# Patient Record
Sex: FEMALE | Race: OTHER | HISPANIC OR LATINO | Employment: FULL TIME | URBAN - METROPOLITAN AREA
[De-identification: names, ages, dates, MRNs, and addresses within clinical notes are randomized per-mention and may not be internally consistent; named-entity substitution may affect disease eponyms.]

---

## 2017-02-08 ENCOUNTER — HOSPITAL ENCOUNTER (EMERGENCY)
Facility: HOSPITAL | Age: 37
Discharge: HOME | End: 2017-02-08
Attending: EMERGENCY MEDICINE | Admitting: EMERGENCY MEDICINE
Payer: MEDICARE

## 2017-02-08 VITALS
DIASTOLIC BLOOD PRESSURE: 66 MMHG | WEIGHT: 250 LBS | TEMPERATURE: 97.8 F | RESPIRATION RATE: 18 BRPM | OXYGEN SATURATION: 96 % | SYSTOLIC BLOOD PRESSURE: 124 MMHG | HEART RATE: 98 BPM

## 2017-02-08 DIAGNOSIS — R51.9 HEADACHE: Primary | ICD-10-CM

## 2017-02-08 LAB
BACTERIA UR QL AUTO: ABNORMAL /HPF
BILIRUB UR QL STRIP: NEGATIVE
CLARITY UR: ABNORMAL
COLOR UR: YELLOW
GLUCOSE UR STRIP-MCNC: NEGATIVE MG/DL
HCG UR QL: NEGATIVE
HGB UR QL STRIP.AUTO: ABNORMAL
KETONES UR STRIP-MCNC: NEGATIVE MG/DL
LEUKOCYTE ESTERASE UR QL STRIP: ABNORMAL
MUCOUS THREADS UR QL AUTO: ABNORMAL
NITRITE UR QL STRIP: NEGATIVE
NON-SQ EPI CELLS URNS QL MICRO: ABNORMAL /HPF
PH UR STRIP.AUTO: 6 [PH] (ref 4.5–8)
PROT UR STRIP-MCNC: NEGATIVE MG/DL
RBC #/AREA URNS AUTO: ABNORMAL /HPF
SP GR UR STRIP.AUTO: 1.02 (ref 1–1.03)
UROBILINOGEN UR QL STRIP.AUTO: 0.2 E.U./DL
WBC #/AREA URNS AUTO: ABNORMAL /HPF

## 2017-02-08 PROCEDURE — 96361 HYDRATE IV INFUSION ADD-ON: CPT

## 2017-02-08 PROCEDURE — 87086 URINE CULTURE/COLONY COUNT: CPT

## 2017-02-08 PROCEDURE — 96374 THER/PROPH/DIAG INJ IV PUSH: CPT

## 2017-02-08 PROCEDURE — 81001 URINALYSIS AUTO W/SCOPE: CPT

## 2017-02-08 PROCEDURE — 96375 TX/PRO/DX INJ NEW DRUG ADDON: CPT

## 2017-02-08 PROCEDURE — 81025 URINE PREGNANCY TEST: CPT | Performed by: EMERGENCY MEDICINE

## 2017-02-08 PROCEDURE — 81002 URINALYSIS NONAUTO W/O SCOPE: CPT | Performed by: EMERGENCY MEDICINE

## 2017-02-08 PROCEDURE — 99283 EMERGENCY DEPT VISIT LOW MDM: CPT

## 2017-02-08 RX ORDER — KETOROLAC TROMETHAMINE 30 MG/ML
30 INJECTION, SOLUTION INTRAMUSCULAR; INTRAVENOUS ONCE
Status: COMPLETED | OUTPATIENT
Start: 2017-02-08 | End: 2017-02-08

## 2017-02-08 RX ORDER — NAPROXEN 500 MG/1
500 TABLET ORAL 2 TIMES DAILY WITH MEALS
Qty: 10 TABLET | Refills: 0 | Status: SHIPPED | OUTPATIENT
Start: 2017-02-08 | End: 2017-08-31 | Stop reason: ALTCHOICE

## 2017-02-08 RX ORDER — ONDANSETRON 4 MG/1
4 TABLET, ORALLY DISINTEGRATING ORAL EVERY 8 HOURS PRN
Qty: 20 TABLET | Refills: 0 | Status: SHIPPED | OUTPATIENT
Start: 2017-02-08 | End: 2017-08-31 | Stop reason: ALTCHOICE

## 2017-02-08 RX ORDER — METOCLOPRAMIDE HYDROCHLORIDE 5 MG/ML
10 INJECTION INTRAMUSCULAR; INTRAVENOUS ONCE
Status: COMPLETED | OUTPATIENT
Start: 2017-02-08 | End: 2017-02-08

## 2017-02-08 RX ADMIN — METOCLOPRAMIDE 10 MG: 5 INJECTION, SOLUTION INTRAMUSCULAR; INTRAVENOUS at 17:18

## 2017-02-08 RX ADMIN — SODIUM CHLORIDE 1000 ML: 0.9 INJECTION, SOLUTION INTRAVENOUS at 17:17

## 2017-02-08 RX ADMIN — KETOROLAC TROMETHAMINE 30 MG: 30 INJECTION, SOLUTION INTRAMUSCULAR at 17:19

## 2017-02-09 LAB — BACTERIA UR CULT: NORMAL

## 2017-08-31 ENCOUNTER — HOSPITAL ENCOUNTER (EMERGENCY)
Facility: HOSPITAL | Age: 37
Discharge: HOME | End: 2017-08-31
Attending: EMERGENCY MEDICINE | Admitting: EMERGENCY MEDICINE
Payer: MEDICARE

## 2017-08-31 VITALS
SYSTOLIC BLOOD PRESSURE: 144 MMHG | RESPIRATION RATE: 18 BRPM | TEMPERATURE: 98.4 F | DIASTOLIC BLOOD PRESSURE: 88 MMHG | WEIGHT: 293 LBS | HEART RATE: 98 BPM | OXYGEN SATURATION: 100 %

## 2017-08-31 DIAGNOSIS — N76.0 BACTERIAL VAGINOSIS: Primary | ICD-10-CM

## 2017-08-31 DIAGNOSIS — R10.9 ABDOMINAL CRAMPING: ICD-10-CM

## 2017-08-31 DIAGNOSIS — R10.2 VAGINAL PAIN: ICD-10-CM

## 2017-08-31 DIAGNOSIS — B96.89 BACTERIAL VAGINOSIS: Primary | ICD-10-CM

## 2017-08-31 LAB
BACTERIA UR QL AUTO: ABNORMAL /HPF
BILIRUB UR QL STRIP: ABNORMAL
CLARITY UR: CLEAR
COLOR UR: ABNORMAL
GLUCOSE UR STRIP-MCNC: NEGATIVE MG/DL
HGB UR QL STRIP.AUTO: ABNORMAL
KETONES UR STRIP-MCNC: NEGATIVE MG/DL
LEUKOCYTE ESTERASE UR QL STRIP: ABNORMAL
MUCOUS THREADS UR QL AUTO: ABNORMAL
NITRITE UR QL STRIP: NEGATIVE
NON-SQ EPI CELLS URNS QL MICRO: ABNORMAL /HPF
PH UR STRIP.AUTO: 5.5 [PH] (ref 4.5–8)
PROT UR STRIP-MCNC: ABNORMAL MG/DL
RBC #/AREA URNS AUTO: ABNORMAL /HPF
SP GR UR STRIP.AUTO: >=1.03 (ref 1–1.03)
UROBILINOGEN UR QL STRIP.AUTO: 0.2 E.U./DL
WBC #/AREA URNS AUTO: ABNORMAL /HPF

## 2017-08-31 PROCEDURE — 81001 URINALYSIS AUTO W/SCOPE: CPT

## 2017-08-31 PROCEDURE — 81002 URINALYSIS NONAUTO W/O SCOPE: CPT | Performed by: EMERGENCY MEDICINE

## 2017-08-31 PROCEDURE — 99284 EMERGENCY DEPT VISIT MOD MDM: CPT

## 2017-08-31 PROCEDURE — 81025 URINE PREGNANCY TEST: CPT | Performed by: EMERGENCY MEDICINE

## 2017-08-31 PROCEDURE — 87591 N.GONORRHOEAE DNA AMP PROB: CPT | Performed by: EMERGENCY MEDICINE

## 2017-08-31 PROCEDURE — 87491 CHLMYD TRACH DNA AMP PROBE: CPT | Performed by: EMERGENCY MEDICINE

## 2017-08-31 RX ORDER — LEVETIRACETAM 1000 MG/1
1000 TABLET ORAL 2 TIMES DAILY
COMMUNITY
End: 2018-10-10 | Stop reason: SDUPTHER

## 2017-08-31 RX ORDER — METRONIDAZOLE 500 MG/1
500 TABLET ORAL EVERY 12 HOURS SCHEDULED
Qty: 14 TABLET | Refills: 0 | Status: SHIPPED | OUTPATIENT
Start: 2017-08-31 | End: 2017-09-07

## 2017-08-31 RX ORDER — NAPROXEN 250 MG/1
500 TABLET ORAL ONCE
Status: COMPLETED | OUTPATIENT
Start: 2017-08-31 | End: 2017-08-31

## 2017-08-31 RX ORDER — NAPROXEN 500 MG/1
500 TABLET ORAL 2 TIMES DAILY PRN
Qty: 14 TABLET | Refills: 0 | Status: SHIPPED | OUTPATIENT
Start: 2017-08-31 | End: 2018-06-27 | Stop reason: ALTCHOICE

## 2017-08-31 RX ADMIN — NAPROXEN 500 MG: 250 TABLET ORAL at 17:12

## 2017-09-01 LAB
CHLAMYDIA DNA CVX QL NAA+PROBE: NORMAL
N GONORRHOEA DNA GENITAL QL NAA+PROBE: NORMAL

## 2018-03-26 ENCOUNTER — APPOINTMENT (EMERGENCY)
Dept: RADIOLOGY | Facility: HOSPITAL | Age: 38
End: 2018-03-26
Payer: MEDICARE

## 2018-03-26 ENCOUNTER — HOSPITAL ENCOUNTER (EMERGENCY)
Facility: HOSPITAL | Age: 38
Discharge: HOME | End: 2018-03-26
Attending: EMERGENCY MEDICINE | Admitting: EMERGENCY MEDICINE
Payer: MEDICARE

## 2018-03-26 VITALS
RESPIRATION RATE: 26 BRPM | OXYGEN SATURATION: 100 % | TEMPERATURE: 98.3 F | DIASTOLIC BLOOD PRESSURE: 87 MMHG | WEIGHT: 145.6 LBS | HEART RATE: 112 BPM | SYSTOLIC BLOOD PRESSURE: 172 MMHG

## 2018-03-26 DIAGNOSIS — R05.9 COUGH: ICD-10-CM

## 2018-03-26 DIAGNOSIS — J45.901 ASTHMA EXACERBATION: Primary | ICD-10-CM

## 2018-03-26 PROCEDURE — 71046 X-RAY EXAM CHEST 2 VIEWS: CPT

## 2018-03-26 PROCEDURE — 99285 EMERGENCY DEPT VISIT HI MDM: CPT

## 2018-03-26 PROCEDURE — 94640 AIRWAY INHALATION TREATMENT: CPT

## 2018-03-26 RX ORDER — BENZONATATE 100 MG/1
100 CAPSULE ORAL 3 TIMES DAILY PRN
Qty: 9 CAPSULE | Refills: 0 | Status: SHIPPED | OUTPATIENT
Start: 2018-03-26 | End: 2018-03-29

## 2018-03-26 RX ORDER — ALBUTEROL SULFATE 90 UG/1
2 AEROSOL, METERED RESPIRATORY (INHALATION) EVERY 6 HOURS PRN
COMMUNITY
End: 2018-07-30

## 2018-03-26 RX ORDER — ONDANSETRON 4 MG/1
4 TABLET, ORALLY DISINTEGRATING ORAL EVERY 6 HOURS PRN
Qty: 12 TABLET | Refills: 0 | Status: SHIPPED | OUTPATIENT
Start: 2018-03-26 | End: 2018-06-27 | Stop reason: ALTCHOICE

## 2018-03-26 RX ORDER — ALBUTEROL SULFATE 2.5 MG/3ML
5 SOLUTION RESPIRATORY (INHALATION) ONCE
Status: COMPLETED | OUTPATIENT
Start: 2018-03-26 | End: 2018-03-26

## 2018-03-26 RX ORDER — PREDNISONE 20 MG/1
20 TABLET ORAL DAILY
Qty: 10 TABLET | Refills: 0 | Status: SHIPPED | OUTPATIENT
Start: 2018-03-26 | End: 2018-03-26

## 2018-03-26 RX ORDER — ALBUTEROL SULFATE 2.5 MG/3ML
SOLUTION RESPIRATORY (INHALATION)
Status: DISCONTINUED
Start: 2018-03-26 | End: 2018-03-26

## 2018-03-26 RX ORDER — ALBUTEROL SULFATE 2.5 MG/3ML
SOLUTION RESPIRATORY (INHALATION)
Status: COMPLETED
Start: 2018-03-26 | End: 2018-03-26

## 2018-03-26 RX ORDER — ONDANSETRON 4 MG/1
4 TABLET, ORALLY DISINTEGRATING ORAL ONCE
Status: COMPLETED | OUTPATIENT
Start: 2018-03-26 | End: 2018-03-26

## 2018-03-26 RX ORDER — BENZONATATE 100 MG/1
100 CAPSULE ORAL ONCE
Status: COMPLETED | OUTPATIENT
Start: 2018-03-26 | End: 2018-03-26

## 2018-03-26 RX ORDER — PREDNISONE 20 MG/1
40 TABLET ORAL DAILY
Qty: 10 TABLET | Refills: 0 | Status: SHIPPED | OUTPATIENT
Start: 2018-03-26 | End: 2018-03-31

## 2018-03-26 RX ADMIN — BENZONATATE 100 MG: 100 CAPSULE ORAL at 20:01

## 2018-03-26 RX ADMIN — ONDANSETRON 4 MG: 4 TABLET, ORALLY DISINTEGRATING ORAL at 19:58

## 2018-03-26 RX ADMIN — IPRATROPIUM BROMIDE 0.5 MG: 0.5 SOLUTION RESPIRATORY (INHALATION) at 18:43

## 2018-03-26 RX ADMIN — ALBUTEROL SULFATE 5 MG: 2.5 SOLUTION RESPIRATORY (INHALATION) at 18:43

## 2018-03-26 RX ADMIN — Medication 0.5 MG: at 18:43

## 2018-03-26 RX ADMIN — PREDNISONE 50 MG: 20 TABLET ORAL at 18:56

## 2018-03-26 RX ADMIN — ALBUTEROL SULFATE 5 MG: 2.5 SOLUTION RESPIRATORY (INHALATION) at 20:01

## 2018-03-26 RX ADMIN — IPRATROPIUM BROMIDE 0.5 MG: 0.5 SOLUTION RESPIRATORY (INHALATION) at 20:01

## 2018-03-26 NOTE — ED PROVIDER NOTES
History  Chief Complaint   Patient presents with    Shortness of Breath     cough w/SOB x 2 days   "spitting up"  rib pain  HPI     26-year-old female with past medical history of intermittent asthma never ever intubated presents with chief complaint of shortness of breath  Started 2 days ago URI type symptoms  Patient began to wheeze 2 days ago patient has taking albuterol around the clock without relief  Patient states the cough started off as dry but has become more productive clear frothy sputum  Patient states this is like her typical asthma exacerbation  Patient was started on albuterol by nursing, patient states this is helping her greatly  Patient is conversationally dyspneic  Patient had an audible wheeze reported by nursing on presentation to triage  Patient denies history of DVT or PE  Patient does not take are hormone  No recent long trips  No smoking or smoking exposure  Dog at home  Typical trigger is cleaning solution at home  Patient denies fever chills rigors headache lightheadedness dizziness neck pain neck stiffness chest pain palpitations hemoptysis abdominal pain nausea vomiting diarrhea constipation urinary symptoms motor weakness  Prior to Admission Medications   Prescriptions Last Dose Informant Patient Reported? Taking?    Citalopram Hydrobromide (CELEXA PO)   Yes No   Sig: Take 1 tablet by mouth   DOXEPIN HCL PO   Yes No   Sig: Take 1 tablet by mouth   albuterol (PROVENTIL HFA,VENTOLIN HFA) 90 mcg/act inhaler  Self Yes Yes   Sig: Inhale 2 puffs every 6 (six) hours as needed for wheezing   levETIRAcetam (KEPPRA) 1000 MG tablet   Yes No   Sig: Take 1,000 mg by mouth 2 (two) times a day   naproxen (NAPROSYN) 500 mg tablet   No No   Sig: Take 1 tablet by mouth 2 (two) times a day as needed for mild pain or moderate pain      Facility-Administered Medications: None       Past Medical History:   Diagnosis Date    Angina at rest [facility])     Anxiety     Asthma     Depression  Migraine     PTSD (post-traumatic stress disorder)     Rheumatoid arthritis (HCC)     Seizures (HCC)        Past Surgical History:   Procedure Laterality Date    APPENDECTOMY         No family history on file  I have reviewed and agree with the history as documented  Social History   Substance Use Topics    Smoking status: Former Smoker    Smokeless tobacco: Not on file    Alcohol use No        Review of Systems   Constitutional: Negative for activity change, appetite change, chills, diaphoresis, fatigue, fever and unexpected weight change  HENT: Negative for congestion, ear discharge, ear pain, facial swelling, hearing loss, nosebleeds, postnasal drip, rhinorrhea, sinus pressure, sneezing, sore throat and tinnitus  Eyes: Negative for photophobia, pain, redness, itching and visual disturbance  Respiratory: Positive for chest tightness, shortness of breath and wheezing  Negative for cough and stridor  Cardiovascular: Negative for chest pain, palpitations and leg swelling  Gastrointestinal: Negative for abdominal distention, abdominal pain, anal bleeding, blood in stool, constipation, diarrhea, nausea and vomiting  Endocrine: Negative for polydipsia, polyphagia and polyuria  Genitourinary: Negative for decreased urine volume, difficulty urinating, dysuria, enuresis, flank pain, frequency, hematuria, menstrual problem, urgency, vaginal bleeding, vaginal discharge and vaginal pain  Musculoskeletal: Negative for arthralgias, back pain, gait problem, joint swelling, myalgias, neck pain and neck stiffness  Skin: Negative for rash and wound  Allergic/Immunologic: Negative for environmental allergies, food allergies and immunocompromised state  Neurological: Negative for dizziness, tremors, seizures, syncope, facial asymmetry, speech difficulty, weakness, light-headedness, numbness and headaches  Hematological: Negative for adenopathy     Psychiatric/Behavioral: Negative for agitation, behavioral problems, confusion, dysphoric mood, hallucinations, self-injury, sleep disturbance and suicidal ideas  The patient is not nervous/anxious and is not hyperactive  Physical Exam  ED Triage Vitals   Temperature Pulse Respirations Blood Pressure SpO2   03/26/18 2022 03/26/18 1843 03/26/18 1843 03/26/18 1843 03/26/18 1843   98 3 °F (36 8 °C) (!) 112 (!) 32 (!) 182/91 97 %      Temp Source Heart Rate Source Patient Position - Orthostatic VS BP Location FiO2 (%)   03/26/18 2022 03/26/18 2022 03/26/18 2022 03/26/18 2022 --   Oral Monitor Lying Right arm       Pain Score       03/26/18 1843       5           Orthostatic Vital Signs  Vitals:    03/26/18 1843 03/26/18 2022   BP: (!) 182/91 (!) 172/87   Pulse: (!) 112 (!) 112   Patient Position - Orthostatic VS:  Lying       Physical Exam   Constitutional: She is oriented to person, place, and time  She appears well-developed and well-nourished  No distress  She is not intubated  HENT:   Head: Normocephalic and atraumatic  Right Ear: External ear normal    Left Ear: External ear normal    Nose: Nose normal    Mouth/Throat: Oropharynx is clear and moist  No oropharyngeal exudate  Eyes: Conjunctivae and EOM are normal  Pupils are equal, round, and reactive to light  Right eye exhibits no discharge  Left eye exhibits no discharge  No scleral icterus  Neck: Normal range of motion  Neck supple  No JVD present  No tracheal deviation present  No thyromegaly present  Cardiovascular: Normal rate, regular rhythm and intact distal pulses  No murmur heard  Pulmonary/Chest: Effort normal  No accessory muscle usage or stridor  Tachypnea noted  No apnea and no bradypnea  She is not intubated  No respiratory distress  She has no decreased breath sounds  She has wheezes (mild expiratory)  She has no rhonchi  She has no rales  Abdominal: Soft  Bowel sounds are normal  She exhibits no distension and no mass  There is no tenderness   There is no rebound and no guarding  No hernia  Musculoskeletal: Normal range of motion  She exhibits no edema, tenderness or deformity  Lymphadenopathy:     She has no cervical adenopathy  Neurological: She is alert and oriented to person, place, and time  She displays normal reflexes  No cranial nerve deficit  She exhibits normal muscle tone  Skin: Skin is warm and dry  No rash noted  She is not diaphoretic  No erythema  Psychiatric: She has a normal mood and affect  Her behavior is normal  Judgment and thought content normal    Nursing note and vitals reviewed  ED Medications  Medications   albuterol inhalation solution 5 mg (5 mg Nebulization Given 3/26/18 1843)   ipratropium (ATROVENT) 0 02 % inhalation solution 0 5 mg (0 5 mg Nebulization Given 3/26/18 1843)   predniSONE tablet 50 mg (50 mg Oral Given 3/26/18 1856)   albuterol inhalation solution 5 mg (5 mg Nebulization Given 3/26/18 2001)   ipratropium (ATROVENT) 0 02 % inhalation solution 0 5 mg (0 5 mg Nebulization Given 3/26/18 2001)   benzonatate (TESSALON PERLES) capsule 100 mg (100 mg Oral Given 3/26/18 2001)   ondansetron (ZOFRAN-ODT) dispersible tablet 4 mg (4 mg Oral Given 3/26/18 1958)       Diagnostic Studies  Results Reviewed     None                 XR chest 2 views   ED Interpretation by Venice Garza DO (03/26 2003)   No effusions or consolidations bilaterally      Final Result by Katheryn Verma MD (03/27 3523)      Normal examination  Workstation performed: [id number]               Procedures  Procedures      Phone Consults  ED Phone Contact    ED Course  ED Course as of Mar 27 2032   Mon Mar 26, 2018   2037 Patient feels better    2038 No wheezing                                 MDM  Number of Diagnoses or Management Options  Asthma exacerbation:   Cough:   Diagnosis management comments: 23-year-old female presenting with asthma exacerbation  Patient was water bleed wheezing as per nursing    On exam vital signs show tachycardia with elevation blood pressure  Patient is in no acute respiratory distress, wheezing mild expiratory with breathing treatment process  Differential diagnosis includes asthma exacerbation pneumonia pulmonary embolism  Patient has never had a pulmonary embolism in the past, patient is not hypoxic  Pulmonary embolism low risk  Patient coughing up sputum, chest x-ray within normal limits doubt pneumonia  Patient admits that nausea vomiting, no signs of aspiration pneumonitis on chest x-ray  Impression asthma exacerbation with cough  Breathing treatments given reassessment multiple times, wheezing remitted, patient felt better  Tessalon Perles for home for cough  Educated patient on the danger of Josr Deal in children  Patient understands at 1 pill can kill a child  Patient will secure Tessalon Perles  Patient's sepsis wrist   Zofran for home for nausea with vomiting  Steroid taper for home  ED return precautions discussed  Patient will follow up with [address] [place] [address] Time    Disposition  Final diagnoses:   Asthma exacerbation   Cough     Time reflects when diagnosis was documented in both MDM as applicable and the Disposition within this note     Time User Action Codes Description Comment    3/26/2018  8:39 PM Justice Roque Add [J45 [id number]] Asthma exacerbation     3/26/2018  8:40 PM Oscar Hawkins Add [R05] Cough       ED Disposition     ED Disposition Condition Comment    Discharge  Carol Pilling discharge to home/self care  Condition at discharge: Good    Return precautions were discussed with patient  Patient understands when to return to  Emergency department  Patient agrees to discharge plan and follow up care             Follow-up Information     Follow up With Specialties Details Why [facility] Call in 2 days for reaccessment [address] [place] [address]  [id number] [phone] Discharge Medication List as of 3/26/2018  8:43 PM      START taking these medications    Details   benzonatate (TESSALON PERLES) 100 mg capsule Take 1 capsule (100 mg total) by mouth 3 (three) times a day as needed for cough for up to 3 days, Starting Mon 3/26/2018, Until Thu 3/29/2018, Print      ondansetron (ZOFRAN-ODT) 4 mg disintegrating tablet Take 1 tablet (4 mg total) by mouth every 6 (six) hours as needed for nausea or vomiting for up to 3 days, Starting Mon 3/26/2018, Until Thu 3/29/2018, Print      predniSONE 20 mg tablet Take 1 tablet (20 mg total) by mouth daily for 10 days, Starting Mon 3/26/2018, Until Thu 4/5/2018, Print         CONTINUE these medications which have NOT CHANGED    Details   albuterol (PROVENTIL HFA,VENTOLIN HFA) 90 mcg/act inhaler Inhale 2 puffs every 6 (six) hours as needed for wheezing, Historical Med      Citalopram Hydrobromide (CELEXA PO) Take 1 tablet by mouth, Historical Med      DOXEPIN HCL PO Take 1 tablet by mouth, Historical Med      levETIRAcetam (KEPPRA) 1000 MG tablet Take 1,000 mg by mouth 2 (two) times a day, Historical Med      naproxen (NAPROSYN) 500 mg tablet Take 1 tablet by mouth 2 (two) times a day as needed for mild pain or moderate pain, Starting Thu 8/31/2017, Print           No discharge procedures on file  ED Provider  Attending physically available and evaluated Lizabeth Madera I managed the patient along with the ED Attending      Electronically Signed by         Dhruv Mcgill DO  03/27/18 2033

## 2018-03-26 NOTE — ED ATTENDING ATTESTATION
Josh Werner DO, saw and evaluated the patient  I have discussed the patient with the resident/non-physician practitioner and agree with the resident's/non-physician practitioner's findings, Plan of Care, and MDM as documented in the resident's/non-physician practitioner's note, except where noted  All available labs and Radiology studies were reviewed  At this point I agree with the current assessment done in the Emergency Department  I have conducted an independent evaluation of this patient a history and physical is as follows:    Several days of SOB and asthma with associated productive cough  Patient mild expiratory wheeze without increased work of breathing on exam   The patient has hacking cough without significant productive sputum during my exam   The heart is regular with positive S1-S2    The patient's history and examination are consistent with acute asthma exacerbation with associated cough    The plan is for discharge with cough medication, and steroids        Critical Care Time  CritCare Time    Procedures

## 2018-03-27 NOTE — ED NOTES
Pt states feeling improved after second treatment   Dr Froy Shepherd at bedside for madelynal     Noemí Cornejo, RN  03/26/18 2035

## 2018-03-27 NOTE — DISCHARGE INSTRUCTIONS
Asthma, Ambulatory Care   GENERAL INFORMATION:   Asthma  is a lung disease that makes breathing difficult  Chronic inflammation and reactions to triggers narrow the airways in your lungs  Asthma can become life-threatening if it is not managed  Common symptoms include the following:   · Coughing     · Wheezing     · Shortness of breath     · Chest tightness  Seek immediate care for the following symptoms:   · Severe shortness of breath    · Blue or gray lips or nails    · Skin around your neck and ribs pulls in with each breath    · Shortness of breath, even after you take your short-term medicine as directed     · Peak flow numbers in the red zone of your asthma action plan  Treatment for asthma  will depend on how severe it is  Medicine may decrease inflammation, open airways, and make it easier to breathe  Medicines may be inhaled, taken as a pill, or injected  Short-term medicines relieve your symptoms quickly  Long-term medicines are used to prevent future attacks  You may also need medicine to help control your allergies  Manage and prevent future asthma attacks:   · Follow your asthma action pan  This is a written plan that you and your healthcare provider create  It explains which medicine you need and when to change doses if necessary  It also explains how you can monitor symptoms and use a peak flow meter  The meter measures how well your lungs are working  · Manage other health conditions , such as allergies, acid reflux, and sleep apnea  · Identify and avoid triggers  These may include pets, dust mites, mold, and cockroaches  · Do not smoke and avoid others who smoke  If you smoke, it is never too late to quit  Ask your healthcare provider if you need help quitting  · Ask about a flu vaccine  The flu can make your asthma worse  You may need a yearly flu shot  Follow up with your healthcare provider as directed:   You will need to return to make sure your medicine is working and your symptoms are controlled  You may be referred to an asthma or allergy specialist  Yenny Valente may be asked to keep a record of your peak flow values and bring it with you to your appointments  Write down your questions so you remember to ask them during your visits  CARE AGREEMENT:   You have the right to help plan your care  Learn about your health condition and how it may be treated  Discuss treatment options with your caregivers to decide what care you want to receive  You always have the right to refuse treatment  The above information is an  only  It is not intended as medical advice for individual conditions or treatments  Talk to your doctor, nurse or pharmacist before following any medical regimen to see if it is safe and effective for you  © 2014 [address] is for End User's use only and may not be sold, redistributed or otherwise used for commercial purposes  All illustrations and images included in CareNotes® are the copyrighted property of A D [employer] M [employer]  or [address]

## 2018-06-22 LAB
ABSOL LYMPHOCYTES (HISTORICAL): 1.7 K/UL (ref 0.5–4)
ALBUMIN SERPL BCP-MCNC: 3.9 G/DL (ref 3–5.2)
ALP SERPL-CCNC: 73 U/L (ref 43–122)
ALT SERPL W P-5'-P-CCNC: 23 U/L (ref 9–52)
ANION GAP SERPL CALCULATED.3IONS-SCNC: 10 MMOL/L (ref 5–14)
AST SERPL W P-5'-P-CCNC: 18 U/L (ref 14–36)
BASOPHILS # BLD AUTO: 0 % (ref 0–1)
BASOPHILS # BLD AUTO: 0 K/UL (ref 0–0.1)
BILIRUB SERPL-MCNC: 0.3 MG/DL
BUN SERPL-MCNC: 10 MG/DL (ref 5–25)
CALCIUM SERPL-MCNC: 9.2 MG/DL (ref 8.4–10.2)
CHLORIDE SERPL-SCNC: 107 MEQ/L (ref 97–108)
CO2 SERPL-SCNC: 25 MMOL/L (ref 22–30)
CREATINE, SERUM (HISTORICAL): 0.65 MG/DL (ref 0.6–1.2)
DEPRECATED RDW RBC AUTO: 13.8 %
EGFR (HISTORICAL): >60 ML/MIN/1.73 M2
EOSINOPHIL # BLD AUTO: 0.3 K/UL (ref 0–0.4)
EOSINOPHIL NFR BLD AUTO: 3 % (ref 0–6)
GLUCOSE SERPL-MCNC: 98 MG/DL (ref 70–99)
HCT VFR BLD AUTO: 33.2 % (ref 36–46)
HGB BLD-MCNC: 11 G/DL (ref 12–16)
LYMPHOCYTES NFR BLD AUTO: 20 % (ref 25–45)
MCH RBC QN AUTO: 27 PG (ref 26–34)
MCHC RBC AUTO-ENTMCNC: 33.1 % (ref 31–36)
MCV RBC AUTO: 82 FL (ref 80–100)
MONOCYTES # BLD AUTO: 0.5 K/UL (ref 0.2–0.9)
MONOCYTES NFR BLD AUTO: 6 % (ref 1–10)
NEUTROPHILS ABS COUNT (HISTORICAL): 6 K/UL (ref 1.8–7.8)
NEUTS SEG NFR BLD AUTO: 71 % (ref 45–65)
PLATELET # BLD AUTO: 369 K/MCL (ref 150–450)
POTASSIUM SERPL-SCNC: 4 MEQ/L (ref 3.6–5)
RBC # BLD AUTO: 4.06 M/MCL (ref 4–5.2)
SODIUM SERPL-SCNC: 141 MEQ/L (ref 137–147)
TOTAL PROTEIN (HISTORICAL): 7.7 G/DL (ref 5.9–8.4)
WBC # BLD AUTO: 8.5 K/MCL (ref 4.5–11)

## 2018-06-27 ENCOUNTER — OFFICE VISIT (OUTPATIENT)
Dept: SURGERY | Facility: CLINIC | Age: 38
End: 2018-06-27

## 2018-06-27 VITALS
WEIGHT: 293 LBS | SYSTOLIC BLOOD PRESSURE: 140 MMHG | RESPIRATION RATE: 14 BRPM | HEART RATE: 82 BPM | DIASTOLIC BLOOD PRESSURE: 92 MMHG

## 2018-06-27 DIAGNOSIS — K80.12 CALCULUS OF GALLBLADDER WITH ACUTE ON CHRONIC CHOLECYSTITIS WITHOUT OBSTRUCTION: Primary | ICD-10-CM

## 2018-06-27 PROCEDURE — 99024 POSTOP FOLLOW-UP VISIT: CPT | Performed by: SPECIALIST

## 2018-06-27 RX ORDER — FOLIC ACID 1 MG/1
TABLET ORAL EVERY 24 HOURS
COMMUNITY
Start: 2016-01-27

## 2018-06-27 RX ORDER — MELOXICAM 7.5 MG/1
7.5 TABLET ORAL 2 TIMES DAILY PRN
COMMUNITY
End: 2018-08-27 | Stop reason: SDUPTHER

## 2018-06-27 RX ORDER — PYRIDOXINE HCL (VITAMIN B6) 100 MG
TABLET ORAL
COMMUNITY
Start: 2015-10-05

## 2018-06-27 RX ORDER — FUROSEMIDE 20 MG/1
TABLET ORAL EVERY 24 HOURS
COMMUNITY
Start: 2018-05-09 | End: 2019-08-02 | Stop reason: SDUPTHER

## 2018-06-27 RX ORDER — MULTIVIT-MIN/IRON FUM/FOLIC AC 7.5 MG-4
1 TABLET ORAL DAILY
COMMUNITY
End: 2019-08-02 | Stop reason: SDUPTHER

## 2018-06-27 RX ORDER — ALBUTEROL SULFATE 90 UG/1
AEROSOL, METERED RESPIRATORY (INHALATION)
COMMUNITY
Start: 2018-04-23 | End: 2018-11-29 | Stop reason: SDUPTHER

## 2018-06-27 RX ORDER — ACETAMINOPHEN 160 MG
TABLET,DISINTEGRATING ORAL
COMMUNITY
Start: 2015-10-05

## 2018-06-27 RX ORDER — LORATADINE 10 MG/1
TABLET ORAL EVERY 24 HOURS
COMMUNITY
Start: 2018-05-09 | End: 2019-08-02 | Stop reason: SDUPTHER

## 2018-06-27 RX ORDER — BENZONATATE 100 MG/1
CAPSULE ORAL 3 TIMES DAILY
COMMUNITY
Start: 2018-05-09 | End: 2018-07-29

## 2018-06-27 RX ORDER — NITROGLYCERIN 0.4 MG/1
TABLET SUBLINGUAL
COMMUNITY
Start: 2015-07-28 | End: 2019-03-28 | Stop reason: SDUPTHER

## 2018-06-27 RX ORDER — FAMOTIDINE 20 MG/1
TABLET, FILM COATED ORAL EVERY 12 HOURS
COMMUNITY
Start: 2015-10-05 | End: 2019-08-02 | Stop reason: SDUPTHER

## 2018-06-27 RX ORDER — ASPIRIN 81 MG/1
TABLET, CHEWABLE ORAL EVERY 24 HOURS
COMMUNITY
Start: 2015-07-28 | End: 2018-09-14 | Stop reason: SINTOL

## 2018-06-27 NOTE — LETTER
June 27, 2018     Patient: Jamar Irwin   YOB: 1980   Date of Visit: 6/27/2018       To Whom it May Concern:    Jamar Irwin is under my professional care  She was seen in my office on 6/27/2018  Leticia Whitten was her transportation for today's visit  Thank you for excusing him       If you have any questions or concerns, please don't hesitate to call           Sincerely,          Gillian He MD        CC: Faizaalf Dc

## 2018-06-27 NOTE — PROGRESS NOTES
Assessment/Plan:   Nikki Barillas is a 44 y  o female who comes in today for postoperative check   Status post laparoscopic cholecystectomy  She was recently seen by her family physician and started on Lasix  She is due to follow up 07/30/2018 at the [facility] as IgA L  From our standpoint surgically she stable  To still the return here p r n  Is encouraged to follow up with her family physician at the center  Postoperative restrictions reviewed  All questions answered  ______________________________________________________  HPI:  Nikki Barillas is a 44 y  o female who comes in today for postoperative check after recent  Laparoscopic cholecystectomy for acute calculous cholecystitis  She was admitted through the emergency room at [facility] and ultimately underwent laparoscopic cholecystectomy after having medical clearance  Currently   Currently she has no real complaints other her umbilicus hurts a little bit  She is otherwise tolerating her diet as per GI function  ROS:  General ROS: negative for - chills, fatigue, fever or night sweats, weight loss  Respiratory ROS: no cough, shortness of breath, or wheezing  Cardiovascular ROS: no chest pain or dyspnea on exertion  Genito-Urinary ROS: no dysuria, trouble voiding, or hematuria  Musculoskeletal ROS: negative for - gait disturbance, joint pain or muscle pain  Neurological ROS: no TIA or stroke symptoms  GI ROS: see HPI  Skin ROS: no new rashes or lesions   Lymphatic ROS: no new adenopathy noted by pt  GYN ROS: see HPI, no new GYN history or bleeding noted  Psy ROS: no new mental or behavioral disturbances         There is no problem list on file for this patient  Allergies:  Benadryl [diphenhydramine]; Codeine; Iodine; Latex; Lovenox [enoxaparin]; Oxycontin [oxycodone]; Provera [medroxyprogesterone];  Shellfish-derived products; Vaccinium angustifolium; and Vicodin [hydrocodone-acetaminophen]      Current Outpatient Prescriptions:     albuterol (PROVENTIL HFA,VENTOLIN HFA) 90 mcg/act inhaler, Inhale 2 puffs every 6 (six) hours as needed for wheezing, Disp: , Rfl:     aspirin 81 mg chewable tablet, every 24 hours, Disp: , Rfl:     benzonatate (TESSALON PERLES) 100 mg capsule, 3 (three) times a day, Disp: , Rfl:     Calcium Carb-Cholecalciferol (CALCIUM 600+D3) 600-200 MG-UNIT TABS, take 1 tablet twice a day, Disp: , Rfl:     Cholecalciferol (VITAMIN D3) 2000 units capsule, take 1 tablet po daily  , Disp: , Rfl:     Citalopram Hydrobromide (CELEXA PO), Take 1 tablet by mouth, Disp: , Rfl:     DOXEPIN HCL PO, Take 1 tablet by mouth, Disp: , Rfl:     famotidine (PEPCID) 20 mg tablet, Every 12 hours, Disp: , Rfl:     folic acid (FOLVITE) 1 mg tablet, every 24 hours, Disp: , Rfl:     furosemide (LASIX) 20 mg tablet, every 24 hours, Disp: , Rfl:     levETIRAcetam (KEPPRA) 1000 MG tablet, Take 1,000 mg by mouth 2 (two) times a day, Disp: , Rfl:     loratadine (CLARITIN) 10 mg tablet, every 24 hours, Disp: , Rfl:     meloxicam (MOBIC) 7 5 mg tablet, Take 7 5 mg by mouth 2 (two) times a day as needed, Disp: , Rfl:     Multiple Vitamins-Minerals (MULTIVITAMIN WITH MINERALS) tablet, Take 1 tablet by mouth daily, Disp: , Rfl:     nitroglycerin (NITROSTAT) 0 4 mg SL tablet, place 1 tablet (0 4MG)  by sublingual route at the 1st sign of attack; may repeat every 5 min until relief; as needed, Disp: , Rfl:     albuterol (VENTOLIN HFA) 90 mcg/act inhaler, inhale 2 puff by inhalation route  every 4 - 6 hours as needed, Disp: , Rfl:     Past Medical History:   Diagnosis Date    [facility] rest [facility] [address] )     Anxiety     Asthma     Depression     Hypertension     Migraine     PTSD (post-traumatic stress disorder)     Rheumatoid arthritis ([facility] [address] )     Seizures ([facility] [address] )        Past Surgical History:   Procedure Laterality Date    APPENDECTOMY       SECTION      CHOLECYSTECTOMY         Family History   Problem Relation Age of Onset    Diabetes Maternal Grandmother     Hypertension Maternal Grandmother         reports that she has quit smoking  She has never used smokeless tobacco  She reports that she does not drink alcohol or use drugs  PHYSICAL EXAM  General: normal, cooperative, no distress  Abdominal: soft, nondistended or nontender  Incision: clean, dry, and intact and healing well    Some portions of this record may have been generated with voice recognition software  There may be translation, syntax,  or grammatical errors  Occasional wrong word or "sound-a-like" substitutions may have occurred due to the inherent limitations of the voice recognition software  Read the chart carefully and recognize, using context, where substitutions may have occurred  If you have any questions, please contact the dictating provider for clarification or correction, as needed  This encounter has been coded by a non-certified coder

## 2018-07-29 PROBLEM — R56.9 SEIZURE (HCC): Status: ACTIVE | Noted: 2018-07-29

## 2018-07-29 PROBLEM — F32.A DEPRESSION: Status: ACTIVE | Noted: 2018-07-29

## 2018-07-30 ENCOUNTER — OFFICE VISIT (OUTPATIENT)
Dept: FAMILY MEDICINE CLINIC | Facility: CLINIC | Age: 38
End: 2018-07-30
Payer: COMMERCIAL

## 2018-07-30 ENCOUNTER — APPOINTMENT (OUTPATIENT)
Dept: LAB | Facility: HOSPITAL | Age: 38
End: 2018-07-30
Payer: COMMERCIAL

## 2018-07-30 ENCOUNTER — TRANSCRIBE ORDERS (OUTPATIENT)
Dept: ADMINISTRATIVE | Facility: HOSPITAL | Age: 38
End: 2018-07-30

## 2018-07-30 VITALS
HEART RATE: 129 BPM | WEIGHT: 293 LBS | DIASTOLIC BLOOD PRESSURE: 94 MMHG | SYSTOLIC BLOOD PRESSURE: 132 MMHG | OXYGEN SATURATION: 99 % | HEIGHT: 62 IN | RESPIRATION RATE: 18 BRPM | BODY MASS INDEX: 53.92 KG/M2 | TEMPERATURE: 97.2 F

## 2018-07-30 DIAGNOSIS — R32 URINARY INCONTINENCE, UNSPECIFIED TYPE: ICD-10-CM

## 2018-07-30 DIAGNOSIS — M06.9 RHEUMATOID ARTHRITIS, INVOLVING UNSPECIFIED SITE, UNSPECIFIED RHEUMATOID FACTOR PRESENCE: ICD-10-CM

## 2018-07-30 DIAGNOSIS — R26.2 AMBULATORY DYSFUNCTION: ICD-10-CM

## 2018-07-30 DIAGNOSIS — E66.9 OBESITY, UNSPECIFIED CLASSIFICATION, UNSPECIFIED OBESITY TYPE, UNSPECIFIED WHETHER SERIOUS COMORBIDITY PRESENT: ICD-10-CM

## 2018-07-30 DIAGNOSIS — E66.9 OBESITY, UNSPECIFIED CLASSIFICATION, UNSPECIFIED OBESITY TYPE, UNSPECIFIED WHETHER SERIOUS COMORBIDITY PRESENT: Primary | ICD-10-CM

## 2018-07-30 DIAGNOSIS — R31.9 HEMATURIA, UNSPECIFIED TYPE: ICD-10-CM

## 2018-07-30 PROBLEM — D64.9 ANEMIA: Status: ACTIVE | Noted: 2018-07-30

## 2018-07-30 PROBLEM — J45.20 MILD INTERMITTENT ASTHMA: Status: ACTIVE | Noted: 2018-07-30

## 2018-07-30 PROBLEM — N39.3 STRESS INCONTINENCE: Status: ACTIVE | Noted: 2018-07-30

## 2018-07-30 PROBLEM — N13.30 HYDRONEPHROSIS: Status: ACTIVE | Noted: 2018-07-30

## 2018-07-30 LAB
CHOLEST SERPL-MCNC: 153 MG/DL
EST. AVERAGE GLUCOSE BLD GHB EST-MCNC: 131 MG/DL
HBA1C MFR BLD: 6.2 % (ref 4.2–6.3)
HDLC SERPL-MCNC: 49 MG/DL (ref 40–59)
LDLC SERPL CALC-MCNC: 72 MG/DL
NONHDLC SERPL-MCNC: 104 MG/DL
SL AMB  POCT GLUCOSE, UA: ABNORMAL
SL AMB LEUKOCYTE ESTERASE,UA: ABNORMAL
SL AMB POCT BILIRUBIN,UA: ABNORMAL
SL AMB POCT BLOOD,UA: 3
SL AMB POCT CLARITY,UA: CLEAR
SL AMB POCT COLOR,UA: ABNORMAL
SL AMB POCT KETONES,UA: ABNORMAL
SL AMB POCT NITRITE,UA: ABNORMAL
SL AMB POCT PH,UA: 5
SL AMB POCT SPECIFIC GRAVITY,UA: 1020
SL AMB POCT URINE PROTEIN: 1
SL AMB POCT UROBILINOGEN: ABNORMAL
TRIGL SERPL-MCNC: 159 MG/DL
TSH SERPL DL<=0.05 MIU/L-ACNC: 2.04 UIU/ML (ref 0.47–4.68)

## 2018-07-30 PROCEDURE — 86038 ANTINUCLEAR ANTIBODIES: CPT

## 2018-07-30 PROCEDURE — 84443 ASSAY THYROID STIM HORMONE: CPT

## 2018-07-30 PROCEDURE — 99213 OFFICE O/P EST LOW 20 MIN: CPT | Performed by: INTERNAL MEDICINE

## 2018-07-30 PROCEDURE — 86431 RHEUMATOID FACTOR QUANT: CPT

## 2018-07-30 PROCEDURE — 80061 LIPID PANEL: CPT

## 2018-07-30 PROCEDURE — 81002 URINALYSIS NONAUTO W/O SCOPE: CPT | Performed by: INTERNAL MEDICINE

## 2018-07-30 PROCEDURE — 83036 HEMOGLOBIN GLYCOSYLATED A1C: CPT

## 2018-07-30 PROCEDURE — 36415 COLL VENOUS BLD VENIPUNCTURE: CPT

## 2018-07-30 PROCEDURE — 86430 RHEUMATOID FACTOR TEST QUAL: CPT

## 2018-07-30 NOTE — ASSESSMENT & PLAN NOTE
Counseled patient on the importance of working to achieve weight reduction goal   Discussed benefits of weight loss including prevention or control of comorbidities  Discussed role that balanced diet and daily activity play in weight reduction  Set up small attainable weight loss goals  Involve family, friends, and co-workers for support  Will obtain blood work for hypothyroidism especially with bilateral lower extremity swelling  I informed patient that she is a good candidate for gastric bypass however she is not willing to have it    She will think about it

## 2018-07-30 NOTE — PROGRESS NOTES
Assessment/Plan:    Seizure ([address] )  Generalized out seizures  She is on Keppra 1000 mg b i d  Last seizure was few months ago    Mild intermittent asthma  Symptoms are currently controlled at this time  Avoid exposure to tobacco smoke, polluted air and other known asthma triggers  Monitor albuterol use to report back at follow up  Patient aware that increased albuterol use indicates poor control and may need further medication adjustment  Continue albuterol as needed    Essential hypertension  Currently blood pressure is controlled at this time  Reviewed BP target goal with patient  Continue to maintain healthy balanced diet with focus on low salt intake  Limit alcohol intake  She is only on Lasix 20 mg daily  Which she states it was given for her for hypertension and edema    Rheumatoid myopathy with rheumatoid arthritis [facility])  She told me she used to be on methotrexate in 2016 and she was [facility] however she was never seen by a rheumatologist   She is requesting to go back to methotrexate  Will refer to Rheumatology  I will also obtain rheumatoid factor and CAROLYN as patient does not seem to have that done in the past    Obesity, unspecified  Counseled patient on the importance of working to achieve weight reduction goal   Discussed benefits of weight loss including prevention or control of comorbidities  Discussed role that balanced diet and daily activity play in weight reduction  Set up small attainable weight loss goals  Involve family, friends, and co-workers for support  Will obtain blood work for hypothyroidism especially with bilateral lower extremity swelling  I informed patient that she is a good candidate for gastric bypass however she is not willing to have it  She will think about it    Depression  To follow up with [facility] however has not seen them for a while  Denies any homicidal suicidal ideations  She is not on any medications right now    I encouraged her to call them and schedule an appointment with them    Anemia  Most likely secondary to her chronic disease rheumatoid arthritis  Continue supplements    Ambulatory dysfunction  She is requesting a walker as she is unable to get up at night to go to the bathroom due to her arthritis and morbid obesity  She is at risk for fall  Will send script for a walker    Stress incontinence  Encouraged her to continue doing Kegel exercises  She is requesting script for pads  Hydronephrosis  In the past over 10 years ago  However she does not know if she had kidney stones  She reports hematuria   Due to history of back pain we will check for nephrolithiasis  Will obtain urine dipstick in the office and send for CT abdomen as well       Diagnoses and all orders for this visit:    Obesity, unspecified classification, unspecified obesity type, unspecified whether serious comorbidity present  -     TSH, 3rd generation with T4 reflex; Future  -     Lipid panel; Future  -     HEMOGLOBIN A1C W/ EAG ESTIMATION; Future    Hematuria, unspecified type  -     POCT urine dip    Rheumatoid arthritis, involving unspecified site, unspecified rheumatoid factor presence ([facility] [address] )  -     Ambulatory referral to Rheumatology; Future  -     RF Screen w/ Reflex to Titer; Future  -     CAROLYN Screen w/ Reflex to Titer/Pattern; Future    Ambulatory dysfunction  -     Walker    Urinary incontinence, unspecified type  -     Incontinence Supply Disposable (ULTIMA INCONTINENCE PAD) MISC; by Does not apply route as needed (incontinence)          Subjective:      Patient ID: Pastora Villaseñor is a 44 y o  female  HPI  Patient is here to establish care  She was seen last in our office in 2016 for a regular visit and came in recently for multiple sick visits  She does not need refills for her medications  But she is requesting to restart methotrexate  She is unable to tell me why she is on aspirin    States that when she was pregnant she had a clot in her kidney and they placed a stent  The stent was removed afterwards and she was placed on aspirin for that  However she also reports that she had hydronephrosis at this time  She does not think that she had kidney stones  She was diagnosed with rheumatoid arthritis in group home  And was placed on some medication which she had side effects of and then was changed to methotrexate  She is requesting a walker due to her ambulatory dysfunction  She is also complaining that her legs are very swollen and that the water pill is not helping them she was not checked for hypothyroidism  I explained to her that this may not be water retention but fat deposition  She denies any shortness of breath  But she is complaining of soft stools that she is  having about 5 times a day since she had her cholecystectomy recently  I explained to her that this could be due to her diet after she had cholecystectomy  She will call Dr logan and inform him about that as well  Complaining of diffuse joint pains  Her last Pap smear was long time ago and she will return for a gyn visit in 3 months with me    The following portions of the patient's history were reviewed and updated as appropriate: allergies, current medications, past family history, past medical history, past social history, past surgical history and problem list     Review of Systems   Constitutional: Negative for chills, fatigue and fever  HENT: Negative for congestion, sinus pressure and sore throat  Eyes: Negative for photophobia, pain and visual disturbance  Respiratory: Negative for cough, chest tightness and shortness of breath  Cardiovascular: Negative for chest pain and leg swelling  Gastrointestinal: Positive for diarrhea  Negative for abdominal pain  Genitourinary: Negative for dysuria, frequency and urgency  Musculoskeletal: Positive for arthralgias and myalgias  Skin: Negative for rash     Neurological: Negative for dizziness, seizures, syncope and headaches  Hematological: Negative for adenopathy  Objective:      /94 (Patient Position: Sitting, Cuff Size: Large) Comment (BP Location): wrist  Pulse (!) 129   Temp (!) 97 2 °F (36 2 °C) (Temporal)   Resp 18   Ht 5' 1 5" (1 562 m)   Wt 136 kg (300 lb)   SpO2 99%   BMI 55 77 kg/m²          Physical Exam   Constitutional: She is oriented to person, place, and time  She appears well-developed and well-nourished  HENT:   Head: Normocephalic and atraumatic  Mouth/Throat: No oropharyngeal exudate  Eyes: Conjunctivae and EOM are normal  Pupils are equal, round, and reactive to light  Right eye exhibits no discharge  Left eye exhibits no discharge  Neck: Normal range of motion  Neck supple  No JVD present  No thyromegaly present  Cardiovascular: Normal rate, regular rhythm and normal heart sounds  No murmur heard  Pulmonary/Chest: Effort normal and breath sounds normal  No respiratory distress  She has no wheezes  She has no rales  Abdominal: Bowel sounds are normal  She exhibits no distension  There is no tenderness  Musculoskeletal: Normal range of motion  She exhibits no edema or deformity  Neurological: She is alert and oriented to person, place, and time  Skin: Skin is warm and dry

## 2018-07-30 NOTE — ASSESSMENT & PLAN NOTE
She told me she used to be on methotrexate in 2016 and [place] however she was never seen by a rheumatologist   She is requesting to go back to methotrexate  Will refer to Rheumatology    I will also obtain rheumatoid factor and CAROLYN as patient does not seem to have that done in the past

## 2018-07-30 NOTE — ASSESSMENT & PLAN NOTE
She is requesting a walker as she is unable to get up at night to go to the bathroom due to her arthritis and morbid obesity  She is at risk for fall    Will send script for a walker

## 2018-07-30 NOTE — ASSESSMENT & PLAN NOTE
Symptoms are currently controlled at this time  Avoid exposure to tobacco smoke, polluted air and other known asthma triggers  Monitor albuterol use to report back at follow up  Patient aware that increased albuterol use indicates poor control and may need further medication adjustment    Continue albuterol as needed

## 2018-07-30 NOTE — ASSESSMENT & PLAN NOTE
To follow up with [facility] however has not seen them for a while  Denies any homicidal suicidal ideations  She is not on any medications right now    I encouraged her to call them and schedule an appointment with them

## 2018-07-30 NOTE — ASSESSMENT & PLAN NOTE
In the past over 10 years ago  However she does not know if she had kidney stones  She reports hematuria   Due to history of back pain we will check for nephrolithiasis    Will obtain urine dipstick in the office and send for CT abdomen as well

## 2018-07-30 NOTE — ASSESSMENT & PLAN NOTE
Currently blood pressure is controlled at this time  Reviewed BP target goal with patient  Continue to maintain healthy balanced diet with focus on low salt intake  Limit alcohol intake  She is only on Lasix 20 mg daily    Which she states it was given for her for hypertension and edema

## 2018-07-31 LAB
CRYOGLOB RF SER-ACNC: ABNORMAL [IU]/ML
RHEUMATOID FACT SER QL LA: POSITIVE

## 2018-08-01 LAB — RYE IGE QN: NEGATIVE

## 2018-08-06 DIAGNOSIS — Z11.59 ENCOUNTER FOR HEPATITIS C VIRUS SCREENING TEST FOR HIGH RISK PATIENT: ICD-10-CM

## 2018-08-06 DIAGNOSIS — Z91.89 ENCOUNTER FOR HEPATITIS C VIRUS SCREENING TEST FOR HIGH RISK PATIENT: ICD-10-CM

## 2018-08-06 DIAGNOSIS — Z11.4 SCREENING FOR HIV (HUMAN IMMUNODEFICIENCY VIRUS): ICD-10-CM

## 2018-08-06 DIAGNOSIS — Z11.1 SCREENING FOR TUBERCULOSIS: ICD-10-CM

## 2018-08-06 DIAGNOSIS — M06.9 RHEUMATOID ARTHRITIS, INVOLVING UNSPECIFIED SITE, UNSPECIFIED RHEUMATOID FACTOR PRESENCE: Primary | ICD-10-CM

## 2018-08-07 ENCOUNTER — APPOINTMENT (OUTPATIENT)
Dept: LAB | Facility: HOSPITAL | Age: 38
End: 2018-08-07
Payer: COMMERCIAL

## 2018-08-07 ENCOUNTER — HOSPITAL ENCOUNTER (OUTPATIENT)
Dept: CT IMAGING | Facility: HOSPITAL | Age: 38
Discharge: HOME | End: 2018-08-07
Payer: COMMERCIAL

## 2018-08-07 DIAGNOSIS — Z11.4 SCREENING FOR HIV (HUMAN IMMUNODEFICIENCY VIRUS): ICD-10-CM

## 2018-08-07 DIAGNOSIS — R31.9 HEMATURIA, UNSPECIFIED TYPE: ICD-10-CM

## 2018-08-07 DIAGNOSIS — M06.9 RHEUMATOID ARTHRITIS, INVOLVING UNSPECIFIED SITE, UNSPECIFIED RHEUMATOID FACTOR PRESENCE: ICD-10-CM

## 2018-08-07 DIAGNOSIS — Z11.1 SCREENING FOR TUBERCULOSIS: ICD-10-CM

## 2018-08-07 DIAGNOSIS — Z11.59 ENCOUNTER FOR HEPATITIS C VIRUS SCREENING TEST FOR HIGH RISK PATIENT: ICD-10-CM

## 2018-08-07 DIAGNOSIS — Z91.89 ENCOUNTER FOR HEPATITIS C VIRUS SCREENING TEST FOR HIGH RISK PATIENT: ICD-10-CM

## 2018-08-07 LAB
ALBUMIN SERPL BCP-MCNC: 4.2 G/DL (ref 3–5.2)
ALP SERPL-CCNC: 80 U/L (ref 43–122)
ALT SERPL W P-5'-P-CCNC: 37 U/L (ref 9–52)
ANION GAP SERPL CALCULATED.3IONS-SCNC: 11 MMOL/L (ref 5–14)
AST SERPL W P-5'-P-CCNC: 25 U/L (ref 14–36)
BILIRUB SERPL-MCNC: 0.2 MG/DL
BUN SERPL-MCNC: 16 MG/DL (ref 5–25)
CALCIUM SERPL-MCNC: 9.5 MG/DL (ref 8.4–10.2)
CHLORIDE SERPL-SCNC: 105 MMOL/L (ref 97–108)
CO2 SERPL-SCNC: 25 MMOL/L (ref 22–30)
CREAT SERPL-MCNC: 0.72 MG/DL (ref 0.6–1.2)
ERYTHROCYTE [SEDIMENTATION RATE] IN BLOOD: 83 MM/HOUR (ref 1–20)
GFR SERPL CREATININE-BSD FRML MDRD: 107 ML/MIN/1.73SQ M
GLUCOSE P FAST SERPL-MCNC: 105 MG/DL (ref 70–99)
POTASSIUM SERPL-SCNC: 3.5 MMOL/L (ref 3.6–5)
PROT SERPL-MCNC: 8.3 G/DL (ref 5.9–8.4)
SODIUM SERPL-SCNC: 141 MMOL/L (ref 137–147)

## 2018-08-07 PROCEDURE — 87389 HIV-1 AG W/HIV-1&-2 AB AG IA: CPT

## 2018-08-07 PROCEDURE — 80053 COMPREHEN METABOLIC PANEL: CPT

## 2018-08-07 PROCEDURE — 36415 COLL VENOUS BLD VENIPUNCTURE: CPT

## 2018-08-07 PROCEDURE — 74176 CT ABD & PELVIS W/O CONTRAST: CPT

## 2018-08-07 PROCEDURE — 85652 RBC SED RATE AUTOMATED: CPT

## 2018-08-07 PROCEDURE — 86200 CCP ANTIBODY: CPT

## 2018-08-07 PROCEDURE — 80074 ACUTE HEPATITIS PANEL: CPT

## 2018-08-07 PROCEDURE — 86480 TB TEST CELL IMMUN MEASURE: CPT

## 2018-08-08 LAB
HAV IGM SER QL: NORMAL
HBV CORE IGM SER QL: NORMAL
HBV SURFACE AG SER QL: NORMAL
HCV AB SER QL: NORMAL
HIV 1+2 AB+HIV1 P24 AG SERPL QL IA: NORMAL

## 2018-08-09 LAB
ANNOTATION COMMENT IMP: NORMAL
CCP IGA+IGG SERPL IA-ACNC: >250 UNITS (ref 0–19)
GAMMA INTERFERON BACKGROUND BLD IA-ACNC: 0.05 IU/ML
M TB IFN-G BLD-IMP: NEGATIVE
M TB IFN-G CD4+ BCKGRND COR BLD-ACNC: 0.01 IU/ML
M TB IFN-G CD4+ T-CELLS BLD-ACNC: 0.06 IU/ML
MITOGEN IGNF BLD-ACNC: 6.89 IU/ML
QUANTIFERON-TB GOLD IN TUBE: NORMAL
SERVICE CMNT-IMP: NORMAL

## 2018-08-10 ENCOUNTER — TELEPHONE (OUTPATIENT)
Dept: FAMILY MEDICINE CLINIC | Facility: CLINIC | Age: 38
End: 2018-08-10

## 2018-08-10 DIAGNOSIS — M05.9 RHEUMATOID ARTHRITIS WITH POSITIVE RHEUMATOID FACTOR, INVOLVING UNSPECIFIED SITE (HCC): Primary | ICD-10-CM

## 2018-08-10 RX ORDER — METHOTREXATE 2.5 MG/1
2.5 TABLET ORAL WEEKLY
Qty: 4 TABLET | Refills: 0 | Status: SHIPPED | OUTPATIENT
Start: 2018-08-10 | End: 2018-09-05 | Stop reason: SDUPTHER

## 2018-08-10 RX ORDER — PREDNISONE 1 MG/1
TABLET ORAL
Qty: 60 TABLET | Refills: 0 | Status: SHIPPED | OUTPATIENT
Start: 2018-08-10 | End: 2018-09-05 | Stop reason: SDUPTHER

## 2018-08-10 NOTE — TELEPHONE ENCOUNTER
I reviewed labs  Will start patient on prednisone taper and methotraxate 2 5 mg weekly   I called her however no answer so I left a voice message  with details about dosing  And that she should definitely  get CMP in 1 month     Meds send to pharmacy

## 2018-08-27 DIAGNOSIS — M54.9 BACK PAIN, UNSPECIFIED BACK LOCATION, UNSPECIFIED BACK PAIN LATERALITY, UNSPECIFIED CHRONICITY: Primary | ICD-10-CM

## 2018-08-27 RX ORDER — MELOXICAM 7.5 MG/1
7.5 TABLET ORAL 2 TIMES DAILY PRN
Qty: 30 TABLET | Refills: 0 | Status: SHIPPED | OUTPATIENT
Start: 2018-08-27 | End: 2018-09-05 | Stop reason: SDUPTHER

## 2018-09-04 ENCOUNTER — TELEPHONE (OUTPATIENT)
Dept: FAMILY MEDICINE CLINIC | Facility: CLINIC | Age: 38
End: 2018-09-04

## 2018-09-04 DIAGNOSIS — M54.9 BACK PAIN, UNSPECIFIED BACK LOCATION, UNSPECIFIED BACK PAIN LATERALITY, UNSPECIFIED CHRONICITY: ICD-10-CM

## 2018-09-04 DIAGNOSIS — M05.9 RHEUMATOID ARTHRITIS WITH POSITIVE RHEUMATOID FACTOR, INVOLVING UNSPECIFIED SITE (HCC): ICD-10-CM

## 2018-09-04 RX ORDER — MELOXICAM 7.5 MG/1
7.5 TABLET ORAL 2 TIMES DAILY PRN
Qty: 30 TABLET | Refills: 0 | Status: CANCELLED | OUTPATIENT
Start: 2018-09-04

## 2018-09-04 RX ORDER — METHOTREXATE 2.5 MG/1
2.5 TABLET ORAL WEEKLY
Qty: 4 TABLET | Refills: 0 | Status: CANCELLED | OUTPATIENT
Start: 2018-09-04

## 2018-09-04 RX ORDER — PREDNISONE 1 MG/1
TABLET ORAL
Qty: 60 TABLET | Refills: 0 | Status: SHIPPED | OUTPATIENT
Start: 2018-09-04 | End: 2018-09-05

## 2018-09-04 NOTE — TELEPHONE ENCOUNTER
I called patient and explained to her that I am not refilling her methotraxate unless she gets the CMP done  She said she will do it tomorrow  Based on the results and her liver function I will decide if we can continue with methotrexate  I refilled her prednisone  However am not refilling her meloxicam as I just refilled it on the 27th of August   Patient states that she have not received anything    Encouraged her to call pharmacy and check with that

## 2018-09-04 NOTE — TELEPHONE ENCOUNTER
Pt is scheduled to see dr duque for same day appt tomorrow  Can you send her refills or pt is questioning if dr duque can do it tomorrow?     Pt also would like for you to contact her regarding a script for walker and poids pads

## 2018-09-05 ENCOUNTER — APPOINTMENT (OUTPATIENT)
Dept: LAB | Facility: HOSPITAL | Age: 38
End: 2018-09-05
Payer: COMMERCIAL

## 2018-09-05 ENCOUNTER — OFFICE VISIT (OUTPATIENT)
Dept: FAMILY MEDICINE CLINIC | Facility: CLINIC | Age: 38
End: 2018-09-05
Payer: COMMERCIAL

## 2018-09-05 ENCOUNTER — TELEPHONE (OUTPATIENT)
Dept: FAMILY MEDICINE CLINIC | Facility: CLINIC | Age: 38
End: 2018-09-05

## 2018-09-05 VITALS
OXYGEN SATURATION: 97 % | TEMPERATURE: 97.1 F | BODY MASS INDEX: 55.39 KG/M2 | WEIGHT: 293 LBS | SYSTOLIC BLOOD PRESSURE: 128 MMHG | RESPIRATION RATE: 18 BRPM | HEART RATE: 83 BPM | DIASTOLIC BLOOD PRESSURE: 80 MMHG

## 2018-09-05 DIAGNOSIS — M05.40 RHEUMATOID MYOPATHY WITH RHEUMATOID ARTHRITIS (HCC): Primary | ICD-10-CM

## 2018-09-05 DIAGNOSIS — M05.9 RHEUMATOID ARTHRITIS WITH POSITIVE RHEUMATOID FACTOR, INVOLVING UNSPECIFIED SITE (HCC): ICD-10-CM

## 2018-09-05 DIAGNOSIS — M54.9 BACK PAIN, UNSPECIFIED BACK LOCATION, UNSPECIFIED BACK PAIN LATERALITY, UNSPECIFIED CHRONICITY: ICD-10-CM

## 2018-09-05 DIAGNOSIS — R32 URINARY INCONTINENCE, UNSPECIFIED TYPE: Primary | ICD-10-CM

## 2018-09-05 LAB
ALBUMIN SERPL BCP-MCNC: 4 G/DL (ref 3–5.2)
ALP SERPL-CCNC: 68 U/L (ref 43–122)
ALT SERPL W P-5'-P-CCNC: 31 U/L (ref 9–52)
ANION GAP SERPL CALCULATED.3IONS-SCNC: 10 MMOL/L (ref 5–14)
AST SERPL W P-5'-P-CCNC: 20 U/L (ref 14–36)
BACTERIA UR QL AUTO: ABNORMAL /HPF
BILIRUB SERPL-MCNC: 0.6 MG/DL
BILIRUB UR QL STRIP: ABNORMAL
BUN SERPL-MCNC: 14 MG/DL (ref 5–25)
CALCIUM SERPL-MCNC: 9.1 MG/DL (ref 8.4–10.2)
CHLORIDE SERPL-SCNC: 104 MMOL/L (ref 97–108)
CLARITY UR: ABNORMAL
CO2 SERPL-SCNC: 27 MMOL/L (ref 22–30)
COLOR UR: ABNORMAL
CREAT SERPL-MCNC: 0.66 MG/DL (ref 0.6–1.2)
GFR SERPL CREATININE-BSD FRML MDRD: 112 ML/MIN/1.73SQ M
GLUCOSE P FAST SERPL-MCNC: 103 MG/DL (ref 70–99)
GLUCOSE UR STRIP-MCNC: NEGATIVE MG/DL
HGB UR QL STRIP.AUTO: ABNORMAL
KETONES UR STRIP-MCNC: ABNORMAL MG/DL
LEUKOCYTE ESTERASE UR QL STRIP: ABNORMAL
NITRITE UR QL STRIP: NEGATIVE
NON-SQ EPI CELLS URNS QL MICRO: ABNORMAL /HPF
PH UR STRIP.AUTO: 5.5 [PH] (ref 4.5–8)
POTASSIUM SERPL-SCNC: 3.8 MMOL/L (ref 3.6–5)
PROT SERPL-MCNC: 8 G/DL (ref 5.9–8.4)
PROT UR STRIP-MCNC: ABNORMAL MG/DL
RBC #/AREA URNS AUTO: ABNORMAL /HPF
SL AMB  POCT GLUCOSE, UA: NORMAL
SL AMB LEUKOCYTE ESTERASE,UA: ABNORMAL
SL AMB POCT BILIRUBIN,UA: NEGATIVE
SL AMB POCT BLOOD,UA: 250
SL AMB POCT CLARITY,UA: ABNORMAL
SL AMB POCT COLOR,UA: ABNORMAL
SL AMB POCT KETONES,UA: NEGATIVE
SL AMB POCT NITRITE,UA: NEGATIVE
SL AMB POCT PH,UA: 5
SL AMB POCT SPECIFIC GRAVITY,UA: 1.02
SL AMB POCT URINE PROTEIN: ABNORMAL
SL AMB POCT UROBILINOGEN: NORMAL
SODIUM SERPL-SCNC: 141 MMOL/L (ref 137–147)
SP GR UR STRIP.AUTO: 1.02 (ref 1–1.03)
UROBILINOGEN UR QL STRIP.AUTO: 0.2 E.U./DL
WBC #/AREA URNS AUTO: ABNORMAL /HPF

## 2018-09-05 PROCEDURE — 81002 URINALYSIS NONAUTO W/O SCOPE: CPT | Performed by: FAMILY MEDICINE

## 2018-09-05 PROCEDURE — 3725F SCREEN DEPRESSION PERFORMED: CPT | Performed by: FAMILY MEDICINE

## 2018-09-05 PROCEDURE — 87086 URINE CULTURE/COLONY COUNT: CPT | Performed by: FAMILY MEDICINE

## 2018-09-05 PROCEDURE — 36415 COLL VENOUS BLD VENIPUNCTURE: CPT

## 2018-09-05 PROCEDURE — 81001 URINALYSIS AUTO W/SCOPE: CPT | Performed by: FAMILY MEDICINE

## 2018-09-05 PROCEDURE — 80053 COMPREHEN METABOLIC PANEL: CPT

## 2018-09-05 PROCEDURE — 99213 OFFICE O/P EST LOW 20 MIN: CPT | Performed by: FAMILY MEDICINE

## 2018-09-05 RX ORDER — METHOTREXATE 2.5 MG/1
5 TABLET ORAL WEEKLY
Qty: 8 TABLET | Refills: 0 | Status: SHIPPED | OUTPATIENT
Start: 2018-09-05 | End: 2019-09-30

## 2018-09-05 RX ORDER — METHOTREXATE 2.5 MG/1
5 TABLET ORAL WEEKLY
Qty: 14 TABLET | Refills: 0 | Status: SHIPPED | OUTPATIENT
Start: 2018-09-05 | End: 2018-09-05 | Stop reason: SDUPTHER

## 2018-09-05 RX ORDER — MELOXICAM 7.5 MG/1
7.5 TABLET ORAL DAILY
Qty: 30 TABLET | Refills: 0 | Status: SHIPPED | OUTPATIENT
Start: 2018-09-05 | End: 2019-08-02

## 2018-09-05 RX ORDER — DIAPER,BRIEF,ADULT, DISPOSABLE
EACH MISCELLANEOUS 4 TIMES DAILY PRN
Qty: 90 EACH | Refills: 0 | Status: SHIPPED | OUTPATIENT
Start: 2018-09-05 | End: 2020-10-15 | Stop reason: SDUPTHER

## 2018-09-05 NOTE — ASSESSMENT & PLAN NOTE
She does not report any improvement, will advance methotrexate to 5mg weekly until she sees her PCP on 10/25 and access if she needs further titration  Counseled regarding GI side effects as well as possible worsening of hematuria  She verbalized understanding

## 2018-09-05 NOTE — PROGRESS NOTES
Assessment/Plan:    Rheumatoid myopathy with rheumatoid arthritis ([facility] )  She does not report any improvement, will advance methotrexate to 5mg weekly until she sees her PCP on 10/25 and access if she needs further titration  Counseled regarding GI side effects as well as possible worsening of hematuria  She verbalized understanding  Diagnoses and all orders for this visit:    Rheumatoid myopathy with rheumatoid arthritis ([facility] )    Rheumatoid arthritis with positive rheumatoid factor, involving unspecified site ([facility])  -     POCT urine dip  -     UA w Reflex to Microscopic w Reflex to Culture  -     Discontinue: methotrexate 2 5 MG tablet; Take 2 tablets (5 mg total) by mouth once a week for 7 doses          Subjective:      Patient ID: Chaim Nicholson is a 43 y o  female  Blood in Urine   This is a recurrent problem  The current episode started more than 1 month ago  The problem is unchanged  She describes the hematuria as gross (clots and smear on toilet paper) hematuria  Clotting in stream: has not noticed at what portion of stream  Her pain is at a severity of 0/10  She is experiencing no pain  She describes her urine color as tea colored (dark tea colored)  Irritative symptoms do not include frequency or urgency  Obstructive symptoms do not include dribbling, incomplete emptying or straining  Pertinent negatives include no abdominal pain, dysuria, fever, flank pain, hesitancy, inability to urinate or nausea  (Recently started on methotrexate, can be an adverse effect but dose is only 2 5mg weekly  )       The following portions of the patient's history were reviewed and updated as appropriate: allergies, current medications, past family history, past medical history, past social history, past surgical history and problem list     Review of Systems   Constitutional: Negative for activity change, appetite change, fatigue and fever  HENT: Negative for drooling and sore throat      Eyes: Negative for pain and visual disturbance  Respiratory: Negative for cough, chest tightness and shortness of breath  Cardiovascular: Negative for chest pain and palpitations  Gastrointestinal: Negative for abdominal pain, constipation, diarrhea and nausea  Genitourinary: Positive for hematuria  Negative for dysuria, flank pain, frequency, hesitancy, incomplete emptying and urgency  Musculoskeletal: Negative for back pain and myalgias  Skin: Negative for color change  Neurological: Negative for numbness and headaches  Psychiatric/Behavioral: Negative for hallucinations and suicidal ideas  Objective:      /80 (BP Location: Left arm, Patient Position: Sitting, Cuff Size: Adult)   Pulse 83   Temp (!) 97 1 °F (36 2 °C) (Temporal)   Resp 18   Wt 135 kg (298 lb)   SpO2 97%   BMI 55 39 kg/m²          Physical Exam   Constitutional: She is oriented to person, place, and time  She appears well-developed and well-nourished  HENT:   Head: Normocephalic and atraumatic  Right Ear: External ear normal    Left Ear: External ear normal    Eyes: Pupils are equal, round, and reactive to light  Neck: Normal range of motion  Neck supple  Cardiovascular: Normal rate, regular rhythm, normal heart sounds and intact distal pulses  Pulmonary/Chest: Effort normal and breath sounds normal  No respiratory distress  Abdominal: Soft  Bowel sounds are normal  She exhibits no distension  There is no tenderness  Musculoskeletal: Normal range of motion  She exhibits no edema  Lymphadenopathy:     She has no cervical adenopathy  Neurological: She is alert and oriented to person, place, and time  Skin: Skin is warm and dry  Psychiatric: She has a normal mood and affect

## 2018-09-06 NOTE — PROGRESS NOTES
Still having gross hematuria, awaiting culture  Doubt d/t methotrexate as she has been on such a low dose but you may want to discuss with you patient when you see her and refer to urology

## 2018-09-07 DIAGNOSIS — N02.9 IDIOPATHIC HEMATURIA WITH GLOMERULAR MORPHOLOGIC CHANGES: Primary | ICD-10-CM

## 2018-09-07 LAB — BACTERIA UR CULT: NORMAL

## 2018-09-08 ENCOUNTER — TELEPHONE (OUTPATIENT)
Dept: FAMILY MEDICINE CLINIC | Facility: CLINIC | Age: 38
End: 2018-09-08

## 2018-09-08 DIAGNOSIS — N39.0 URINARY TRACT INFECTION WITH HEMATURIA, SITE UNSPECIFIED: Primary | ICD-10-CM

## 2018-09-08 DIAGNOSIS — R31.9 URINARY TRACT INFECTION WITH HEMATURIA, SITE UNSPECIFIED: Primary | ICD-10-CM

## 2018-09-08 DIAGNOSIS — R80.9 PROTEINURIA, UNSPECIFIED TYPE: Primary | ICD-10-CM

## 2018-09-08 NOTE — TELEPHONE ENCOUNTER
Dr Goodman,  My patient whom I started on Methotrexate came in as a sick visit with gross hematuria  The dose at that time was only 2 5 mg weekly, I didn't find hematuria as a side effect anywhere  So the initial thought was a UTI  Her urine cx was contaminants only  But when I looked at the UA she has +2 Proteinuria  So in light of her RA, I was wondering if she could have Lupus and Lupus nephrtitis  I ordered the blood work and I am waiting on results  I will also place urology consult for the hematuria  I also called her nad recommended stopping meloxicam and aspirin  If you have any other thoughts, I appreciate your input  Thanks!

## 2018-09-10 ENCOUNTER — APPOINTMENT (OUTPATIENT)
Dept: LAB | Facility: HOSPITAL | Age: 38
End: 2018-09-10
Payer: COMMERCIAL

## 2018-09-10 DIAGNOSIS — R80.9 PROTEINURIA, UNSPECIFIED TYPE: ICD-10-CM

## 2018-09-10 DIAGNOSIS — M05.9 RHEUMATOID ARTHRITIS WITH POSITIVE RHEUMATOID FACTOR, INVOLVING UNSPECIFIED SITE (HCC): ICD-10-CM

## 2018-09-10 DIAGNOSIS — N02.9 IDIOPATHIC HEMATURIA WITH GLOMERULAR MORPHOLOGIC CHANGES: ICD-10-CM

## 2018-09-10 LAB
ALBUMIN SERPL BCP-MCNC: 4.2 G/DL (ref 3–5.2)
ALP SERPL-CCNC: 67 U/L (ref 43–122)
ALT SERPL W P-5'-P-CCNC: 38 U/L (ref 9–52)
ANION GAP SERPL CALCULATED.3IONS-SCNC: 13 MMOL/L (ref 5–14)
AST SERPL W P-5'-P-CCNC: 33 U/L (ref 14–36)
BASOPHILS # BLD AUTO: 0 THOUSANDS/ΜL (ref 0–0.1)
BASOPHILS NFR BLD AUTO: 1 % (ref 0–1)
BILIRUB SERPL-MCNC: 0.3 MG/DL
BUN SERPL-MCNC: 16 MG/DL (ref 5–25)
C3 SERPL-MCNC: 154 MG/DL (ref 90–180)
C4 SERPL-MCNC: 32 MG/DL (ref 10–40)
CALCIUM SERPL-MCNC: 9.7 MG/DL (ref 8.4–10.2)
CHLORIDE SERPL-SCNC: 102 MMOL/L (ref 97–108)
CO2 SERPL-SCNC: 28 MMOL/L (ref 22–30)
CREAT SERPL-MCNC: 0.68 MG/DL (ref 0.6–1.2)
CREAT UR-MCNC: 411 MG/DL
EOSINOPHIL # BLD AUTO: 0.1 THOUSAND/ΜL (ref 0–0.4)
EOSINOPHIL NFR BLD AUTO: 1 % (ref 0–6)
ERYTHROCYTE [DISTWIDTH] IN BLOOD BY AUTOMATED COUNT: 16.1 %
GFR SERPL CREATININE-BSD FRML MDRD: 111 ML/MIN/1.73SQ M
GLUCOSE P FAST SERPL-MCNC: 99 MG/DL (ref 70–99)
HCT VFR BLD AUTO: 37.5 % (ref 36–46)
HGB BLD-MCNC: 12.3 G/DL (ref 12–16)
LYMPHOCYTES # BLD AUTO: 2.1 THOUSANDS/ΜL (ref 0.5–4)
LYMPHOCYTES NFR BLD AUTO: 32 % (ref 20–50)
MCH RBC QN AUTO: 27.1 PG (ref 26–34)
MCHC RBC AUTO-ENTMCNC: 32.7 G/DL (ref 31–36)
MCV RBC AUTO: 83 FL (ref 80–100)
MICROALBUMIN UR-MCNC: 678 MG/L (ref 0–20)
MICROALBUMIN/CREAT 24H UR: 165 MG/G CREATININE (ref 0–30)
MONOCYTES # BLD AUTO: 0.4 THOUSAND/ΜL (ref 0.2–0.9)
MONOCYTES NFR BLD AUTO: 6 % (ref 1–10)
NEUTROPHILS # BLD AUTO: 3.9 THOUSANDS/ΜL (ref 1.8–7.8)
NEUTS SEG NFR BLD AUTO: 60 % (ref 45–65)
PLATELET # BLD AUTO: 350 THOUSANDS/UL (ref 150–450)
PMV BLD AUTO: 8 FL (ref 8.9–12.7)
POTASSIUM SERPL-SCNC: 3.2 MMOL/L (ref 3.6–5)
PROT SERPL-MCNC: 8.4 G/DL (ref 5.9–8.4)
RBC # BLD AUTO: 4.52 MILLION/UL (ref 4–5.2)
SODIUM SERPL-SCNC: 143 MMOL/L (ref 137–147)
WBC # BLD AUTO: 6.5 THOUSAND/UL (ref 4.5–11)

## 2018-09-10 PROCEDURE — 85025 COMPLETE CBC W/AUTO DIFF WBC: CPT

## 2018-09-10 PROCEDURE — 36415 COLL VENOUS BLD VENIPUNCTURE: CPT

## 2018-09-10 PROCEDURE — 86147 CARDIOLIPIN ANTIBODY EA IG: CPT

## 2018-09-10 PROCEDURE — 86225 DNA ANTIBODY NATIVE: CPT

## 2018-09-10 PROCEDURE — 82570 ASSAY OF URINE CREATININE: CPT | Performed by: FAMILY MEDICINE

## 2018-09-10 PROCEDURE — 82043 UR ALBUMIN QUANTITATIVE: CPT | Performed by: FAMILY MEDICINE

## 2018-09-10 PROCEDURE — 86160 COMPLEMENT ANTIGEN: CPT

## 2018-09-10 PROCEDURE — 80053 COMPREHEN METABOLIC PANEL: CPT

## 2018-09-11 DIAGNOSIS — R31.9 HEMATURIA, UNSPECIFIED TYPE: Primary | ICD-10-CM

## 2018-09-11 LAB
CARDIOLIPIN IGA SER IA-ACNC: <9 APL U/ML (ref 0–11)
CARDIOLIPIN IGG SER IA-ACNC: <9 GPL U/ML (ref 0–14)
CARDIOLIPIN IGM SER IA-ACNC: 10 MPL U/ML (ref 0–12)
DSDNA AB SER-ACNC: 1 IU/ML (ref 0–9)

## 2018-09-12 ENCOUNTER — TELEPHONE (OUTPATIENT)
Dept: FAMILY MEDICINE CLINIC | Facility: CLINIC | Age: 38
End: 2018-09-12

## 2018-09-12 NOTE — TELEPHONE ENCOUNTER
Karol,  I placed a rheumatology referral for this patient a good while ago but do not see an appointment yet  Can you check on this please?  Thanks

## 2018-09-13 RX ORDER — NITROFURANTOIN MACROCRYSTALS 100 MG/1
100 CAPSULE ORAL 2 TIMES DAILY
Qty: 10 CAPSULE | Refills: 0 | Status: SHIPPED | OUTPATIENT
Start: 2018-09-13 | End: 2018-09-18

## 2018-09-13 NOTE — TELEPHONE ENCOUNTER
Called patient  Will hold MTX  Although urine cx only positive for mixed bacteria, will attempt to treat UTI and evaluate response  She also has appt with Urology tomorrow   She verbalized understanding

## 2018-09-13 NOTE — TELEPHONE ENCOUNTER
I agree with your plan to hold the MTX for now  Regarding the protein in her urine - remember that RBC's, especially that quantity, will test positive for protein due to cell membrane breakdown  I would suggest empirically treating the "mixed colonies" to see if it resolves her hematuria    I agree with urology referral

## 2018-09-14 ENCOUNTER — OFFICE VISIT (OUTPATIENT)
Dept: UROLOGY | Facility: MEDICAL CENTER | Age: 38
End: 2018-09-14
Payer: COMMERCIAL

## 2018-09-14 VITALS
HEIGHT: 62 IN | BODY MASS INDEX: 53.92 KG/M2 | SYSTOLIC BLOOD PRESSURE: 140 MMHG | DIASTOLIC BLOOD PRESSURE: 90 MMHG | WEIGHT: 293 LBS

## 2018-09-14 DIAGNOSIS — R31.0 HEMATURIA, GROSS: Primary | ICD-10-CM

## 2018-09-14 DIAGNOSIS — R31.9 HEMATURIA, UNSPECIFIED TYPE: ICD-10-CM

## 2018-09-14 DIAGNOSIS — R31.29 MICROSCOPIC HEMATURIA: ICD-10-CM

## 2018-09-14 LAB
SL AMB  POCT GLUCOSE, UA: ABNORMAL
SL AMB LEUKOCYTE ESTERASE,UA: ABNORMAL
SL AMB POCT BILIRUBIN,UA: ABNORMAL
SL AMB POCT BLOOD,UA: ABNORMAL
SL AMB POCT CLARITY,UA: CLEAR
SL AMB POCT COLOR,UA: YELLOW
SL AMB POCT KETONES,UA: ABNORMAL
SL AMB POCT NITRITE,UA: ABNORMAL
SL AMB POCT PH,UA: 5.5
SL AMB POCT SPECIFIC GRAVITY,UA: 1.02
SL AMB POCT URINE PROTEIN: ABNORMAL
SL AMB POCT UROBILINOGEN: 0.2

## 2018-09-14 PROCEDURE — 81003 URINALYSIS AUTO W/O SCOPE: CPT | Performed by: UROLOGY

## 2018-09-14 PROCEDURE — 88112 CYTOPATH CELL ENHANCE TECH: CPT | Performed by: PATHOLOGY

## 2018-09-14 PROCEDURE — 99244 OFF/OP CNSLTJ NEW/EST MOD 40: CPT | Performed by: UROLOGY

## 2018-09-14 NOTE — PROGRESS NOTES
Assessment/Plan:      Diagnoses and all orders for this visit:    Hematuria, gross    Microscopic hematuria  -     Cytology, urine; Future  -     Cystoscopy; Future    Hematuria, unspecified type  -     Ambulatory referral to Urology  -     POCT urine dip auto non-scope          Subjective:  No complaints     Patient ID: Ute Tate is a 43 y o  female  HPI  Patient states that she has been having episodic, almost daily, gross hematuria for about a month  She denies any dysuria, flank or abdominal pains, or history of nephrolithiasis or pyelonephritis  He has not had any urinary tract infections in quite some time, but does report that she had a similar issue about 10 years ago in [place], where hydronephrosis of her right kidney was noted  At the time a stent was placed and her hematuria resolved, with the stent subsequently being removed  She states that she has not had any issues of her kidneys but she still has had recurrent and chronic hematuria, otherwise  Review of Systems   Constitutional: Negative  HENT: Negative  Eyes: Negative  Respiratory: Negative  Cardiovascular: Negative  Gastrointestinal: Negative  Endocrine: Negative  Genitourinary: Positive for hematuria  Negative for difficulty urinating, dysuria, flank pain and urgency  Musculoskeletal: Positive for arthralgias, joint swelling and myalgias  Skin: Negative  Allergic/Immunologic: Negative  Neurological: Positive for headaches  Hematological: Negative  Psychiatric/Behavioral: Negative  Objective:     Physical Exam   Constitutional: She is oriented to person, place, and time  She appears well-developed and well-nourished  No distress  HENT:   Head: Normocephalic and atraumatic  Nose: Nose normal    Mouth/Throat: Oropharynx is clear and moist    Eyes: Conjunctivae and EOM are normal  Pupils are equal, round, and reactive to light  No scleral icterus  Neck: Normal range of motion   Neck supple  Pulmonary/Chest: Effort normal  No respiratory distress  Abdominal: Soft  Bowel sounds are normal  She exhibits no distension and no mass  There is no tenderness  Musculoskeletal: Normal range of motion  She exhibits no edema or tenderness  Neurological: She is alert and oriented to person, place, and time  No cranial nerve deficit  Skin: Skin is warm and dry  No pallor  Psychiatric: She has a normal mood and affect  Her behavior is normal  Judgment and thought content normal    Nursing note and vitals reviewed  CT scan of the abdomen pelvis from 08/07/2018: Impression     1  There is no acute intra-abdominal or intrapelvic inflammatory change   There is no bowel obstruction or hydronephrosis   2  No renal calculi are seen  3  Left adnexal 4 1 cm water density cyst    According to current guidelines (J Am Carolyn Radiol 2013;10:671-681) in this premenopausal woman, this is benign and requires no followup  If this lesion is symptomatic, consider further evaluation with pelvic    ultrasound

## 2018-10-10 DIAGNOSIS — R56.9 SEIZURE (HCC): Primary | ICD-10-CM

## 2018-10-11 RX ORDER — LEVETIRACETAM 1000 MG/1
1000 TABLET ORAL 2 TIMES DAILY
Qty: 60 TABLET | Refills: 3 | Status: SHIPPED | OUTPATIENT
Start: 2018-10-11 | End: 2019-08-02 | Stop reason: SDUPTHER

## 2018-10-18 ENCOUNTER — PROCEDURE VISIT (OUTPATIENT)
Dept: UROLOGY | Facility: MEDICAL CENTER | Age: 38
End: 2018-10-18
Payer: COMMERCIAL

## 2018-10-18 VITALS
WEIGHT: 293 LBS | DIASTOLIC BLOOD PRESSURE: 80 MMHG | BODY MASS INDEX: 53.92 KG/M2 | HEIGHT: 62 IN | SYSTOLIC BLOOD PRESSURE: 130 MMHG

## 2018-10-18 DIAGNOSIS — R31.0 HEMATURIA, GROSS: Primary | ICD-10-CM

## 2018-10-18 LAB
SL AMB  POCT GLUCOSE, UA: ABNORMAL
SL AMB LEUKOCYTE ESTERASE,UA: ABNORMAL
SL AMB POCT BILIRUBIN,UA: ABNORMAL
SL AMB POCT BLOOD,UA: ABNORMAL
SL AMB POCT CLARITY,UA: ABNORMAL
SL AMB POCT COLOR,UA: ABNORMAL
SL AMB POCT KETONES,UA: ABNORMAL
SL AMB POCT NITRITE,UA: ABNORMAL
SL AMB POCT PH,UA: 5.5
SL AMB POCT SPECIFIC GRAVITY,UA: 1.02
SL AMB POCT URINE PROTEIN: 300
SL AMB POCT UROBILINOGEN: 0.2

## 2018-10-18 PROCEDURE — 52000 CYSTOURETHROSCOPY: CPT | Performed by: UROLOGY

## 2018-10-18 PROCEDURE — 81003 URINALYSIS AUTO W/O SCOPE: CPT | Performed by: UROLOGY

## 2018-10-18 NOTE — PROGRESS NOTES
Assessment/Plan:      Diagnoses and all orders for this visit:    Hematuria, gross  -     POCT urine dip auto non-scope        Plan:  No notable findings for this hematuria workup, will monitor  Subjective: Follow-up for hematuria workup     Patient ID: Lizabeth Madera is a 43 y o  female  HPI  Patient states that she had been having episodic, almost daily, gross hematuria for about a month, but no dysuria, flank or abdominal pains, or history of nephrolithiasis or pyelonephritis  She does report that she had a similar issue about 10 years ago in [place], where hydronephrosis of her right kidney was noted  At the time a stent was placed and her hematuria resolved, with the stent subsequently being removed  She states that she has not had any issues of her kidneys but she still has had recurrent and chronic hematuria, otherwise  She is here for cystoscopy today      Review of Systems   Constitutional: Negative  HENT: Negative  Eyes: Negative  Respiratory: Negative  Cardiovascular: Negative  Gastrointestinal: Negative  Endocrine: Negative  Genitourinary: Negative  Musculoskeletal: Negative  Skin: Negative  Allergic/Immunologic: Negative  Neurological: Negative  Hematological: Negative  Psychiatric/Behavioral: Negative  Objective:     Physical Exam   Constitutional: She is oriented to person, place, and time  She appears well-developed and well-nourished  No distress  HENT:   Head: Normocephalic and atraumatic  Nose: Nose normal    Mouth/Throat: Oropharynx is clear and moist    Eyes: Pupils are equal, round, and reactive to light  Conjunctivae and EOM are normal  No scleral icterus  Neck: Normal range of motion  Neck supple  Pulmonary/Chest: Effort normal  No respiratory distress  Abdominal: Soft  Bowel sounds are normal  She exhibits no distension and no mass  There is no tenderness     Genitourinary: Vagina normal    Musculoskeletal: Normal range of motion  She exhibits no edema or tenderness  Lymphadenopathy:     She has no cervical adenopathy  Neurological: She is alert and oriented to person, place, and time  No cranial nerve deficit  Skin: Skin is warm and dry  No rash noted  No erythema  No pallor  Psychiatric: She has a normal mood and affect  Her behavior is normal  Judgment and thought content normal    Nursing note and vitals reviewed  09/14/2018 1140                                      [facility]                                                             Pathologist:           Mohini Jiang MD                                                           Specimen:    Urine, Voided                                                                              Final Diagnosis   A  Urine, Voided, :  Negative for high grade urothelial carcinoma ([address]) - see comment  Benign urothelial cells  Benign squamous cells    Neutrophils and lymphocytes            Refer to cystoscopy procedure note

## 2018-10-18 NOTE — LETTER
October 18, 2018     Justice Carver MD  [address]  [place]  [address]  [address]    Patient: Nataly Horner   YOB: 1980   Date of Visit: 10/18/2018       Dear Dr Hugh Lozano: Thank you for referring Nataly Horner to me for evaluation  Below are my notes for this consultation  If you have questions, please do not hesitate to call me  I look forward to following your patient along with you  Sincerely,        Gonzalez Jarquin MD        CC: No Recipients  Gonzalez Jarquin MD  10/18/2018 10:36 AM  Sign at close encounter  Cystoscopy  Date/Time: 10/18/2018 10:35 AM  Performed by: Mere Cespedesr by: Sriram Rocha     Procedure details: cystoscopy    Patient tolerance: Patient tolerated the procedure well with no immediate complications        Patient was placed in the dorsal lithotomy position, prepped and draped in the introitus in the usual manner  A lubricated 16 Zimbabwean flexible cystoscope was inserted per urethra, where there was no evidence of any obstruction, lesions, or stricture seen  Bladder was entered and pan cystoscoped, there was 1/4 trabeculation, but no tumors, stones, or diverticuli were visualized  The bilateral ureteral orifices were in their normal position orientation, with clear efflux of urine  Cystoscope was removed, she tolerated procedure well  Gonzalez Jarquin MD  10/18/2018 10:36 AM  Sign at close encounter  Assessment/Plan:      Diagnoses and all orders for this visit:    Hematuria, gross  -     POCT urine dip auto non-scope        Plan:  No notable findings for this hematuria workup, will monitor  Subjective: Follow-up for hematuria workup     Patient ID: Nataly Horner is a 43 y o  female  HPI  Patient states that she had been having episodic, almost daily, gross hematuria for about a month, but no dysuria, flank or abdominal pains, or history of nephrolithiasis or pyelonephritis   She does report that she had a similar issue about 10 years ago in [place], where hydronephrosis of her right kidney was noted  At the time a stent was placed and her hematuria resolved, with the stent subsequently being removed  She states that she has not had any issues of her kidneys but she still has had recurrent and chronic hematuria, otherwise  She is here for cystoscopy today      Review of Systems   Constitutional: Negative  HENT: Negative  Eyes: Negative  Respiratory: Negative  Cardiovascular: Negative  Gastrointestinal: Negative  Endocrine: Negative  Genitourinary: Negative  Musculoskeletal: Negative  Skin: Negative  Allergic/Immunologic: Negative  Neurological: Negative  Hematological: Negative  Psychiatric/Behavioral: Negative  Objective:     Physical Exam   Constitutional: She is oriented to person, place, and time  She appears well-developed and well-nourished  No distress  HENT:   Head: Normocephalic and atraumatic  Nose: Nose normal    Mouth/Throat: Oropharynx is clear and moist    Eyes: Pupils are equal, round, and reactive to light  Conjunctivae and EOM are normal  No scleral icterus  Neck: Normal range of motion  Neck supple  Pulmonary/Chest: Effort normal  No respiratory distress  Abdominal: Soft  Bowel sounds are normal  She exhibits no distension and no mass  There is no tenderness  Genitourinary: Vagina normal    Musculoskeletal: Normal range of motion  She exhibits no edema or tenderness  Lymphadenopathy:     She has no cervical adenopathy  Neurological: She is alert and oriented to person, place, and time  No cranial nerve deficit  Skin: Skin is warm and dry  No rash noted  No erythema  No pallor  Psychiatric: She has a normal mood and affect  Her behavior is normal  Judgment and thought content normal    Nursing note and vitals reviewed        09/14/2018 1140                                      [facility] [place]                                                             Pathologist:           Juan Antonio Chase MD                                                           Specimen:    Urine, Voided                                                                              Final Diagnosis   A  Urine, Voided, :  Negative for high grade urothelial carcinoma ([address]) - see comment  Benign urothelial cells  Benign squamous cells    Neutrophils and lymphocytes            Refer to cystoscopy procedure note

## 2018-10-18 NOTE — PROGRESS NOTES
Cystoscopy  Date/Time: 10/18/2018 10:35 AM  Performed by: Rose Mary Knight by: Elizabeth Rodríguez     Procedure details: cystoscopy    Patient tolerance: Patient tolerated the procedure well with no immediate complications        Patient was placed in the dorsal lithotomy position, prepped and draped in the introitus in the usual manner  A lubricated 16 Macedonian flexible cystoscope was inserted per urethra, where there was no evidence of any obstruction, lesions, or stricture seen  Bladder was entered and pan cystoscoped, there was 1/4 trabeculation, but no tumors, stones, or diverticuli were visualized  The bilateral ureteral orifices were in their normal position orientation, with clear efflux of urine  Cystoscope was removed, she tolerated procedure well

## 2018-10-25 ENCOUNTER — APPOINTMENT (OUTPATIENT)
Dept: LAB | Facility: HOSPITAL | Age: 38
End: 2018-10-25
Payer: COMMERCIAL

## 2018-10-25 ENCOUNTER — ANNUAL EXAM (OUTPATIENT)
Dept: FAMILY MEDICINE CLINIC | Facility: CLINIC | Age: 38
End: 2018-10-25
Payer: COMMERCIAL

## 2018-10-25 VITALS
SYSTOLIC BLOOD PRESSURE: 124 MMHG | TEMPERATURE: 97.3 F | WEIGHT: 286 LBS | RESPIRATION RATE: 18 BRPM | DIASTOLIC BLOOD PRESSURE: 88 MMHG | BODY MASS INDEX: 52.63 KG/M2 | HEIGHT: 62 IN

## 2018-10-25 DIAGNOSIS — T14.8XXA BRUISING: ICD-10-CM

## 2018-10-25 DIAGNOSIS — N91.2 AMENORRHEA: ICD-10-CM

## 2018-10-25 DIAGNOSIS — Z72.51 HIGH RISK SEXUAL BEHAVIOR, UNSPECIFIED TYPE: ICD-10-CM

## 2018-10-25 DIAGNOSIS — N92.6 IRREGULAR PERIODS: ICD-10-CM

## 2018-10-25 DIAGNOSIS — Z01.419 ENCOUNTER FOR GYNECOLOGICAL EXAMINATION: ICD-10-CM

## 2018-10-25 DIAGNOSIS — Z72.51 HIGH RISK SEXUAL BEHAVIOR, UNSPECIFIED TYPE: Primary | ICD-10-CM

## 2018-10-25 PROBLEM — R23.8 EASY BRUISING: Status: ACTIVE | Noted: 2018-10-25

## 2018-10-25 PROBLEM — R23.3 EASY BRUISING: Status: ACTIVE | Noted: 2018-10-25

## 2018-10-25 LAB
BASOPHILS # BLD AUTO: 0.1 THOUSANDS/ΜL (ref 0–0.1)
BASOPHILS NFR BLD AUTO: 1 % (ref 0–1)
EOSINOPHIL # BLD AUTO: 0.1 THOUSAND/ΜL (ref 0–0.4)
EOSINOPHIL NFR BLD AUTO: 1 % (ref 0–6)
ERYTHROCYTE [DISTWIDTH] IN BLOOD BY AUTOMATED COUNT: 15.5 %
FSH SERPL-ACNC: 3 MIU/ML
HCT VFR BLD AUTO: 36.6 % (ref 36–46)
HGB BLD-MCNC: 12 G/DL (ref 12–16)
INR PPP: 1.08 (ref 0.89–1.1)
LH SERPL-ACNC: 5.6 MIU/ML
LYMPHOCYTES # BLD AUTO: 2 THOUSANDS/ΜL (ref 0.5–4)
LYMPHOCYTES NFR BLD AUTO: 22 % (ref 20–50)
MCH RBC QN AUTO: 27.9 PG (ref 26–34)
MCHC RBC AUTO-ENTMCNC: 32.9 G/DL (ref 31–36)
MCV RBC AUTO: 85 FL (ref 80–100)
MONOCYTES # BLD AUTO: 0.6 THOUSAND/ΜL (ref 0.2–0.9)
MONOCYTES NFR BLD AUTO: 7 % (ref 1–10)
NEUTROPHILS # BLD AUTO: 6.4 THOUSANDS/ΜL (ref 1.8–7.8)
NEUTS SEG NFR BLD AUTO: 70 % (ref 45–65)
PLATELET # BLD AUTO: 336 THOUSANDS/UL (ref 150–450)
PMV BLD AUTO: 8.8 FL (ref 8.9–12.7)
PROLACTIN SERPL-MCNC: 7.2 NG/ML
PROTHROMBIN TIME: 11.4 SECONDS (ref 9.5–11.6)
RBC # BLD AUTO: 4.31 MILLION/UL (ref 4–5.2)
SL AMB POCT URINE HCG: NORMAL
TSH SERPL DL<=0.05 MIU/L-ACNC: 1.45 UIU/ML (ref 0.47–4.68)
WBC # BLD AUTO: 9.2 THOUSAND/UL (ref 4.5–11)

## 2018-10-25 PROCEDURE — 85025 COMPLETE CBC W/AUTO DIFF WBC: CPT

## 2018-10-25 PROCEDURE — 87624 HPV HI-RISK TYP POOLED RSLT: CPT | Performed by: FAMILY MEDICINE

## 2018-10-25 PROCEDURE — G0145 SCR C/V CYTO,THINLAYER,RESCR: HCPCS | Performed by: FAMILY MEDICINE

## 2018-10-25 PROCEDURE — 83001 ASSAY OF GONADOTROPIN (FSH): CPT

## 2018-10-25 PROCEDURE — 36415 COLL VENOUS BLD VENIPUNCTURE: CPT

## 2018-10-25 PROCEDURE — 81025 URINE PREGNANCY TEST: CPT | Performed by: FAMILY MEDICINE

## 2018-10-25 PROCEDURE — 99395 PREV VISIT EST AGE 18-39: CPT | Performed by: FAMILY MEDICINE

## 2018-10-25 PROCEDURE — 87491 CHLMYD TRACH DNA AMP PROBE: CPT | Performed by: FAMILY MEDICINE

## 2018-10-25 PROCEDURE — 80074 ACUTE HEPATITIS PANEL: CPT

## 2018-10-25 PROCEDURE — 84146 ASSAY OF PROLACTIN: CPT

## 2018-10-25 PROCEDURE — 83002 ASSAY OF GONADOTROPIN (LH): CPT

## 2018-10-25 PROCEDURE — 87389 HIV-1 AG W/HIV-1&-2 AB AG IA: CPT

## 2018-10-25 PROCEDURE — 85610 PROTHROMBIN TIME: CPT

## 2018-10-25 PROCEDURE — 87591 N.GONORRHOEAE DNA AMP PROB: CPT | Performed by: FAMILY MEDICINE

## 2018-10-25 PROCEDURE — 86592 SYPHILIS TEST NON-TREP QUAL: CPT

## 2018-10-25 PROCEDURE — 84443 ASSAY THYROID STIM HORMONE: CPT

## 2018-10-25 NOTE — ASSESSMENT & PLAN NOTE
Last period was in July 2018  It could be secondary to autoimmune disease  Patient states that her period is very irregular  Sometimes only lasting 2-3 days which is unusual for her  Will check urine pregnancy testing in the office  Will check prolactin TSH and FSH and LH    Based on results and based on Pap smear will also consider referral for gyn for further evaluation

## 2018-10-25 NOTE — PROGRESS NOTES
Chino Will 1980 female MRN: [id number]    Annual GYN/PAP Visit      ASSESSMENT & PLAN: Chino Will is a 43 y o  No obstetric history on file  with normal gynecologic exam     1   Routine well woman exam done today  2  Pap and HPV:  The patient's last pap was many years ago  Pap and cotesting was done today  STI testing was done today  Current ASCCP Guidelines reviewed  3   The following were reviewed in today's visit: breast self exam, STD testing, HIV risk factors and prevention, adequate intake of calcium and vitamin D, exercise and healthy diet  CC:  Annual Gynecologic Examination    HPI: Chino Will is a 43 y o  No obstetric history on file  who presents for annual gynecologic examination  She has the following concerns:  Irregular periods, STD risk  States that her  is having an affair and she wants to be tested for all sexually transmitted diseases  Although she denies any vaginal discharge or gyn symptoms          Past Medical/Surgical History:     Past Medical History:   Diagnosis Date    Angina at rest [facility])     Anxiety     Asthma     Depression     Hypertension     Kidney problem     Migraine     PTSD (post-traumatic stress disorder)     Rheumatoid arthritis ([facility] )     Seizures ([facility] )        Past Surgical History:   Procedure Laterality Date    APPENDECTOMY      CARDIAC CATHETERIZATION       SECTION      CHOLECYSTECTOMY      KIDNEY SURGERY         Past OB/Gyn History:  OB History     No data available           Patient's last menstrual period was 2018 (within days)  Pt has menstrual issues  Irregular periods, last was in 2018   History of sexually transmitted infection: No   History of abnormal pap smears: No      Patient is currently sexually active  heterosexual   The current method of family planning is none      Family History   Problem Relation Age of Onset    Diabetes Maternal Grandmother     Hypertension Maternal Grandmother        Social History:  Social History     Social History    Marital status: Single     Spouse name: N/A    Number of children: N/A    Years of education: N/A     Occupational History    Not on file  Social History Main Topics    Smoking status: Former Smoker    Smokeless tobacco: Former User    Alcohol use No    Drug use: No    Sexual activity: Not on file     Other Topics Concern    Not on file     Social History Narrative    No narrative on file       Presently lives with    Patient is   Patient is currently unemployed   DV Screening: The patient does feel safe at home  Allergies   Allergen Reactions    Codeine     Iodine     Latex     Lovenox [Enoxaparin]     Shellfish-Derived Products Hives    Vaccinium Angustifolium Hives    Vicodin [Hydrocodone-Acetaminophen]     Benadryl [Diphenhydramine] Rash    Oxycontin [Oxycodone] Palpitations    Provera [Medroxyprogesterone] Palpitations         Current Outpatient Prescriptions:     albuterol (VENTOLIN HFA) 90 mcg/act inhaler, inhale 2 puff by inhalation route  every 4 - 6 hours as needed, Disp: , Rfl:     Calcium Carb-Cholecalciferol (CALCIUM 600+D3) 600-200 MG-UNIT TABS, take 1 tablet twice a day, Disp: , Rfl:     Cholecalciferol (VITAMIN D3) 2000 units capsule, take 1 tablet po daily  , Disp: , Rfl:     famotidine (PEPCID) 20 mg tablet, Every 12 hours, Disp: , Rfl:     folic acid (FOLVITE) 1 mg tablet, every 24 hours, Disp: , Rfl:     furosemide (LASIX) 20 mg tablet, every 24 hours, Disp: , Rfl:     Incontinence Supply Disposable (POISE PAD) PADS, by Does not apply route 4 (four) times a day as needed (incontinence), Disp: 90 each, Rfl: 0    levETIRAcetam (KEPPRA) 1000 MG tablet, Take 1 tablet (1,000 mg total) by mouth 2 (two) times a day, Disp: 60 tablet, Rfl: 3    loratadine (CLARITIN) 10 mg tablet, every 24 hours, Disp: , Rfl:     meloxicam (MOBIC) 7 5 mg tablet, Take 1 tablet (7 5 mg total) by mouth daily, Disp: 30 tablet, Rfl: 0    methotrexate 2 5 MG tablet, Take 2 tablets (5 mg total) by mouth once a week for 4 doses, Disp: 8 tablet, Rfl: 0    Multiple Vitamins-Minerals (MULTIVITAMIN WITH MINERALS) tablet, Take 1 tablet by mouth daily, Disp: , Rfl:     nitroglycerin (NITROSTAT) 0 4 mg SL tablet, place 1 tablet (0 4MG)  by sublingual route at the 1st sign of attack; may repeat every 5 min until relief; as needed, Disp: , Rfl:     Review of Systems  Constitutional: feels well, no tiredness, no recent unintended weight gain or loss  ENT: no nosebleeds or hoarseness  Cardiovascular: no chest pain, no palpitations, no lower extremity edema  Respiratory: no complaints of shortness of shortness of breath  Breasts:no complaints of breast pain, breast lump, or nipple discharge  Gastrointestinal: no complaints of abdominal pain, constipation, nausea, vomiting, or diarrhea or bloody stools  Genitourinary : no complaints of dysuria, incontinence, pelvic pain, no dysmenorrhea, vaginal discharge or abnormal vaginal bleeding and as noted in HPI  Musculoskeletal: positive for arthralgia,joint swelling or stiffness  Integumentary: no complaints of skin rash or lesion, itching or dry skin, but complain of easy bruising   Neurological: no complaints of headache, no dizziness or fainting      Objective          /88 (BP Location: Right arm, Patient Position: Sitting, Cuff Size: Large)   Temp (!) 97 3 °F (36 3 °C) (Temporal)   Resp 18   Ht 5' 2" (1 575 m)   Wt 130 kg (286 lb)   LMP 07/25/2018 (Within Days)   BMI 52 31 kg/m²     General:   appears stated age, cooperative, alert   Neck: normal, supple, no masses   Heart: regular rate and rhythm, no murmur   Lungs: clear to auscultation bilaterally   Breasts: normal appearance, no masses or tenderness   Abdomen: soft, non-tender, without masses or organomegaly   Vulva: normal   Vagina: normal vagina   Urethra: normal   Cervix: Normal, no discharge  Uterus: normal   Adnexa: normal adnexa   Lymphatic palpation of lymph nodes in neck, axilla, groin and/or other locations: no lymphadenopathy or masses noted   Skin normal skin turgor and no rashes     Psychiatric orientation to person, place, and time: normal  mood and affect: normal       Cinthya Carlson MD  PGY-3, Family Medicine  10/25/18

## 2018-10-26 LAB
HAV IGM SER QL: NORMAL
HBV CORE IGM SER QL: NORMAL
HBV SURFACE AG SER QL: NORMAL
HCV AB SER QL: NORMAL
HIV 1+2 AB+HIV1 P24 AG SERPL QL IA: NORMAL
RPR SER QL: NORMAL

## 2018-10-27 LAB
CHLAMYDIA DNA CVX QL NAA+PROBE: NORMAL
N GONORRHOEA DNA GENITAL QL NAA+PROBE: NORMAL

## 2018-10-30 LAB
HPV HR 12 DNA CVX QL NAA+PROBE: NEGATIVE
HPV16 DNA CVX QL NAA+PROBE: NEGATIVE
HPV18 DNA CVX QL NAA+PROBE: NEGATIVE

## 2018-11-02 LAB
LAB AP GYN PRIMARY INTERPRETATION: NORMAL
Lab: NORMAL

## 2018-11-07 ENCOUNTER — TELEPHONE (OUTPATIENT)
Dept: UROLOGY | Facility: MEDICAL CENTER | Age: 38
End: 2018-11-07

## 2018-11-07 NOTE — TELEPHONE ENCOUNTER
Received signed [facility] request for records from 2018  Records printed and mailed in provided envelope  Pre Payment is not permitted

## 2018-11-29 DIAGNOSIS — J45.909 UNCOMPLICATED ASTHMA, UNSPECIFIED ASTHMA SEVERITY, UNSPECIFIED WHETHER PERSISTENT: Primary | ICD-10-CM

## 2018-11-29 RX ORDER — ALBUTEROL SULFATE 90 UG/1
2 AEROSOL, METERED RESPIRATORY (INHALATION) EVERY 4 HOURS PRN
Qty: 18 G | Refills: 1 | Status: SHIPPED | OUTPATIENT
Start: 2018-11-29 | End: 2019-08-02 | Stop reason: SDUPTHER

## 2019-03-28 ENCOUNTER — OFFICE VISIT (OUTPATIENT)
Dept: FAMILY MEDICINE CLINIC | Facility: CLINIC | Age: 39
End: 2019-03-28

## 2019-03-28 VITALS
TEMPERATURE: 98.6 F | BODY MASS INDEX: 51.16 KG/M2 | HEIGHT: 62 IN | SYSTOLIC BLOOD PRESSURE: 134 MMHG | RESPIRATION RATE: 18 BRPM | OXYGEN SATURATION: 99 % | WEIGHT: 278 LBS | DIASTOLIC BLOOD PRESSURE: 82 MMHG | HEART RATE: 104 BPM

## 2019-03-28 DIAGNOSIS — M05.40 RHEUMATOID MYOPATHY WITH RHEUMATOID ARTHRITIS (HCC): ICD-10-CM

## 2019-03-28 DIAGNOSIS — I20.8 ANGINA AT REST (HCC): Primary | ICD-10-CM

## 2019-03-28 DIAGNOSIS — R07.9 CHEST PAIN, UNSPECIFIED TYPE: ICD-10-CM

## 2019-03-28 DIAGNOSIS — F41.9 ANXIETY: ICD-10-CM

## 2019-03-28 PROCEDURE — 99214 OFFICE O/P EST MOD 30 MIN: CPT | Performed by: PHYSICIAN ASSISTANT

## 2019-03-28 PROCEDURE — 93000 ELECTROCARDIOGRAM COMPLETE: CPT | Performed by: PHYSICIAN ASSISTANT

## 2019-03-28 RX ORDER — DOXEPIN HYDROCHLORIDE 25 MG/1
25 CAPSULE ORAL
Qty: 30 CAPSULE | Refills: 1 | Status: SHIPPED | OUTPATIENT
Start: 2019-03-28 | End: 2019-08-02 | Stop reason: SDUPTHER

## 2019-03-28 RX ORDER — NITROGLYCERIN 0.4 MG/1
0.4 TABLET SUBLINGUAL
Qty: 30 TABLET | Refills: 0 | Status: SHIPPED | OUTPATIENT
Start: 2019-03-28 | End: 2019-11-05 | Stop reason: SDUPTHER

## 2019-03-28 RX ORDER — CITALOPRAM 20 MG/1
20 TABLET ORAL DAILY
Qty: 30 TABLET | Refills: 1 | Status: SHIPPED | OUTPATIENT
Start: 2019-03-28 | End: 2019-08-02 | Stop reason: SDUPTHER

## 2019-03-28 NOTE — ASSESSMENT & PLAN NOTE
Started back on citalopram and doxepin   In the past she was on 150 of doxepin but I recommend she return back in 1 month to titrate if needed

## 2019-03-28 NOTE — PATIENT INSTRUCTIONS
Angina   AMBULATORY CARE:   Angina  is pain, pressure, or tightness that is usually felt in your chest  It is caused by decreased blood flow and oxygen to your heart  Angina is usually caused by atherosclerosis (hardening of the arteries)  Chest pain may come on when you are stressed or do physical activities, such as walking or exercising  If left untreated, angina may get worse, increase your risk for a heart attack, or become life-threatening  Common symptoms include the following:   · Pressure, tightness, or pain in your neck, jaw, shoulder, or back    · Pain or numbness in either arm    · Discomfort that feels like heartburn    · Shortness of breath, sweating, nausea, or lightheadedness  Call 911 if:   · You have any of the following signs of a heart attack:      ¨ Squeezing, pressure, or pain in your chest that lasts longer than 5 minutes or returns    ¨ Discomfort or pain in your back, neck, jaw, stomach, or arm     ¨ Trouble breathing    ¨ Nausea or vomiting    ¨ Lightheadedness or a sudden cold sweat, especially with chest pain or trouble breathing    · You have chest pain that does not go away after you take medicine as directed  · You lose feeling in your face, arms, or legs, or you suddenly feel weak  Seek care immediately if:   · Your angina is happening more frequently, lasting longer, or causing worse pain  Contact your healthcare provider if:   · You are dizzy or nauseated after you take your medicine  · You have shortness of breath at rest     · You have new or worse swelling in your feet or ankles  · You have questions or concerns about your condition or care  Treatment for angina  may include any of the following  Take your medicine as directed  Call your healthcare provider if you think your medicine is not helping or if you have side effects  Tell him if you are allergic to any medicine  Keep a list of the medicines, vitamins, and herbs you take   Include the amounts, and when and why you take them  Bring the list or the pill bottles to follow-up visits  Carry your medicine list with you in case of an emergency  · Antiplatelets , such as aspirin, help prevent blood clots  Take your antiplatelet medicine exactly as directed  These medicines make it more likely for you to bleed or bruise  If you are told to take aspirin, do not take acetaminophen or ibuprofen instead  · Blood thinners  keep clots from forming in your blood  Clots may cause heart attacks, strokes, or death  This medicine makes it more likely for you to bleed or bruise  · Other medicines  may be given to open the arteries to your heart, slow your heartbeat, or decrease your blood pressure or cholesterol  · Do not take certain medicines without asking your healthcare provider first   These include NSAIDs, herbal or vitamin supplements, or hormones (estrogen or progestin)  · Angioplasty and stenting  help open the coronary arteries and allow blood to flow to the heart  Ask for more information about these procedures  · Coronary artery bypass graft (CABG) , or open heart surgery, can improve blood flow to the heart  This will help decrease your chest pain and prevent a heart attack  Manage angina:   · Do not smoke  Nicotine and other chemicals in cigarettes and cigars can cause heart and lung damage  Ask your healthcare provider for information if you currently smoke and need help to quit  E-cigarettes or smokeless tobacco still contain nicotine  Talk to your healthcare provider before you use these products  · Maintain a healthy weight  When you weigh more than is healthy for you, your heart must work harder  You are at higher risk for serious health problems if you are overweight  Ask your healthcare provider how much you should weigh  Ask him to help you create a weight loss plan if you are overweight  · Ask about activity  Your healthcare provider will tell you which activities to limit or avoid  Ask about the best exercise plan for you  · Eat heart-healthy foods  Include fresh fruits and vegetables in your meal plan  Choose low-fat foods, such as skim or 1% fat milk, low-fat cheese and yogurt, fish, chicken (without skin), and lean meats  Eat two 4-ounce servings of fish high in omega-3 fats each week, such as salmon, fresh tuna, and herring  Do not eat foods that are high in sodium, such as canned foods, potato chips, salty snacks, and cold cuts  Put less table salt on your food  · Avoid activities that cause angina  Pay attention to your symptoms and find out what seems to make your angina worse  · Ask if you should have a flu vaccine  The flu vaccine will decrease your risk for an infection  An infection can put more stress on your heart and worsen your angina  Follow up with your healthcare provider as directed:  Write down your questions so you remember to ask them during your visits  © 2017 [facility] Information is for End User's use only and may not be sold, redistributed or otherwise used for commercial purposes  All illustrations and images included in CareNotes® are the copyrighted property of A D A M [employer] Inc  or [employer] Jordan  The above information is an  only  It is not intended as medical advice for individual conditions or treatments  Talk to your doctor, nurse or pharmacist before following any medical regimen to see if it is safe and effective for you

## 2019-03-28 NOTE — ASSESSMENT & PLAN NOTE
EKG was normal today but I recommend getting a stress test   Chest pain is likely more from anxiety and she was started on medication    Nitro was refilled in case of need however

## 2019-03-28 NOTE — PROGRESS NOTES
Assessment/Plan:    Rheumatoid myopathy with rheumatoid arthritis [facility])  New referral for rheumatology placed today    Angina at rest [facility])  EKG was normal today but I recommend getting a stress test   Chest pain is likely more from anxiety and she was started on medication  Nitro was refilled in case of need however    Anxiety  Started back on citalopram and doxepin  In the past she was on 150 of doxepin but I recommend she return back in 1 month to titrate if needed         Problem List Items Addressed This Visit        Cardiovascular and Mediastinum    Angina at rest [facility]) - Primary     EKG was normal today but I recommend getting a stress test   Chest pain is likely more from anxiety and she was started on medication  Nitro was refilled in case of need however         Relevant Medications    nitroglycerin (NITROSTAT) 0 4 mg SL tablet    Other Relevant Orders    Stress test only, exercise       Musculoskeletal and Integument    Rheumatoid myopathy with rheumatoid arthritis [facility])     New referral for rheumatology placed today         Relevant Orders    Ambulatory referral to Rheumatology       Other    Anxiety     Started back on citalopram and doxepin  In the past she was on 150 of doxepin but I recommend she return back in 1 month to titrate if needed         Relevant Medications    citalopram (CeleXA) 20 mg tablet    doxepin (SINEquan) 25 mg capsule      Other Visit Diagnoses     Chest pain, unspecified type        Relevant Orders    Stress test only, exercise    POCT ECG (Completed)            Subjective:      Patient ID: Natalia Suarez is a 43 y o  female  HPI 42 y/o F with RA, hx of bipolar d/o and angina here for chest pain  She started to get chest pain 4 days ago  Pain is stabbing and she was gettign SOB and it was radiating into her right shoulder  She took 1 dose of nitroglycerin and it didn't go away    She tried to take deep breaths and also took tums because she thought it could be acid reflux  Pain has not resolved and is there now but mild  She has been under a lot of stress  Pain increases with activity and stress  No change with eating   She was seeing [facility] in the past and was on doxepin and celexa  IN the past she had SI and had been hearing voices and doxepin was increased to 150 to make her sleep and rid of the voices  She is not hearing any of those now but is anxious because of her son moving in with her and having to move  She also got denied social security but was told her RA is severe  The following portions of the patient's history were reviewed and updated as appropriate:   She  has a past medical history of Angina at rest [facility]), Anxiety, Asthma, Depression, Hypertension, Kidney problem, Migraine, PTSD (post-traumatic stress disorder), Rheumatoid arthritis ([facility] [address] ), and Seizures ([facility] )  She   Patient Active Problem List    Diagnosis Date Noted    Anxiety 2019    Angina at rest [facility]) 2019    Encounter for gynecological examination 10/25/2018    Easy bruising 10/25/2018    Irregular periods 10/25/2018    Anemia 2018    Ambulatory dysfunction 2018    Mild intermittent asthma 2018    Stress incontinence 2018    Hydronephrosis 2018    Depression 2018    Seizure ([facility] [address] ) 2018    Rheumatoid myopathy with rheumatoid arthritis ([facility] [address] ) 2016    Obesity, unspecified 2012    Essential hypertension 2012     She  has a past surgical history that includes Cholecystectomy; Appendectomy ();  section (); Cardiac catheterization (); and Kidney surgery ()  Her family history includes Diabetes in her maternal grandmother; Hypertension in her maternal grandmother  She  reports that she has quit smoking  She has quit using smokeless tobacco  She reports that she does not drink alcohol or use drugs    Current Outpatient Medications   Medication Sig Dispense Refill    albuterol (VENTOLIN HFA) 90 mcg/act inhaler Inhale 2 puffs every 4 (four) hours as needed for wheezing or shortness of breath 18 g 1    Calcium Carb-Cholecalciferol (CALCIUM 600+D3) 600-200 MG-UNIT TABS take 1 tablet twice a day      Cholecalciferol (VITAMIN D3) 2000 units capsule take 1 tablet po daily   citalopram (CeleXA) 20 mg tablet Take 1 tablet (20 mg total) by mouth daily 30 tablet 1    doxepin (SINEquan) 25 mg capsule Take 1 capsule (25 mg total) by mouth daily at bedtime 30 capsule 1    famotidine (PEPCID) 20 mg tablet Every 12 hours      folic acid (FOLVITE) 1 mg tablet every 24 hours      furosemide (LASIX) 20 mg tablet every 24 hours      Incontinence Supply Disposable (POISE PAD) PADS by Does not apply route 4 (four) times a day as needed (incontinence) 90 each 0    levETIRAcetam (KEPPRA) 1000 MG tablet Take 1 tablet (1,000 mg total) by mouth 2 (two) times a day 60 tablet 3    loratadine (CLARITIN) 10 mg tablet every 24 hours      meloxicam (MOBIC) 7 5 mg tablet Take 1 tablet (7 5 mg total) by mouth daily 30 tablet 0    methotrexate 2 5 MG tablet Take 2 tablets (5 mg total) by mouth once a week for 4 doses 8 tablet 0    Multiple Vitamins-Minerals (MULTIVITAMIN WITH MINERALS) tablet Take 1 tablet by mouth daily      nitroglycerin (NITROSTAT) 0 4 mg SL tablet Place 1 tablet (0 4 mg total) under the tongue every 5 (five) minutes as needed for chest pain Call 911 if using 3 doses 30 tablet 0     No current facility-administered medications for this visit  Current Outpatient Medications on File Prior to Visit   Medication Sig    albuterol (VENTOLIN HFA) 90 mcg/act inhaler Inhale 2 puffs every 4 (four) hours as needed for wheezing or shortness of breath    Calcium Carb-Cholecalciferol (CALCIUM 600+D3) 600-200 MG-UNIT TABS take 1 tablet twice a day    Cholecalciferol (VITAMIN D3) 2000 units capsule take 1 tablet po daily      famotidine (PEPCID) 20 mg tablet Every 12 hours    folic acid (FOLVITE) 1 mg tablet every 24 hours    furosemide (LASIX) 20 mg tablet every 24 hours    Incontinence Supply Disposable (POISE PAD) PADS by Does not apply route 4 (four) times a day as needed (incontinence)    levETIRAcetam (KEPPRA) 1000 MG tablet Take 1 tablet (1,000 mg total) by mouth 2 (two) times a day    loratadine (CLARITIN) 10 mg tablet every 24 hours    meloxicam (MOBIC) 7 5 mg tablet Take 1 tablet (7 5 mg total) by mouth daily    methotrexate 2 5 MG tablet Take 2 tablets (5 mg total) by mouth once a week for 4 doses    Multiple Vitamins-Minerals (MULTIVITAMIN WITH MINERALS) tablet Take 1 tablet by mouth daily    [DISCONTINUED] nitroglycerin (NITROSTAT) 0 4 mg SL tablet place 1 tablet (0 4MG)  by sublingual route at the 1st sign of attack; may repeat every 5 min until relief; as needed     No current facility-administered medications on file prior to visit  She is allergic to codeine; iodine; latex; lovenox [enoxaparin]; shellfish-derived products; vaccinium angustifolium; vicodin [hydrocodone-acetaminophen]; benadryl [diphenhydramine]; oxycontin [oxycodone]; and provera [medroxyprogesterone]       Review of Systems      Objective:      /82 (BP Location: Left arm, Patient Position: Sitting, Cuff Size: Thigh)   Pulse 104   Temp 98 6 °F (37 °C) (Temporal)   Resp 18   Ht 5' 2" (1 575 m)   Wt 126 kg (278 lb)   SpO2 99%   Breastfeeding? No   BMI 50 85 kg/m²          Physical Exam   Constitutional: She is oriented to person, place, and time  She appears well-developed and well-nourished  No distress  HENT:   Head: Normocephalic and atraumatic  Right Ear: External ear normal    Left Ear: External ear normal    Eyes: Conjunctivae are normal    Neck: Normal range of motion  Neck supple  No thyromegaly present  Cardiovascular: Normal rate, regular rhythm and normal heart sounds  No murmur heard  Pulmonary/Chest: Effort normal and breath sounds normal  No respiratory distress  She has no wheezes  Lymphadenopathy:     She has no cervical adenopathy  Neurological: She is alert and oriented to person, place, and time  Psychiatric: She has a normal mood and affect  Her behavior is normal    Nursing note and vitals reviewed          EKG: NSR

## 2019-04-03 ENCOUNTER — HOSPITAL ENCOUNTER (OUTPATIENT)
Dept: NON INVASIVE DIAGNOSTICS | Facility: HOSPITAL | Age: 39
Discharge: HOME | End: 2019-04-03
Payer: COMMERCIAL

## 2019-04-03 DIAGNOSIS — I20.8 ANGINA AT REST (HCC): ICD-10-CM

## 2019-04-03 DIAGNOSIS — R07.9 CHEST PAIN, UNSPECIFIED TYPE: ICD-10-CM

## 2019-04-03 LAB
CHEST PAIN STATEMENT: NORMAL
MAX DIASTOLIC BP: 84 MMHG
MAX HEART RATE: 155 BPM
MAX PREDICTED HEART RATE: 181 BPM
MAX. SYSTOLIC BP: 177 MMHG
PROTOCOL NAME: NORMAL
TARGET HR FORMULA: NORMAL
TEST INDICATION: NORMAL
TIME IN EXERCISE PHASE: NORMAL

## 2019-04-03 PROCEDURE — 93018 CV STRESS TEST I&R ONLY: CPT | Performed by: INTERNAL MEDICINE

## 2019-04-03 PROCEDURE — 93016 CV STRESS TEST SUPVJ ONLY: CPT | Performed by: INTERNAL MEDICINE

## 2019-04-03 PROCEDURE — 93017 CV STRESS TEST TRACING ONLY: CPT

## 2019-05-02 ENCOUNTER — PATIENT OUTREACH (OUTPATIENT)
Dept: FAMILY MEDICINE CLINIC | Facility: CLINIC | Age: 39
End: 2019-05-02

## 2019-05-02 ENCOUNTER — APPOINTMENT (OUTPATIENT)
Dept: LAB | Facility: CLINIC | Age: 39
End: 2019-05-02
Payer: COMMERCIAL

## 2019-05-02 ENCOUNTER — OFFICE VISIT (OUTPATIENT)
Dept: FAMILY MEDICINE CLINIC | Facility: CLINIC | Age: 39
End: 2019-05-02

## 2019-05-02 ENCOUNTER — TELEPHONE (OUTPATIENT)
Dept: FAMILY MEDICINE CLINIC | Facility: CLINIC | Age: 39
End: 2019-05-02

## 2019-05-02 VITALS
SYSTOLIC BLOOD PRESSURE: 128 MMHG | WEIGHT: 292 LBS | OXYGEN SATURATION: 99 % | DIASTOLIC BLOOD PRESSURE: 82 MMHG | HEART RATE: 82 BPM | TEMPERATURE: 97.4 F | BODY MASS INDEX: 53.41 KG/M2 | RESPIRATION RATE: 18 BRPM

## 2019-05-02 DIAGNOSIS — F41.9 ANXIETY: ICD-10-CM

## 2019-05-02 DIAGNOSIS — M05.9 RHEUMATOID ARTHRITIS WITH POSITIVE RHEUMATOID FACTOR, INVOLVING UNSPECIFIED SITE (HCC): ICD-10-CM

## 2019-05-02 DIAGNOSIS — H10.32 ACUTE CONJUNCTIVITIS OF LEFT EYE, UNSPECIFIED ACUTE CONJUNCTIVITIS TYPE: ICD-10-CM

## 2019-05-02 DIAGNOSIS — R80.9 PROTEINURIA, UNSPECIFIED TYPE: Primary | ICD-10-CM

## 2019-05-02 DIAGNOSIS — E66.9 OBESITY, UNSPECIFIED CLASSIFICATION, UNSPECIFIED OBESITY TYPE, UNSPECIFIED WHETHER SERIOUS COMORBIDITY PRESENT: ICD-10-CM

## 2019-05-02 DIAGNOSIS — K59.00 CONSTIPATION, UNSPECIFIED CONSTIPATION TYPE: ICD-10-CM

## 2019-05-02 LAB
ALBUMIN SERPL BCP-MCNC: 3.5 G/DL (ref 3.5–5)
ALP SERPL-CCNC: 78 U/L (ref 46–116)
ALT SERPL W P-5'-P-CCNC: 14 U/L (ref 12–78)
ANION GAP SERPL CALCULATED.3IONS-SCNC: 7 MMOL/L (ref 4–13)
AST SERPL W P-5'-P-CCNC: 14 U/L (ref 5–45)
BASOPHILS # BLD AUTO: 0.02 THOUSANDS/ΜL (ref 0–0.1)
BASOPHILS NFR BLD AUTO: 0 % (ref 0–1)
BILIRUB SERPL-MCNC: 0.37 MG/DL (ref 0.2–1)
BUN SERPL-MCNC: 10 MG/DL (ref 5–25)
CALCIUM SERPL-MCNC: 8.7 MG/DL (ref 8.3–10.1)
CHLORIDE SERPL-SCNC: 107 MMOL/L (ref 100–108)
CHOLEST SERPL-MCNC: 140 MG/DL (ref 50–200)
CO2 SERPL-SCNC: 26 MMOL/L (ref 21–32)
CREAT SERPL-MCNC: 0.78 MG/DL (ref 0.6–1.3)
CREAT UR-MCNC: 197 MG/DL
EOSINOPHIL # BLD AUTO: 0.1 THOUSAND/ΜL (ref 0–0.61)
EOSINOPHIL NFR BLD AUTO: 2 % (ref 0–6)
ERYTHROCYTE [DISTWIDTH] IN BLOOD BY AUTOMATED COUNT: 13.4 % (ref 11.6–15.1)
GFR SERPL CREATININE-BSD FRML MDRD: 96 ML/MIN/1.73SQ M
GLUCOSE P FAST SERPL-MCNC: 86 MG/DL (ref 65–99)
HCT VFR BLD AUTO: 34.6 % (ref 34.8–46.1)
HDLC SERPL-MCNC: 57 MG/DL (ref 40–60)
HGB BLD-MCNC: 10.5 G/DL (ref 11.5–15.4)
IMM GRANULOCYTES # BLD AUTO: 0.01 THOUSAND/UL (ref 0–0.2)
IMM GRANULOCYTES NFR BLD AUTO: 0 % (ref 0–2)
LDLC SERPL CALC-MCNC: 64 MG/DL (ref 0–100)
LYMPHOCYTES # BLD AUTO: 1.59 THOUSANDS/ΜL (ref 0.6–4.47)
LYMPHOCYTES NFR BLD AUTO: 25 % (ref 14–44)
MCH RBC QN AUTO: 26.3 PG (ref 26.8–34.3)
MCHC RBC AUTO-ENTMCNC: 30.3 G/DL (ref 31.4–37.4)
MCV RBC AUTO: 87 FL (ref 82–98)
MICROALBUMIN UR-MCNC: 23.9 MG/L (ref 0–20)
MICROALBUMIN/CREAT 24H UR: 12 MG/G CREATININE (ref 0–30)
MONOCYTES # BLD AUTO: 0.42 THOUSAND/ΜL (ref 0.17–1.22)
MONOCYTES NFR BLD AUTO: 7 % (ref 4–12)
NEUTROPHILS # BLD AUTO: 4.29 THOUSANDS/ΜL (ref 1.85–7.62)
NEUTS SEG NFR BLD AUTO: 66 % (ref 43–75)
NONHDLC SERPL-MCNC: 83 MG/DL
NRBC BLD AUTO-RTO: 0 /100 WBCS
PLATELET # BLD AUTO: 357 THOUSANDS/UL (ref 149–390)
PMV BLD AUTO: 10.1 FL (ref 8.9–12.7)
POTASSIUM SERPL-SCNC: 3.8 MMOL/L (ref 3.5–5.3)
PROT SERPL-MCNC: 7.9 G/DL (ref 6.4–8.2)
RBC # BLD AUTO: 4 MILLION/UL (ref 3.81–5.12)
SODIUM SERPL-SCNC: 140 MMOL/L (ref 136–145)
TRIGL SERPL-MCNC: 95 MG/DL
TSH SERPL DL<=0.05 MIU/L-ACNC: 1.54 UIU/ML (ref 0.36–3.74)
WBC # BLD AUTO: 6.43 THOUSAND/UL (ref 4.31–10.16)

## 2019-05-02 PROCEDURE — 80053 COMPREHEN METABOLIC PANEL: CPT

## 2019-05-02 PROCEDURE — 36415 COLL VENOUS BLD VENIPUNCTURE: CPT

## 2019-05-02 PROCEDURE — 99214 OFFICE O/P EST MOD 30 MIN: CPT | Performed by: INTERNAL MEDICINE

## 2019-05-02 PROCEDURE — 82570 ASSAY OF URINE CREATININE: CPT | Performed by: FAMILY MEDICINE

## 2019-05-02 PROCEDURE — 80061 LIPID PANEL: CPT

## 2019-05-02 PROCEDURE — 85025 COMPLETE CBC W/AUTO DIFF WBC: CPT

## 2019-05-02 PROCEDURE — 84443 ASSAY THYROID STIM HORMONE: CPT

## 2019-05-02 PROCEDURE — 82043 UR ALBUMIN QUANTITATIVE: CPT | Performed by: FAMILY MEDICINE

## 2019-05-02 RX ORDER — POLYMYXIN B SULFATE AND TRIMETHOPRIM 1; 10000 MG/ML; [USP'U]/ML
1 SOLUTION OPHTHALMIC EVERY 4 HOURS
Qty: 10 ML | Refills: 0 | Status: SHIPPED | OUTPATIENT
Start: 2019-05-02 | End: 2020-04-14

## 2019-05-02 RX ORDER — POLYETHYLENE GLYCOL 3350 17 G/17G
17 POWDER, FOR SOLUTION ORAL DAILY
Qty: 14 EACH | Refills: 0 | Status: SHIPPED | OUTPATIENT
Start: 2019-05-02 | End: 2019-08-02

## 2019-05-06 ENCOUNTER — PATIENT OUTREACH (OUTPATIENT)
Dept: FAMILY MEDICINE CLINIC | Facility: CLINIC | Age: 39
End: 2019-05-06

## 2019-05-06 ENCOUNTER — APPOINTMENT (OUTPATIENT)
Dept: LAB | Facility: CLINIC | Age: 39
End: 2019-05-06
Payer: COMMERCIAL

## 2019-05-06 DIAGNOSIS — R80.9 PROTEINURIA, UNSPECIFIED TYPE: ICD-10-CM

## 2019-05-06 PROCEDURE — 84156 ASSAY OF PROTEIN URINE: CPT

## 2019-05-07 LAB
PROT 24H UR-MCNC: 144 MG/24 HRS (ref 40–150)
SPECIMEN VOL UR: 1600 ML

## 2019-05-09 ENCOUNTER — PATIENT OUTREACH (OUTPATIENT)
Dept: FAMILY MEDICINE CLINIC | Facility: CLINIC | Age: 39
End: 2019-05-09

## 2019-05-20 ENCOUNTER — PATIENT OUTREACH (OUTPATIENT)
Dept: FAMILY MEDICINE CLINIC | Facility: CLINIC | Age: 39
End: 2019-05-20

## 2019-06-07 ENCOUNTER — PATIENT OUTREACH (OUTPATIENT)
Dept: FAMILY MEDICINE CLINIC | Facility: CLINIC | Age: 39
End: 2019-06-07

## 2019-07-11 ENCOUNTER — OFFICE VISIT (OUTPATIENT)
Dept: OTOLARYNGOLOGY | Facility: CLINIC | Age: 39
End: 2019-07-11
Payer: COMMERCIAL

## 2019-07-11 VITALS
HEART RATE: 79 BPM | DIASTOLIC BLOOD PRESSURE: 66 MMHG | BODY MASS INDEX: 53.39 KG/M2 | SYSTOLIC BLOOD PRESSURE: 98 MMHG | HEIGHT: 62 IN | WEIGHT: 290.1 LBS

## 2019-07-11 DIAGNOSIS — E66.01 MORBID OBESITY WITH BMI OF 50.0-59.9, ADULT (HCC): Primary | ICD-10-CM

## 2019-07-11 DIAGNOSIS — T16.1XXA FB EAR, RIGHT, INITIAL ENCOUNTER: ICD-10-CM

## 2019-07-11 PROCEDURE — 99203 OFFICE O/P NEW LOW 30 MIN: CPT | Performed by: OTOLARYNGOLOGY

## 2019-07-11 PROCEDURE — 69200 CLEAR OUTER EAR CANAL: CPT | Performed by: OTOLARYNGOLOGY

## 2019-07-11 NOTE — PROGRESS NOTES
Otolaryngology Head and Neck Surgery History and Physical    Chief complaint    Chief Complaint   Patient presents with    Regular check up        History of the Present Illness    Misael Waters is a 43 y o  who reports that when she has upper respiratory infection she does have coughing spells and dysphonia that lasts longer than expected  Currently no symptoms  She denies any significant reflux  She reports that her breathing is usually normal but does have significant congestion with upper respiratory infections that she reports as having 4-6 per year  Review of Systems   Constitutional: Negative  HENT: Negative  Eyes: Negative  Respiratory: Positive for shortness of breath  Cardiovascular: Negative  Gastrointestinal: Negative  Endocrine: Negative  Genitourinary: Negative  Musculoskeletal: Positive for back pain and neck pain  Skin: Negative  Allergic/Immunologic: Negative  Neurological: Positive for headaches  Hematological: Negative  Psychiatric/Behavioral: Negative          Past Medical History:   Diagnosis Date    Angina at rest [facility])     Anxiety     Asthma     Depression     Hypertension     Kidney problem     Migraine     PTSD (post-traumatic stress disorder)     Rhe[facility] ([facility] [address] )     Seizures (HCC)        Past Surgical History:   Procedure Laterality Date    APPENDECTOMY      CARDIAC CATHETERIZATION  2006     SECTION  2008    CHOLECYSTECTOMY      KIDNEY SURGERY  2007       Social History     Socioeconomic History    Marital status: /Civil Union     Spouse name: Not on file    Number of children: Not on file    Years of education: Not on file    Highest education level: Not on file   Occupational History    Not on file   Social Needs    Financial resource strain: Hard    Food insecurity:     Worry: Never true     Inability: Never true   Get Together needs:     Medical: Yes     Non-medical: Yes Tobacco Use    Smoking status: Former Smoker    Smokeless tobacco: Former User   Substance and Sexual Activity    Alcohol use: No    Drug use: No    Sexual activity: Not on file   Lifestyle    Physical activity:     Days per week: Not on file     Minutes per session: Not on file    Stress: Not on file   Relationships    Social connections:     Talks on phone: Not on file     Gets together: Not on file     Attends Ten[employer] service: Not on file     Active member of club or organization: Not on file     Attends meetings of clubs or organizations: Not on file     Relationship status: Not on file    Intimate partner violence:     Fear of current or ex partner: Not on file     Emotionally abused: Not on file     Physically abused: Not on file     Forced sexual activity: Not on file   Other Topics Concern    Not on file   Social History Narrative    Not on file       Family History   Problem Relation Age of Onset    Diabetes Maternal Grandmother     Hypertension Maternal Grandmother            BP 98/66 (BP Location: Right arm, Patient Position: Sitting, Cuff Size: Standard)   Pulse 79   Ht 5' 2" (1 575 m)   Wt 132 kg (290 lb 1 6 oz)   BMI 53 06 kg/m²       Current Outpatient Medications:     albuterol (VENTOLIN HFA) 90 mcg/act inhaler, Inhale 2 puffs every 4 (four) hours as needed for wheezing or shortness of breath, Disp: 18 g, Rfl: 1    Calcium Carb-Cholecalciferol (CALCIUM 600+D3) 600-200 MG-UNIT TABS, take 1 tablet twice a day, Disp: , Rfl:     Cholecalciferol (VITAMIN D3) 2000 units capsule, take 1 tablet po daily  , Disp: , Rfl:     citalopram (CeleXA) 20 mg tablet, Take 1 tablet (20 mg total) by mouth daily, Disp: 30 tablet, Rfl: 1    doxepin (SINEquan) 25 mg capsule, Take 1 capsule (25 mg total) by mouth daily at bedtime, Disp: 30 capsule, Rfl: 1    famotidine (PEPCID) 20 mg tablet, Every 12 hours, Disp: , Rfl:     folic acid (FOLVITE) 1 mg tablet, every 24 hours, Disp: , Rfl:    furosemide (LASIX) 20 mg tablet, every 24 hours, Disp: , Rfl:     Incontinence Supply Disposable (POISE PAD) PADS, by Does not apply route 4 (four) times a day as needed (incontinence), Disp: 90 each, Rfl: 0    levETIRAcetam (KEPPRA) 1000 MG tablet, Take 1 tablet (1,000 mg total) by mouth 2 (two) times a day, Disp: 60 tablet, Rfl: 3    loratadine (CLARITIN) 10 mg tablet, every 24 hours, Disp: , Rfl:     meloxicam (MOBIC) 7 5 mg tablet, Take 1 tablet (7 5 mg total) by mouth daily, Disp: 30 tablet, Rfl: 0    Multiple Vitamins-Minerals (MULTIVITAMIN WITH MINERALS) tablet, Take 1 tablet by mouth daily, Disp: , Rfl:     nitroglycerin (NITROSTAT) 0 4 mg SL tablet, Place 1 tablet (0 4 mg total) under the tongue every 5 (five) minutes as needed for chest pain Call 911 if using 3 doses, Disp: 30 tablet, Rfl: 0    polyethylene glycol (MIRALAX) 17 g packet, Take 17 g by mouth daily, Disp: 14 each, Rfl: 0    polymyxin b-trimethoprim (POLYTRIM) ophthalmic solution, Administer 1 drop into the left eye every 4 (four) hours, Disp: 10 mL, Rfl: 0    methotrexate 2 5 MG tablet, Take 2 tablets (5 mg total) by mouth once a week for 4 doses, Disp: 8 tablet, Rfl: 0     Physical Exam    Physical Exam   Constitutional: Oriented to person, place, and time  Well-developed, obese  No apparent distress, non-toxic appearance  Cooperative, able to hear and answer questions without difficulty  Voice: Normal voice quality  Head: Normocephalic, atraumatic  No scars, masses or lesions  Face: Symmetric, no edema, no sinus tenderness  Eyes: Vision grossly intact, extra-ocular movement intact  Right Ear: External ear normal   Auditory canal with wax (FB?) in ear  Removed with suction and alligator forceps, appears to be dead insect  Tympanic membrane well-appearing, without retraction or scarring  No fluid present  No post-auricular erythema or tenderness  Left Ear: External ear normal   Auditory canal clear    Tympanic membrane well-appearing, without retraction or scarring  No fluid present  No post-auricular erythema or tenderness  Nose: Septum mild right deviation  Mucosa moist, turbinates well appearing  No crusting, polyps or discharge evident  Oral cavity: Dentition intact  Mucosa moist, lips normal   Tongue mobile, floor of mouth normal   Hard palate unremarkable  No masses or lesions  Oropharynx: Uvula is midline, sot palate normal   Unremarkable oropharyngeal inlet  Tonsils 1+ unremarkable  Posterior pharyngeal wall clear  No masses or lesions  Salivary glands:  Parotid glands and submandibular glands symmetric, no enlargement or tenderness  Neck: Normal laryngeal elevation with swallow  Trachea midline  No masses or lesions  No palpable adenopathy  Thyroid: normal in size, unremarkable without tenderness or palpable nodules  Pulmonary/Chest: Normal effort and rate  No respiratory distress  Musculoskeletal: Normal range of motion  Neurological: Cranial nerves 2-12 intact  Skin: Skin is warm and dry  Psychiatric: Normal mood and affect        1  Morbid obesity with BMI of 50 0-59 9, adult ([facility] [address] )     2  FB ear, right, initial encounter         FB removed   F/u prn

## 2019-07-16 ENCOUNTER — HOSPITAL ENCOUNTER (EMERGENCY)
Facility: HOSPITAL | Age: 39
Discharge: HOME | End: 2019-07-16
Attending: EMERGENCY MEDICINE | Admitting: EMERGENCY MEDICINE
Payer: COMMERCIAL

## 2019-07-16 VITALS
RESPIRATION RATE: 18 BRPM | TEMPERATURE: 99.5 F | HEART RATE: 80 BPM | DIASTOLIC BLOOD PRESSURE: 87 MMHG | OXYGEN SATURATION: 100 % | WEIGHT: 289 LBS | SYSTOLIC BLOOD PRESSURE: 134 MMHG | BODY MASS INDEX: 52.86 KG/M2

## 2019-07-16 DIAGNOSIS — R19.7 NAUSEA VOMITING AND DIARRHEA: Primary | ICD-10-CM

## 2019-07-16 DIAGNOSIS — R10.9 ABDOMINAL PAIN: ICD-10-CM

## 2019-07-16 DIAGNOSIS — R11.2 NAUSEA VOMITING AND DIARRHEA: Primary | ICD-10-CM

## 2019-07-16 LAB
ALBUMIN SERPL BCP-MCNC: 4.4 G/DL (ref 3–5.2)
ALP SERPL-CCNC: 69 U/L (ref 43–122)
ALT SERPL W P-5'-P-CCNC: 16 U/L (ref 9–52)
ANION GAP SERPL CALCULATED.3IONS-SCNC: 10 MMOL/L (ref 5–14)
AST SERPL W P-5'-P-CCNC: 19 U/L (ref 14–36)
BACTERIA UR QL AUTO: ABNORMAL /HPF
BASOPHILS # BLD AUTO: 0.1 THOUSANDS/ΜL (ref 0–0.1)
BASOPHILS NFR BLD AUTO: 1 % (ref 0–1)
BILIRUB SERPL-MCNC: 0.1 MG/DL
BILIRUB UR QL STRIP: NEGATIVE
BUN SERPL-MCNC: 17 MG/DL (ref 5–25)
CALCIUM SERPL-MCNC: 9.2 MG/DL (ref 8.4–10.2)
CHLORIDE SERPL-SCNC: 106 MMOL/L (ref 97–108)
CLARITY UR: ABNORMAL
CO2 SERPL-SCNC: 25 MMOL/L (ref 22–30)
COLOR UR: YELLOW
CREAT SERPL-MCNC: 0.63 MG/DL (ref 0.6–1.2)
EOSINOPHIL # BLD AUTO: 0.1 THOUSAND/ΜL (ref 0–0.4)
EOSINOPHIL NFR BLD AUTO: 1 % (ref 0–6)
ERYTHROCYTE [DISTWIDTH] IN BLOOD BY AUTOMATED COUNT: 15.9 %
EXT PREG TEST URINE: NEGATIVE
EXT. CONTROL ED NAV: NORMAL
GFR SERPL CREATININE-BSD FRML MDRD: 113 ML/MIN/1.73SQ M
GLUCOSE SERPL-MCNC: 98 MG/DL (ref 70–99)
GLUCOSE UR STRIP-MCNC: NEGATIVE MG/DL
HCT VFR BLD AUTO: 34.3 % (ref 36–46)
HGB BLD-MCNC: 11.4 G/DL (ref 12–16)
HGB UR QL STRIP.AUTO: 250
KETONES UR STRIP-MCNC: NEGATIVE MG/DL
LEUKOCYTE ESTERASE UR QL STRIP: 100
LIPASE SERPL-CCNC: 80 U/L (ref 23–300)
LYMPHOCYTES # BLD AUTO: 2.3 THOUSANDS/ΜL (ref 0.5–4)
LYMPHOCYTES NFR BLD AUTO: 28 % (ref 25–45)
MCH RBC QN AUTO: 26.8 PG (ref 26–34)
MCHC RBC AUTO-ENTMCNC: 33.1 G/DL (ref 31–36)
MCV RBC AUTO: 81 FL (ref 80–100)
MONOCYTES # BLD AUTO: 0.6 THOUSAND/ΜL (ref 0.2–0.9)
MONOCYTES NFR BLD AUTO: 7 % (ref 1–10)
NEUTROPHILS # BLD AUTO: 5.2 THOUSANDS/ΜL (ref 1.8–7.8)
NEUTS SEG NFR BLD AUTO: 63 % (ref 45–65)
NITRITE UR QL STRIP: NEGATIVE
NON-SQ EPI CELLS URNS QL MICRO: ABNORMAL /HPF
PH UR STRIP.AUTO: 5 [PH]
PLATELET # BLD AUTO: 350 THOUSANDS/UL (ref 150–450)
PMV BLD AUTO: 7.8 FL (ref 8.9–12.7)
POTASSIUM SERPL-SCNC: 3.9 MMOL/L (ref 3.6–5)
PROT SERPL-MCNC: 8.3 G/DL (ref 5.9–8.4)
PROT UR STRIP-MCNC: NEGATIVE MG/DL
RBC # BLD AUTO: 4.25 MILLION/UL (ref 4–5.2)
RBC #/AREA URNS AUTO: ABNORMAL /HPF
SODIUM SERPL-SCNC: 141 MMOL/L (ref 137–147)
SP GR UR STRIP.AUTO: 1.02 (ref 1–1.04)
UROBILINOGEN UA: NEGATIVE MG/DL
WBC # BLD AUTO: 8.3 THOUSAND/UL (ref 4.5–11)
WBC #/AREA URNS AUTO: ABNORMAL /HPF

## 2019-07-16 PROCEDURE — 80053 COMPREHEN METABOLIC PANEL: CPT | Performed by: EMERGENCY MEDICINE

## 2019-07-16 PROCEDURE — 81001 URINALYSIS AUTO W/SCOPE: CPT | Performed by: EMERGENCY MEDICINE

## 2019-07-16 PROCEDURE — 85025 COMPLETE CBC W/AUTO DIFF WBC: CPT | Performed by: EMERGENCY MEDICINE

## 2019-07-16 PROCEDURE — 96374 THER/PROPH/DIAG INJ IV PUSH: CPT

## 2019-07-16 PROCEDURE — 99283 EMERGENCY DEPT VISIT LOW MDM: CPT

## 2019-07-16 PROCEDURE — 81025 URINE PREGNANCY TEST: CPT | Performed by: EMERGENCY MEDICINE

## 2019-07-16 PROCEDURE — 36415 COLL VENOUS BLD VENIPUNCTURE: CPT | Performed by: EMERGENCY MEDICINE

## 2019-07-16 PROCEDURE — 83690 ASSAY OF LIPASE: CPT | Performed by: EMERGENCY MEDICINE

## 2019-07-16 PROCEDURE — 96361 HYDRATE IV INFUSION ADD-ON: CPT

## 2019-07-16 PROCEDURE — 99284 EMERGENCY DEPT VISIT MOD MDM: CPT | Performed by: EMERGENCY MEDICINE

## 2019-07-16 PROCEDURE — 96375 TX/PRO/DX INJ NEW DRUG ADDON: CPT

## 2019-07-16 RX ORDER — ONDANSETRON 4 MG/1
4 TABLET, FILM COATED ORAL EVERY 6 HOURS
Qty: 12 TABLET | Refills: 0 | Status: SHIPPED | OUTPATIENT
Start: 2019-07-16 | End: 2019-08-02

## 2019-07-16 RX ORDER — ONDANSETRON 2 MG/ML
4 INJECTION INTRAMUSCULAR; INTRAVENOUS ONCE
Status: COMPLETED | OUTPATIENT
Start: 2019-07-16 | End: 2019-07-16

## 2019-07-16 RX ADMIN — ONDANSETRON 4 MG: 2 INJECTION, SOLUTION INTRAMUSCULAR; INTRAVENOUS at 21:54

## 2019-07-16 RX ADMIN — FAMOTIDINE 20 MG: 10 INJECTION, SOLUTION INTRAVENOUS at 21:54

## 2019-07-16 RX ADMIN — SODIUM CHLORIDE 1000 ML: 0.9 INJECTION, SOLUTION INTRAVENOUS at 21:51

## 2019-07-17 NOTE — ED PROVIDER NOTES
History  Chief Complaint   Patient presents with    Vomiting     vomiting with diarrhea since this morning     Four hundred [de-identified] 4year-old female presenting for nausea vomiting diarrhea that started today  States he has had 3 episodes of nonbloody nonbilious emesis, 3 episodes of nonbloody diarrhea  Denies any fevers or chills or recent travel outside [place]  She states she began having abdominal pain after having multiple episodes of vomiting  Denies any dysuria or frequency  Denies any sick contacts  History provided by:  Patient   used: No    Vomiting   Severity:  Moderate  Timing:  Constant  Number of daily episodes:  3  Quality:  Stomach contents  Progression:  Unchanged  Chronicity:  New  Recent urination:  Normal  Relieved by:  Nothing  Worsened by:  Nothing  Ineffective treatments:  None tried  Associated symptoms: abdominal pain and diarrhea    Associated symptoms: no chills, no cough, no fever and no sore throat        Prior to Admission Medications   Prescriptions Last Dose Informant Patient Reported? Taking? Calcium Carb-Cholecalciferol (CALCIUM 600+D3) 600-200 MG-UNIT TABS  Self Yes No   Sig: take 1 tablet twice a day   Cholecalciferol (VITAMIN D3) 2000 units capsule  Self Yes No   Sig: take 1 tablet po daily     Incontinence Supply Disposable (POISE PAD) PADS  Self No No   Sig: by Does not apply route 4 (four) times a day as needed (incontinence)   Multiple Vitamins-Minerals (MULTIVITAMIN WITH MINERALS) tablet  Self Yes No   Sig: Take 1 tablet by mouth daily   albuterol (VENTOLIN HFA) 90 mcg/act inhaler   No No   Sig: Inhale 2 puffs every 4 (four) hours as needed for wheezing or shortness of breath   citalopram (CeleXA) 20 mg tablet   No No   Sig: Take 1 tablet (20 mg total) by mouth daily   doxepin (SINEquan) 25 mg capsule   No No   Sig: Take 1 capsule (25 mg total) by mouth daily at bedtime   famotidine (PEPCID) 20 mg tablet  Self Yes No   Sig: Every 12 hours folic acid (FOLVITE) 1 mg tablet  Self Yes No   Sig: every 24 hours   furosemide (LASIX) 20 mg tablet  Self Yes No   Sig: every 24 hours   levETIRAcetam (KEPPRA) 1000 MG tablet   No No   Sig: Take 1 tablet (1,000 mg total) by mouth 2 (two) times a day   loratadine (CLARITIN) 10 mg tablet  Self Yes No   Sig: every 24 hours   meloxicam (MOBIC) 7 5 mg tablet  Self No No   Sig: Take 1 tablet (7 5 mg total) by mouth daily   methotrexate 2 5 MG tablet  Self No No   Sig: Take 2 tablets (5 mg total) by mouth once a week for 4 doses   nitroglycerin (NITROSTAT) 0 4 mg SL tablet   No No   Sig: Place 1 tablet (0 4 mg total) under the tongue every 5 (five) minutes as needed for chest pain Call 911 if using 3 doses   polyethylene glycol (MIRALAX) 17 g packet   No No   Sig: Take 17 g by mouth daily   polymyxin b-trimethoprim (POLYTRIM) ophthalmic solution   No No   Sig: Administer 1 drop into the left eye every 4 (four) hours      Facility-Administered Medications: None       Past Medical History:   Diagnosis Date    [facility]     Anxiety     Asthma     Depression     Hypertension     Kidney problem     Migraine     PTSD (post-traumatic stress disorder)     [facility] arthritis ([facility] [address] [facility]     Seizures ([facility] [address] )        Past Surgical History:   Procedure Laterality Date    APPENDECTOMY      CARDIAC CATHETERIZATION  2006     SECTION  2008    CHOLECYSTECTOMY      KIDNEY SURGERY  2007       Family History   Problem Relation Age of Onset    Diabetes Maternal Grandmother     Hypertension Maternal Grandmother      I have reviewed and agree with the history as documented  Social History     Tobacco Use    Smoking status: Former Smoker    Smokeless tobacco: Former User   Substance Use Topics    Alcohol use: No    Drug use: No        Review of Systems   Constitutional: Negative  Negative for chills and fever  HENT: Negative  Negative for rhinorrhea, sore throat, trouble swallowing and voice change  Eyes: Negative  Negative for pain and visual disturbance  Respiratory: Negative  Negative for cough, shortness of breath and wheezing  Cardiovascular: Negative  Negative for chest pain and palpitations  Gastrointestinal: Positive for abdominal pain, diarrhea, nausea and vomiting  Genitourinary: Negative  Negative for dysuria and frequency  Musculoskeletal: Negative  Negative for neck pain and neck stiffness  Skin: Negative  Negative for rash  Neurological: Negative  Negative for dizziness, speech difficulty, weakness, light-headedness and numbness  Physical Exam  Physical Exam   Constitutional: She is oriented to person, place, and time  She appears well-developed and well-nourished  No distress  HENT:   Head: Normocephalic and atraumatic  Mouth/Throat: Oropharynx is clear and moist    Eyes: Pupils are equal, round, and reactive to light  Conjunctivae and EOM are normal    Neck: Normal range of motion  Neck supple  No tracheal deviation present  Cardiovascular: Normal rate, regular rhythm and intact distal pulses  Pulmonary/Chest: Effort normal and breath sounds normal  No respiratory distress  She has no wheezes  She has no rales  Abdominal: Soft  Bowel sounds are normal  She exhibits no distension  There is no tenderness  There is no rebound and no guarding  Musculoskeletal: Normal range of motion  She exhibits no tenderness or deformity  Neurological: She is alert and oriented to person, place, and time  Skin: Skin is warm and dry  Capillary refill takes less than 2 seconds  No rash noted  Psychiatric: She has a normal mood and affect  Her behavior is normal    Nursing note and vitals reviewed        Vital Signs  ED Triage Vitals   Temperature Pulse Respirations Blood Pressure SpO2   07/16/19 2046 07/16/19 2046 07/16/19 2046 07/16/19 2051 07/16/19 2046   99 5 °F (37 5 °C) 89 18 (!) 173/78 100 %      Temp Source Heart Rate Source Patient Position - Orthostatic VS BP Location FiO2 (%)   07/16/19 2046 07/16/19 2046 07/16/19 2046 07/16/19 2046 --   Tympanic Monitor Sitting Left arm       Pain Score       --                  Vitals:    07/16/19 2046 07/16/19 2051 07/16/19 2256   BP:  (!) 173/78 134/87   Pulse: 89  80   Patient Position - Orthostatic VS: Sitting Sitting Lying         Visual Acuity      ED Medications  Medications   ondansetron (ZOFRAN) injection 4 mg (4 mg Intravenous Given 7/16/19 2154)   famotidine (PEPCID) injection 20 mg (20 mg Intravenous Given 7/16/19 2154)   sodium chloride 0 9 % bolus 1,000 mL (0 mL Intravenous Stopped 7/16/19 2256)       Diagnostic Studies  Results Reviewed     Procedure Component Value Units Date/Time    CBC and differential [[id number]]  (Abnormal) Collected:  07/16/19 2147    Lab Status:  Final result Specimen:  Blood from Arm, Right Updated:  07/16/19 2208     WBC 8 30 Thousand/uL      RBC 4 25 Million/uL      Hemoglobin 11 4 g/dL      Hematocrit 34 3 %      MCV 81 fL      MCH 26 8 pg      MCHC 33 1 g/dL      RDW 15 9 %      MPV 7 8 fL      Platelets 826 Thousands/uL      Neutrophils Relative 63 %      Lymphocytes Relative 28 %      Monocytes Relative 7 %      Eosinophils Relative 1 %      Basophils Relative 1 %      Neutrophils Absolute 5 20 Thousands/µL      Lymphocytes Absolute 2 30 Thousands/µL      Monocytes Absolute 0 60 Thousand/µL      Eosinophils Absolute 0 10 Thousand/µL      Basophils Absolute 0 10 Thousands/µL     Lipase [[id number]]  (Normal) Collected:  07/16/19 2147    Lab Status:  Final result Specimen:  Blood from Arm, Right Updated:  07/16/19 2207     Lipase 80 u/L     CMP [[id number]]  (Normal) Collected:  07/16/19 2147    Lab Status:  Final result Specimen:  Blood from Arm, Right Updated:  07/16/19 2207     Sodium 141 mmol/L      Potassium 3 9 mmol/L      Chloride 106 mmol/L      CO2 25 mmol/L      ANION GAP 10 mmol/L      BUN 17 mg/dL      Creatinine 0 63 mg/dL      Glucose 98 mg/dL      Calcium 9 2 mg/dL      AST 19 U/L      ALT 16 U/L      Alkaline Phosphatase 69 U/L      Total Protein 8 3 g/dL      Albumin 4 4 g/dL      Total Bilirubin 0 10 mg/dL      eGFR 113 ml/min/1 73sq m     Narrative:       [place] guidelines for Chronic Kidney Disease (CKD):     Stage 1 with normal or high GFR (GFR > 90 mL/min/1 73 square meters)    Stage 2 Mild CKD (GFR = 60-89 mL/min/1 73 square meters)    Stage 3A Moderate CKD (GFR = 45-59 mL/min/1 73 square meters)    Stage 3B Moderate CKD (GFR = 30-44 mL/min/1 73 square meters)    Stage 4 Severe CKD (GFR = 15-29 mL/min/1 73 square meters)    Stage 5 End Stage CKD (GFR <15 mL/min/1 73 square meters)  Note: GFR calculation is accurate only with a steady state creatinine    Urine Microscopic [[id number]]  (Abnormal) Collected:  07/16/19 2115    Lab Status:  Final result Specimen:  Urine, Clean Catch Updated:  07/16/19 2137     RBC, UA 4-10 /hpf      WBC, UA 4-10 /hpf      Epithelial Cells Moderate /hpf      Bacteria, UA Moderate /hpf     UA w Reflex to Microscopic [[id number]]  (Abnormal) Collected:  07/16/19 2115    Lab Status:  Final result Specimen:  Urine, Clean Catch Updated:  07/16/19 2129     Color, UA Yellow     Clarity, UA Slightly Cloudy     Specific [place], UA 1 020     pH, UA 5 0     Leukocytes,  0     Nitrite, UA Negative     Protein, UA Negative mg/dl      Glucose, UA Negative mg/dl      Ketones, UA Negative mg/dl      Bilirubin, UA Negative     Blood,  0     UROBILINOGEN UA Negative mg/dL     POCT pregnancy, urine [[id number]]  (Normal) Resulted:  07/16/19 2115    Lab Status:  Final result Specimen:  Urine Updated:  07/16/19 2115     EXT PREG TEST UR (Ref: Negative) negative     Control valid                 No orders to display              Procedures  Procedures       ED Course                               MDM  Number of Diagnoses or Management Options  Abdominal pain:   Nausea vomiting and diarrhea:   Diagnosis management comments: Patient is a 43-year-old female who presented for nausea vomiting diarrhea x1 day  Has a benign abdominal examination  Vitals are okay  Her lab work is within normal limits  After receiving IV fluids, Zofran and Pepcid she felt significantly better  Her repeat abdominal examination to be soft and without focal tenderness  She is requesting to go home  Will be discharged with a short prescription of Zofran  Strict return precautions discussed, size of follow-up with PCP and if needed to establish herself with a gastroenterologist        Amount and/or Complexity of Data Reviewed  Clinical lab tests: ordered and reviewed  Tests in the medicine section of CPT®: ordered and reviewed    Patient Progress  Patient progress: improved      Disposition  Final diagnoses:   Nausea vomiting and diarrhea   Abdominal pain     Time reflects when diagnosis was documented in both MDM as applicable and the Disposition within this note     Time User Action Codes Description Comment    7/16/2019 10:38 PM Osluz Cochran Add [R11 2,  R19 7] Nausea vomiting and diarrhea     7/16/2019 10:38 PM Osluz Cochran Add [R10 9] Abdominal pain       ED Disposition     ED Disposition Condition Date/Time Comment    Discharge Stable Tue Jul 16, 2019 10:37 PM Sonja Rose discharge to home/self care              Follow-up Information     Follow up With Specialties Details Why Contact Info Additional [id number] In 1 week  [address], [address] [facility] [address]  [address], [address], [address], [address]          Discharge Medication List as of 7/16/2019 10:40 PM      START taking these medications    Details   ondansetron (ZOFRAN) 4 mg tablet Take 1 tablet (4 mg total) by mouth every 6 (six) hours, Starting Tue 7/16/2019, Print         CONTINUE these medications which have NOT CHANGED    Details albuterol (VENTOLIN HFA) 90 mcg/act inhaler Inhale 2 puffs every 4 (four) hours as needed for wheezing or shortness of breath, Starting Thu 11/29/2018, Normal      Calcium Carb-Cholecalciferol (CALCIUM 600+D3) 600-200 MG-UNIT TABS take 1 tablet twice a day, Historical Med      Cholecalciferol (VITAMIN D3) 2000 units capsule take 1 tablet po daily  , Historical Med      citalopram (CeleXA) 20 mg tablet Take 1 tablet (20 mg total) by mouth daily, Starting Thu 3/28/2019, Normal      doxepin (SINEquan) 25 mg capsule Take 1 capsule (25 mg total) by mouth daily at bedtime, Starting Thu 3/28/2019, Normal      famotidine (PEPCID) 20 mg tablet Every 12 hours, Starting Mon 10/5/2015, Historical Med      folic acid (FOLVITE) 1 mg tablet every 24 hours, Starting Wed 1/27/2016, Historical Med      furosemide (LASIX) 20 mg tablet every 24 hours, Starting Wed 5/9/2018, Historical Med      Incontinence Supply Disposable (POISE PAD) PADS by Does not apply route 4 (four) times a day as needed (incontinence), Starting Wed 9/5/2018, Normal      levETIRAcetam (KEPPRA) 1000 MG tablet Take 1 tablet (1,000 mg total) by mouth 2 (two) times a day, Starting Thu 10/11/2018, Normal      loratadine (CLARITIN) 10 mg tablet every 24 hours, Starting Wed 5/9/2018, Historical Med      meloxicam (MOBIC) 7 5 mg tablet Take 1 tablet (7 5 mg total) by mouth daily, Starting Wed 9/5/2018, Normal      methotrexate 2 5 MG tablet Take 2 tablets (5 mg total) by mouth once a week for 4 doses, Starting Wed 9/5/2018, Until Thu 9/27/2018, Normal      Multiple Vitamins-Minerals (MULTIVITAMIN WITH MINERALS) tablet Take 1 tablet by mouth daily, Historical Med      nitroglycerin (NITROSTAT) 0 4 mg SL tablet Place 1 tablet (0 4 mg total) under the tongue every 5 (five) minutes as needed for chest pain Call 911 if using 3 doses, Starting Thu 3/28/2019, Normal      polyethylene glycol (MIRALAX) 17 g packet Take 17 g by mouth daily, Starting Thu 5/2/2019, Normal polymyxin b-trimethoprim (POLYTRIM) ophthalmic solution Administer 1 drop into the left eye every 4 (four) hours, Starting Thu 5/2/2019, Normal           No discharge procedures on file      ED Provider  Electronically Signed by           Adalid Edouard DO  07/17/19 2746

## 2019-08-02 ENCOUNTER — OFFICE VISIT (OUTPATIENT)
Dept: FAMILY MEDICINE CLINIC | Facility: CLINIC | Age: 39
End: 2019-08-02

## 2019-08-02 VITALS
TEMPERATURE: 97.6 F | OXYGEN SATURATION: 99 % | RESPIRATION RATE: 18 BRPM | DIASTOLIC BLOOD PRESSURE: 78 MMHG | SYSTOLIC BLOOD PRESSURE: 120 MMHG | HEIGHT: 62 IN | HEART RATE: 89 BPM | BODY MASS INDEX: 52.06 KG/M2 | WEIGHT: 282.9 LBS

## 2019-08-02 DIAGNOSIS — K59.00 CONSTIPATION, UNSPECIFIED CONSTIPATION TYPE: ICD-10-CM

## 2019-08-02 DIAGNOSIS — R20.2 NUMBNESS AND TINGLING IN BOTH HANDS: Primary | ICD-10-CM

## 2019-08-02 DIAGNOSIS — J45.20 MILD INTERMITTENT ASTHMA WITHOUT COMPLICATION: ICD-10-CM

## 2019-08-02 DIAGNOSIS — F41.9 ANXIETY: ICD-10-CM

## 2019-08-02 DIAGNOSIS — M05.40 RHEUMATOID MYOPATHY WITH RHEUMATOID ARTHRITIS (HCC): ICD-10-CM

## 2019-08-02 DIAGNOSIS — J30.9 ALLERGIC RHINITIS, UNSPECIFIED SEASONALITY, UNSPECIFIED TRIGGER: ICD-10-CM

## 2019-08-02 DIAGNOSIS — R20.0 NUMBNESS AND TINGLING IN BOTH HANDS: Primary | ICD-10-CM

## 2019-08-02 DIAGNOSIS — R56.9 SEIZURE (HCC): ICD-10-CM

## 2019-08-02 DIAGNOSIS — K21.9 GASTROESOPHAGEAL REFLUX DISEASE WITHOUT ESOPHAGITIS: ICD-10-CM

## 2019-08-02 DIAGNOSIS — J45.909 UNCOMPLICATED ASTHMA, UNSPECIFIED ASTHMA SEVERITY, UNSPECIFIED WHETHER PERSISTENT: ICD-10-CM

## 2019-08-02 DIAGNOSIS — I10 ESSENTIAL HYPERTENSION: ICD-10-CM

## 2019-08-02 PROCEDURE — 3074F SYST BP LT 130 MM HG: CPT | Performed by: PHYSICIAN ASSISTANT

## 2019-08-02 PROCEDURE — 3078F DIAST BP <80 MM HG: CPT | Performed by: PHYSICIAN ASSISTANT

## 2019-08-02 PROCEDURE — 99214 OFFICE O/P EST MOD 30 MIN: CPT | Performed by: PHYSICIAN ASSISTANT

## 2019-08-02 RX ORDER — CITALOPRAM 20 MG/1
20 TABLET ORAL DAILY
Qty: 30 TABLET | Refills: 3 | Status: SHIPPED | OUTPATIENT
Start: 2019-08-02 | End: 2020-04-14 | Stop reason: SDUPTHER

## 2019-08-02 RX ORDER — FAMOTIDINE 20 MG/1
20 TABLET, FILM COATED ORAL DAILY
Qty: 30 TABLET | Refills: 5 | Status: SHIPPED | OUTPATIENT
Start: 2019-08-02 | End: 2019-11-05 | Stop reason: SDUPTHER

## 2019-08-02 RX ORDER — GABAPENTIN 300 MG/1
300 CAPSULE ORAL 3 TIMES DAILY
Qty: 90 CAPSULE | Refills: 2 | Status: SHIPPED | OUTPATIENT
Start: 2019-08-02 | End: 2019-09-26

## 2019-08-02 RX ORDER — DICLOFENAC SODIUM 75 MG/1
75 TABLET, DELAYED RELEASE ORAL 2 TIMES DAILY
Qty: 60 TABLET | Refills: 1 | Status: SHIPPED | OUTPATIENT
Start: 2019-08-02 | End: 2019-11-05 | Stop reason: SDUPTHER

## 2019-08-02 RX ORDER — LEFLUNOMIDE 10 MG/1
10 TABLET ORAL DAILY
COMMUNITY
End: 2020-10-15

## 2019-08-02 RX ORDER — POLYETHYLENE GLYCOL 3350 17 G/17G
17 POWDER, FOR SOLUTION ORAL DAILY
Qty: 578 G | Refills: 2 | Status: SHIPPED | OUTPATIENT
Start: 2019-08-02 | End: 2019-11-05 | Stop reason: SDUPTHER

## 2019-08-02 RX ORDER — FUROSEMIDE 20 MG/1
20 TABLET ORAL DAILY
Qty: 30 TABLET | Refills: 5 | Status: SHIPPED | OUTPATIENT
Start: 2019-08-02 | End: 2020-04-14

## 2019-08-02 RX ORDER — ALBUTEROL SULFATE 90 UG/1
2 AEROSOL, METERED RESPIRATORY (INHALATION) EVERY 4 HOURS PRN
Qty: 18 G | Refills: 1 | Status: SHIPPED | OUTPATIENT
Start: 2019-08-02 | End: 2019-11-05 | Stop reason: SDUPTHER

## 2019-08-02 RX ORDER — DOXEPIN HYDROCHLORIDE 25 MG/1
25 CAPSULE ORAL
Qty: 30 CAPSULE | Refills: 3 | Status: SHIPPED | OUTPATIENT
Start: 2019-08-02 | End: 2020-04-14 | Stop reason: SDUPTHER

## 2019-08-02 RX ORDER — LEVETIRACETAM 1000 MG/1
1000 TABLET ORAL 2 TIMES DAILY
Qty: 60 TABLET | Refills: 3 | Status: SHIPPED | OUTPATIENT
Start: 2019-08-02 | End: 2020-04-14 | Stop reason: SDUPTHER

## 2019-08-02 RX ORDER — MULTIVIT-MIN/IRON FUM/FOLIC AC 7.5 MG-4
1 TABLET ORAL DAILY
Qty: 30 TABLET | Refills: 11 | Status: SHIPPED | OUTPATIENT
Start: 2019-08-02

## 2019-08-02 RX ORDER — LORATADINE 10 MG/1
10 TABLET ORAL DAILY
Qty: 30 TABLET | Refills: 5 | OUTPATIENT
Start: 2019-08-02 | End: 2019-11-05

## 2019-08-02 NOTE — ASSESSMENT & PLAN NOTE
Placed on diclofenac  Recommend she continue following up with rheumatology  Discussed that this can increase her risk of GI bleed and we discussed symptoms of GI bleed  She will call the office with any abdominal pain or bleeding symptoms

## 2019-08-02 NOTE — PATIENT INSTRUCTIONS
Recommend starting the gabapentin at night 3 days and then increase to twice a day  After 1 week start 3 times a day

## 2019-08-02 NOTE — PROGRESS NOTES
Assessment/Plan:    Mild intermittent asthma  Continue with as needed and albuterol    Rheumatoid myopathy with rheumatoid arthritis ([facility] )  Placed on diclofenac  Recommend she continue following up with rheumatology  Discussed that this can increase her risk of GI bleed and we discussed symptoms of GI bleed  She will call the office with any abdominal pain or bleeding symptoms  Seizure ([place] [address] )  Current Keppra  Referred to Neurology  Problem List Items Addressed This Visit        Respiratory    Mild intermittent asthma     Continue with as needed and albuterol         Relevant Medications    albuterol (VENTOLIN HFA) 90 mcg/act inhaler       Cardiovascular and Mediastinum    Essential hypertension    Relevant Medications    furosemide (LASIX) 20 mg tablet       Musculoskeletal and Integument    Rheumatoid myopathy with rheumatoid arthritis ([place] [address] )     Placed on diclofenac  Recommend she continue following up with rheumatology  Discussed that this can increase her risk of GI bleed and we discussed symptoms of GI bleed  She will call the office with any abdominal pain or bleeding symptoms  Other    Seizure ([place] [address] )     Current Keppra  Referred to Neurology           Relevant Medications    levETIRAcetam (KEPPRA) 1000 MG tablet    Multiple Vitamins-Minerals (MULTIVITAMIN WITH MINERALS) tablet    Other Relevant Orders    Ambulatory referral to Neurology    Anxiety    Relevant Medications    citalopram (CeleXA) 20 mg tablet    doxepin (SINEquan) 25 mg capsule    polyethylene glycol (GLYCOLAX) powder    Constipation      Other Visit Diagnoses     Numbness and tingling in both hands    -  Primary    Relevant Medications    gabapentin (NEURONTIN) 300 mg capsule    diclofenac (VOLTAREN) 75 mg EC tablet    Elastic Bandages & Supports (CARPAL TUNNEL WRIST DELUXE) MISC    Other Relevant Orders    EMG 2 Limb Upper Extremity    Uncomplicated asthma, unspecified asthma severity, unspecified whether persistent Relevant Medications    albuterol (VENTOLIN HFA) 90 mcg/act inhaler    Gastroesophageal reflux disease without esophagitis        Relevant Medications    famotidine (PEPCID) 20 mg tablet    Allergic rhinitis, unspecified seasonality, unspecified trigger        Relevant Medications    loratadine (CLARITIN) 10 mg tablet            Subjective:      Patient ID: Nikki Barillas is a 43 y o  female  HPI 67-year-old female with history of rheumatoid arthritis, seizures, angina and depression PTSD here for follow-up  She does see Rheumatology regularly and is currently recommended to take arava  She was on methotrexate once a week in the past but it didnot help and she was restarted on it now  She has been on this for about 1 month  She is still in pain daily  She will be seeing Dermatology back in another month  She has been gettign a lot of numbness in her hands  It is is worse at night   It is alll her fingers and she has problems bending them  Sometimes the numbness radiates up her arms  She denies any neck problems but does get bilateral shoulder pain  She does work as a  and the numbness and pain in her fingers interferes the job  She is on keppra for seizures and last one was in May  She had seizures for many years  She has not currently seeing a neurologist   She has been on [address] which has been controlling his seizures  She believes she had an EEG which she does not know when she had it done  The seizures are described as generalized  The following portions of the patient's history were reviewed and updated as appropriate:   She  has a past medical history of Angina at rest [facility]), Anxiety, Depression, Kidney problem, Migraine, and PTSD (post-traumatic stress disorder)    She   Patient Active Problem List    Diagnosis Date Noted    Constipation 05/02/2019    Proteinuria 05/02/2019    Acute bacterial conjunctivitis of left eye 05/02/2019    Anxiety 03/28/2019    Angina at rest [facility]) 2019    Encounter for gynecological examination 10/25/2018    Easy bruising 10/25/2018    Irregular periods 10/25/2018    Anemia 2018    Ambulatory dysfunction 2018    Mild intermittent asthma 2018    Stress incontinence 2018    Hydronephrosis 2018    Depression 2018    Seizure ([facility] ) 2018    Rheumatoid myopathy with rheumatoid arthritis ([facility] ) 2016    Obesity, unspecified 2012    Essential hypertension 2012     She  has a past surgical history that includes Cholecystectomy; Appendectomy ();  section (); Cardiac catheterization (); and Kidney surgery ()  Her family history includes Diabetes in her maternal grandmother; Hypertension in her maternal grandmother  She  reports that she has quit smoking  She has quit using smokeless tobacco  She reports that she does not drink alcohol or use drugs  Current Outpatient Medications   Medication Sig Dispense Refill    albuterol (VENTOLIN HFA) 90 mcg/act inhaler Inhale 2 puffs every 4 (four) hours as needed for wheezing or shortness of breath 18 g 1    Calcium Carb-Cholecalciferol (CALCIUM 600+D3) 600-200 MG-UNIT TABS take 1 tablet twice a day      Cholecalciferol (VITAMIN D3) 2000 units capsule take 1 tablet po daily        citalopram (CeleXA) 20 mg tablet Take 1 tablet (20 mg total) by mouth daily 30 tablet 3    doxepin (SINEquan) 25 mg capsule Take 1 capsule (25 mg total) by mouth daily at bedtime 30 capsule 3    famotidine (PEPCID) 20 mg tablet Take 1 tablet (20 mg total) by mouth daily 30 tablet 5    folic acid (FOLVITE) 1 mg tablet every 24 hours      furosemide (LASIX) 20 mg tablet Take 1 tablet (20 mg total) by mouth daily 30 tablet 5    Incontinence Supply Disposable (POISE PAD) PADS by Does not apply route 4 (four) times a day as needed (incontinence) 90 each 0    leflunomide (ARAVA) 10 MG tablet Take 10 mg by mouth daily      levETIRAcetam (KEPPRA) 1000 MG tablet Take 1 tablet (1,000 mg total) by mouth 2 (two) times a day 60 tablet 3    loratadine (CLARITIN) 10 mg tablet Take 1 tablet (10 mg total) by mouth daily 30 tablet 5    Multiple Vitamins-Minerals (MULTIVITAMIN WITH MINERALS) tablet Take 1 tablet by mouth daily 30 tablet 11    nitroglycerin (NITROSTAT) 0 4 mg SL tablet Place 1 tablet (0 4 mg total) under the tongue every 5 (five) minutes as needed for chest pain Call 911 if using 3 doses 30 tablet 0    polymyxin b-trimethoprim (POLYTRIM) ophthalmic solution Administer 1 drop into the left eye every 4 (four) hours 10 mL 0    diclofenac (VOLTAREN) 75 mg EC tablet Take 1 tablet (75 mg total) by mouth 2 (two) times a day 60 tablet 1    Elastic Bandages & Supports (CARPAL TUNNEL WRIST DELUXE) MISC by Does not apply route daily at bedtime 2 each 0    gabapentin (NEURONTIN) 300 mg capsule Take 1 capsule (300 mg total) by mouth 3 (three) times a day 90 capsule 2    methotrexate 2 5 MG tablet Take 2 tablets (5 mg total) by mouth once a week for 4 doses 8 tablet 0    polyethylene glycol (GLYCOLAX) powder Take 17 g by mouth daily 578 g 2     No current facility-administered medications for this visit  Current Outpatient Medications on File Prior to Visit   Medication Sig    Calcium Carb-Cholecalciferol (CALCIUM 600+D3) 600-200 MG-UNIT TABS take 1 tablet twice a day    Cholecalciferol (VITAMIN D3) 2000 units capsule take 1 tablet po daily      folic acid (FOLVITE) 1 mg tablet every 24 hours    Incontinence Supply Disposable (POISE PAD) PADS by Does not apply route 4 (four) times a day as needed (incontinence)    leflunomide (ARAVA) 10 MG tablet Take 10 mg by mouth daily    nitroglycerin (NITROSTAT) 0 4 mg SL tablet Place 1 tablet (0 4 mg total) under the tongue every 5 (five) minutes as needed for chest pain Call 911 if using 3 doses    polymyxin b-trimethoprim (POLYTRIM) ophthalmic solution Administer 1 drop into the left eye every 4 (four) hours    [DISCONTINUED] albuterol (VENTOLIN HFA) 90 mcg/act inhaler Inhale 2 puffs every 4 (four) hours as needed for wheezing or shortness of breath    [DISCONTINUED] citalopram (CeleXA) 20 mg tablet Take 1 tablet (20 mg total) by mouth daily    [DISCONTINUED] doxepin (SINEquan) 25 mg capsule Take 1 capsule (25 mg total) by mouth daily at bedtime    [DISCONTINUED] famotidine (PEPCID) 20 mg tablet Every 12 hours    [DISCONTINUED] furosemide (LASIX) 20 mg tablet every 24 hours    [DISCONTINUED] levETIRAcetam (KEPPRA) 1000 MG tablet Take 1 tablet (1,000 mg total) by mouth 2 (two) times a day    [DISCONTINUED] loratadine (CLARITIN) 10 mg tablet every 24 hours    [DISCONTINUED] Multiple Vitamins-Minerals (MULTIVITAMIN WITH MINERALS) tablet Take 1 tablet by mouth daily    [DISCONTINUED] ondansetron (ZOFRAN) 4 mg tablet Take 1 tablet (4 mg total) by mouth every 6 (six) hours    [DISCONTINUED] polyethylene glycol (MIRALAX) 17 g packet Take 17 g by mouth daily    methotrexate 2 5 MG tablet Take 2 tablets (5 mg total) by mouth once a week for 4 doses    [DISCONTINUED] meloxicam (MOBIC) 7 5 mg tablet Take 1 tablet (7 5 mg total) by mouth daily     No current facility-administered medications on file prior to visit  She is allergic to codeine; iodine; latex; lovenox [enoxaparin]; shellfish-derived products; vaccinium angustifolium; vicodin [hydrocodone-acetaminophen]; benadryl [diphenhydramine]; oxycontin [oxycodone]; and provera [medroxyprogesterone]       Review of Systems   Constitutional: Positive for fatigue  Negative for activity change, appetite change, chills and unexpected weight change  HENT: Negative for ear pain, hearing loss and sore throat  Eyes: Negative for visual disturbance  Respiratory: Negative for cough and wheezing  Cardiovascular: Negative for chest pain  Gastrointestinal: Negative for abdominal pain, constipation (miralax helps), diarrhea and vomiting     Genitourinary: Negative for difficulty urinating and dysuria  Musculoskeletal: Positive for arthralgias and joint swelling  Negative for back pain, myalgias, neck pain and neck stiffness  Skin: Negative for rash  Neurological: Positive for numbness  Negative for dizziness, seizures and headaches  Psychiatric/Behavioral: Negative for behavioral problems  Objective:      /78 (BP Location: Left arm, Patient Position: Sitting, Cuff Size: Large)   Pulse 89   Temp 97 6 °F (36 4 °C) (Temporal)   Resp 18   Ht 5' 2" (1 575 m)   Wt 128 kg (282 lb 14 4 oz)   LMP  (LMP Unknown)   SpO2 99%   Breastfeeding? No   BMI 51 74 kg/m²          Physical Exam   Constitutional: She is oriented to person, place, and time  She appears well-developed and well-nourished  No distress  HENT:   Head: Normocephalic and atraumatic  Right Ear: External ear normal    Left Ear: External ear normal    Eyes: Conjunctivae are normal    Neck: Normal range of motion  Neck supple  No thyromegaly present  Cardiovascular: Normal rate, regular rhythm and normal heart sounds  No murmur heard  Pulmonary/Chest: Breath sounds normal  No respiratory distress  She has no wheezes  Musculoskeletal: She exhibits edema and tenderness  Swelling in DIP, PIP, MCP joints bilaterally  +tinel/phalen bilaterally   Lymphadenopathy:     She has no cervical adenopathy  Neurological: She is alert and oriented to person, place, and time  Psychiatric: She has a normal mood and affect  Her behavior is normal    Nursing note and vitals reviewed

## 2019-08-06 ENCOUNTER — TELEPHONE (OUTPATIENT)
Dept: FAMILY MEDICINE CLINIC | Facility: CLINIC | Age: 39
End: 2019-08-06

## 2019-08-06 NOTE — TELEPHONE ENCOUNTER
Pt given all info    EMG apt([phone]) is on 8/22/2019 at 9:30 am at [facility], [address], [place]   Neuro [facility]BN([phone]) is on 11/1/2019 at 9 am at [address], [address], [place]

## 2019-08-08 ENCOUNTER — TELEPHONE (OUTPATIENT)
Dept: FAMILY MEDICINE CLINIC | Facility: CLINIC | Age: 39
End: 2019-08-08

## 2019-08-08 NOTE — TELEPHONE ENCOUNTER
[facility] [place], [place] called with questions regarding order that was sent over  Please give them a call      Thank you

## 2019-08-09 ENCOUNTER — HOSPITAL ENCOUNTER (EMERGENCY)
Facility: HOSPITAL | Age: 39
Discharge: HOME | End: 2019-08-09
Attending: EMERGENCY MEDICINE
Payer: COMMERCIAL

## 2019-08-09 ENCOUNTER — APPOINTMENT (EMERGENCY)
Dept: RADIOLOGY | Facility: HOSPITAL | Age: 39
End: 2019-08-09
Payer: COMMERCIAL

## 2019-08-09 VITALS
RESPIRATION RATE: 18 BRPM | TEMPERATURE: 98.2 F | WEIGHT: 286.38 LBS | OXYGEN SATURATION: 100 % | BODY MASS INDEX: 52.38 KG/M2 | SYSTOLIC BLOOD PRESSURE: 131 MMHG | DIASTOLIC BLOOD PRESSURE: 60 MMHG | HEART RATE: 83 BPM

## 2019-08-09 DIAGNOSIS — S93.601A SPRAIN OF RIGHT FOOT, INITIAL ENCOUNTER: ICD-10-CM

## 2019-08-09 DIAGNOSIS — S93.409A ANKLE SPRAIN: Primary | ICD-10-CM

## 2019-08-09 LAB
EXT PREG TEST URINE: NEGATIVE
EXT. CONTROL ED NAV: NORMAL

## 2019-08-09 PROCEDURE — 29515 APPLICATION SHORT LEG SPLINT: CPT | Performed by: PHYSICIAN ASSISTANT

## 2019-08-09 PROCEDURE — 81025 URINE PREGNANCY TEST: CPT | Performed by: PHYSICIAN ASSISTANT

## 2019-08-09 PROCEDURE — 73630 X-RAY EXAM OF FOOT: CPT

## 2019-08-09 PROCEDURE — 99283 EMERGENCY DEPT VISIT LOW MDM: CPT

## 2019-08-09 PROCEDURE — 99283 EMERGENCY DEPT VISIT LOW MDM: CPT | Performed by: PHYSICIAN ASSISTANT

## 2019-08-09 PROCEDURE — 73610 X-RAY EXAM OF ANKLE: CPT

## 2019-08-09 RX ORDER — NAPROXEN 500 MG/1
500 TABLET ORAL ONCE
Status: COMPLETED | OUTPATIENT
Start: 2019-08-09 | End: 2019-08-09

## 2019-08-09 RX ORDER — NAPROXEN 500 MG/1
500 TABLET ORAL 2 TIMES DAILY WITH MEALS
Qty: 20 TABLET | Refills: 0 | Status: SHIPPED | OUTPATIENT
Start: 2019-08-09 | End: 2019-09-26

## 2019-08-09 RX ADMIN — NAPROXEN 500 MG: 500 TABLET ORAL at 15:21

## 2019-08-09 NOTE — ED PROVIDER NOTES
History  Chief Complaint   Patient presents with    Ankle Injury     staets she fell down 3 steps about 1 hour ago and injured rt ankle  denies any other pain     Patient is a 50-year-old female presents today with chief complaint of right-sided ankle pain and foot pain after a fall down 3 steps  Patient reports she fell 1 hour ago and did not hit her head or neck or back  Patient denies any blood thinning medications  Patient denies any numbness, tingling, weakness, paresthesias, paralysis associated with the pain  Patient reports she has been unable to ambulate on the foot without pain  Patient reports no radiation of the pain up her leg or into her inferior knee  Patient notes she has not taken any medication to alleviate her symptoms  Patient reports the pain is aching cramping and goes from the lateral malleolus down into the midfoot area on the lateral aspect of the foot  Patient reports no previous injuries to this area  History provided by:  Patient   used: No    Foot Injury - Major   Location:  Foot and ankle  Time since incident:  1 hour  Injury: yes    Mechanism of injury: fall    Fall:     Fall occurred:  Down stairs    Height of fall:  3 step    Point of impact:  Feet  Ankle location:  R ankle  Foot location:  R foot  Pain details:     Quality:  Aching    Radiates to:  Does not radiate    Severity:  Moderate    Onset quality:  Gradual    Duration:  1 hour    Timing:  Constant    Progression:  Unchanged  Chronicity:  New  Dislocation: no    Prior injury to area:  No  Relieved by:  None tried  Worsened by:  Nothing  Ineffective treatments:  None tried  Associated symptoms: no fatigue and no fever        Prior to Admission Medications   Prescriptions Last Dose Informant Patient Reported? Taking?    Calcium Carb-Cholecalciferol (CALCIUM 600+D3) 600-200 MG-UNIT TABS  Self Yes No   Sig: take 1 tablet twice a day   Cholecalciferol (VITAMIN D3) 2000 units capsule  Self Yes No Sig: take 1 tablet po daily     Elastic Bandages & Supports (CARPAL TUNNEL WRIST DELUXE) MISC   No No   Sig: by Does not apply route daily at bedtime   Incontinence Supply Disposable (POISE PAD) PADS  Self No No   Sig: by Does not apply route 4 (four) times a day as needed (incontinence)   Multiple Vitamins-Minerals (MULTIVITAMIN WITH MINERALS) tablet   No No   Sig: Take 1 tablet by mouth daily   albuterol (VENTOLIN HFA) 90 mcg/act inhaler   No No   Sig: Inhale 2 puffs every 4 (four) hours as needed for wheezing or shortness of breath   citalopram (CeleXA) 20 mg tablet   No No   Sig: Take 1 tablet (20 mg total) by mouth daily   diclofenac (VOLTAREN) 75 mg EC tablet   No No   Sig: Take 1 tablet (75 mg total) by mouth 2 (two) times a day   doxepin (SINEquan) 25 mg capsule   No No   Sig: Take 1 capsule (25 mg total) by mouth daily at bedtime   famotidine (PEPCID) 20 mg tablet   No No   Sig: Take 1 tablet (20 mg total) by mouth daily   folic acid (FOLVITE) 1 mg tablet  Self Yes No   Sig: every 24 hours   furosemide (LASIX) 20 mg tablet   No No   Sig: Take 1 tablet (20 mg total) by mouth daily   gabapentin (NEURONTIN) 300 mg capsule   No No   Sig: Take 1 capsule (300 mg total) by mouth 3 (three) times a day   leflunomide (ARAVA) 10 MG tablet   Yes No   Sig: Take 10 mg by mouth daily   levETIRAcetam (KEPPRA) 1000 MG tablet   No No   Sig: Take 1 tablet (1,000 mg total) by mouth 2 (two) times a day   loratadine (CLARITIN) 10 mg tablet   No No   Sig: Take 1 tablet (10 mg total) by mouth daily   methotrexate 2 5 MG tablet  Self No No   Sig: Take 2 tablets (5 mg total) by mouth once a week for 4 doses   nitroglycerin (NITROSTAT) 0 4 mg SL tablet   No No   Sig: Place 1 tablet (0 4 mg total) under the tongue every 5 (five) minutes as needed for chest pain Call 911 if using 3 doses   polyethylene glycol (GLYCOLAX) powder   No No   Sig: Take 17 g by mouth daily   polymyxin b-trimethoprim (POLYTRIM) ophthalmic solution   No No Sig: Administer 1 drop into the left eye every 4 (four) hours      Facility-Administered Medications: None       Past Medical History:   Diagnosis Date    [facility] at [facility])     Anxiety     Depression     Kidney problem     Migraine     PTSD (post-traumatic stress disorder)        Past Surgical History:   Procedure Laterality Date    APPENDECTOMY      CARDIAC CATHETERIZATION       SECTION  2008    CHOLECYSTECTOMY      KIDNEY SURGERY  2007       Family History   Problem Relation Age of Onset    Diabetes Maternal Grandmother     Hypertension Maternal Grandmother      I have reviewed and agree with the history as documented  Social History     Tobacco Use    Smoking status: Former Smoker    Smokeless tobacco: Former User   Substance Use Topics    Alcohol use: No    Drug use: No        Review of Systems   Constitutional: Negative for chills, fatigue and fever  HENT: Negative for congestion, ear pain, rhinorrhea and sore throat  Eyes: Negative for redness  Respiratory: Negative for chest tightness and shortness of breath  Cardiovascular: Negative for chest pain and palpitations  Gastrointestinal: Negative for abdominal pain, nausea and vomiting  Genitourinary: Negative for dysuria and hematuria  Musculoskeletal: Positive for arthralgias  Skin: Negative for rash  Neurological: Negative for dizziness, syncope, light-headedness and numbness  Physical Exam  Physical Exam   Constitutional: She is oriented to person, place, and time  She appears well-developed and well-nourished  HENT:   Head: Normocephalic  Eyes: No scleral icterus  Cardiovascular: Normal rate and regular rhythm  Pulmonary/Chest: Effort normal and breath sounds normal  No stridor  Abdominal: Soft  She exhibits no distension  There is no tenderness  Musculoskeletal: Normal range of motion  She exhibits tenderness  She exhibits no edema or deformity          Right ankle: She exhibits normal range of motion, no swelling, no ecchymosis and normal pulse  Tenderness  Lateral malleolus and head of 5th metatarsal tenderness found  No proximal fibula tenderness found  Feet:    Neurological: She is alert and oriented to person, place, and time  Skin: Skin is warm and dry  Capillary refill takes less than 2 seconds  Psychiatric: She has a normal mood and affect  Nursing note and vitals reviewed        Vital Signs  ED Triage Vitals [08/09/19 1454]   Temperature Pulse Respirations Blood Pressure SpO2   98 2 °F (36 8 °C) 83 18 131/60 100 %      Temp Source Heart Rate Source Patient Position - Orthostatic VS BP Location FiO2 (%)   Tympanic Monitor Sitting Left arm --      Pain Score       8           Vitals:    08/09/19 1454   BP: 131/60   Pulse: 83   Patient Position - Orthostatic VS: Sitting         Visual Acuity      ED Medications  Medications   naproxen (NAPROSYN) tablet 500 mg (500 mg Oral Given 8/9/19 1521)       Diagnostic Studies  Results Reviewed     Procedure Component Value Units Date/Time    POCT pregnancy, urine [[id number]]  (Normal) Resulted:  08/09/19 1520    Lab Status:  Final result Updated:  08/09/19 1520     EXT PREG TEST UR (Ref: Negative) negative     Control valid                 XR ankle 3+ views RIGHT   ED Interpretation by Bairon Hurley PA-C (08/09 1544)   No acute fractures visualized      XR foot 3+ views RIGHT   ED Interpretation by Bairon Hurley PA-C (08/09 1544)   No acute fractures visualized                 Procedures  Splint application  Date/Time: 8/9/2019 3:47 PM  Performed by: Bairon Hurley PA-C  Authorized by: Bairon Hurley PA-C     Patient location:  Bedside  Procedure performed by emergency physician: Yes    Other Assisting Provider: No    Consent:     Consent obtained:  Verbal    Consent given by:  Patient    Risks discussed:  Pain and swelling    Alternatives discussed:  No treatment and alternative treatment  Universal protocol:     Procedure explained and questions answered to patient or proxy's satisfaction: yes      Patient identity confirmed:  Verbally with patient  Indication:     Indications: sprain/strain    Pre-procedure details:     Sensation:  Normal  Procedure details:     Laterality:  Right    Location:  Foot    Foot:  R foot    Strapping: no      Supplies:  Prefabricated splint and elastic bandage (wrap around the ankle and an orthopedic shoe on the foot)  Post-procedure details:     Pain:  Improved    Sensation:  Normal    Neurovascular Exam: skin pink, capillary refill <2 sec, normal pulses and skin intact, warm, and dry      Patient tolerance of procedure: Tolerated well, no immediate complications           ED Course                               MDM    Disposition  Final diagnoses: Ankle sprain   Sprain of right foot, initial encounter     Time reflects when diagnosis was documented in both MDM as applicable and the Disposition within this note     Time User Action Codes Description Comment    8/9/2019  3:48 PM Kvng Dillon Add [[id number]] Ankle sprain     8/9/2019  3:48 PM Rich Peterson, [id number] [facility]  [address] Sprain of right foot, initial encounter       ED Disposition     ED Disposition Condition Date/Time Comment    Discharge Good Fri Aug 9, 2019  3:48 PM Lesvia Diaz discharge to home/self care              Follow-up Information     Follow up With Specialties Details Why Contact Info    Darshana Goldberg MD Orthopedic Surgery Schedule an appointment as soon as possible for a visit in 2 days  [address]  [phone]            Discharge Medication List as of 8/9/2019  3:49 PM      START taking these medications    Details   naproxen (EC NAPROSYN) 500 MG EC tablet Take 1 tablet (500 mg total) by mouth 2 (two) times a day with meals, Starting Fri 8/9/2019, Until Sat 8/8/2020, Print         CONTINUE these medications which have NOT CHANGED    Details   albuterol (VENTOLIN HFA) 90 mcg/act inhaler Inhale 2 puffs every 4 (four) hours as needed for wheezing or shortness of breath, Starting Fri 8/2/2019, Normal      Calcium Carb-Cholecalciferol (CALCIUM 600+D3) 600-200 MG-UNIT TABS take 1 tablet twice a day, Historical Med      Cholecalciferol (VITAMIN D3) 2000 units capsule take 1 tablet po daily  , Historical Med      citalopram (CeleXA) 20 mg tablet Take 1 tablet (20 mg total) by mouth daily, Starting Fri 8/2/2019, Normal      diclofenac (VOLTAREN) 75 mg EC tablet Take 1 tablet (75 mg total) by mouth 2 (two) times a day, Starting Fri 8/2/2019, Normal      doxepin (SINEquan) 25 mg capsule Take 1 capsule (25 mg total) by mouth daily at bedtime, Starting Fri 8/2/2019, Normal      Elastic Bandages & Supports (CARPAL TUNNEL WRIST DELUXE) MISC by Does not apply route daily at bedtime, Starting Fri 8/2/2019, Print      famotidine (PEPCID) 20 mg tablet Take 1 tablet (20 mg total) by mouth daily, Starting Fri 9/1/1678, Normal      folic acid (FOLVITE) 1 mg tablet every 24 hours, Starting Wed 1/27/2016, Historical Med      furosemide (LASIX) 20 mg tablet Take 1 tablet (20 mg total) by mouth daily, Starting Fri 8/2/2019, Normal      gabapentin (NEURONTIN) 300 mg capsule Take 1 capsule (300 mg total) by mouth 3 (three) times a day, Starting Fri 8/2/2019, Normal      Incontinence Supply Disposable (POISE PAD) PADS by Does not apply route 4 (four) times a day as needed (incontinence), Starting Wed 9/5/2018, Normal      leflunomide (ARAVA) 10 MG tablet Take 10 mg by mouth daily, Historical Med      levETIRAcetam (KEPPRA) 1000 MG tablet Take 1 tablet (1,000 mg total) by mouth 2 (two) times a day, Starting Fri 8/2/2019, Normal      loratadine (CLARITIN) 10 mg tablet Take 1 tablet (10 mg total) by mouth daily, Starting Fri 8/2/2019, Normal      methotrexate 2 5 MG tablet Take 2 tablets (5 mg total) by mouth once a week for 4 doses, Starting Wed 9/5/2018, Until Thu 9/27/2018, Normal      Multiple Vitamins-Minerals (MULTIVITAMIN WITH MINERALS) tablet Take 1 tablet by mouth daily, Starting Fri 8/2/2019, Normal      nitroglycerin (NITROSTAT) 0 4 mg SL tablet Place 1 tablet (0 4 mg total) under the tongue every 5 (five) minutes as needed for chest pain Call 911 if using 3 doses, Starting u 3/28/2019, Normal      polyethylene glycol (GLYCOLAX) powder Take 17 g by mouth daily, Starting Fri 8/2/2019, Normal      polymyxin b-trimethoprim (POLYTRIM) ophthalmic solution Administer 1 drop into the left eye every 4 (four) hours, Starting u 5/2/2019, Normal           No discharge procedures on file      ED Provider  Electronically Signed by           Leigh Elder PA-C  08/09/19 6858

## 2019-08-22 ENCOUNTER — HOSPITAL ENCOUNTER (OUTPATIENT)
Dept: NEUROLOGY | Facility: CLINIC | Age: 39
Discharge: HOME | End: 2019-08-22
Payer: COMMERCIAL

## 2019-08-22 DIAGNOSIS — R20.2 NUMBNESS AND TINGLING IN BOTH HANDS: ICD-10-CM

## 2019-08-22 DIAGNOSIS — R20.0 NUMBNESS AND TINGLING IN BOTH HANDS: ICD-10-CM

## 2019-08-22 PROCEDURE — 95886 MUSC TEST DONE W/N TEST COMP: CPT | Performed by: PHYSICAL MEDICINE & REHABILITATION

## 2019-08-22 PROCEDURE — 95912 NRV CNDJ TEST 11-12 STUDIES: CPT | Performed by: PHYSICAL MEDICINE & REHABILITATION

## 2019-08-27 NOTE — RESULT ENCOUNTER NOTE
EMG came back normal   Recommend she return back to the office so we can further discuss the numbness in her hands

## 2019-09-26 ENCOUNTER — OFFICE VISIT (OUTPATIENT)
Dept: FAMILY MEDICINE CLINIC | Facility: CLINIC | Age: 39
End: 2019-09-26

## 2019-09-26 ENCOUNTER — LAB (OUTPATIENT)
Dept: LAB | Facility: CLINIC | Age: 39
End: 2019-09-26
Payer: COMMERCIAL

## 2019-09-26 VITALS
OXYGEN SATURATION: 98 % | RESPIRATION RATE: 18 BRPM | SYSTOLIC BLOOD PRESSURE: 130 MMHG | DIASTOLIC BLOOD PRESSURE: 80 MMHG | HEIGHT: 62 IN | TEMPERATURE: 97.3 F | WEIGHT: 283 LBS | BODY MASS INDEX: 52.08 KG/M2 | HEART RATE: 69 BPM

## 2019-09-26 DIAGNOSIS — R20.0 BILATERAL HAND NUMBNESS: ICD-10-CM

## 2019-09-26 DIAGNOSIS — R20.0 HAND NUMBNESS: ICD-10-CM

## 2019-09-26 DIAGNOSIS — N92.6 IRREGULAR MENSES: ICD-10-CM

## 2019-09-26 DIAGNOSIS — N39.3 STRESS INCONTINENCE: ICD-10-CM

## 2019-09-26 DIAGNOSIS — Z87.891 FORMER SMOKER: ICD-10-CM

## 2019-09-26 DIAGNOSIS — N39.0 URINARY TRACT INFECTION WITHOUT HEMATURIA, SITE UNSPECIFIED: ICD-10-CM

## 2019-09-26 DIAGNOSIS — Z12.39 BREAST CANCER SCREENING: Primary | ICD-10-CM

## 2019-09-26 DIAGNOSIS — R10.31 RIGHT LOWER QUADRANT PAIN: ICD-10-CM

## 2019-09-26 DIAGNOSIS — Z23 ENCOUNTER FOR IMMUNIZATION: ICD-10-CM

## 2019-09-26 LAB
SL AMB  POCT GLUCOSE, UA: NEGATIVE
SL AMB LEUKOCYTE ESTERASE,UA: ABNORMAL
SL AMB POCT BILIRUBIN,UA: NEGATIVE
SL AMB POCT BLOOD,UA: ABNORMAL
SL AMB POCT CLARITY,UA: CLEAR
SL AMB POCT COLOR,UA: YELLOW
SL AMB POCT KETONES,UA: NEGATIVE
SL AMB POCT NITRITE,UA: NEGATIVE
SL AMB POCT PH,UA: 6
SL AMB POCT SPECIFIC GRAVITY,UA: 1.01
SL AMB POCT URINE HCG: NORMAL
SL AMB POCT URINE PROTEIN: NEGATIVE
SL AMB POCT UROBILINOGEN: 0.2
VIT B12 SERPL-MCNC: 446 PG/ML (ref 100–900)

## 2019-09-26 PROCEDURE — 87147 CULTURE TYPE IMMUNOLOGIC: CPT | Performed by: PHYSICIAN ASSISTANT

## 2019-09-26 PROCEDURE — 81003 URINALYSIS AUTO W/O SCOPE: CPT | Performed by: PHYSICIAN ASSISTANT

## 2019-09-26 PROCEDURE — 81025 URINE PREGNANCY TEST: CPT | Performed by: PHYSICIAN ASSISTANT

## 2019-09-26 PROCEDURE — 90732 PPSV23 VACC 2 YRS+ SUBQ/IM: CPT | Performed by: PHYSICIAN ASSISTANT

## 2019-09-26 PROCEDURE — 90715 TDAP VACCINE 7 YRS/> IM: CPT | Performed by: PHYSICIAN ASSISTANT

## 2019-09-26 PROCEDURE — 90682 RIV4 VACC RECOMBINANT DNA IM: CPT | Performed by: PHYSICIAN ASSISTANT

## 2019-09-26 PROCEDURE — 90471 IMMUNIZATION ADMIN: CPT | Performed by: PHYSICIAN ASSISTANT

## 2019-09-26 PROCEDURE — 86618 LYME DISEASE ANTIBODY: CPT

## 2019-09-26 PROCEDURE — 36415 COLL VENOUS BLD VENIPUNCTURE: CPT

## 2019-09-26 PROCEDURE — 99214 OFFICE O/P EST MOD 30 MIN: CPT | Performed by: PHYSICIAN ASSISTANT

## 2019-09-26 PROCEDURE — 82607 VITAMIN B-12: CPT

## 2019-09-26 PROCEDURE — 90472 IMMUNIZATION ADMIN EACH ADD: CPT | Performed by: PHYSICIAN ASSISTANT

## 2019-09-26 PROCEDURE — 87086 URINE CULTURE/COLONY COUNT: CPT | Performed by: PHYSICIAN ASSISTANT

## 2019-09-26 RX ORDER — GABAPENTIN 400 MG/1
400 CAPSULE ORAL 3 TIMES DAILY
Qty: 90 CAPSULE | Refills: 2 | Status: SHIPPED | OUTPATIENT
Start: 2019-09-26 | End: 2020-04-14

## 2019-09-26 RX ORDER — CIPROFLOXACIN 500 MG/1
500 TABLET, FILM COATED ORAL EVERY 12 HOURS SCHEDULED
Qty: 6 TABLET | Refills: 0 | Status: SHIPPED | OUTPATIENT
Start: 2019-09-26 | End: 2019-09-29

## 2019-09-26 RX ORDER — LIDOCAINE 40 MG/G
CREAM TOPICAL AS NEEDED
Qty: 45 G | Refills: 1 | Status: SHIPPED | OUTPATIENT
Start: 2019-09-26 | End: 2019-11-05 | Stop reason: SDUPTHER

## 2019-09-26 RX ORDER — CELECOXIB 200 MG/1
200 CAPSULE ORAL 2 TIMES DAILY
COMMUNITY
Start: 2019-09-11 | End: 2020-08-13

## 2019-09-26 NOTE — ASSESSMENT & PLAN NOTE
Continue gabapentin increased dose to 400 mg 3 times a day  We discussed that it may be related to her rheumatoid arthritis  EMG was  Negative  Also ordered a Lyme titer and B12 to be done  Recommend that she return here after she completes physical therapy to see if it helps

## 2019-09-26 NOTE — ASSESSMENT & PLAN NOTE
Today he did have leukocytes on urinalysis  Discussed that this may be the cause of her pelvic discomfort  She does have a consult with Urology next month

## 2019-09-26 NOTE — PROGRESS NOTES
Assessment/Plan:    Hand numbness    Continue gabapentin increased dose to 400 mg 3 times a day  We discussed that it may be related to her rheumatoid arthritis  EMG was  Negative  Also ordered a Lyme titer and B12 to be done  Recommend that she return here after she completes physical therapy to see if it helps  Stress incontinence    Today he did have leukocytes on urinalysis  Discussed that this may be the cause of her pelvic discomfort  She does have a consult with Urology next month  Problem List Items Addressed This Visit        Other    Stress incontinence       Today he did have leukocytes on urinalysis  Discussed that this may be the cause of her pelvic discomfort  She does have a consult with Urology next month  Hand numbness       Continue gabapentin increased dose to 400 mg 3 times a day  We discussed that it may be related to her rheumatoid arthritis  EMG was  Negative  Also ordered a Lyme titer and B12 to be done  Recommend that she return here after she completes physical therapy to see if it helps             Other Visit Diagnoses     Breast cancer screening    -  Primary    Relevant Orders    Mammo screening bilateral w 3d & cad    Encounter for immunization        Relevant Orders    influenza vaccine, [id number], quadrivalent, recombinant, PF, 0 5 mL, for patients 18 yr+ (FLUBLOK) (Completed)    TDAP VACCINE GREATER THAN OR EQUAL TO 6YO IM (Completed)    Bilateral hand numbness        Relevant Medications    gabapentin (NEURONTIN) 400 mg capsule    lidocaine (LMX) 4 % cream    Other Relevant Orders    Lyme Antibody Profile with reflex to WB    Vitamin B12    Right lower quadrant pain        Relevant Orders    POCT urine dip auto non-scope (Completed)    Ambulatory referral to Obstetrics / Gynecology    Urine culture    Irregular menses        Relevant Orders    POCT urine HCG (Completed)    Former smoker        Relevant Orders    PNEUMOCOCCAL POLYSACCHARIDE VACCINE 23-VALENT =>3YO SQ IM (Completed)    Urinary tract infection without hematuria, site unspecified        Relevant Medications    ciprofloxacin (CIPRO) 500 mg tablet            Subjective:      Patient ID: Tucker Brian is a 35 y o  female  HPI  62-year-old female here for bilateral numbness and tingling in the hands  She had an EMG done on 8/22/19 and came back normal   On right hand she has little ball near 4th finger  She does have rheumatoid arthritis and is seeing rheumatologist   She just got approved for orencia but has  Not started it yet  She also was prescribed Celebrex from their office she has noticed that she feels extremely weak for at least an hour after she takes it  The numbness is mostly on right side and does radiate sometimes up from the shoulder and sometimes down    She started to get crampy pain in abdomen in RLQ for last 1 week  She does not know when her LMP  When she was pregnant in the past she had pyelonephritis and she had to have surgery and stents placed  Since then she has had changed with her urination  She would urinate less during the day and more at night  She does not have a history of urinary tract infections as a child  The following portions of the patient's history were reviewed and updated as appropriate:   She  has a past medical history of Angina at rest [facility]), Anxiety, Depression, Kidney problem, Migraine, and PTSD (post-traumatic stress disorder)    She   Patient Active Problem List    Diagnosis Date Noted    Hand numbness 09/26/2019    Carpal tunnel syndrome, bilateral     Constipation 05/02/2019    Proteinuria 05/02/2019    Acute bacterial conjunctivitis of left eye 05/02/2019    Anxiety 03/28/2019    Angina at rest [facility]) 03/28/2019    Encounter for gynecological examination 10/25/2018    Easy bruising 10/25/2018    Irregular periods 10/25/2018    Anemia 07/30/2018    Ambulatory dysfunction 07/30/2018    Mild intermittent asthma 07/30/2018  Stress incontinence 2018    Hydronephrosis 2018    Depression 2018    Seizure ([facility] [address] ) 2018    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    Obesity, unspecified 2012    Essential hypertension 2012     She  has a past surgical history that includes Cholecystectomy; Appendectomy ();  section (); Cardiac catheterization (); and Kidney surgery ()  Her family history includes Diabetes in her maternal grandmother; Hypertension in her maternal grandmother  She  reports that she has quit smoking  She has quit using smokeless tobacco  She reports that she does not drink alcohol or use drugs  Current Outpatient Medications   Medication Sig Dispense Refill    Abatacept (ORENCIA) 125 MG/ML SOSY Inject 125 mg under the skin Once a week      albuterol (VENTOLIN HFA) 90 mcg/act inhaler Inhale 2 puffs every 4 (four) hours as needed for wheezing or shortness of breath 18 g 1    Calcium Carb-Cholecalciferol (CALCIUM 600+D3) 600-200 MG-UNIT TABS take 1 tablet twice a day      celecoxib (CeleBREX) 200 mg capsule Take 200 mg by mouth 2 (two) times a day      Cholecalciferol (VITAMIN D3) 2000 units capsule take 1 tablet po daily        citalopram (CeleXA) 20 mg tablet Take 1 tablet (20 mg total) by mouth daily 30 tablet 3    diclofenac (VOLTAREN) 75 mg EC tablet Take 1 tablet (75 mg total) by mouth 2 (two) times a day 60 tablet 1    doxepin (SINEquan) 25 mg capsule Take 1 capsule (25 mg total) by mouth daily at bedtime 30 capsule 3    Elastic Bandages & Supports (CARPAL TUNNEL WRIST DELUXE) MISC by Does not apply route daily at bedtime 2 each 0    famotidine (PEPCID) 20 mg tablet Take 1 tablet (20 mg total) by mouth daily 30 tablet 5    folic acid (FOLVITE) 1 mg tablet every 24 hours      furosemide (LASIX) 20 mg tablet Take 1 tablet (20 mg total) by mouth daily 30 tablet 5    leflunomide (ARAVA) 10 MG tablet Take 10 mg by mouth daily  levETIRAcetam (KEPPRA) 1000 MG tablet Take 1 tablet (1,000 mg total) by mouth 2 (two) times a day 60 tablet 3    loratadine (CLARITIN) 10 mg tablet Take 1 tablet (10 mg total) by mouth daily 30 tablet 5    Multiple Vitamins-Minerals (MULTIVITAMIN WITH MINERALS) tablet Take 1 tablet by mouth daily 30 tablet 11    nitroglycerin (NITROSTAT) 0 4 mg SL tablet Place 1 tablet (0 4 mg total) under the tongue every 5 (five) minutes as needed for chest pain Call 911 if using 3 doses 30 tablet 0    polyethylene glycol (GLYCOLAX) powder Take 17 g by mouth daily 578 g 2    polymyxin b-trimethoprim (POLYTRIM) ophthalmic solution Administer 1 drop into the left eye every 4 (four) hours 10 mL 0    ciprofloxacin (CIPRO) 500 mg tablet Take 1 tablet (500 mg total) by mouth every 12 (twelve) hours for 3 days 6 tablet 0    gabapentin (NEURONTIN) 400 mg capsule Take 1 capsule (400 mg total) by mouth 3 (three) times a day 90 capsule 2    Incontinence Supply Disposable (POISE PAD) PADS by Does not apply route 4 (four) times a day as needed (incontinence) 90 each 0    lidocaine (LMX) 4 % cream Apply topically as needed for mild pain 45 g 1    methotrexate 2 5 MG tablet Take 2 tablets (5 mg total) by mouth once a week for 4 doses 8 tablet 0     No current facility-administered medications for this visit  Current Outpatient Medications on File Prior to Visit   Medication Sig    Abatacept (ORENCIA) 125 MG/ML SOSY Inject 125 mg under the skin Once a week    albuterol (VENTOLIN HFA) 90 mcg/act inhaler Inhale 2 puffs every 4 (four) hours as needed for wheezing or shortness of breath    Calcium Carb-Cholecalciferol (CALCIUM 600+D3) 600-200 MG-UNIT TABS take 1 tablet twice a day    celecoxib (CeleBREX) 200 mg capsule Take 200 mg by mouth 2 (two) times a day    Cholecalciferol (VITAMIN D3) 2000 units capsule take 1 tablet po daily      citalopram (CeleXA) 20 mg tablet Take 1 tablet (20 mg total) by mouth daily    diclofenac (VOLTAREN) 75 mg EC tablet Take 1 tablet (75 mg total) by mouth 2 (two) times a day    doxepin (SINEquan) 25 mg capsule Take 1 capsule (25 mg total) by mouth daily at bedtime    Elastic Bandages & Supports (CARPAL TUNNEL WRIST DELUXE) MISC by Does not apply route daily at bedtime    famotidine (PEPCID) 20 mg tablet Take 1 tablet (20 mg total) by mouth daily    folic acid (FOLVITE) 1 mg tablet every 24 hours    furosemide (LASIX) 20 mg tablet Take 1 tablet (20 mg total) by mouth daily    leflunomide (ARAVA) 10 MG tablet Take 10 mg by mouth daily    levETIRAcetam (KEPPRA) 1000 MG tablet Take 1 tablet (1,000 mg total) by mouth 2 (two) times a day    loratadine (CLARITIN) 10 mg tablet Take 1 tablet (10 mg total) by mouth daily    Multiple Vitamins-Minerals (MULTIVITAMIN WITH MINERALS) tablet Take 1 tablet by mouth daily    nitroglycerin (NITROSTAT) 0 4 mg SL tablet Place 1 tablet (0 4 mg total) under the tongue every 5 (five) minutes as needed for chest pain Call 911 if using 3 doses    polyethylene glycol (GLYCOLAX) powder Take 17 g by mouth daily    polymyxin b-trimethoprim (POLYTRIM) ophthalmic solution Administer 1 drop into the left eye every 4 (four) hours    [DISCONTINUED] gabapentin (NEURONTIN) 300 mg capsule Take 1 capsule (300 mg total) by mouth 3 (three) times a day    [DISCONTINUED] naproxen (EC NAPROSYN) 500 MG EC tablet Take 1 tablet (500 mg total) by mouth 2 (two) times a day with meals    Incontinence Supply Disposable (POISE PAD) PADS by Does not apply route 4 (four) times a day as needed (incontinence)    methotrexate 2 5 MG tablet Take 2 tablets (5 mg total) by mouth once a week for 4 doses     No current facility-administered medications on file prior to visit        She is allergic to codeine; iodine; latex; lovenox [enoxaparin]; shellfish-derived products; vaccinium angustifolium; vicodin [hydrocodone-acetaminophen]; benadryl [diphenhydramine]; oxycontin [oxycodone]; and provera [medroxyprogesterone]       Review of Systems   Constitutional: Positive for fatigue  Negative for activity change, appetite change, chills and unexpected weight change  HENT: Negative for ear pain and sore throat  Eyes: Negative for visual disturbance  Respiratory: Negative for cough and wheezing  Cardiovascular: Negative for chest pain  Gastrointestinal: Positive for abdominal pain  Negative for constipation, diarrhea and vomiting  Genitourinary: Positive for frequency and urgency  Negative for difficulty urinating and dysuria  UI     Musculoskeletal: Positive for arthralgias, joint swelling, neck pain and neck stiffness  Negative for back pain and myalgias  Skin: Negative for rash  Neurological: Positive for weakness and numbness  Negative for dizziness and headaches  Psychiatric/Behavioral: Negative for behavioral problems  Objective:      /80 (BP Location: Left arm, Patient Position: Sitting, Cuff Size: Extra-Large)   Pulse 69   Temp (!) 97 3 °F (36 3 °C) (Temporal)   Resp 18   Ht 5' 2" (1 575 m)   Wt 128 kg (283 lb)   LMP  (LMP Unknown) Comment: irregular  SpO2 98%   Breastfeeding? No   BMI 51 76 kg/m²          Physical Exam   Constitutional: She is oriented to person, place, and time  She appears well-developed and well-nourished  No distress  HENT:   Head: Normocephalic and atraumatic  Right Ear: External ear normal    Left Ear: External ear normal    Eyes: Conjunctivae are normal    Neck: Normal range of motion  Neck supple  No thyromegaly present  Cardiovascular: Normal rate, regular rhythm and normal heart sounds  No murmur heard  Pulmonary/Chest: Effort normal and breath sounds normal  No respiratory distress  She has no wheezes  Abdominal: Soft  Bowel sounds are normal  She exhibits no distension and no mass  There is tenderness (mild rlq)  There is no guarding     Musculoskeletal: She exhibits edema (swelling of both hands and fingers), tenderness (right hand pip/mcp/wrist ) and deformity (right 4th finger 1/2 cm round nodule near pip joint)  Lymphadenopathy:     She has no cervical adenopathy  Neurological: She is alert and oriented to person, place, and time  Skin: No rash noted  Psychiatric: She has a normal mood and affect  Her behavior is normal    Nursing note and vitals reviewed

## 2019-09-27 LAB
B BURGDOR IGG+IGM SER-ACNC: <0.91 ISR (ref 0–0.9)
BACTERIA UR CULT: ABNORMAL
BACTERIA UR CULT: ABNORMAL

## 2019-09-30 ENCOUNTER — APPOINTMENT (EMERGENCY)
Dept: ULTRASOUND IMAGING | Facility: HOSPITAL | Age: 39
End: 2019-09-30
Payer: COMMERCIAL

## 2019-09-30 ENCOUNTER — HOSPITAL ENCOUNTER (EMERGENCY)
Facility: HOSPITAL | Age: 39
Discharge: HOME | End: 2019-09-30
Attending: EMERGENCY MEDICINE
Payer: COMMERCIAL

## 2019-09-30 ENCOUNTER — APPOINTMENT (EMERGENCY)
Dept: CT IMAGING | Facility: HOSPITAL | Age: 39
End: 2019-09-30
Payer: COMMERCIAL

## 2019-09-30 VITALS
TEMPERATURE: 97.5 F | DIASTOLIC BLOOD PRESSURE: 78 MMHG | HEART RATE: 60 BPM | WEIGHT: 283.95 LBS | OXYGEN SATURATION: 100 % | SYSTOLIC BLOOD PRESSURE: 141 MMHG | RESPIRATION RATE: 16 BRPM | BODY MASS INDEX: 51.94 KG/M2

## 2019-09-30 DIAGNOSIS — R10.31 RIGHT LOWER QUADRANT ABDOMINAL PAIN: Primary | ICD-10-CM

## 2019-09-30 DIAGNOSIS — R10.9 ABDOMINAL PAIN, UNSPECIFIED ABDOMINAL LOCATION: Primary | ICD-10-CM

## 2019-09-30 LAB
ALBUMIN SERPL BCP-MCNC: 4.3 G/DL (ref 3–5.2)
ALP SERPL-CCNC: 70 U/L (ref 43–122)
ALT SERPL W P-5'-P-CCNC: 13 U/L (ref 9–52)
ANION GAP SERPL CALCULATED.3IONS-SCNC: 7 MMOL/L (ref 5–14)
AST SERPL W P-5'-P-CCNC: 18 U/L (ref 14–36)
BACTERIA UR QL AUTO: ABNORMAL /HPF
BASOPHILS # BLD AUTO: 0.1 THOUSANDS/ΜL (ref 0–0.1)
BASOPHILS NFR BLD AUTO: 1 % (ref 0–1)
BILIRUB SERPL-MCNC: 0.3 MG/DL
BILIRUB UR QL STRIP: NEGATIVE
BUN SERPL-MCNC: 17 MG/DL (ref 5–25)
CALCIUM SERPL-MCNC: 9.1 MG/DL (ref 8.4–10.2)
CHLORIDE SERPL-SCNC: 102 MMOL/L (ref 97–108)
CLARITY UR: CLEAR
CO2 SERPL-SCNC: 29 MMOL/L (ref 22–30)
COLOR UR: YELLOW
CREAT SERPL-MCNC: 0.81 MG/DL (ref 0.6–1.2)
EOSINOPHIL # BLD AUTO: 0.2 THOUSAND/ΜL (ref 0–0.4)
EOSINOPHIL NFR BLD AUTO: 3 % (ref 0–6)
ERYTHROCYTE [DISTWIDTH] IN BLOOD BY AUTOMATED COUNT: 13.9 %
EXT PREG TEST URINE: NEGATIVE
EXT. CONTROL ED NAV: NORMAL
GFR SERPL CREATININE-BSD FRML MDRD: 91 ML/MIN/1.73SQ M
GLUCOSE SERPL-MCNC: 98 MG/DL (ref 70–99)
GLUCOSE UR STRIP-MCNC: NEGATIVE MG/DL
HCT VFR BLD AUTO: 35.8 % (ref 36–46)
HGB BLD-MCNC: 11.7 G/DL (ref 12–16)
HGB UR QL STRIP.AUTO: 50
KETONES UR STRIP-MCNC: NEGATIVE MG/DL
LEUKOCYTE ESTERASE UR QL STRIP: 25
LYMPHOCYTES # BLD AUTO: 1.9 THOUSANDS/ΜL (ref 0.5–4)
LYMPHOCYTES NFR BLD AUTO: 25 % (ref 25–45)
MCH RBC QN AUTO: 26.9 PG (ref 26–34)
MCHC RBC AUTO-ENTMCNC: 32.6 G/DL (ref 31–36)
MCV RBC AUTO: 83 FL (ref 80–100)
MONOCYTES # BLD AUTO: 0.6 THOUSAND/ΜL (ref 0.2–0.9)
MONOCYTES NFR BLD AUTO: 7 % (ref 1–10)
MUCOUS THREADS UR QL AUTO: ABNORMAL
NEUTROPHILS # BLD AUTO: 4.8 THOUSANDS/ΜL (ref 1.8–7.8)
NEUTS SEG NFR BLD AUTO: 64 % (ref 45–65)
NITRITE UR QL STRIP: NEGATIVE
NON-SQ EPI CELLS URNS QL MICRO: ABNORMAL /HPF
PH UR STRIP.AUTO: 5 [PH]
PLATELET # BLD AUTO: 286 THOUSANDS/UL (ref 150–450)
PMV BLD AUTO: 8.1 FL (ref 8.9–12.7)
POTASSIUM SERPL-SCNC: 4 MMOL/L (ref 3.6–5)
PROT SERPL-MCNC: 8 G/DL (ref 5.9–8.4)
PROT UR STRIP-MCNC: ABNORMAL MG/DL
RBC # BLD AUTO: 4.34 MILLION/UL (ref 4–5.2)
RBC #/AREA URNS AUTO: ABNORMAL /HPF
SODIUM SERPL-SCNC: 138 MMOL/L (ref 137–147)
SP GR UR STRIP.AUTO: 1.02 (ref 1–1.04)
UROBILINOGEN UA: NEGATIVE MG/DL
WBC # BLD AUTO: 7.5 THOUSAND/UL (ref 4.5–11)
WBC #/AREA URNS AUTO: ABNORMAL /HPF

## 2019-09-30 PROCEDURE — 87591 N.GONORRHOEAE DNA AMP PROB: CPT | Performed by: PHYSICIAN ASSISTANT

## 2019-09-30 PROCEDURE — 74176 CT ABD & PELVIS W/O CONTRAST: CPT

## 2019-09-30 PROCEDURE — 99284 EMERGENCY DEPT VISIT MOD MDM: CPT

## 2019-09-30 PROCEDURE — 36415 COLL VENOUS BLD VENIPUNCTURE: CPT | Performed by: PHYSICIAN ASSISTANT

## 2019-09-30 PROCEDURE — 87491 CHLMYD TRACH DNA AMP PROBE: CPT | Performed by: PHYSICIAN ASSISTANT

## 2019-09-30 PROCEDURE — 76856 US EXAM PELVIC COMPLETE: CPT

## 2019-09-30 PROCEDURE — 96374 THER/PROPH/DIAG INJ IV PUSH: CPT

## 2019-09-30 PROCEDURE — 80053 COMPREHEN METABOLIC PANEL: CPT | Performed by: PHYSICIAN ASSISTANT

## 2019-09-30 PROCEDURE — 85025 COMPLETE CBC W/AUTO DIFF WBC: CPT | Performed by: PHYSICIAN ASSISTANT

## 2019-09-30 PROCEDURE — 81025 URINE PREGNANCY TEST: CPT | Performed by: PHYSICIAN ASSISTANT

## 2019-09-30 PROCEDURE — 76830 TRANSVAGINAL US NON-OB: CPT

## 2019-09-30 PROCEDURE — 81001 URINALYSIS AUTO W/SCOPE: CPT | Performed by: PHYSICIAN ASSISTANT

## 2019-09-30 PROCEDURE — 81003 URINALYSIS AUTO W/O SCOPE: CPT | Performed by: PHYSICIAN ASSISTANT

## 2019-09-30 PROCEDURE — 99284 EMERGENCY DEPT VISIT MOD MDM: CPT | Performed by: EMERGENCY MEDICINE

## 2019-09-30 RX ORDER — KETOROLAC TROMETHAMINE 30 MG/ML
30 INJECTION, SOLUTION INTRAMUSCULAR; INTRAVENOUS ONCE
Status: COMPLETED | OUTPATIENT
Start: 2019-09-30 | End: 2019-09-30

## 2019-09-30 RX ORDER — IBUPROFEN 600 MG/1
600 TABLET ORAL EVERY 6 HOURS PRN
Qty: 30 TABLET | Refills: 0 | Status: SHIPPED | OUTPATIENT
Start: 2019-09-30 | End: 2020-04-14

## 2019-09-30 RX ORDER — DICYCLOMINE HYDROCHLORIDE 10 MG/1
10 CAPSULE ORAL
Qty: 10 CAPSULE | Refills: 0 | Status: SHIPPED | OUTPATIENT
Start: 2019-09-30 | End: 2020-04-14

## 2019-09-30 RX ADMIN — KETOROLAC TROMETHAMINE 30 MG: 30 INJECTION, SOLUTION INTRAMUSCULAR at 18:55

## 2019-09-30 NOTE — RESULT ENCOUNTER NOTE
Urine did grow some bacteria but not completely consistent with UTI and more likely contaminants with some skin bacteria    I am putting in an order for a pelvic ultrasound to check on ovary and right lower pelvic pain

## 2019-10-01 ENCOUNTER — TELEPHONE (OUTPATIENT)
Dept: FAMILY MEDICINE CLINIC | Facility: CLINIC | Age: 39
End: 2019-10-01

## 2019-10-01 LAB
C TRACH DNA SPEC QL NAA+PROBE: NEGATIVE
N GONORRHOEA DNA SPEC QL NAA+PROBE: NEGATIVE

## 2019-10-01 NOTE — ED PROVIDER NOTES
History  Chief Complaint   Patient presents with    Abdominal Pain     lower abd  pain/cramping for several weeks  no vag  bleeding/discharge  no dysuria but states FMD dxd with UTI last week  pain worse since last week     This is a 50-year-old female with past medical history morbid obesity, anxiety, depression, migraine, and PTSD who presents today with lower abdominal pain that has been present for weeks  The patient reports the pain comes and goes  She states this most recent episode has lasted about 2 days  She appears comfortable in the room  However, she reports her pain is a 10/10  She reports no vaginal bleeding  She reports no recent abdominal surgeries  She has a history of an appendectomy, cholecystectomy, and  section  She denies any diarrhea  She denies any constipation  She reports no fevers  She reports she went to see her family medicine doctor and a pelvic ultrasound was ordered  She reports she has yet to schedule that  She reports no discomfort with urination  No significant vaginal discharge  She reports no new partners  She states she has not taken anything for her pain today  She reports she is eating and drinking normally  Prior to Admission Medications   Prescriptions Last Dose Informant Patient Reported? Taking? Abatacept (ORENCIA) 125 MG/ML SOSY   Yes No   Sig: Inject 125 mg under the skin Once a week   Calcium Carb-Cholecalciferol (CALCIUM 600+D3) 600-200 MG-UNIT TABS  Self Yes No   Sig: take 1 tablet twice a day   Cholecalciferol (VITAMIN D3) 2000 units capsule  Self Yes No   Sig: take 1 tablet po daily     Elastic Bandages & Supports (CARPAL TUNNEL WRIST DELUXE) MISC   No No   Sig: by Does not apply route daily at bedtime   Incontinence Supply Disposable (POISE PAD) PADS  Self No No   Sig: by Does not apply route 4 (four) times a day as needed (incontinence)   Multiple Vitamins-Minerals (MULTIVITAMIN WITH MINERALS) tablet   No No   Sig: Take 1 tablet by mouth daily   albuterol (VENTOLIN HFA) 90 mcg/act inhaler   No No   Sig: Inhale 2 puffs every 4 (four) hours as needed for wheezing or shortness of breath   celecoxib (CeleBREX) 200 mg capsule   Yes No   Sig: Take 200 mg by mouth 2 (two) times a day   ciprofloxacin (CIPRO) 500 mg tablet   No No   Sig: Take 1 tablet (500 mg total) by mouth every 12 (twelve) hours for 3 days   citalopram (CeleXA) 20 mg tablet   No No   Sig: Take 1 tablet (20 mg total) by mouth daily   diclofenac (VOLTAREN) 75 mg EC tablet   No No   Sig: Take 1 tablet (75 mg total) by mouth 2 (two) times a day   doxepin (SINEquan) 25 mg capsule   No No   Sig: Take 1 capsule (25 mg total) by mouth daily at bedtime   famotidine (PEPCID) 20 mg tablet   No No   Sig: Take 1 tablet (20 mg total) by mouth daily   folic acid (FOLVITE) 1 mg tablet  Self Yes No   Sig: every 24 hours   furosemide (LASIX) 20 mg tablet   No No   Sig: Take 1 tablet (20 mg total) by mouth daily   gabapentin (NEURONTIN) 400 mg capsule   No No   Sig: Take 1 capsule (400 mg total) by mouth 3 (three) times a day   leflunomide (ARAVA) 10 MG tablet   Yes No   Sig: Take 10 mg by mouth daily   levETIRAcetam (KEPPRA) 1000 MG tablet   No No   Sig: Take 1 tablet (1,000 mg total) by mouth 2 (two) times a day   lidocaine (LMX) 4 % cream   No No   Sig: Apply topically as needed for mild pain   loratadine (CLARITIN) 10 mg tablet   No No   Sig: Take 1 tablet (10 mg total) by mouth daily   nitroglycerin (NITROSTAT) 0 4 mg SL tablet   No No   Sig: Place 1 tablet (0 4 mg total) under the tongue every 5 (five) minutes as needed for chest pain Call 911 if using 3 doses   polyethylene glycol (GLYCOLAX) powder   No No   Sig: Take 17 g by mouth daily   polymyxin b-trimethoprim (POLYTRIM) ophthalmic solution   No No   Sig: Administer 1 drop into the left eye every 4 (four) hours      Facility-Administered Medications: None       Past Medical History:   Diagnosis Date    Angina at rest [facility])     Anxiety     Depression     Kidney problem     Migraine     PTSD (post-traumatic stress disorder)        Past Surgical History:   Procedure Laterality Date    APPENDECTOMY      CARDIAC CATHETERIZATION       SECTION  2008    CHOLECYSTECTOMY      KIDNEY SURGERY  2007       Family History   Problem Relation Age of Onset    Diabetes Maternal Grandmother     Hypertension Maternal Grandmother      I have reviewed and agree with the history as documented  Social History     Tobacco Use    Smoking status: Former Smoker    Smokeless tobacco: Former User   Substance Use Topics    Alcohol use: No    Drug use: No        Review of Systems   Constitutional: Negative  Negative for activity change and appetite change  HENT: Negative  Negative for congestion  Eyes: Negative  Respiratory: Negative  Cardiovascular: Negative  Gastrointestinal: Positive for abdominal distention and abdominal pain  Negative for anal bleeding, blood in stool, constipation, diarrhea, nausea, rectal pain and vomiting  Endocrine: Negative  Genitourinary: Positive for pelvic pain  Negative for hematuria, menstrual problem, vaginal bleeding, vaginal discharge and vaginal pain  Musculoskeletal: Negative  Skin: Negative  Allergic/Immunologic: Negative  Neurological: Negative  Hematological: Negative  Psychiatric/Behavioral: Negative  Physical Exam  Physical Exam   Constitutional: She is oriented to person, place, and time  She appears well-developed and well-nourished  Non-toxic appearance  She does not appear ill  No distress  HENT:   Head: Normocephalic and atraumatic  Mouth/Throat: Oropharynx is clear and moist  No oropharyngeal exudate  Cardiovascular: Normal rate, regular rhythm and normal heart sounds  No murmur heard  Pulmonary/Chest: Effort normal and breath sounds normal    Abdominal: There is tenderness   There is no rigidity, no rebound, no guarding and no CVA tenderness  Obese abdomen  Noted tenderness anterior and posterior pelvis  bilaterally  Genitourinary: Vagina normal    Neurological: She is alert and oriented to person, place, and time  Skin: Skin is warm  Capillary refill takes less than 2 seconds  Psychiatric: She has a normal mood and affect  Her behavior is normal        Vital Signs  ED Triage Vitals   Temperature Pulse Respirations Blood Pressure SpO2   09/30/19 1816 09/30/19 1816 09/30/19 1816 09/30/19 1854 09/30/19 1816   97 5 °F (36 4 °C) 79 18 145/84 100 %      Temp Source Heart Rate Source Patient Position - Orthostatic VS BP Location FiO2 (%)   09/30/19 1816 09/30/19 1816 09/30/19 1816 09/30/19 1816 --   Tympanic Monitor Sitting Left arm       Pain Score       09/30/19 1854       9           Vitals:    09/30/19 1816 09/30/19 1854 09/30/19 2112   BP:  145/84 141/78   Pulse: 79 68 60   Patient Position - Orthostatic VS: Sitting Lying Lying         Visual Acuity      ED Medications  Medications   ketorolac (TORADOL) injection 30 mg (30 mg Intravenous Given 9/30/19 1855)       Diagnostic Studies  Results Reviewed     Procedure Component Value Units Date/Time    Chlamydia/GC amplified DNA by PCR [[id number]]  (Normal) Collected:  09/30/19 1948    Lab Status:  Final result Specimen:  Urine, Other Updated:  10/01/19 2250     N gonorrhoeae, DNA Probe Negative     Chlamydia trachomatis, DNA Probe Negative    Narrative:       Test performed using PCR amplification of target DNA  This test is intended as an aid in the diagnosis of Chlamydial and gonococcal disease  This test has not been evaluated in patients younger than 15years of age and is not recommended for evaluation of suspected sexual abuse  Additional testing is recommended when the results do not correlate with clinical signs and symptoms        Comprehensive metabolic panel [[id number]]  (Normal) Collected:  09/30/19 1854    Lab Status:  Final result Specimen:  Blood from Arm, Right Updated: 09/30/19 1915     Sodium 138 mmol/L      Potassium 4 0 mmol/L      Chloride 102 mmol/L      CO2 29 mmol/L      ANION GAP 7 mmol/L      BUN 17 mg/dL      Creatinine 0 81 mg/dL      Glucose 98 mg/dL      Calcium 9 1 mg/dL      AST 18 U/L      ALT 13 U/L      Alkaline Phosphatase 70 U/L      Total Protein 8 0 g/dL      Albumin 4 3 g/dL      Total Bilirubin 0 30 mg/dL      eGFR 91 ml/min/1 73sq m     Narrative:       National Kidney Disease Foundation guidelines for Chronic Kidney Disease (CKD):     Stage 1 with normal or high GFR (GFR > 90 mL/min/1 73 square meters)    Stage 2 Mild CKD (GFR = 60-89 mL/min/1 73 square meters)    Stage 3A Moderate CKD (GFR = 45-59 mL/min/1 73 square meters)    Stage 3B Moderate CKD (GFR = 30-44 mL/min/1 73 square meters)    Stage 4 Severe CKD (GFR = 15-29 mL/min/1 73 square meters)    Stage 5 End Stage CKD (GFR <15 mL/min/1 73 square meters)  Note: GFR calculation is accurate only with a steady state creatinine    CBC and differential [[id number]]  (Abnormal) Collected:  09/30/19 1854    Lab Status:  Final result Specimen:  Blood from Arm, Right Updated:  09/30/19 1903     WBC 7 50 Thousand/uL      RBC 4 34 Million/uL      Hemoglobin 11 7 g/dL      Hematocrit 35 8 %      MCV 83 fL      MCH 26 9 pg      MCHC 32 6 g/dL      RDW 13 9 %      MPV 8 1 fL      Platelets 358 Thousands/uL      Neutrophils Relative 64 %      Lymphocytes Relative 25 %      Monocytes Relative 7 %      Eosinophils Relative 3 %      Basophils Relative 1 %      Neutrophils Absolute 4 80 Thousands/µL      Lymphocytes Absolute 1 90 Thousands/µL      Monocytes Absolute 0 60 Thousand/µL      Eosinophils Absolute 0 20 Thousand/µL      Basophils Absolute 0 10 Thousands/µL     Urine Microscopic [[id number]]  (Abnormal) Collected:  09/30/19 1831    Lab Status:  Final result Specimen:  Urine, Other Updated:  09/30/19 1902     RBC, UA 1-2 /hpf      WBC, UA 2-4 /hpf      Epithelial Cells Moderate /hpf      Bacteria, UA Moderate /hpf      MUCUS THREADS Innumerable    UA w Reflex to Microscopic w Reflex to Culture [[id number]]  (Abnormal) Collected:  09/30/19 1831    Lab Status:  Final result Specimen:  Urine, Other Updated:  09/30/19 1849     Color, UA Yellow     Clarity, UA Clear     Specific Gravity, UA 1 025     pH, UA 5 0     Leukocytes, UA 25 0     Nitrite, UA Negative     Protein, UA 15 (Trace) mg/dl      Glucose, UA Negative mg/dl      Ketones, UA Negative mg/dl      Bilirubin, UA Negative     Blood, UA 50 0     UROBILINOGEN UA Negative mg/dL     POCT pregnancy, urine [[id number]]  (Normal) Resulted:  09/30/19 1833    Lab Status:  Final result Updated:  09/30/19 1833     EXT PREG TEST UR (Ref: Negative) negative     Control valid                 CT abdomen pelvis wo contrast   Final Result by Tom Mathews MD (09/30 2014)         1  No evidence of bowel obstruction, colitis or diverticulitis  2   No findings to suggest nephrolithiasis or obstructive uropathy  Workstation performed: [id number]         US pelvis complete w transvaginal   Final Result by Adriana Jenkins MD (09/30 2135)       Unremarkable exam given limitations  Workstation performed: [id number]                    Procedures  Procedures       ED Course                               MDM  Number of Diagnoses or Management Options  Abdominal pain, unspecified abdominal location:   Diagnosis management comments: This is a 53-year-old female presents today with chronic pelvic/abdominal pain  Her labs are unremarkable  She does have noted blood, protein, and leukocytes in her urine  However, she has no urinary complaints  Will await for culture  Her CT of abdomen does not reveal any acute renal stones  Pelvic ultrasound was unable to visualize left ovary  However, given patient has had this abdominal pain for a significant amount of time, my suspicion for torsion is low    I reviewed the studies with the patient extensively and she was agreeable to discharge  Patient was discharged home stable  Recommended follow-up with primary care provider  Disposition  Final diagnoses:   Abdominal pain, unspecified abdominal location     Time reflects when diagnosis was documented in both MDM as applicable and the Disposition within this note     Time User Action Codes Description Comment    9/30/2019  9:51 PM Lew Augustesylvia BOOKER Add [R10 9] Abdominal pain, unspecified abdominal location       ED Disposition     ED Disposition Condition Date/Time Comment    Discharge Stable Mon Sep 30, 2019  9:51 PM De Bal discharge to home/self care  Follow-up Information    None         Discharge Medication List as of 9/30/2019  9:52 PM      START taking these medications    Details   dicyclomine (BENTYL) 10 mg capsule Take 1 capsule (10 mg total) by mouth 4 (four) times a day (before meals and at bedtime), Starting Mon 9/30/2019, Print      ibuprofen (MOTRIN) 600 mg tablet Take 1 tablet (600 mg total) by mouth every 6 (six) hours as needed for moderate pain, Starting Mon 9/30/2019, Print         CONTINUE these medications which have NOT CHANGED    Details   Abatacept (ORENCIA) 125 MG/ML SOSY Inject 125 mg under the skin Once a week, Starting Wed 9/25/2019, Until Tue 12/24/2019, Historical Med      albuterol (VENTOLIN HFA) 90 mcg/act inhaler Inhale 2 puffs every 4 (four) hours as needed for wheezing or shortness of breath, Starting Fri 8/2/2019, Normal      Calcium Carb-Cholecalciferol (CALCIUM 600+D3) 600-200 MG-UNIT TABS take 1 tablet twice a day, Historical Med      celecoxib (CeleBREX) 200 mg capsule Take 200 mg by mouth 2 (two) times a day, Starting Wed 9/11/2019, Until Thu 9/10/2020, Historical Med      Cholecalciferol (VITAMIN D3) 2000 units capsule take 1 tablet po daily  , Historical Med      citalopram (CeleXA) 20 mg tablet Take 1 tablet (20 mg total) by mouth daily, Starting Fri 8/2/2019, Normal      diclofenac (VOLTAREN) 75 mg EC tablet Take 1 tablet (75 mg total) by mouth 2 (two) times a day, Starting Fri 8/2/2019, Normal      doxepin (SINEquan) 25 mg capsule Take 1 capsule (25 mg total) by mouth daily at bedtime, Starting Fri 8/2/2019, Normal      Elastic Bandages & Supports (CARPAL TUNNEL WRIST DELUXE) MISC by Does not apply route daily at bedtime, Starting Fri 8/2/2019, Print      famotidine (PEPCID) 20 mg tablet Take 1 tablet (20 mg total) by mouth daily, Starting Fri 7/2/0877, Normal      folic acid (FOLVITE) 1 mg tablet every 24 hours, Starting Wed 1/27/2016, Historical Med      furosemide (LASIX) 20 mg tablet Take 1 tablet (20 mg total) by mouth daily, Starting Fri 8/2/2019, Normal      gabapentin (NEURONTIN) 400 mg capsule Take 1 capsule (400 mg total) by mouth 3 (three) times a day, Starting Thu 9/26/2019, Normal      Incontinence Supply Disposable (POISE PAD) PADS by Does not apply route 4 (four) times a day as needed (incontinence), Starting Wed 9/5/2018, Normal      leflunomide (ARAVA) 10 MG tablet Take 10 mg by mouth daily, Historical Med      levETIRAcetam (KEPPRA) 1000 MG tablet Take 1 tablet (1,000 mg total) by mouth 2 (two) times a day, Starting Fri 8/2/2019, Normal      lidocaine (LMX) 4 % cream Apply topically as needed for mild pain, Starting Thu 9/26/2019, Normal      loratadine (CLARITIN) 10 mg tablet Take 1 tablet (10 mg total) by mouth daily, Starting Fri 8/2/2019, Normal      Multiple Vitamins-Minerals (MULTIVITAMIN WITH MINERALS) tablet Take 1 tablet by mouth daily, Starting Fri 8/2/2019, Normal      nitroglycerin (NITROSTAT) 0 4 mg SL tablet Place 1 tablet (0 4 mg total) under the tongue every 5 (five) minutes as needed for chest pain Call 911 if using 3 doses, Starting Thu 3/28/2019, Normal      polyethylene glycol (GLYCOLAX) powder Take 17 g by mouth daily, Starting Fri 8/2/2019, Normal      polymyxin b-trimethoprim (POLYTRIM) ophthalmic solution Administer 1 drop into the left eye every 4 (four) hours, Starting Thu 5/2/2019, Normal         STOP taking these medications       ciprofloxacin (CIPRO) 500 mg tablet Comments:   Reason for Stopping:             No discharge procedures on file      ED Provider  Electronically Signed by           Brielle Berkowitz PA-C  10/02/19 4516

## 2019-10-01 NOTE — TELEPHONE ENCOUNTER
Pt given all info and directions    US of pelvis is on 10/14/2019 at 9 am at [address]

## 2019-10-10 ENCOUNTER — APPOINTMENT (OUTPATIENT)
Dept: LAB | Facility: CLINIC | Age: 39
End: 2019-10-10
Payer: COMMERCIAL

## 2019-10-10 ENCOUNTER — HOSPITAL ENCOUNTER (OUTPATIENT)
Dept: MAMMOGRAPHY | Facility: CLINIC | Age: 39
Discharge: HOME | End: 2019-10-10
Payer: COMMERCIAL

## 2019-10-10 ENCOUNTER — OFFICE VISIT (OUTPATIENT)
Dept: OBGYN CLINIC | Facility: CLINIC | Age: 39
End: 2019-10-10

## 2019-10-10 VITALS
WEIGHT: 282.6 LBS | SYSTOLIC BLOOD PRESSURE: 120 MMHG | DIASTOLIC BLOOD PRESSURE: 80 MMHG | HEIGHT: 62 IN | BODY MASS INDEX: 52.01 KG/M2

## 2019-10-10 VITALS — BODY MASS INDEX: 51.89 KG/M2 | HEIGHT: 62 IN | WEIGHT: 282 LBS

## 2019-10-10 DIAGNOSIS — N92.1 MENORRHAGIA WITH IRREGULAR CYCLE: ICD-10-CM

## 2019-10-10 DIAGNOSIS — Z12.39 BREAST CANCER SCREENING: ICD-10-CM

## 2019-10-10 DIAGNOSIS — R10.2 PELVIC PAIN: Primary | ICD-10-CM

## 2019-10-10 LAB
FSH SERPL-ACNC: 11 MIU/ML
SL AMB POCT URINE HCG: NEGATIVE
TSH SERPL DL<=0.05 MIU/L-ACNC: 1.77 UIU/ML (ref 0.36–3.74)

## 2019-10-10 PROCEDURE — 36415 COLL VENOUS BLD VENIPUNCTURE: CPT

## 2019-10-10 PROCEDURE — 77063 BREAST TOMOSYNTHESIS BI: CPT

## 2019-10-10 PROCEDURE — 99203 OFFICE O/P NEW LOW 30 MIN: CPT | Performed by: NURSE PRACTITIONER

## 2019-10-10 PROCEDURE — 83001 ASSAY OF GONADOTROPIN (FSH): CPT

## 2019-10-10 PROCEDURE — 84443 ASSAY THYROID STIM HORMONE: CPT

## 2019-10-10 PROCEDURE — 81025 URINE PREGNANCY TEST: CPT | Performed by: NURSE PRACTITIONER

## 2019-10-10 PROCEDURE — 77067 SCR MAMMO BI INCL CAD: CPT

## 2019-10-10 NOTE — LETTER
Mago Ribeiro  1980        To whom it may concern,   Please be advised that Mago Ribeiro is under our medical care  Please excuse her from work yesterday and today (10/9/19-10/10/19)  Please contact our office with any questions or concerns      RUPERT Zelaya  10/10/2019

## 2019-10-10 NOTE — PROGRESS NOTES
PROBLEM GYNECOLOGICAL VISIT    Jemma Aguilar is a 35 y o  female who presents today with complaint of irregular menses and pelvic pain  Her general medical history has been reviewed and she reports it as follows:    Past Medical History:   Diagnosis Date    Angina at rest [facility])     Anxiety     Depression     Epilepsy ([address] )     Fibroid 2012    Hydronephrosis 2007    Hypertension     Migraine without aura     PTSD (post-traumatic stress disorder)      Past Surgical History:   Procedure Laterality Date    APPENDECTOMY      CARDIAC CATHETERIZATION       SECTION      CHOLECYSTECTOMY  2018    KIDNEY SURGERY  2007    TUBAL LIGATION  2008     OB History        10    Para   4    Term   4       0    AB   6    Living   4       SAB   6    TAB   0    Ectopic   0    Multiple   0    Live Births   4               Social History     Tobacco Use    Smoking status: Former Smoker     Types: Cigarettes     Last attempt to quit: 2013     Years since quittin 7    Smokeless tobacco: Former User   Substance Use Topics    Alcohol use: Yes     Frequency: Monthly or less     Drinks per session: 3 or 4    Drug use: Never       Current Outpatient Medications:     Abatacept (ORENCIA) 125 MG/ML SOSY, Inject 125 mg under the skin Once a week, Disp: , Rfl:     albuterol (VENTOLIN HFA) 90 mcg/act inhaler, Inhale 2 puffs every 4 (four) hours as needed for wheezing or shortness of breath, Disp: 18 g, Rfl: 1    Calcium Carb-Cholecalciferol (CALCIUM 600+D3) 600-200 MG-UNIT TABS, take 1 tablet twice a day, Disp: , Rfl:     celecoxib (CeleBREX) 200 mg capsule, Take 200 mg by mouth 2 (two) times a day, Disp: , Rfl:     Cholecalciferol (VITAMIN D3) 2000 units capsule, take 1 tablet po daily  , Disp: , Rfl:     citalopram (CeleXA) 20 mg tablet, Take 1 tablet (20 mg total) by mouth daily, Disp: 30 tablet, Rfl: 3    diclofenac (VOLTAREN) 75 mg EC tablet, Take 1 tablet (75 mg total) by mouth 2 (two) times a day, Disp: 60 tablet, Rfl: 1    dicyclomine (BENTYL) 10 mg capsule, Take 1 capsule (10 mg total) by mouth 4 (four) times a day (before meals and at bedtime), Disp: 10 capsule, Rfl: 0    doxepin (SINEquan) 25 mg capsule, Take 1 capsule (25 mg total) by mouth daily at bedtime, Disp: 30 capsule, Rfl: 3    Elastic Bandages & Supports (CARPAL TUNNEL WRIST DELUXE) MISC, by Does not apply route daily at bedtime, Disp: 2 each, Rfl: 0    famotidine (PEPCID) 20 mg tablet, Take 1 tablet (20 mg total) by mouth daily, Disp: 30 tablet, Rfl: 5    folic acid (FOLVITE) 1 mg tablet, every 24 hours, Disp: , Rfl:     furosemide (LASIX) 20 mg tablet, Take 1 tablet (20 mg total) by mouth daily, Disp: 30 tablet, Rfl: 5    gabapentin (NEURONTIN) 400 mg capsule, Take 1 capsule (400 mg total) by mouth 3 (three) times a day, Disp: 90 capsule, Rfl: 2    ibuprofen (MOTRIN) 600 mg tablet, Take 1 tablet (600 mg total) by mouth every 6 (six) hours as needed for moderate pain, Disp: 30 tablet, Rfl: 0    Incontinence Supply Disposable (POISE PAD) PADS, by Does not apply route 4 (four) times a day as needed (incontinence), Disp: 90 each, Rfl: 0    leflunomide (ARAVA) 10 MG tablet, Take 10 mg by mouth daily, Disp: , Rfl:     levETIRAcetam (KEPPRA) 1000 MG tablet, Take 1 tablet (1,000 mg total) by mouth 2 (two) times a day, Disp: 60 tablet, Rfl: 3    lidocaine (LMX) 4 % cream, Apply topically as needed for mild pain, Disp: 45 g, Rfl: 1    loratadine (CLARITIN) 10 mg tablet, Take 1 tablet (10 mg total) by mouth daily, Disp: 30 tablet, Rfl: 5    Multiple Vitamins-Minerals (MULTIVITAMIN WITH MINERALS) tablet, Take 1 tablet by mouth daily, Disp: 30 tablet, Rfl: 11    nitroglycerin (NITROSTAT) 0 4 mg SL tablet, Place 1 tablet (0 4 mg total) under the tongue every 5 (five) minutes as needed for chest pain Call 911 if using 3 doses, Disp: 30 tablet, Rfl: 0    polyethylene glycol (GLYCOLAX) powder, Take 17 g by mouth daily, Disp: 578 g, Rfl: 2    polymyxin b-trimethoprim (POLYTRIM) ophthalmic solution, Administer 1 drop into the left eye every 4 (four) hours, Disp: 10 mL, Rfl: 0    History of Present Illness:   Reports menses were always regular (though heavy and long duration) until 2015 when her menses began to become very irregular and extremely heavy flow  At that time, she was starting to be treated with many different medications and she believed that was the cause of the irregularity of her menses  Her LMP is sometime in 6/2019  She also reports R sided pelvic pain for 3-4 weeks which has become constant and which is unrelieved with OTC meds such as Tylenol or Ibuprofen/Motrin  Denies GI issues  Review of Systems:  Review of Systems   Constitutional: Negative  Gastrointestinal: Negative  Genitourinary: Positive for menstrual problem and pelvic pain  Negative for dysuria and vaginal discharge  Physical Exam:  /80   Ht 5' 2" (1 575 m)   Wt 128 kg (282 lb 9 6 oz)   LMP 06/15/2019 (Approximate)   BMI 51 69 kg/m²   Physical Exam   Constitutional: She appears well-developed and well-nourished  Abdominal: Soft  There is tenderness  R sided pelvic tenderness   Skin: Skin is warm and dry  Vitals reviewed  Point of Care Testing:   -urine pregnancy test: negative    Assessment:   1  Pelvic pain  2  Menorrhagia with irregular menses  Plan:   1  Imaging ordered: pelvic ultrasound  2  Bloodwork ordered: FSH, TSH, testosterone  3  Return to office in 2 weeks to review results

## 2019-10-14 ENCOUNTER — HOSPITAL ENCOUNTER (OUTPATIENT)
Dept: ULTRASOUND IMAGING | Facility: HOSPITAL | Age: 39
Discharge: HOME | End: 2019-10-14
Payer: COMMERCIAL

## 2019-10-14 ENCOUNTER — APPOINTMENT (OUTPATIENT)
Dept: LAB | Facility: CLINIC | Age: 39
End: 2019-10-14
Payer: COMMERCIAL

## 2019-10-14 DIAGNOSIS — R10.31 RIGHT LOWER QUADRANT ABDOMINAL PAIN: ICD-10-CM

## 2019-10-14 PROCEDURE — 84402 ASSAY OF FREE TESTOSTERONE: CPT

## 2019-10-14 PROCEDURE — 76856 US EXAM PELVIC COMPLETE: CPT

## 2019-10-14 PROCEDURE — 76830 TRANSVAGINAL US NON-OB: CPT

## 2019-10-14 PROCEDURE — 36415 COLL VENOUS BLD VENIPUNCTURE: CPT

## 2019-10-14 PROCEDURE — 84403 ASSAY OF TOTAL TESTOSTERONE: CPT

## 2019-10-15 LAB
TESTOST FREE SERPL-MCNC: 1.8 PG/ML (ref 0–4.2)
TESTOST SERPL-MCNC: 27 NG/DL (ref 8–48)

## 2019-10-16 NOTE — RESULT ENCOUNTER NOTE
Ultrasound does show of fibroid in the uterus  This is a noncancerous growth  It can cause abdominal discomfort and sometimes menstrual irregularities  Recommend that she schedule a visit with her gynecologist to discuss what treatment is best for her    Sometimes they will surgically remove these but sometimes also they just put on hormone regulating medications

## 2019-10-19 ENCOUNTER — HOSPITAL ENCOUNTER (EMERGENCY)
Facility: HOSPITAL | Age: 39
Discharge: HOME | End: 2019-10-19
Attending: EMERGENCY MEDICINE
Payer: COMMERCIAL

## 2019-10-19 VITALS
HEART RATE: 79 BPM | TEMPERATURE: 98 F | BODY MASS INDEX: 52.22 KG/M2 | DIASTOLIC BLOOD PRESSURE: 86 MMHG | RESPIRATION RATE: 18 BRPM | WEIGHT: 285.5 LBS | SYSTOLIC BLOOD PRESSURE: 141 MMHG | OXYGEN SATURATION: 100 %

## 2019-10-19 DIAGNOSIS — M65.341 TRIGGER RING FINGER OF RIGHT HAND: Primary | ICD-10-CM

## 2019-10-19 PROCEDURE — 99282 EMERGENCY DEPT VISIT SF MDM: CPT | Performed by: PHYSICIAN ASSISTANT

## 2019-10-19 PROCEDURE — 99283 EMERGENCY DEPT VISIT LOW MDM: CPT

## 2019-10-19 NOTE — ED PROVIDER NOTES
History  Chief Complaint   Patient presents with    Finger Pain     pt right 4th finger locked in bent position for about an hour, pt reports this has happened before d/t RA     79-year-old female with past medical history of RA, hypertension, epilepsy, depression presenting for evaluation of finger pain  Patient reports she was cutting cake earlier this afternoon when her right ring finger became stuck in the flexed position  She reports that this has happened to a finger on the left hand but she is usually able to extend the finger without difficulty  She states she has not been able to extend the right ring finger since earlier this afternoon  She reports is now causing her pain  She denies taking anything for pain prior to arrival   No numbness tingling or weakness  She is mostly concerned as she is a  and is unable to do her job efficiently with the finger stuck in a flexed position  Denies any direct injury or trauma to the right upper extremity  Prior to Admission Medications   Prescriptions Last Dose Informant Patient Reported? Taking? Abatacept (ORENCIA) 125 MG/ML SOSY   Yes No   Sig: Inject 125 mg under the skin Once a week   Calcium Carb-Cholecalciferol (CALCIUM 600+D3) 600-200 MG-UNIT TABS  Self Yes No   Sig: take 1 tablet twice a day   Cholecalciferol (VITAMIN D3) 2000 units capsule  Self Yes No   Sig: take 1 tablet po daily     Elastic Bandages & Supports (CARPAL TUNNEL WRIST DELUXE) MISC   No No   Sig: by Does not apply route daily at bedtime   Incontinence Supply Disposable (POISE PAD) PADS  Self No No   Sig: by Does not apply route 4 (four) times a day as needed (incontinence)   Multiple Vitamins-Minerals (MULTIVITAMIN WITH MINERALS) tablet   No No   Sig: Take 1 tablet by mouth daily   albuterol (VENTOLIN HFA) 90 mcg/act inhaler   No No   Sig: Inhale 2 puffs every 4 (four) hours as needed for wheezing or shortness of breath   celecoxib (CeleBREX) 200 mg capsule   Yes No Sig: Take 200 mg by mouth 2 (two) times a day   citalopram (CeleXA) 20 mg tablet   No No   Sig: Take 1 tablet (20 mg total) by mouth daily   diclofenac (VOLTAREN) 75 mg EC tablet   No No   Sig: Take 1 tablet (75 mg total) by mouth 2 (two) times a day   dicyclomine (BENTYL) 10 mg capsule   No No   Sig: Take 1 capsule (10 mg total) by mouth 4 (four) times a day (before meals and at bedtime)   doxepin (SINEquan) 25 mg capsule   No No   Sig: Take 1 capsule (25 mg total) by mouth daily at bedtime   famotidine (PEPCID) 20 mg tablet   No No   Sig: Take 1 tablet (20 mg total) by mouth daily   folic acid (FOLVITE) 1 mg tablet  Self Yes No   Sig: every 24 hours   furosemide (LASIX) 20 mg tablet   No No   Sig: Take 1 tablet (20 mg total) by mouth daily   gabapentin (NEURONTIN) 400 mg capsule   No No   Sig: Take 1 capsule (400 mg total) by mouth 3 (three) times a day   ibuprofen (MOTRIN) 600 mg tablet   No No   Sig: Take 1 tablet (600 mg total) by mouth every 6 (six) hours as needed for moderate pain   leflunomide (ARAVA) 10 MG tablet   Yes No   Sig: Take 10 mg by mouth daily   levETIRAcetam (KEPPRA) 1000 MG tablet   No No   Sig: Take 1 tablet (1,000 mg total) by mouth 2 (two) times a day   lidocaine (LMX) 4 % cream   No No   Sig: Apply topically as needed for mild pain   loratadine (CLARITIN) 10 mg tablet   No No   Sig: Take 1 tablet (10 mg total) by mouth daily   nitroglycerin (NITROSTAT) 0 4 mg SL tablet   No No   Sig: Place 1 tablet (0 4 mg total) under the tongue every 5 (five) minutes as needed for chest pain Call 911 if using 3 doses   polyethylene glycol (GLYCOLAX) powder   No No   Sig: Take 17 g by mouth daily   polymyxin b-trimethoprim (POLYTRIM) ophthalmic solution   No No   Sig: Administer 1 drop into the left eye every 4 (four) hours      Facility-Administered Medications: None       Past Medical History:   Diagnosis Date    [facility])     Anxiety     Depression     Epilepsy ([facility] [address] )     Fibroid 2012  Hydronephrosis 2007    Hypertension     Migraine without aura     PTSD (post-traumatic stress disorder)        Past Surgical History:   Procedure Laterality Date    APPENDECTOMY      CARDIAC CATHETERIZATION       SECTION  2008    CHOLECYSTECTOMY  2018    KIDNEY SURGERY  2007    TUBAL LIGATION  2008       Family History   Problem Relation Age of Onset    Diabetes Maternal Grandmother     Hypertension Maternal Grandmother     No Known Problems Mother     No Known Problems Sister     No Known Problems Daughter     No Known Problems Paternal Grandmother     No Known Problems Daughter     Breast cancer Neg Hx     Cancer Neg Hx      I have reviewed and agree with the history as documented  Social History     Tobacco Use    Smoking status: Former Smoker     Types: Cigarettes     Last attempt to quit: 2013     Years since quittin 8    Smokeless tobacco: Former User   Substance Use Topics    Alcohol use: Not Currently     Frequency: Monthly or less     Drinks per session: 3 or 4    Drug use: Never        Review of Systems   All other systems reviewed and are negative  Physical Exam  Physical Exam   Constitutional: She is oriented to person, place, and time  She appears well-developed and well-nourished  No distress  HENT:   Head: Normocephalic and atraumatic  Eyes: Conjunctivae are normal    EOM grossly intact   Neck: Normal range of motion  Neck supple  No JVD present  Cardiovascular: Normal rate  Pulmonary/Chest: Effort normal    Abdominal: Soft  Musculoskeletal:        Hands:  FROM, steady gait, cap refill brisk, strength and sensation grossly intact throughout   Neurological: She is alert and oriented to person, place, and time  Skin: Skin is warm and dry  Capillary refill takes less than 2 seconds  Psychiatric: She has a normal mood and affect  Her behavior is normal    Nursing note and vitals reviewed        Vital Signs  ED Triage Vitals [10/19/19 1740] Temperature Pulse Respirations Blood Pressure SpO2   98 °F (36 7 °C) 79 18 141/86 100 %      Temp Source Heart Rate Source Patient Position - Orthostatic VS BP Location FiO2 (%)   Tympanic Monitor Sitting Left arm --      Pain Score       --           Vitals:    10/19/19 1740   BP: 141/86   Pulse: 79   Patient Position - Orthostatic VS: Sitting         Visual Acuity      ED Medications  Medications - No data to display    Diagnostic Studies  Results Reviewed     None                 No orders to display              Procedures  Procedures       ED Course                               MDM  Number of Diagnoses or Management Options  Diagnosis management comments: 58-year-old female presenting for evaluation of right ring finger pain and physical exam findings consistent with trigger finger, patient has full range of motion of other fingers, advised to take Motrin and Tylenol at home and will provide information to follow up with hand specialist, patient is also asking for work no currently, f/u with pcp as an outpatient    strict return to ED precautions discussed  Pt verbalizes understanding and agrees with plan  Pt is stable for discharge    Portions of the record may have been created with voice recognition software  Occasional wrong word or "sound a like" substitutions may have occurred due to the inherent limitations of voice recognition software  Read the chart carefully and recognize, using context, where substitutions have occurred          Disposition  Final diagnoses:   Trigger ring finger of right hand     Time reflects when diagnosis was documented in both MDM as applicable and the Disposition within this note     Time User Action Codes Description Comment    10/19/2019  6:07 PM Saloni Valentin Add [H16 400] Trigger ring finger of right hand       ED Disposition     ED Disposition Condition Date/Time Comment    Discharge Stable Sat Oct 19, 2019  6:07 PM [facility] discharge to home/self care             Follow-up Information     Follow up With Specialties Details Why   Christin Don PA-C Family Medicine, Physician Assistant Call in 1 day  [address] [place] [address] [phone]       Nataliia Antunez MD Orthopedic Surgery Call in 1 day  [address]  [phone]            Patient's Medications   Discharge Prescriptions    No medications on file     No discharge procedures on file      ED Provider  Electronically Signed by           Joon Steele PA-C  10/19/19 1037

## 2019-10-28 ENCOUNTER — TELEPHONE (OUTPATIENT)
Dept: NEUROLOGY | Facility: CLINIC | Age: 39
End: 2019-10-28

## 2019-10-28 NOTE — TELEPHONE ENCOUNTER
Spoke with patient, patient has never seen a Neurologist before  Patient has only had an MRI completed  Patient did not receive NP paperwork, informed patient I would mail this out but due to her appointment being a few days away, if she does not receive in time to please arrive to appointment 30-40 mins early for check in and to work on the paperwork  Patient made aware of appointment, date, time, and location

## 2019-10-29 ENCOUNTER — TELEPHONE (OUTPATIENT)
Dept: NEUROLOGY | Facility: CLINIC | Age: 39
End: 2019-10-29

## 2019-10-29 NOTE — TELEPHONE ENCOUNTER
Appointment canceled for Dong English ([id number])   Visit Type: CONSULT [facility]   Date        Time      Length    Provider                  Department   11/1/2019    9:00 AM  60 mins  Gregoria Kiser MD         [facility]      Reason for Cancellation: Canceled via [facility] with patient who stated that she will callback to reschedule her appt at a later time

## 2019-11-05 ENCOUNTER — HOSPITAL ENCOUNTER (EMERGENCY)
Facility: HOSPITAL | Age: 39
Discharge: HOME | End: 2019-11-05
Attending: EMERGENCY MEDICINE | Admitting: EMERGENCY MEDICINE
Payer: COMMERCIAL

## 2019-11-05 VITALS
TEMPERATURE: 98.1 F | SYSTOLIC BLOOD PRESSURE: 135 MMHG | WEIGHT: 281.75 LBS | DIASTOLIC BLOOD PRESSURE: 104 MMHG | OXYGEN SATURATION: 98 % | HEART RATE: 95 BPM | RESPIRATION RATE: 18 BRPM | BODY MASS INDEX: 51.53 KG/M2

## 2019-11-05 DIAGNOSIS — F41.9 ANXIETY: ICD-10-CM

## 2019-11-05 DIAGNOSIS — R20.2 NUMBNESS AND TINGLING IN BOTH HANDS: ICD-10-CM

## 2019-11-05 DIAGNOSIS — R20.0 NUMBNESS AND TINGLING IN BOTH HANDS: ICD-10-CM

## 2019-11-05 DIAGNOSIS — R56.9 SEIZURE (HCC): ICD-10-CM

## 2019-11-05 DIAGNOSIS — I20.8 ANGINA AT REST (HCC): ICD-10-CM

## 2019-11-05 DIAGNOSIS — I10 ESSENTIAL HYPERTENSION: ICD-10-CM

## 2019-11-05 DIAGNOSIS — J45.909 UNCOMPLICATED ASTHMA, UNSPECIFIED ASTHMA SEVERITY, UNSPECIFIED WHETHER PERSISTENT: ICD-10-CM

## 2019-11-05 DIAGNOSIS — K21.9 GASTROESOPHAGEAL REFLUX DISEASE WITHOUT ESOPHAGITIS: ICD-10-CM

## 2019-11-05 DIAGNOSIS — R20.0 BILATERAL HAND NUMBNESS: ICD-10-CM

## 2019-11-05 DIAGNOSIS — R51.9 HEADACHE: Primary | ICD-10-CM

## 2019-11-05 PROCEDURE — 99284 EMERGENCY DEPT VISIT MOD MDM: CPT | Performed by: EMERGENCY MEDICINE

## 2019-11-05 PROCEDURE — 99283 EMERGENCY DEPT VISIT LOW MDM: CPT

## 2019-11-05 RX ORDER — POLYETHYLENE GLYCOL 3350 17 G/17G
17 POWDER, FOR SOLUTION ORAL DAILY
Qty: 578 G | Refills: 0 | Status: CANCELLED | OUTPATIENT
Start: 2019-11-05

## 2019-11-05 RX ORDER — MULTIVIT-MIN/IRON FUM/FOLIC AC 7.5 MG-4
1 TABLET ORAL DAILY
Qty: 30 TABLET | Refills: 0 | Status: CANCELLED | OUTPATIENT
Start: 2019-11-05

## 2019-11-05 RX ORDER — FAMOTIDINE 20 MG/1
20 TABLET, FILM COATED ORAL DAILY
Qty: 30 TABLET | Refills: 0 | Status: CANCELLED | OUTPATIENT
Start: 2019-11-05

## 2019-11-05 RX ORDER — GABAPENTIN 400 MG/1
400 CAPSULE ORAL 3 TIMES DAILY
Qty: 90 CAPSULE | Refills: 0 | Status: CANCELLED | OUTPATIENT
Start: 2019-11-05

## 2019-11-05 RX ORDER — ALBUTEROL SULFATE 90 UG/1
2 AEROSOL, METERED RESPIRATORY (INHALATION) EVERY 4 HOURS PRN
Qty: 18 G | Refills: 0 | Status: CANCELLED | OUTPATIENT
Start: 2019-11-05

## 2019-11-05 RX ORDER — CITALOPRAM 20 MG/1
20 TABLET ORAL DAILY
Qty: 30 TABLET | Refills: 0 | Status: CANCELLED | OUTPATIENT
Start: 2019-11-05

## 2019-11-05 RX ORDER — FUROSEMIDE 20 MG/1
20 TABLET ORAL DAILY
Qty: 30 TABLET | Refills: 0 | Status: CANCELLED | OUTPATIENT
Start: 2019-11-05

## 2019-11-05 RX ORDER — BUTALBITAL, ACETAMINOPHEN AND CAFFEINE 50; 325; 40 MG/1; MG/1; MG/1
1 TABLET ORAL ONCE
Status: COMPLETED | OUTPATIENT
Start: 2019-11-05 | End: 2019-11-05

## 2019-11-05 RX ORDER — LIDOCAINE 40 MG/G
CREAM TOPICAL AS NEEDED
Qty: 45 G | Refills: 0 | Status: CANCELLED | OUTPATIENT
Start: 2019-11-05

## 2019-11-05 RX ORDER — DICLOFENAC SODIUM 75 MG/1
75 TABLET, DELAYED RELEASE ORAL 2 TIMES DAILY
Qty: 60 TABLET | Refills: 0 | Status: CANCELLED | OUTPATIENT
Start: 2019-11-05

## 2019-11-05 RX ORDER — CETIRIZINE HYDROCHLORIDE 10 MG/1
10 TABLET ORAL DAILY
Qty: 30 TABLET | Refills: 0 | OUTPATIENT
Start: 2019-11-05 | End: 2019-11-19

## 2019-11-05 RX ORDER — NITROGLYCERIN 0.4 MG/1
0.4 TABLET SUBLINGUAL
Qty: 30 TABLET | Refills: 0 | Status: CANCELLED | OUTPATIENT
Start: 2019-11-05

## 2019-11-05 RX ORDER — LEVETIRACETAM 1000 MG/1
1000 TABLET ORAL 2 TIMES DAILY
Qty: 60 TABLET | Refills: 0 | Status: CANCELLED | OUTPATIENT
Start: 2019-11-05

## 2019-11-05 RX ORDER — DOXEPIN HYDROCHLORIDE 25 MG/1
25 CAPSULE ORAL
Qty: 30 CAPSULE | Refills: 0 | Status: CANCELLED | OUTPATIENT
Start: 2019-11-05

## 2019-11-05 RX ADMIN — BUTALBITAL, ACETAMINOPHEN, AND CAFFEINE 1 TABLET: 50; 325; 40 TABLET ORAL at 21:17

## 2019-11-06 RX ORDER — FUROSEMIDE 20 MG/1
20 TABLET ORAL DAILY
Qty: 30 TABLET | Refills: 0 | Status: CANCELLED | OUTPATIENT
Start: 2019-11-06

## 2019-11-06 RX ORDER — LEVETIRACETAM 1000 MG/1
1000 TABLET ORAL 2 TIMES DAILY
Qty: 60 TABLET | Refills: 0 | Status: CANCELLED | OUTPATIENT
Start: 2019-11-06

## 2019-11-06 RX ORDER — LIDOCAINE 40 MG/G
CREAM TOPICAL AS NEEDED
Qty: 45 G | Refills: 0 | Status: SHIPPED | OUTPATIENT
Start: 2019-11-06 | End: 2020-04-14

## 2019-11-06 RX ORDER — FAMOTIDINE 20 MG/1
20 TABLET, FILM COATED ORAL DAILY
Qty: 30 TABLET | Refills: 0 | Status: SHIPPED | OUTPATIENT
Start: 2019-11-06 | End: 2020-06-08

## 2019-11-06 RX ORDER — DOXEPIN HYDROCHLORIDE 25 MG/1
25 CAPSULE ORAL
Qty: 30 CAPSULE | Refills: 0 | Status: CANCELLED | OUTPATIENT
Start: 2019-11-06

## 2019-11-06 RX ORDER — ALBUTEROL SULFATE 90 UG/1
2 AEROSOL, METERED RESPIRATORY (INHALATION) EVERY 4 HOURS PRN
Qty: 18 G | Refills: 0 | Status: SHIPPED | OUTPATIENT
Start: 2019-11-06 | End: 2019-12-13 | Stop reason: SDUPTHER

## 2019-11-06 RX ORDER — POLYETHYLENE GLYCOL 3350 17 G/17G
17 POWDER, FOR SOLUTION ORAL DAILY
Qty: 578 G | Refills: 0 | Status: SHIPPED | OUTPATIENT
Start: 2019-11-06 | End: 2020-04-14 | Stop reason: SDUPTHER

## 2019-11-06 RX ORDER — GABAPENTIN 400 MG/1
400 CAPSULE ORAL 3 TIMES DAILY
Qty: 90 CAPSULE | Refills: 0 | Status: CANCELLED | OUTPATIENT
Start: 2019-11-06

## 2019-11-06 RX ORDER — NITROGLYCERIN 0.4 MG/1
0.4 TABLET SUBLINGUAL
Qty: 25 TABLET | Refills: 0 | Status: SHIPPED | OUTPATIENT
Start: 2019-11-06 | End: 2020-10-15 | Stop reason: SDUPTHER

## 2019-11-06 RX ORDER — MULTIVIT-MIN/IRON FUM/FOLIC AC 7.5 MG-4
1 TABLET ORAL DAILY
Qty: 30 TABLET | Refills: 0 | Status: CANCELLED | OUTPATIENT
Start: 2019-11-06

## 2019-11-06 RX ORDER — DICLOFENAC SODIUM 75 MG/1
75 TABLET, DELAYED RELEASE ORAL 2 TIMES DAILY
Qty: 60 TABLET | Refills: 0 | Status: SHIPPED | OUTPATIENT
Start: 2019-11-06 | End: 2020-08-13

## 2019-11-06 RX ORDER — CITALOPRAM 20 MG/1
20 TABLET ORAL DAILY
Qty: 30 TABLET | Refills: 0 | Status: CANCELLED | OUTPATIENT
Start: 2019-11-06

## 2019-11-06 NOTE — ED PROVIDER NOTES
History  Chief Complaint   Patient presents with    Headache     frontal HA that started yesterday with pressure in eyes  last tylenol dose this a m  States photophobia too  Patient is a 71-year-old female who presents with a 2 day history of frontal achy headache that radiates to the back of the skull  Patient has a history of migraines but states this is different  Does admit some nasal congestion  States that she recently had her eyes checked last week and is awaiting new glasses  States last time she had her eyes checked was 2013  Patient does have some photophobia  Denies any nausea vomiting  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 Taking? Abatacept (ORENCIA) 125 MG/ML SOSY   Yes No   Sig: Inject 125 mg under the skin Once a week   Calcium Carb-Cholecalciferol (CALCIUM 600+D3) 600-200 MG-UNIT TABS  Self Yes No   Sig: take 1 tablet twice a day   Cholecalciferol (VITAMIN D3) 2000 units capsule  Self Yes No   Sig: take 1 tablet po daily     Elastic Bandages & Supports (CARPAL TUNNEL WRIST DELUXE) MISC   No No   Sig: by Does not apply route daily at bedtime   Incontinence Supply Disposable (POISE PAD) PADS  Self No No   Sig: by Does not apply route 4 (four) times a day as needed (incontinence)   Multiple Vitamins-Minerals (MULTIVITAMIN WITH MINERALS) tablet   No No   Sig: Take 1 tablet by mouth daily   albuterol (VENTOLIN HFA) 90 mcg/act inhaler   No No   Sig: Inhale 2 puffs every 4 (four) hours as needed for wheezing or shortness of breath   celecoxib (CeleBREX) 200 mg capsule   Yes No   Sig: Take 200 mg by mouth 2 (two) times a day   citalopram (CeleXA) 20 mg tablet   No No   Sig: Take 1 tablet (20 mg total) by mouth daily   diclofenac (VOLTAREN) 75 mg EC tablet   No No   Sig: Take 1 tablet (75 mg total) by mouth 2 (two) times a day   dicyclomine (BENTYL) 10 mg capsule   No No   Sig: Take 1 capsule (10 mg total) by mouth 4 (four) times a day (before meals and at bedtime)   doxepin (SINEquan) 25 mg capsule   No No   Sig: Take 1 capsule (25 mg total) by mouth daily at bedtime   famotidine (PEPCID) 20 mg tablet   No No   Sig: Take 1 tablet (20 mg total) by mouth daily   folic acid (FOLVITE) 1 mg tablet  Self Yes No   Sig: every 24 hours   furosemide (LASIX) 20 mg tablet   No No   Sig: Take 1 tablet (20 mg total) by mouth daily   gabapentin (NEURONTIN) 400 mg capsule   No No   Sig: Take 1 capsule (400 mg total) by mouth 3 (three) times a day   ibuprofen (MOTRIN) 600 mg tablet   No No   Sig: Take 1 tablet (600 mg total) by mouth every 6 (six) hours as needed for moderate pain   leflunomide (ARAVA) 10 MG tablet   Yes No   Sig: Take 10 mg by mouth daily   levETIRAcetam (KEPPRA) 1000 MG tablet   No No   Sig: Take 1 tablet (1,000 mg total) by mouth 2 (two) times a day   lidocaine (LMX) 4 % cream   No No   Sig: Apply topically as needed for mild pain   loratadine (CLARITIN) 10 mg tablet   No No   Sig: Take 1 tablet (10 mg total) by mouth daily   nitroglycerin (NITROSTAT) 0 4 mg SL tablet   No No   Sig: Place 1 tablet (0 4 mg total) under the tongue every 5 (five) minutes as needed for chest pain Call 911 if using 3 doses   polyethylene glycol (GLYCOLAX) powder   No No   Sig: Take 17 g by mouth daily   polymyxin b-trimethoprim (POLYTRIM) ophthalmic solution   No No   Sig: Administer 1 drop into the left eye every 4 (four) hours      Facility-Administered Medications: None       Past Medical History:   Diagnosis Date    [facility])     Anxiety     Depression     Epilepsy ([facility] [address] )     Fibroid 2012    Hydronephrosis 2007    Hypertension     Migraine without aura     PTSD (post-traumatic stress disorder)        Past Surgical History:   Procedure Laterality Date    APPENDECTOMY      CARDIAC CATHETERIZATION  2006     SECTION  2008    CHOLECYSTECTOMY  2018    KIDNEY SURGERY  2007    TUBAL LIGATION  2008       Family History   Problem Relation Age of Onset    Diabetes Maternal Grandmother     Hypertension Maternal Grandmother     No Known Problems Mother     No Known Problems Sister     No Known Problems Daughter     No Known Problems Paternal Grandmother     No Known Problems Daughter     Breast cancer Neg Hx     Cancer Neg Hx      I have reviewed and agree with the history as documented  Social History     Tobacco Use    Smoking status: Former Smoker     Types: Cigarettes     Last attempt to quit: 2013     Years since quittin 8    Smokeless tobacco: Former User   Substance Use Topics    Alcohol use: Not Currently     Frequency: Monthly or less     Drinks per session: 3 or 4    Drug use: Never        Review of Systems   Constitutional: Negative  Negative for fever  HENT: Positive for congestion  Eyes: Positive for photophobia  Respiratory: Negative  Cardiovascular: Negative  Negative for chest pain  Gastrointestinal: Negative  Endocrine: Negative  Genitourinary: Negative  Musculoskeletal: Negative  Skin: Negative  Allergic/Immunologic: Negative  Neurological: Positive for headaches  Hematological: Negative  Psychiatric/Behavioral: Negative  All other systems reviewed and are negative  Physical Exam  Physical Exam   Constitutional: She is oriented to person, place, and time  She appears well-developed and well-nourished  HENT:   Head: Normocephalic and atraumatic  Right Ear: External ear normal    Left Ear: External ear normal    Nose: Mucosal edema, rhinorrhea and sinus tenderness present  Right sinus exhibits frontal sinus tenderness  Left sinus exhibits frontal sinus tenderness     Mouth/Throat: Oropharynx is clear and moist    Eyes: Pupils are equal, round, and reactive to light  Conjunctivae and EOM are normal    Neck: Normal range of motion  Neck supple  Cardiovascular: Normal rate, regular rhythm, normal heart sounds and intact distal pulses  Pulmonary/Chest: Effort normal and breath sounds normal    Abdominal: Soft  Bowel sounds are normal    Musculoskeletal: Normal range of motion  Neurological: She is alert and oriented to person, place, and time  She displays no atrophy and no tremor  No sensory deficit  She exhibits normal muscle tone  She displays no seizure activity  Coordination and gait normal  GCS eye subscore is 4  GCS verbal subscore is 5  GCS motor subscore is 6  Skin: Skin is warm and dry  Capillary refill takes less than 2 seconds  She is not diaphoretic  Psychiatric: She has a normal mood and affect  Her behavior is normal    Nursing note and vitals reviewed        Vital Signs  ED Triage Vitals [11/05/19 2103]   Temperature Pulse Respirations Blood Pressure SpO2   98 1 °F (36 7 °C) 95 18 (!) 135/104 98 %      Temp Source Heart Rate Source Patient Position - Orthostatic VS BP Location FiO2 (%)   Tympanic Monitor Sitting Left arm --      Pain Score       Worst Possible Pain           Vitals:    11/05/19 2103   BP: (!) 135/104   Pulse: 95   Patient Position - Orthostatic VS: Sitting         Visual Acuity  Visual Acuity      Most Recent Value   L Pupil Size (mm)  3   R Pupil Size (mm)  3          ED Medications  Medications   butalbital-acetaminophen-caffeine (FIORICET,ESGIC) -40 mg per tablet 1 tablet (1 tablet Oral Given 11/5/19 2117)       Diagnostic Studies  Results Reviewed     None                 No orders to display              Procedures  Procedures       ED Course                               MDM  Number of Diagnoses or Management Options  Headache:       Disposition  Final diagnoses:   Headache     Time reflects when diagnosis was documented in both MDM as applicable and the Disposition within this note     Time User Action Codes Description Comment    11/5/2019  9:13 PM Sunshine Weinstein [R51] Headache       ED Disposition     ED Disposition Condition Date/Time Comment    Discharge Stable Tue Nov 5, 2019  9:13 PM Chun Dempsey discharge to home/self care  Follow-up Information     Follow up With Specialties Details Why Contact Info    Hemant Godwin PA-C Family Medicine, Physician Assistant   [address] [facility]  [address] [facility]  [place] [address] [place] [address]  [phone]            Discharge Medication List as of 11/5/2019  9:14 PM      START taking these medications    Details   cetirizine (ZyrTEC) 10 mg tablet Take 1 tablet (10 mg total) by mouth daily, Starting Tue 11/5/2019, Until Thu 12/5/2019, Print         CONTINUE these medications which have NOT CHANGED    Details   Abatacept (ORENCIA) 125 MG/ML SOSY Inject 125 mg under the skin Once a week, Starting Wed 9/25/2019, Until Tue 12/24/2019, Historical Med      albuterol (VENTOLIN HFA) 90 mcg/act inhaler Inhale 2 puffs every 4 (four) hours as needed for wheezing or shortness of breath, Starting Fri 8/2/2019, Normal      Calcium Carb-Cholecalciferol (CALCIUM 600+D3) 600-200 MG-UNIT TABS take 1 tablet twice a day, Historical Med      celecoxib (CeleBREX) 200 mg capsule Take 200 mg by mouth 2 (two) times a day, Starting Wed 9/11/2019, Until Thu 9/10/2020, Historical Med      Cholecalciferol (VITAMIN D3) 2000 units capsule take 1 tablet po daily  , Historical Med      citalopram (CeleXA) 20 mg tablet Take 1 tablet (20 mg total) by mouth daily, Starting Fri 8/2/2019, Normal      diclofenac (VOLTAREN) 75 mg EC tablet Take 1 tablet (75 mg total) by mouth 2 (two) times a day, Starting Fri 8/2/2019, Normal      dicyclomine (BENTYL) 10 mg capsule Take 1 capsule (10 mg total) by mouth 4 (four) times a day (before meals and at bedtime), Starting Mon 9/30/2019, Print      doxepin (SINEquan) 25 mg capsule Take 1 capsule (25 mg total) by mouth daily at bedtime, Starting Fri 8/2/2019, Normal      Elastic Bandages & Supports (CARPAL TUNNEL WRIST DELUXE) MISC by Does not apply route daily at bedtime, Starting Fri 8/2/2019, Print      famotidine (PEPCID) 20 mg tablet Take 1 tablet (20 mg total) by mouth daily, Starting Fri 0/7/4926, Normal      folic acid (FOLVITE) 1 mg tablet every 24 hours, Starting Wed 1/27/2016, Historical Med      furosemide (LASIX) 20 mg tablet Take 1 tablet (20 mg total) by mouth daily, Starting Fri 8/2/2019, Normal      gabapentin (NEURONTIN) 400 mg capsule Take 1 capsule (400 mg total) by mouth 3 (three) times a day, Starting Thu 9/26/2019, Normal      ibuprofen (MOTRIN) 600 mg tablet Take 1 tablet (600 mg total) by mouth every 6 (six) hours as needed for moderate pain, Starting Mon 9/30/2019, Print      Incontinence Supply Disposable (POISE PAD) PADS by Does not apply route 4 (four) times a day as needed (incontinence), Starting Wed 9/5/2018, Normal      leflunomide (ARAVA) 10 MG tablet Take 10 mg by mouth daily, Historical Med      levETIRAcetam (KEPPRA) 1000 MG tablet Take 1 tablet (1,000 mg total) by mouth 2 (two) times a day, Starting Fri 8/2/2019, Normal      lidocaine (LMX) 4 % cream Apply topically as needed for mild pain, Starting Thu 9/26/2019, Normal      Multiple Vitamins-Minerals (MULTIVITAMIN WITH MINERALS) tablet Take 1 tablet by mouth daily, Starting Fri 8/2/2019, Normal      nitroglycerin (NITROSTAT) 0 4 mg SL tablet Place 1 tablet (0 4 mg total) under the tongue every 5 (five) minutes as needed for chest pain Call 911 if using 3 doses, Starting Thu 3/28/2019, Normal      polyethylene glycol (GLYCOLAX) powder Take 17 g by mouth daily, Starting Fri 8/2/2019, Normal      polymyxin b-trimethoprim (POLYTRIM) ophthalmic solution Administer 1 drop into the left eye every 4 (four) hours, Starting Thu 5/2/2019, Normal         STOP taking these medications       loratadine (CLARITIN) 10 mg tablet Comments:   Reason for Stopping:             No discharge procedures on file      ED Provider  Electronically Signed by           Constantino Apley, MD  11/05/19 8380

## 2019-11-19 ENCOUNTER — HOSPITAL ENCOUNTER (EMERGENCY)
Facility: HOSPITAL | Age: 39
Discharge: HOME | End: 2019-11-19
Attending: EMERGENCY MEDICINE | Admitting: EMERGENCY MEDICINE
Payer: COMMERCIAL

## 2019-11-19 VITALS
SYSTOLIC BLOOD PRESSURE: 149 MMHG | TEMPERATURE: 99.1 F | OXYGEN SATURATION: 100 % | RESPIRATION RATE: 20 BRPM | BODY MASS INDEX: 51.4 KG/M2 | HEART RATE: 96 BPM | WEIGHT: 281 LBS | DIASTOLIC BLOOD PRESSURE: 90 MMHG

## 2019-11-19 DIAGNOSIS — J45.901 ASTHMA EXACERBATION: Primary | ICD-10-CM

## 2019-11-19 DIAGNOSIS — J06.9 VIRAL URI: ICD-10-CM

## 2019-11-19 PROCEDURE — 99284 EMERGENCY DEPT VISIT MOD MDM: CPT

## 2019-11-19 PROCEDURE — 94640 AIRWAY INHALATION TREATMENT: CPT

## 2019-11-19 PROCEDURE — 99284 EMERGENCY DEPT VISIT MOD MDM: CPT | Performed by: EMERGENCY MEDICINE

## 2019-11-19 RX ORDER — FEXOFENADINE HYDROCHLORIDE 60 MG/1
60 TABLET, FILM COATED ORAL 2 TIMES DAILY
Qty: 60 TABLET | Refills: 0 | Status: SHIPPED | OUTPATIENT
Start: 2019-11-19 | End: 2020-04-14 | Stop reason: SDUPTHER

## 2019-11-19 RX ORDER — FLUTICASONE PROPIONATE 50 MCG
1 SPRAY, SUSPENSION (ML) NASAL DAILY
Qty: 16 G | Refills: 0 | Status: SHIPPED | OUTPATIENT
Start: 2019-11-19 | End: 2020-04-14

## 2019-11-19 RX ORDER — FLUTICASONE PROPIONATE 50 MCG
1 SPRAY, SUSPENSION (ML) NASAL DAILY
Qty: 16 G | Refills: 0 | Status: SHIPPED | OUTPATIENT
Start: 2019-11-19 | End: 2019-11-19 | Stop reason: SDUPTHER

## 2019-11-19 RX ADMIN — ALBUTEROL SULFATE 5 MG: 2.5 SOLUTION RESPIRATORY (INHALATION) at 17:37

## 2019-11-19 RX ADMIN — IPRATROPIUM BROMIDE 0.5 MG: 0.5 SOLUTION RESPIRATORY (INHALATION) at 17:37

## 2019-11-19 NOTE — ED PROVIDER NOTES
History  Chief Complaint   Patient presents with    Asthma     began with cough and it got worse through out the day; made asthma act up  Was using nebulizer at home; EMS called by co worker due to trouble with breathing  Patient is a 79-year-old female with a history of asthma presents with a 2 day history of asthma exacerbation  Patient states she has in her inhaler at home without relief  Also dorsum nasal congestion and nonproductive cough  No fevers sweats or chills  Has never been intubated nor admitted for asthma  Denies any smoking or any smoke exposure  Patient was actually seen by me earlier this month where she was switched from claritin to Zyrtec  States no improvement in her symptoms  Prior to Admission Medications   Prescriptions Last Dose Informant Patient Reported? Taking? Abatacept (ORENCIA) 125 MG/ML SOSY   Yes No   Sig: Inject 125 mg under the skin Once a week   Calcium Carb-Cholecalciferol (CALCIUM 600+D3) 600-200 MG-UNIT TABS  Self Yes No   Sig: take 1 tablet twice a day   Cholecalciferol (VITAMIN D3) 2000 units capsule  Self Yes No   Sig: take 1 tablet po daily     Elastic Bandages & Supports (CARPAL TUNNEL WRIST DELUXE) MISC   No No   Sig: by Does not apply route daily at bedtime   Incontinence Supply Disposable (POISE PAD) PADS  Self No No   Sig: by Does not apply route 4 (four) times a day as needed (incontinence)   Multiple Vitamins-Minerals (MULTIVITAMIN WITH MINERALS) tablet   No No   Sig: Take 1 tablet by mouth daily   albuterol (VENTOLIN HFA) 90 mcg/act inhaler   No No   Sig: Inhale 2 puffs every 4 (four) hours as needed for wheezing or shortness of breath   celecoxib (CeleBREX) 200 mg capsule   Yes No   Sig: Take 200 mg by mouth 2 (two) times a day   cetirizine (ZyrTEC) 10 mg tablet   No No   Sig: Take 1 tablet (10 mg total) by mouth daily   citalopram (CeleXA) 20 mg tablet   No No   Sig: Take 1 tablet (20 mg total) by mouth daily   diclofenac (VOLTAREN) 75 mg EC tablet   No No   Sig: Take 1 tablet (75 mg total) by mouth 2 (two) times a day   dicyclomine (BENTYL) 10 mg capsule   No No   Sig: Take 1 capsule (10 mg total) by mouth 4 (four) times a day (before meals and at bedtime)   doxepin (SINEquan) 25 mg capsule   No No   Sig: Take 1 capsule (25 mg total) by mouth daily at bedtime   famotidine (PEPCID) 20 mg tablet   No No   Sig: Take 1 tablet (20 mg total) by mouth daily   folic acid (FOLVITE) 1 mg tablet  Self Yes No   Sig: every 24 hours   furosemide (LASIX) 20 mg tablet   No No   Sig: Take 1 tablet (20 mg total) by mouth daily   gabapentin (NEURONTIN) 400 mg capsule   No No   Sig: Take 1 capsule (400 mg total) by mouth 3 (three) times a day   ibuprofen (MOTRIN) 600 mg tablet   No No   Sig: Take 1 tablet (600 mg total) by mouth every 6 (six) hours as needed for moderate pain   leflunomide (ARAVA) 10 MG tablet   Yes No   Sig: Take 10 mg by mouth daily   levETIRAcetam (KEPPRA) 1000 MG tablet   No No   Sig: Take 1 tablet (1,000 mg total) by mouth 2 (two) times a day   lidocaine (LMX) 4 % cream   No No   Sig: Apply topically as needed for mild pain   nitroglycerin (NITROSTAT) 0 4 mg SL tablet   No No   Sig: Place 1 tablet (0 4 mg total) under the tongue every 5 (five) minutes as needed for chest pain Call 911 if using 3 doses   polyethylene glycol (GLYCOLAX) powder   No No   Sig: Take 17 g by mouth daily   polymyxin b-trimethoprim (POLYTRIM) ophthalmic solution   No No   Sig: Administer 1 drop into the left eye every 4 (four) hours      Facility-Administered Medications: None       Past Medical History:   Diagnosis Date    [facility])     Anxiety     Depression     Epilepsy ([facility] [address] )     Fibroid 2012    Hydronephrosis 2007    Hypertension     Migraine without aura     PTSD (post-traumatic stress disorder)        Past Surgical History:   Procedure Laterality Date    APPENDECTOMY      CARDIAC CATHETERIZATION       SECTION      CHOLECYSTECTOMY  2018    KIDNEY SURGERY  2007    TUBAL LIGATION  2008       Family History   Problem Relation Age of Onset    Diabetes Maternal Grandmother     Hypertension Maternal Grandmother     No Known Problems Mother     No Known Problems Sister     No Known Problems Daughter     No Known Problems Paternal Grandmother     No Known Problems Daughter     Breast cancer Neg Hx     Cancer Neg Hx      I have reviewed and agree with the history as documented  Social History     Tobacco Use    Smoking status: Former Smoker     Types: Cigarettes     Last attempt to quit: 2013     Years since quittin 8    Smokeless tobacco: Former User   Substance Use Topics    Alcohol use: Not Currently     Frequency: Monthly or less     Drinks per session: 3 or 4    Drug use: Never        Review of Systems   Constitutional: Negative  Negative for activity change and fever  HENT: Positive for congestion  Eyes: Negative  Respiratory: Positive for cough, shortness of breath and wheezing  Cardiovascular: Negative  Gastrointestinal: Negative  Negative for abdominal pain, diarrhea, nausea and vomiting  Endocrine: Negative  Genitourinary: Negative  Musculoskeletal: Negative  Skin: Negative  Allergic/Immunologic: Negative  Neurological: Negative  Hematological: Negative  Psychiatric/Behavioral: Negative  All other systems reviewed and are negative  Physical Exam  Physical Exam   Constitutional: She is oriented to person, place, and time  She appears well-developed and well-nourished  HENT:   Head: Normocephalic  Right Ear: External ear normal    Left Ear: External ear normal    Nose: Mucosal edema and rhinorrhea present  Mouth/Throat: Oropharynx is clear and moist    Eyes: Pupils are equal, round, and reactive to light  Conjunctivae are normal    Neck: Normal range of motion  Neck supple     Cardiovascular: Normal rate, regular rhythm, normal heart sounds and intact distal pulses  Pulmonary/Chest: Effort normal    Decreased breath sounds in all fields  Abdominal: Soft  Bowel sounds are normal    Musculoskeletal: Normal range of motion  Neurological: She is alert and oriented to person, place, and time  Skin: Skin is warm  Capillary refill takes less than 2 seconds  Psychiatric: She has a normal mood and affect  Her behavior is normal    Nursing note and vitals reviewed  Vital Signs  ED Triage Vitals [11/19/19 1716]   Temperature Pulse Respirations Blood Pressure SpO2   99 1 °F (37 3 °C) 96 20 149/90 100 %      Temp Source Heart Rate Source Patient Position - Orthostatic VS BP Location FiO2 (%)   Tympanic Monitor Sitting Right arm --      Pain Score       6           Vitals:    11/19/19 1716   BP: 149/90   Pulse: 96   Patient Position - Orthostatic VS: Sitting         Visual Acuity      ED Medications  Medications   albuterol inhalation solution 5 mg (5 mg Nebulization Given 11/19/19 1737)   ipratropium (ATROVENT) 0 02 % inhalation solution 0 5 mg (0 5 mg Nebulization Given 11/19/19 1737)       Diagnostic Studies  Results Reviewed     None                 No orders to display              Procedures  Procedures       ED Course  ED Course as of Nov 19 1814 Tue Nov 19, 2019 1812 Patient's breathing improved after neb  Spoke with patient at length  Will switch to leg raise this time  Also prescribed Flonase  Talked about over-the-counter decongestants like cord seen HBP  Only medications per patient high blood pressure  Otherwise follow-up PCP return the ER for any concerns  Patient also requesting at work  Will provide                                    MDM  Number of Diagnoses or Management Options  Asthma exacerbation:   Viral URI:      Amount and/or Complexity of Data Reviewed  Obtain history from someone other than the patient: yes  Review and summarize past medical records: yes        Disposition  Final diagnoses:   Asthma exacerbation   Viral URI Time reflects when diagnosis was documented in both MDM as applicable and the Disposition within this note     Time User Action Codes Description Comment    11/19/2019  6:12 PM Tristan Jalyn Add [I39 [id number]] Asthma exacerbation     11/19/2019  6:12 PM Tristan Jalyn Add [J06 [id number]] Viral URI       ED Disposition     ED Disposition Condition Date/Time Comment    Discharge Stable Tue Nov 19, 2019  6:12 PM Kenny Ding discharge to home/self care  Follow-up Information     Follow up With Specialties Details Why Contact Info    Hemant Godwin PA-C Family Medicine, Physician Assistant   [address]  [address] [facility]  [address]             Patient's Medications   Discharge Prescriptions    FEXOFENADINE (ALLEGRA) 60 MG TABLET    Take 1 tablet (60 mg total) by mouth 2 (two) times a day       Start Date: 11/19/2019End Date: 12/19/2019       Order Dose: 60 mg       Quantity: 60 tablet    Refills: 0    FLUTICASONE (FLONASE) 50 MCG/ACT NASAL SPRAY    1 spray into each nostril daily       Start Date: 11/19/2019End Date: --       Order Dose: 1 spray       Quantity: 16 g    Refills: 0     No discharge procedures on file      ED Provider  Electronically Signed by           Tristin Tejada MD  11/19/19 9919

## 2019-11-26 ENCOUNTER — TELEPHONE (OUTPATIENT)
Dept: OBGYN CLINIC | Facility: CLINIC | Age: 39
End: 2019-11-26

## 2019-12-06 DIAGNOSIS — Z12.31 ENCOUNTER FOR SCREENING MAMMOGRAM FOR MALIGNANT NEOPLASM OF BREAST: Primary | ICD-10-CM

## 2019-12-13 ENCOUNTER — APPOINTMENT (EMERGENCY)
Dept: RADIOLOGY | Facility: HOSPITAL | Age: 39
End: 2019-12-13
Payer: COMMERCIAL

## 2019-12-13 ENCOUNTER — HOSPITAL ENCOUNTER (EMERGENCY)
Facility: HOSPITAL | Age: 39
Discharge: HOME | End: 2019-12-13
Attending: EMERGENCY MEDICINE
Payer: COMMERCIAL

## 2019-12-13 VITALS
TEMPERATURE: 98.4 F | DIASTOLIC BLOOD PRESSURE: 89 MMHG | BODY MASS INDEX: 51.05 KG/M2 | OXYGEN SATURATION: 100 % | HEART RATE: 90 BPM | SYSTOLIC BLOOD PRESSURE: 175 MMHG | WEIGHT: 279.1 LBS | RESPIRATION RATE: 20 BRPM

## 2019-12-13 DIAGNOSIS — B34.9 VIRAL ILLNESS: ICD-10-CM

## 2019-12-13 DIAGNOSIS — J45.901 ASTHMA EXACERBATION: Primary | ICD-10-CM

## 2019-12-13 DIAGNOSIS — J45.909 UNCOMPLICATED ASTHMA, UNSPECIFIED ASTHMA SEVERITY, UNSPECIFIED WHETHER PERSISTENT: ICD-10-CM

## 2019-12-13 LAB
ALBUMIN SERPL BCP-MCNC: 4.4 G/DL (ref 3–5.2)
ALP SERPL-CCNC: 76 U/L (ref 43–122)
ALT SERPL W P-5'-P-CCNC: 20 U/L (ref 9–52)
ANION GAP SERPL CALCULATED.3IONS-SCNC: 10 MMOL/L (ref 5–14)
AST SERPL W P-5'-P-CCNC: 18 U/L (ref 14–36)
BASOPHILS # BLD AUTO: 0.1 THOUSANDS/ΜL (ref 0–0.1)
BASOPHILS NFR BLD AUTO: 1 % (ref 0–1)
BILIRUB SERPL-MCNC: 0.4 MG/DL
BUN SERPL-MCNC: 15 MG/DL (ref 5–25)
CALCIUM SERPL-MCNC: 9.5 MG/DL (ref 8.4–10.2)
CHLORIDE SERPL-SCNC: 101 MMOL/L (ref 97–108)
CO2 SERPL-SCNC: 26 MMOL/L (ref 22–30)
CREAT SERPL-MCNC: 0.67 MG/DL (ref 0.6–1.2)
EOSINOPHIL # BLD AUTO: 0.1 THOUSAND/ΜL (ref 0–0.4)
EOSINOPHIL NFR BLD AUTO: 2 % (ref 0–6)
ERYTHROCYTE [DISTWIDTH] IN BLOOD BY AUTOMATED COUNT: 14.4 %
EXT PREG TEST URINE: NEGATIVE
EXT. CONTROL ED NAV: NORMAL
FLUAV RNA NPH QL NAA+PROBE: NORMAL
FLUBV RNA NPH QL NAA+PROBE: NORMAL
GFR SERPL CREATININE-BSD FRML MDRD: 110 ML/MIN/1.73SQ M
GLUCOSE SERPL-MCNC: 112 MG/DL (ref 70–99)
HCT VFR BLD AUTO: 35.5 % (ref 36–46)
HGB BLD-MCNC: 11.8 G/DL (ref 12–16)
LYMPHOCYTES # BLD AUTO: 2.4 THOUSANDS/ΜL (ref 0.5–4)
LYMPHOCYTES NFR BLD AUTO: 29 % (ref 25–45)
MCH RBC QN AUTO: 26.5 PG (ref 26–34)
MCHC RBC AUTO-ENTMCNC: 33.1 G/DL (ref 31–36)
MCV RBC AUTO: 80 FL (ref 80–100)
MONOCYTES # BLD AUTO: 0.5 THOUSAND/ΜL (ref 0.2–0.9)
MONOCYTES NFR BLD AUTO: 7 % (ref 1–10)
NEUTROPHILS # BLD AUTO: 5 THOUSANDS/ΜL (ref 1.8–7.8)
NEUTS SEG NFR BLD AUTO: 62 % (ref 45–65)
PLATELET # BLD AUTO: 372 THOUSANDS/UL (ref 150–450)
PMV BLD AUTO: 8 FL (ref 8.9–12.7)
POTASSIUM SERPL-SCNC: 4 MMOL/L (ref 3.6–5)
PROT SERPL-MCNC: 8.6 G/DL (ref 5.9–8.4)
RBC # BLD AUTO: 4.44 MILLION/UL (ref 4–5.2)
RSV RNA NPH QL NAA+PROBE: NORMAL
SODIUM SERPL-SCNC: 137 MMOL/L (ref 137–147)
WBC # BLD AUTO: 8.1 THOUSAND/UL (ref 4.5–11)

## 2019-12-13 PROCEDURE — 71045 X-RAY EXAM CHEST 1 VIEW: CPT

## 2019-12-13 PROCEDURE — 36415 COLL VENOUS BLD VENIPUNCTURE: CPT | Performed by: EMERGENCY MEDICINE

## 2019-12-13 PROCEDURE — 80053 COMPREHEN METABOLIC PANEL: CPT | Performed by: EMERGENCY MEDICINE

## 2019-12-13 PROCEDURE — 93005 ELECTROCARDIOGRAM TRACING: CPT

## 2019-12-13 PROCEDURE — 81025 URINE PREGNANCY TEST: CPT | Performed by: EMERGENCY MEDICINE

## 2019-12-13 PROCEDURE — 85025 COMPLETE CBC W/AUTO DIFF WBC: CPT | Performed by: EMERGENCY MEDICINE

## 2019-12-13 PROCEDURE — 99285 EMERGENCY DEPT VISIT HI MDM: CPT

## 2019-12-13 PROCEDURE — 99285 EMERGENCY DEPT VISIT HI MDM: CPT | Performed by: EMERGENCY MEDICINE

## 2019-12-13 PROCEDURE — 96360 HYDRATION IV INFUSION INIT: CPT

## 2019-12-13 PROCEDURE — 87631 RESP VIRUS 3-5 TARGETS: CPT | Performed by: EMERGENCY MEDICINE

## 2019-12-13 PROCEDURE — 94640 AIRWAY INHALATION TREATMENT: CPT

## 2019-12-13 RX ORDER — DEXAMETHASONE 4 MG/1
12 TABLET ORAL ONCE
Qty: 1 TABLET | Refills: 0 | Status: SHIPPED | OUTPATIENT
Start: 2019-12-13 | End: 2019-12-13

## 2019-12-13 RX ORDER — ACETAMINOPHEN 325 MG/1
975 TABLET ORAL ONCE
Status: COMPLETED | OUTPATIENT
Start: 2019-12-13 | End: 2019-12-13

## 2019-12-13 RX ORDER — ALBUTEROL SULFATE 90 UG/1
2 AEROSOL, METERED RESPIRATORY (INHALATION) EVERY 4 HOURS PRN
Qty: 18 G | Refills: 0 | Status: SHIPPED | OUTPATIENT
Start: 2019-12-13 | End: 2020-04-14 | Stop reason: SDUPTHER

## 2019-12-13 RX ADMIN — DEXAMETHASONE SODIUM PHOSPHATE 10 MG: 10 INJECTION, SOLUTION INTRAMUSCULAR; INTRAVENOUS at 17:37

## 2019-12-13 RX ADMIN — SODIUM CHLORIDE 1000 ML: 0.9 INJECTION, SOLUTION INTRAVENOUS at 18:08

## 2019-12-13 RX ADMIN — IPRATROPIUM BROMIDE 1 MG: 0.5 SOLUTION RESPIRATORY (INHALATION) at 17:38

## 2019-12-13 RX ADMIN — ACETAMINOPHEN 975 MG: 325 TABLET ORAL at 17:37

## 2019-12-13 RX ADMIN — ALBUTEROL SULFATE 10 MG: 2.5 SOLUTION RESPIRATORY (INHALATION) at 17:38

## 2019-12-14 LAB
ATRIAL RATE: 85 BPM
P AXIS: 17 DEGREES
PR INTERVAL: 146 MS
QRS AXIS: 25 DEGREES
QRSD INTERVAL: 84 MS
QT INTERVAL: 352 MS
QTC INTERVAL: 418 MS
T WAVE AXIS: 35 DEGREES
VENTRICULAR RATE: 85 BPM

## 2019-12-14 PROCEDURE — 93010 ELECTROCARDIOGRAM REPORT: CPT | Performed by: INTERNAL MEDICINE

## 2019-12-14 NOTE — ED PROVIDER NOTES
History  Chief Complaint   Patient presents with    Shortness of Breath     SOB, chest tightness, cough, and headache since yesterday  staets she was given Orencia injection for arthritis and symtpoms started a few hours later  this was first dose of this medication     Patient is a 71-year-old female, multiple medical comorbidities  She complains of shortness of breath feels like she is having an asthma exacerbation  She states that she has been getting minimal relief using her inhaler once every 4 hours  States it feels similar to previous exacerbations  She feels like a tightness when she is breathing but denies overt chest pain denies any associated radiation of her discomfort in her neck back or arms denies associated nausea or diaphoresis  She also notes that she has been feeling generalized fatigue, aches and pains  Some nonproductive cough and headache  Denies any sick contacts  Prior to Admission Medications   Prescriptions Last Dose Informant Patient Reported? Taking? Abatacept (ORENCIA) 125 MG/ML SOSY   Yes No   Sig: Inject 125 mg under the skin Once a week   Calcium Carb-Cholecalciferol (CALCIUM 600+D3) 600-200 MG-UNIT TABS  Self Yes No   Sig: take 1 tablet twice a day   Cholecalciferol (VITAMIN D3) 2000 units capsule  Self Yes No   Sig: take 1 tablet po daily     Elastic Bandages & Supports (CARPAL TUNNEL WRIST DELUXE) MISC   No No   Sig: by Does not apply route daily at bedtime   Incontinence Supply Disposable (POISE PAD) PADS  Self No No   Sig: by Does not apply route 4 (four) times a day as needed (incontinence)   Multiple Vitamins-Minerals (MULTIVITAMIN WITH MINERALS) tablet   No No   Sig: Take 1 tablet by mouth daily   albuterol (VENTOLIN HFA) 90 mcg/act inhaler   No No   Sig: Inhale 2 puffs every 4 (four) hours as needed for wheezing or shortness of breath   albuterol (VENTOLIN HFA) 90 mcg/act inhaler   No No   Sig: Inhale 2 puffs every 4 (four) hours as needed for wheezing or shortness of breath   celecoxib (CeleBREX) 200 mg capsule   Yes No   Sig: Take 200 mg by mouth 2 (two) times a day   citalopram (CeleXA) 20 mg tablet   No No   Sig: Take 1 tablet (20 mg total) by mouth daily   diclofenac (VOLTAREN) 75 mg EC tablet   No No   Sig: Take 1 tablet (75 mg total) by mouth 2 (two) times a day   dicyclomine (BENTYL) 10 mg capsule   No No   Sig: Take 1 capsule (10 mg total) by mouth 4 (four) times a day (before meals and at bedtime)   doxepin (SINEquan) 25 mg capsule   No No   Sig: Take 1 capsule (25 mg total) by mouth daily at bedtime   famotidine (PEPCID) 20 mg tablet   No No   Sig: Take 1 tablet (20 mg total) by mouth daily   fexofenadine (ALLEGRA) 60 MG tablet   No No   Sig: Take 1 tablet (60 mg total) by mouth 2 (two) times a day   fluticasone (FLONASE) 50 mcg/act nasal spray   No No   Si spray into each nostril daily   folic acid (FOLVITE) 1 mg tablet  Self Yes No   Sig: every 24 hours   furosemide (LASIX) 20 mg tablet   No No   Sig: Take 1 tablet (20 mg total) by mouth daily   gabapentin (NEURONTIN) 400 mg capsule   No No   Sig: Take 1 capsule (400 mg total) by mouth 3 (three) times a day   ibuprofen (MOTRIN) 600 mg tablet   No No   Sig: Take 1 tablet (600 mg total) by mouth every 6 (six) hours as needed for moderate pain   leflunomide (ARAVA) 10 MG tablet   Yes No   Sig: Take 10 mg by mouth daily   levETIRAcetam (KEPPRA) 1000 MG tablet   No No   Sig: Take 1 tablet (1,000 mg total) by mouth 2 (two) times a day   lidocaine (LMX) 4 % cream   No No   Sig: Apply topically as needed for mild pain   nitroglycerin (NITROSTAT) 0 4 mg SL tablet   No No   Sig: Place 1 tablet (0 4 mg total) under the tongue every 5 (five) minutes as needed for chest pain Call 911 if using 3 doses   polyethylene glycol (GLYCOLAX) powder   No No   Sig: Take 17 g by mouth daily   polymyxin b-trimethoprim (POLYTRIM) ophthalmic solution   No No   Sig: Administer 1 drop into the left eye every 4 (four) hours Facility-Administered Medications: None       Past Medical History:   Diagnosis Date    Angina at [facility])     Anxiety     Depression     Epilepsy ([facility] [address] )     Fibroid 2012    Hydronephrosis 2007    Hypertension     Migraine without aura     PTSD (post-traumatic stress disorder)        Past Surgical History:   Procedure Laterality Date    APPENDECTOMY      CARDIAC CATHETERIZATION  2006     SECTION  2008    CHOLECYSTECTOMY  2018    KIDNEY SURGERY  2007    TUBAL LIGATION  2008       Family History   Problem Relation Age of Onset    Diabetes Maternal Grandmother     Hypertension Maternal Grandmother     No Known Problems Mother     No Known Problems Sister     No Known Problems Daughter     No Known Problems Paternal Grandmother     No Known Problems Daughter     Breast cancer Neg Hx     Cancer Neg Hx      I have reviewed and agree with the history as documented  Social History     Tobacco Use    Smoking status: Former Smoker     Types: Cigarettes     Last attempt to quit: 2013     Years since quittin 9    Smokeless tobacco: Former User   Substance Use Topics    Alcohol use: Not Currently     Frequency: Monthly or less     Drinks per session: 3 or 4    Drug use: Never        Review of Systems   Constitutional: Negative  Negative for chills and fever  HENT: Positive for rhinorrhea  Negative for sore throat, trouble swallowing and voice change  Eyes: Negative  Negative for pain and visual disturbance  Respiratory: Positive for cough, chest tightness, shortness of breath and wheezing  Cardiovascular: Negative  Negative for chest pain and palpitations  Gastrointestinal: Negative for abdominal pain, diarrhea, nausea and vomiting  Genitourinary: Negative  Negative for dysuria and frequency  Musculoskeletal: Negative  Negative for neck pain and neck stiffness  Skin: Negative  Negative for rash  Neurological: Positive for headaches   Negative for dizziness, speech difficulty, weakness, light-headedness and numbness  Physical Exam  Physical Exam   Constitutional: She is oriented to person, place, and time  She appears well-developed and well-nourished  No distress  HENT:   Head: Normocephalic and atraumatic  Mouth/Throat: Oropharynx is clear and moist    Eyes: Pupils are equal, round, and reactive to light  Conjunctivae and EOM are normal    Neck: Normal range of motion  Neck supple  No tracheal deviation present  Cardiovascular: Normal rate, regular rhythm and intact distal pulses  Pulmonary/Chest: Effort normal  No respiratory distress  She has wheezes in the right upper field, the right lower field, the left upper field and the left lower field  She has no rales  Abdominal: Soft  Bowel sounds are normal  She exhibits no distension  There is no tenderness  There is no rebound and no guarding  Musculoskeletal: Normal range of motion  She exhibits no tenderness or deformity  Neurological: She is alert and oriented to person, place, and time  Skin: Skin is warm and dry  Capillary refill takes less than 2 seconds  No rash noted  Psychiatric: She has a normal mood and affect  Her behavior is normal    Nursing note and vitals reviewed        Vital Signs  ED Triage Vitals [12/13/19 1706]   Temperature Pulse Respirations Blood Pressure SpO2   98 4 °F (36 9 °C) 86 16 136/87 100 %      Temp Source Heart Rate Source Patient Position - Orthostatic VS BP Location FiO2 (%)   Tympanic Monitor Sitting Left arm --      Pain Score       4           Vitals:    12/13/19 1706 12/13/19 1835 12/13/19 1924   BP: 136/87 144/78 (!) 175/89   Pulse: 86 82 90   Patient Position - Orthostatic VS: Sitting Lying Lying         Visual Acuity      ED Medications  Medications   sodium chloride 0 9 % bolus 1,000 mL (0 mL Intravenous Stopped 12/13/19 1915)   acetaminophen (TYLENOL) tablet 975 mg (975 mg Oral Given 12/13/19 1737)   dexamethasone 10 mg/mL oral liquid 10 mg 1 mL (10 mg Oral Given 12/13/19 1737)   albuterol inhalation solution 10 mg (10 mg Nebulization Given 12/13/19 1738)   ipratropium (ATROVENT) 0 02 % inhalation solution 1 mg (1 mg Nebulization Given 12/13/19 1738)       Diagnostic Studies  Results Reviewed     Procedure Component Value Units Date/Time    Influenza A/B and RSV PCR [[id number]]  (Normal) Collected:  12/13/19 1736    Lab Status:  Final result Specimen:  Nasopharyngeal Swab Updated:  12/13/19 1823     INFLUENZA A PCR None Detected     INFLUENZA B PCR None Detected     RSV PCR None Detected    Comprehensive metabolic panel [[id number]]  (Abnormal) Collected:  12/13/19 1756    Lab Status:  Final result Specimen:  Blood from Arm, Right Updated:  12/13/19 1814     Sodium 137 mmol/L      Potassium 4 0 mmol/L      Chloride 101 mmol/L      CO2 26 mmol/L      ANION GAP 10 mmol/L      BUN 15 mg/dL      Creatinine 0 67 mg/dL      Glucose 112 mg/dL      Calcium 9 5 mg/dL      AST 18 U/L      ALT 20 U/L      Alkaline Phosphatase 76 U/L      Total Protein 8 6 g/dL      Albumin 4 4 g/dL      Total Bilirubin 0 40 mg/dL      eGFR 110 ml/min/1 73sq m     Narrative:       [place] guidelines for Chronic Kidney Disease (CKD):     Stage 1 with normal or high GFR (GFR > 90 mL/min/1 73 square meters)    Stage 2 Mild CKD (GFR = 60-89 mL/min/1 73 square meters)    Stage 3A Moderate CKD (GFR = 45-59 mL/min/1 73 square meters)    Stage 3B Moderate CKD (GFR = 30-44 mL/min/1 73 square meters)    Stage 4 Severe CKD (GFR = 15-29 mL/min/1 73 square meters)    Stage 5 End Stage CKD (GFR <15 mL/min/1 73 square meters)  Note: GFR calculation is accurate only with a steady state creatinine    CBC and differential [[id number]]  (Abnormal) Collected:  12/13/19 1756    Lab Status:  Final result Specimen:  Blood from Arm, Right Updated:  12/13/19 1810     WBC 8 10 Thousand/uL      RBC 4 44 Million/uL      Hemoglobin 11 8 g/dL      Hematocrit 35 5 %      MCV 80 fL MCH 26 5 pg      MCHC 33 1 g/dL      RDW 14 4 %      MPV 8 0 fL      Platelets 175 Thousands/uL      Neutrophils Relative 62 %      Lymphocytes Relative 29 %      Monocytes Relative 7 %      Eosinophils Relative 2 %      Basophils Relative 1 %      Neutrophils Absolute 5 00 Thousands/µL      Lymphocytes Absolute 2 40 Thousands/µL      Monocytes Absolute 0 50 Thousand/µL      Eosinophils Absolute 0 10 Thousand/µL      Basophils Absolute 0 10 Thousands/µL     POCT pregnancy, urine [[id number]]  (Normal) Resulted:  12/13/19 1735    Lab Status:  Final result Updated:  12/13/19 1740     EXT PREG TEST UR (Ref: Negative) negative     Control valid                 XR chest 1 view portable   ED Interpretation by Lebron Wilburn DO (12/13 1833)   No acute pna or ptx appreciated  Procedures  Procedures         ED Course  ED Course as of Dec 13 1932   Fri Dec 13, 2019   1729 Procedure Note: EKG  Date/Time: 12/13/19 5:29 PM   Performed by: Evita Mane  Authorized by: Evita Mane  ECG interpreted by me, the ED Provider: yes   The EKG demonstrates:  Rate 85  Rhythm sinus  QTc 418  No ST elevations/depressions                                      MDM  Number of Diagnoses or Management Options  Asthma exacerbation:   Viral illness:   Diagnosis management comments: Patient is a 76-year-old female presenting for concerns of shortness of breath  Exam is consistent with asthma exacerbation  Mild diffuse bulging of present on initial evaluation  Patient received steroids and breathing treatments felt significantly better  Chest x-ray interpreted by me was negative  Lab work and influenza testing was negative  Patient says she feels much better and is agreeable with outpatient follow-up  Strict return precautions were discussed         Amount and/or Complexity of Data Reviewed  Clinical lab tests: ordered and reviewed  Tests in the radiology section of CPT®: ordered and reviewed  Independent visualization of images, tracings, or specimens: yes          Disposition  Final diagnoses:   Asthma exacerbation   Viral illness     Time reflects when diagnosis was documented in both MDM as applicable and the Disposition within this note     Time User Action Codes Description Comment    12/13/2019  6:22 PM Pradip Ruano Add [J45 901] Asthma exacerbation     12/13/2019  6:23 PM Pradip Ruano Modify [J45 901] Asthma exacerbation     12/13/2019  7:12 PM Pradip Ruano Add [B34 9] Viral illness     12/13/2019  7:12 PM Mari Rivera, [address] [place] [T55 469] Uncomplicated asthma, unspecified asthma severity, unspecified whether persistent       ED Disposition     ED Disposition Condition Date/Time Comment    Discharge Stable Fri Dec 13, 2019  7:11 PM Jan Lucas discharge to home/self care              Follow-up Information     Follow up With Specialties Details Why Contact Info    Hemant Godwin PA-C Family Medicine, Physician Assistant   [address] [facility]  [address] [facility] [address]  [phone]            Discharge Medication List as of 12/13/2019  7:13 PM      START taking these medications    Details   dexamethasone (DECADRON) 4 mg tablet Take 3 tablets (12 mg total) by mouth once for 1 dose, Starting Fri 12/13/2019, Print         CONTINUE these medications which have CHANGED    Details   albuterol (VENTOLIN HFA) 90 mcg/act inhaler Inhale 2 puffs every 4 (four) hours as needed for wheezing or shortness of breath, Starting Fri 12/13/2019, Normal         CONTINUE these medications which have NOT CHANGED    Details   Abatacept (ORENCIA) 125 MG/ML SOSY Inject 125 mg under the skin Once a week, Starting Wed 9/25/2019, Until Tue 12/24/2019, Historical Med      Calcium Carb-Cholecalciferol (CALCIUM 600+D3) 600-200 MG-UNIT TABS take 1 tablet twice a day, Historical Med      celecoxib (CeleBREX) 200 mg capsule Take 200 mg by mouth 2 (two) times a day, Starting Wed 9/11/2019, Until Thu 9/10/2020, Historical Med      Cholecalciferol (VITAMIN D3) 2000 units capsule take 1 tablet po daily  , Historical Med      citalopram (CeleXA) 20 mg tablet Take 1 tablet (20 mg total) by mouth daily, Starting Fri 8/2/2019, Normal      diclofenac (VOLTAREN) 75 mg EC tablet Take 1 tablet (75 mg total) by mouth 2 (two) times a day, Starting Wed 11/6/2019, Normal      dicyclomine (BENTYL) 10 mg capsule Take 1 capsule (10 mg total) by mouth 4 (four) times a day (before meals and at bedtime), Starting Mon 9/30/2019, Print      doxepin (SINEquan) 25 mg capsule Take 1 capsule (25 mg total) by mouth daily at bedtime, Starting Fri 8/2/2019, Normal      Elastic Bandages & Supports (CARPAL TUNNEL WRIST DELUXE) MISC by Does not apply route daily at bedtime, Starting Fri 8/2/2019, Print      famotidine (PEPCID) 20 mg tablet Take 1 tablet (20 mg total) by mouth daily, Starting Wed 11/6/2019, Normal      fexofenadine (ALLEGRA) 60 MG tablet Take 1 tablet (60 mg total) by mouth 2 (two) times a day, Starting Tue 11/19/2019, Until Thu 12/19/2019, Print      fluticasone (FLONASE) 50 mcg/act nasal spray 1 spray into each nostril daily, Starting Tue 32/69/4389, Print      folic acid (FOLVITE) 1 mg tablet every 24 hours, Starting Wed 1/27/2016, Historical Med      furosemide (LASIX) 20 mg tablet Take 1 tablet (20 mg total) by mouth daily, Starting Fri 8/2/2019, Normal      gabapentin (NEURONTIN) 400 mg capsule Take 1 capsule (400 mg total) by mouth 3 (three) times a day, Starting Thu 9/26/2019, Normal      ibuprofen (MOTRIN) 600 mg tablet Take 1 tablet (600 mg total) by mouth every 6 (six) hours as needed for moderate pain, Starting Mon 9/30/2019, Print      Incontinence Supply Disposable (POISE PAD) PADS by Does not apply route 4 (four) times a day as needed (incontinence), Starting Wed 9/5/2018, Normal      leflunomide (ARAVA) 10 MG tablet Take 10 mg by mouth daily, Historical Med      levETIRAcetam (KEPPRA) 1000 MG tablet Take 1 tablet (1,000 mg total) by mouth 2 (two) times a day, Starting Fri 8/2/2019, Normal      lidocaine (LMX) 4 % cream Apply topically as needed for mild pain, Starting Wed 11/6/2019, Normal      Multiple Vitamins-Minerals (MULTIVITAMIN WITH MINERALS) tablet Take 1 tablet by mouth daily, Starting Fri 8/2/2019, Normal      nitroglycerin (NITROSTAT) 0 4 mg SL tablet Place 1 tablet (0 4 mg total) under the tongue every 5 (five) minutes as needed for chest pain Call 911 if using 3 doses, Starting Wed 11/6/2019, Normal      polyethylene glycol (GLYCOLAX) powder Take 17 g by mouth daily, Starting Wed 11/6/2019, Normal      polymyxin b-trimethoprim (POLYTRIM) ophthalmic solution Administer 1 drop into the left eye every 4 (four) hours, Starting u 5/2/2019, Normal           No discharge procedures on file      ED Provider  Electronically Signed by           Burak Perez DO  12/13/19 1934

## 2019-12-29 ENCOUNTER — HOSPITAL ENCOUNTER (EMERGENCY)
Facility: HOSPITAL | Age: 39
Discharge: HOME | End: 2019-12-29
Attending: EMERGENCY MEDICINE | Admitting: EMERGENCY MEDICINE
Payer: COMMERCIAL

## 2019-12-29 VITALS
DIASTOLIC BLOOD PRESSURE: 64 MMHG | TEMPERATURE: 98.2 F | RESPIRATION RATE: 14 BRPM | SYSTOLIC BLOOD PRESSURE: 111 MMHG | BODY MASS INDEX: 50.81 KG/M2 | HEART RATE: 74 BPM | WEIGHT: 277.78 LBS | OXYGEN SATURATION: 100 %

## 2019-12-29 DIAGNOSIS — R51.9 HEADACHE: Primary | ICD-10-CM

## 2019-12-29 DIAGNOSIS — J06.9 URI (UPPER RESPIRATORY INFECTION): ICD-10-CM

## 2019-12-29 LAB
BACTERIA UR QL AUTO: ABNORMAL /HPF
BILIRUB UR QL STRIP: NEGATIVE
CLARITY UR: ABNORMAL
COLOR UR: ABNORMAL
EXT PREG TEST URINE: NORMAL
EXT. CONTROL ED NAV: NORMAL
GLUCOSE UR STRIP-MCNC: NEGATIVE MG/DL
HGB UR QL STRIP.AUTO: 250
KETONES UR STRIP-MCNC: NEGATIVE MG/DL
LEUKOCYTE ESTERASE UR QL STRIP: 100
MUCOUS THREADS UR QL AUTO: ABNORMAL
NITRITE UR QL STRIP: NEGATIVE
NON-SQ EPI CELLS URNS QL MICRO: ABNORMAL /HPF
PH UR STRIP.AUTO: 6 [PH]
PROT UR STRIP-MCNC: ABNORMAL MG/DL
RBC #/AREA URNS AUTO: ABNORMAL /HPF
SP GR UR STRIP.AUTO: 1.02 (ref 1–1.04)
UROBILINOGEN UA: NEGATIVE MG/DL
WBC #/AREA URNS AUTO: ABNORMAL /HPF

## 2019-12-29 PROCEDURE — 96375 TX/PRO/DX INJ NEW DRUG ADDON: CPT

## 2019-12-29 PROCEDURE — 99284 EMERGENCY DEPT VISIT MOD MDM: CPT | Performed by: EMERGENCY MEDICINE

## 2019-12-29 PROCEDURE — 81001 URINALYSIS AUTO W/SCOPE: CPT | Performed by: EMERGENCY MEDICINE

## 2019-12-29 PROCEDURE — 99283 EMERGENCY DEPT VISIT LOW MDM: CPT

## 2019-12-29 PROCEDURE — 96361 HYDRATE IV INFUSION ADD-ON: CPT

## 2019-12-29 PROCEDURE — 96374 THER/PROPH/DIAG INJ IV PUSH: CPT

## 2019-12-29 PROCEDURE — 81025 URINE PREGNANCY TEST: CPT | Performed by: EMERGENCY MEDICINE

## 2019-12-29 RX ORDER — METOCLOPRAMIDE HYDROCHLORIDE 5 MG/ML
10 INJECTION INTRAMUSCULAR; INTRAVENOUS ONCE
Status: COMPLETED | OUTPATIENT
Start: 2019-12-29 | End: 2019-12-29

## 2019-12-29 RX ORDER — GUAIFENESIN 600 MG
1200 TABLET, EXTENDED RELEASE 12 HR ORAL EVERY 12 HOURS SCHEDULED
Qty: 30 TABLET | Refills: 0 | Status: SHIPPED | OUTPATIENT
Start: 2019-12-29 | End: 2020-04-14

## 2019-12-29 RX ORDER — BENZONATATE 100 MG/1
100 CAPSULE ORAL EVERY 8 HOURS
Qty: 21 CAPSULE | Refills: 0 | Status: SHIPPED | OUTPATIENT
Start: 2019-12-29 | End: 2020-04-14

## 2019-12-29 RX ORDER — KETOROLAC TROMETHAMINE 30 MG/ML
30 INJECTION, SOLUTION INTRAMUSCULAR; INTRAVENOUS ONCE
Status: COMPLETED | OUTPATIENT
Start: 2019-12-29 | End: 2019-12-29

## 2019-12-29 RX ORDER — ALBUTEROL SULFATE 90 UG/1
2 AEROSOL, METERED RESPIRATORY (INHALATION) EVERY 4 HOURS PRN
Qty: 1 INHALER | Refills: 0 | Status: SHIPPED | OUTPATIENT
Start: 2019-12-29

## 2019-12-29 RX ORDER — FLUTICASONE PROPIONATE 50 MCG
1 SPRAY, SUSPENSION (ML) NASAL DAILY
Qty: 16 G | Refills: 0 | Status: SHIPPED | OUTPATIENT
Start: 2019-12-29 | End: 2020-04-14 | Stop reason: SDUPTHER

## 2019-12-29 RX ADMIN — SODIUM CHLORIDE 1000 ML: 0.9 INJECTION, SOLUTION INTRAVENOUS at 15:36

## 2019-12-29 RX ADMIN — METOCLOPRAMIDE 10 MG: 5 INJECTION, SOLUTION INTRAMUSCULAR; INTRAVENOUS at 15:36

## 2019-12-29 RX ADMIN — KETOROLAC TROMETHAMINE 30 MG: 30 INJECTION, SOLUTION INTRAMUSCULAR at 15:36

## 2019-12-29 NOTE — ED PROVIDER NOTES
History  Chief Complaint   Patient presents with    Headache     " for last couple days, vomited yesterday"     76-year-old female presents with complaint of headache over the past several days  She states that she has had upper respiratory infection type symptoms and that she has had a global headache  Her headache has begun any occipital region and has spread around to include her entire head  She complains of a dull pressure-like feeling similar to prior migraines but somewhat more severe  Pain is worse with coughing, bright light, noise, and exertion  She has not had any fever/chills, neck stiffness, or other acute issues or concerns  Headache   Pain location:  Generalized  Quality:  Dull  Onset quality:  Gradual  Timing:  Constant  Progression:  Waxing and waning  Chronicity:  Recurrent  Similar to prior headaches: yes    Context: activity    Relieved by:  Nothing  Worsened by: Activity, light and sound  Ineffective treatments:  None tried  Associated symptoms: congestion (Mild), cough, drainage, photophobia (Mild), sinus pressure (Mild) and URI    Associated symptoms: no abdominal pain, no back pain, no blurred vision, no diarrhea, no dizziness, no ear pain, no eye pain, no facial pain, no fatigue, no fever, no focal weakness, no hearing loss, no loss of balance, no myalgias, no nausea, no near-syncope, no neck pain, no neck stiffness, no numbness, no paresthesias, no seizures, no sore throat, no swollen glands, no syncope, no tingling, no visual change, no vomiting and no weakness        Prior to Admission Medications   Prescriptions Last Dose Informant Patient Reported? Taking? Abatacept (ORENCIA) 125 MG/ML SOSY   Yes No   Sig: Inject 125 mg under the skin Once a week   Calcium Carb-Cholecalciferol (CALCIUM 600+D3) 600-200 MG-UNIT TABS  Self Yes No   Sig: take 1 tablet twice a day   Cholecalciferol (VITAMIN D3) 2000 units capsule  Self Yes No   Sig: take 1 tablet po daily     Elastic Bandages & Supports (CARPAL TUNNEL WRIST DELUXE) MISC   No No   Sig: by Does not apply route daily at bedtime   Incontinence Supply Disposable (POISE PAD) PADS  Self No No   Sig: by Does not apply route 4 (four) times a day as needed (incontinence)   Multiple Vitamins-Minerals (MULTIVITAMIN WITH MINERALS) tablet   No No   Sig: Take 1 tablet by mouth daily   albuterol (VENTOLIN HFA) 90 mcg/act inhaler   No No   Sig: Inhale 2 puffs every 4 (four) hours as needed for wheezing or shortness of breath   celecoxib (CeleBREX) 200 mg capsule   Yes No   Sig: Take 200 mg by mouth 2 (two) times a day   citalopram (CeleXA) 20 mg tablet   No No   Sig: Take 1 tablet (20 mg total) by mouth daily   diclofenac (VOLTAREN) 75 mg EC tablet   No No   Sig: Take 1 tablet (75 mg total) by mouth 2 (two) times a day   dicyclomine (BENTYL) 10 mg capsule   No No   Sig: Take 1 capsule (10 mg total) by mouth 4 (four) times a day (before meals and at bedtime)   doxepin (SINEquan) 25 mg capsule   No No   Sig: Take 1 capsule (25 mg total) by mouth daily at bedtime   famotidine (PEPCID) 20 mg tablet   No No   Sig: Take 1 tablet (20 mg total) by mouth daily   fexofenadine (ALLEGRA) 60 MG tablet   No No   Sig: Take 1 tablet (60 mg total) by mouth 2 (two) times a day   fluticasone (FLONASE) 50 mcg/act nasal spray   No No   Si spray into each nostril daily   folic acid (FOLVITE) 1 mg tablet  Self Yes No   Sig: every 24 hours   furosemide (LASIX) 20 mg tablet   No No   Sig: Take 1 tablet (20 mg total) by mouth daily   gabapentin (NEURONTIN) 400 mg capsule   No No   Sig: Take 1 capsule (400 mg total) by mouth 3 (three) times a day   ibuprofen (MOTRIN) 600 mg tablet   No No   Sig: Take 1 tablet (600 mg total) by mouth every 6 (six) hours as needed for moderate pain   leflunomide (ARAVA) 10 MG tablet   Yes No   Sig: Take 10 mg by mouth daily   levETIRAcetam (KEPPRA) 1000 MG tablet   No No   Sig: Take 1 tablet (1,000 mg total) by mouth 2 (two) times a day lidocaine (LMX) 4 % cream   No No   Sig: Apply topically as needed for mild pain   nitroglycerin (NITROSTAT) 0 4 mg SL tablet   No No   Sig: Place 1 tablet (0 4 mg total) under the tongue every 5 (five) minutes as needed for chest pain Call 911 if using 3 doses   polyethylene glycol (GLYCOLAX) powder   No No   Sig: Take 17 g by mouth daily   polymyxin b-trimethoprim (POLYTRIM) ophthalmic solution   No No   Sig: Administer 1 drop into the left eye every 4 (four) hours      Facility-Administered Medications: None       Past Medical History:   Diagnosis Date    [facility])     Anxiety     Depression     Epilepsy ([facility] [address] )     Fibroid 2012    Hydronephrosis 2007    Hypertension     Migraine without aura     PTSD (post-traumatic stress disorder)        Past Surgical History:   Procedure Laterality Date    APPENDECTOMY      CARDIAC CATHETERIZATION       SECTION      CHOLECYSTECTOMY  2018    KIDNEY SURGERY  2007    TUBAL LIGATION  2008       Family History   Problem Relation Age of Onset    Diabetes Maternal Grandmother     Hypertension Maternal Grandmother     No Known Problems Mother     No Known Problems Sister     No Known Problems Daughter     No Known Problems Paternal Grandmother     No Known Problems Daughter     Breast cancer Neg Hx     Cancer Neg Hx      I have reviewed and agree with the history as documented  Social History     Tobacco Use    Smoking status: Former Smoker     Types: Cigarettes     Last attempt to quit: 2013     Years since quittin 9    Smokeless tobacco: Former User   Substance Use Topics    Alcohol use: Not Currently     Frequency: Monthly or less     Drinks per session: 3 or 4    Drug use: Never        Review of Systems   Constitutional: Negative for fatigue and fever  HENT: Positive for congestion (Mild), postnasal drip and sinus pressure (Mild)  Negative for ear pain, hearing loss and sore throat      Eyes: Positive for photophobia (Mild)  Negative for blurred vision and pain  Respiratory: Positive for cough  Cardiovascular: Negative for syncope and near-syncope  Gastrointestinal: Negative for abdominal pain, diarrhea, nausea and vomiting  Musculoskeletal: Negative for back pain, myalgias, neck pain and neck stiffness  Neurological: Positive for headaches  Negative for dizziness, focal weakness, seizures, weakness, numbness, paresthesias and loss of balance  Physical Exam  Physical Exam   Constitutional: She is oriented to person, place, and time  She appears well-developed and well-nourished  HENT:   Head: Normocephalic and atraumatic  Nose: Mucosal edema and rhinorrhea present  Mouth/Throat: Oropharynx is clear and moist    Eyes: Pupils are equal, round, and reactive to light  Conjunctivae and EOM are normal    Neck: Normal range of motion  Neck supple  Cardiovascular: Normal rate, regular rhythm and normal heart sounds  Pulmonary/Chest: Effort normal and breath sounds normal  No respiratory distress  She has no wheezes  Abdominal: Soft  Bowel sounds are normal  There is no tenderness  There is no guarding  Musculoskeletal: She exhibits no edema, tenderness or deformity  Neurological: She is alert and oriented to person, place, and time  She has normal strength  No cranial nerve deficit or sensory deficit  She exhibits normal muscle tone  Coordination normal  GCS eye subscore is 4  GCS verbal subscore is 5  GCS motor subscore is 6  Skin: Skin is warm and dry  Capillary refill takes less than 2 seconds  No rash noted  Psychiatric: She has a normal mood and affect  Her behavior is normal    Nursing note and vitals reviewed        Vital Signs  ED Triage Vitals [12/29/19 1509]   Temperature Pulse Respirations Blood Pressure SpO2   98 2 °F (36 8 °C) 89 20 118/61 97 %      Temp Source Heart Rate Source Patient Position - Orthostatic VS BP Location FiO2 (%)   Tympanic Monitor Sitting Left arm -- Pain Score       --           Vitals:    12/29/19 1509   BP: 118/61   Pulse: 89   Patient Position - Orthostatic VS: Sitting         Visual Acuity      ED Medications  Medications - No data to display    Diagnostic Studies  Results Reviewed     None                 No orders to display              Procedures  Procedures         ED Course                               MDM      Disposition  Final diagnoses:   None     ED Disposition     None      Follow-up Information    None         Patient's Medications   Discharge Prescriptions    No medications on file     No discharge procedures on file      ED Provider  Electronically Signed by           Bridger Bob DO  12/29/19 2032

## 2020-02-06 ENCOUNTER — HOSPITAL ENCOUNTER (EMERGENCY)
Facility: HOSPITAL | Age: 40
Discharge: HOME | End: 2020-02-06
Attending: EMERGENCY MEDICINE | Admitting: EMERGENCY MEDICINE
Payer: COMMERCIAL

## 2020-02-06 ENCOUNTER — APPOINTMENT (EMERGENCY)
Dept: CT IMAGING | Facility: HOSPITAL | Age: 40
End: 2020-02-06
Payer: COMMERCIAL

## 2020-02-06 VITALS
RESPIRATION RATE: 20 BRPM | DIASTOLIC BLOOD PRESSURE: 95 MMHG | WEIGHT: 280.2 LBS | OXYGEN SATURATION: 100 % | HEART RATE: 76 BPM | TEMPERATURE: 98 F | SYSTOLIC BLOOD PRESSURE: 153 MMHG | BODY MASS INDEX: 51.25 KG/M2

## 2020-02-06 DIAGNOSIS — R11.2 NAUSEA & VOMITING: Primary | ICD-10-CM

## 2020-02-06 LAB
ALBUMIN SERPL BCP-MCNC: 4.1 G/DL (ref 3–5.2)
ALP SERPL-CCNC: 60 U/L (ref 43–122)
ALT SERPL W P-5'-P-CCNC: 15 U/L (ref 9–52)
ANION GAP SERPL CALCULATED.3IONS-SCNC: 10 MMOL/L (ref 5–14)
AST SERPL W P-5'-P-CCNC: 21 U/L (ref 14–36)
BACTERIA UR QL AUTO: ABNORMAL /HPF
BASOPHILS # BLD AUTO: 0.1 THOUSANDS/ΜL (ref 0–0.1)
BASOPHILS NFR BLD AUTO: 1 % (ref 0–1)
BILIRUB SERPL-MCNC: 0.4 MG/DL
BILIRUB UR QL STRIP: NEGATIVE
BUN SERPL-MCNC: 13 MG/DL (ref 5–25)
CALCIUM SERPL-MCNC: 8.8 MG/DL (ref 8.4–10.2)
CHLORIDE SERPL-SCNC: 104 MMOL/L (ref 97–108)
CLARITY UR: CLEAR
CO2 SERPL-SCNC: 24 MMOL/L (ref 22–30)
COLOR UR: YELLOW
CREAT SERPL-MCNC: 0.53 MG/DL (ref 0.6–1.2)
EOSINOPHIL # BLD AUTO: 0.1 THOUSAND/ΜL (ref 0–0.4)
EOSINOPHIL NFR BLD AUTO: 2 % (ref 0–6)
ERYTHROCYTE [DISTWIDTH] IN BLOOD BY AUTOMATED COUNT: 15.6 %
GFR SERPL CREATININE-BSD FRML MDRD: 119 ML/MIN/1.73SQ M
GLUCOSE SERPL-MCNC: 98 MG/DL (ref 70–99)
GLUCOSE UR STRIP-MCNC: NEGATIVE MG/DL
HCT VFR BLD AUTO: 36.7 % (ref 36–46)
HGB BLD-MCNC: 11.7 G/DL (ref 12–16)
HGB UR QL STRIP.AUTO: 150
KETONES UR STRIP-MCNC: NEGATIVE MG/DL
LEUKOCYTE ESTERASE UR QL STRIP: 100
LIPASE SERPL-CCNC: 62 U/L (ref 23–300)
LYMPHOCYTES # BLD AUTO: 2.5 THOUSANDS/ΜL (ref 0.5–4)
LYMPHOCYTES NFR BLD AUTO: 32 % (ref 25–45)
MCH RBC QN AUTO: 26.1 PG (ref 26–34)
MCHC RBC AUTO-ENTMCNC: 31.8 G/DL (ref 31–36)
MCV RBC AUTO: 82 FL (ref 80–100)
MONOCYTES # BLD AUTO: 0.5 THOUSAND/ΜL (ref 0.2–0.9)
MONOCYTES NFR BLD AUTO: 7 % (ref 1–10)
MUCOUS THREADS UR QL AUTO: ABNORMAL
NEUTROPHILS # BLD AUTO: 4.6 THOUSANDS/ΜL (ref 1.8–7.8)
NEUTS SEG NFR BLD AUTO: 59 % (ref 45–65)
NITRITE UR QL STRIP: NEGATIVE
NON-SQ EPI CELLS URNS QL MICRO: ABNORMAL /HPF
PH UR STRIP.AUTO: 5 [PH]
PLATELET # BLD AUTO: 298 THOUSANDS/UL (ref 150–450)
PMV BLD AUTO: 8.3 FL (ref 8.9–12.7)
POTASSIUM SERPL-SCNC: 4.3 MMOL/L (ref 3.6–5)
PROT SERPL-MCNC: 8 G/DL (ref 5.9–8.4)
PROT UR STRIP-MCNC: NEGATIVE MG/DL
RBC # BLD AUTO: 4.47 MILLION/UL (ref 4–5.2)
RBC #/AREA URNS AUTO: ABNORMAL /HPF
SODIUM SERPL-SCNC: 138 MMOL/L (ref 137–147)
SP GR UR STRIP.AUTO: 1.02 (ref 1–1.04)
UROBILINOGEN UA: NEGATIVE MG/DL
WBC # BLD AUTO: 7.8 THOUSAND/UL (ref 4.5–11)
WBC #/AREA URNS AUTO: ABNORMAL /HPF

## 2020-02-06 PROCEDURE — 81003 URINALYSIS AUTO W/O SCOPE: CPT | Performed by: PHYSICIAN ASSISTANT

## 2020-02-06 PROCEDURE — 99284 EMERGENCY DEPT VISIT MOD MDM: CPT | Performed by: PHYSICIAN ASSISTANT

## 2020-02-06 PROCEDURE — 83690 ASSAY OF LIPASE: CPT | Performed by: PHYSICIAN ASSISTANT

## 2020-02-06 PROCEDURE — 74176 CT ABD & PELVIS W/O CONTRAST: CPT

## 2020-02-06 PROCEDURE — 80053 COMPREHEN METABOLIC PANEL: CPT | Performed by: PHYSICIAN ASSISTANT

## 2020-02-06 PROCEDURE — 96374 THER/PROPH/DIAG INJ IV PUSH: CPT

## 2020-02-06 PROCEDURE — 81001 URINALYSIS AUTO W/SCOPE: CPT | Performed by: PHYSICIAN ASSISTANT

## 2020-02-06 PROCEDURE — 85025 COMPLETE CBC W/AUTO DIFF WBC: CPT | Performed by: PHYSICIAN ASSISTANT

## 2020-02-06 PROCEDURE — 99285 EMERGENCY DEPT VISIT HI MDM: CPT

## 2020-02-06 PROCEDURE — 36415 COLL VENOUS BLD VENIPUNCTURE: CPT | Performed by: PHYSICIAN ASSISTANT

## 2020-02-06 PROCEDURE — C9113 INJ PANTOPRAZOLE SODIUM, VIA: HCPCS | Performed by: PHYSICIAN ASSISTANT

## 2020-02-06 PROCEDURE — 96361 HYDRATE IV INFUSION ADD-ON: CPT

## 2020-02-06 PROCEDURE — 96375 TX/PRO/DX INJ NEW DRUG ADDON: CPT

## 2020-02-06 RX ORDER — PANTOPRAZOLE SODIUM 20 MG/1
20 TABLET, DELAYED RELEASE ORAL DAILY
Qty: 20 TABLET | Refills: 0 | Status: SHIPPED | OUTPATIENT
Start: 2020-02-06 | End: 2020-04-14

## 2020-02-06 RX ORDER — PANTOPRAZOLE SODIUM 40 MG/1
40 INJECTION, POWDER, FOR SOLUTION INTRAVENOUS ONCE
Status: COMPLETED | OUTPATIENT
Start: 2020-02-06 | End: 2020-02-06

## 2020-02-06 RX ORDER — ONDANSETRON 4 MG/1
4 TABLET, ORALLY DISINTEGRATING ORAL EVERY 6 HOURS PRN
Qty: 20 TABLET | Refills: 0 | Status: SHIPPED | OUTPATIENT
Start: 2020-02-06 | End: 2020-04-14

## 2020-02-06 RX ORDER — ONDANSETRON 2 MG/ML
4 INJECTION INTRAMUSCULAR; INTRAVENOUS ONCE
Status: COMPLETED | OUTPATIENT
Start: 2020-02-06 | End: 2020-02-06

## 2020-02-06 RX ADMIN — ONDANSETRON 4 MG: 2 INJECTION INTRAMUSCULAR; INTRAVENOUS at 09:50

## 2020-02-06 RX ADMIN — SODIUM CHLORIDE 1000 ML: 0.9 INJECTION, SOLUTION INTRAVENOUS at 09:32

## 2020-02-06 RX ADMIN — IOHEXOL 50 ML: 240 INJECTION, SOLUTION INTRATHECAL; INTRAVASCULAR; INTRAVENOUS; ORAL at 13:11

## 2020-02-06 RX ADMIN — IOHEXOL 50 ML: 240 INJECTION, SOLUTION INTRATHECAL; INTRAVASCULAR; INTRAVENOUS; ORAL at 09:39

## 2020-02-06 RX ADMIN — PANTOPRAZOLE SODIUM 40 MG: 40 INJECTION, POWDER, FOR SOLUTION INTRAVENOUS at 09:50

## 2020-02-06 NOTE — ED NOTES
Pt reports taking one dose of pepto at home to relieve vomiting      Aleksandra Monsivais, FARIDEH  02/06/20 8149

## 2020-02-06 NOTE — ED PROVIDER NOTES
History  Chief Complaint   Patient presents with    Vomiting Blood     yesterday afternoon, started out pink, now bright red blood     79-year-old female with past medical history of asthma, depression, epilepsy, hypertension, rheumatoid arthritis presenting for evaluation of hematemesis  Patient states that she has had several episodes of vomiting starting yesterday  She reports she noticed blood streaks through the emesis starting at the pain color but progressively getting more red as the day went on  She denies vomiting maryjane blood  She denies taking anything for her symptoms prior to arrival   She does work as a  and has multiple sick contacts  Denies any diarrhea  She reports epigastric pain but no chest pain shortness of breath or palpitations  Denies any fevers  Did receive the flu shot  Prior to Admission Medications   Prescriptions Last Dose Informant Patient Reported? Taking? Abatacept (ORENCIA) 125 MG/ML SOSY   Yes No   Sig: Inject 125 mg under the skin Once a week   Calcium Carb-Cholecalciferol (CALCIUM 600+D3) 600-200 MG-UNIT TABS  Self Yes No   Sig: take 1 tablet twice a day   Cholecalciferol (VITAMIN D3) 2000 units capsule  Self Yes No   Sig: take 1 tablet po daily     Elastic Bandages & Supports (CARPAL TUNNEL WRIST DELUXE) MISC   No No   Sig: by Does not apply route daily at bedtime   Incontinence Supply Disposable (POISE PAD) PADS  Self No No   Sig: by Does not apply route 4 (four) times a day as needed (incontinence)   Multiple Vitamins-Minerals (MULTIVITAMIN WITH MINERALS) tablet   No No   Sig: Take 1 tablet by mouth daily   albuterol (PROVENTIL HFA,VENTOLIN HFA) 90 mcg/act inhaler   No No   Sig: Inhale 2 puffs every 4 (four) hours as needed for wheezing   albuterol (VENTOLIN HFA) 90 mcg/act inhaler   No No   Sig: Inhale 2 puffs every 4 (four) hours as needed for wheezing or shortness of breath   benzonatate (TESSALON PERLES) 100 mg capsule   No No   Sig: Take 1 capsule (100 mg total) by mouth every 8 (eight) hours   celecoxib (CeleBREX) 200 mg capsule   Yes No   Sig: Take 200 mg by mouth 2 (two) times a day   citalopram (CeleXA) 20 mg tablet   No No   Sig: Take 1 tablet (20 mg total) by mouth daily   diclofenac (VOLTAREN) 75 mg EC tablet   No No   Sig: Take 1 tablet (75 mg total) by mouth 2 (two) times a day   dicyclomine (BENTYL) 10 mg capsule 2020 at Unknown time  No Yes   Sig: Take 1 capsule (10 mg total) by mouth 4 (four) times a day (before meals and at bedtime)   doxepin (SINEquan) 25 mg capsule   No No   Sig: Take 1 capsule (25 mg total) by mouth daily at bedtime   famotidine (PEPCID) 20 mg tablet 2020 at Unknown time  No Yes   Sig: Take 1 tablet (20 mg total) by mouth daily   fexofenadine (ALLEGRA) 60 MG tablet   No No   Sig: Take 1 tablet (60 mg total) by mouth 2 (two) times a day   fluticasone (FLONASE) 50 mcg/act nasal spray   No No   Si spray into each nostril daily   fluticasone (FLONASE) 50 mcg/act nasal spray   No No   Si spray into each nostril daily   folic acid (FOLVITE) 1 mg tablet  Self Yes No   Sig: every 24 hours   furosemide (LASIX) 20 mg tablet   No No   Sig: Take 1 tablet (20 mg total) by mouth daily   gabapentin (NEURONTIN) 400 mg capsule   No No   Sig: Take 1 capsule (400 mg total) by mouth 3 (three) times a day   guaiFENesin (MUCINEX) 600 mg 12 hr tablet   No No   Sig: Take 2 tablets (1,200 mg total) by mouth every 12 (twelve) hours   ibuprofen (MOTRIN) 600 mg tablet   No No   Sig: Take 1 tablet (600 mg total) by mouth every 6 (six) hours as needed for moderate pain   leflunomide (ARAVA) 10 MG tablet   Yes No   Sig: Take 10 mg by mouth daily   levETIRAcetam (KEPPRA) 1000 MG tablet   No No   Sig: Take 1 tablet (1,000 mg total) by mouth 2 (two) times a day   lidocaine (LMX) 4 % cream   No No   Sig: Apply topically as needed for mild pain   nitroglycerin (NITROSTAT) 0 4 mg SL tablet   No No   Sig: Place 1 tablet (0 4 mg total) under the tongue every 5 (five) minutes as needed for chest pain Call 911 if using 3 doses   polyethylene glycol (GLYCOLAX) powder   No No   Sig: Take 17 g by mouth daily   polymyxin b-trimethoprim (POLYTRIM) ophthalmic solution   No No   Sig: Administer 1 drop into the left eye every 4 (four) hours      Facility-Administered Medications: None       Past Medical History:   Diagnosis Date    [facility])     Anxiety     Asthma     Depression     Epilepsy ([place] [address] )     Fibroid 2012    History of cardiac cath 2006    Hydronephrosis 2007    Hypertension     Migraine without aura     PTSD (post-traumatic stress disorder)     Rheumatoid arteritis ([place] [address] )        Past Surgical History:   Procedure Laterality Date    APPENDECTOMY      CARDIAC CATHETERIZATION  2006     SECTION  2008    CHOLECYSTECTOMY  2018    KIDNEY SURGERY  2007    TUBAL LIGATION  2008       Family History   Problem Relation Age of Onset    Diabetes Maternal Grandmother     Hypertension Maternal Grandmother     No Known Problems Mother     No Known Problems Sister     No Known Problems Daughter     No Known Problems Paternal Grandmother     No Known Problems Daughter     Breast cancer Neg Hx     Cancer Neg Hx      I have reviewed and agree with the history as documented  Social History     Tobacco Use    Smoking status: Former Smoker     Types: Cigarettes     Last attempt to quit: 2013     Years since quittin 1    Smokeless tobacco: Former User   Substance Use Topics    Alcohol use: Not Currently     Frequency: Monthly or less     Drinks per session: 3 or 4    Drug use: Never        Review of Systems   All other systems reviewed and are negative  Physical Exam  Physical Exam   Constitutional: She is oriented to person, place, and time  She appears well-nourished  No distress  Overweight   HENT:   Head: Normocephalic and atraumatic     Right Ear: External ear normal    Left Ear: External ear normal    Nose: Nose normal    Mouth/Throat: Oropharynx is clear and moist  No oropharyngeal exudate  Eyes: Conjunctivae are normal    EOM grossly intact   Neck: Normal range of motion  Neck supple  No JVD present  Cardiovascular: Normal rate  Pulmonary/Chest: Effort normal  No stridor  No respiratory distress  She has no wheezes  Abdominal: Soft  There is tenderness (Epigastric tenderness, no right lower quadrant tenderness palpation)  Musculoskeletal:   FROM, steady gait, cap refill brisk, strength and sensation grossly intact throughout   Lymphadenopathy:     She has no cervical adenopathy  Neurological: She is alert and oriented to person, place, and time  Skin: Skin is warm and dry  Capillary refill takes less than 2 seconds  She is not diaphoretic  Psychiatric: She has a normal mood and affect  Her behavior is normal    Nursing note and vitals reviewed        Vital Signs  ED Triage Vitals [02/06/20 0843]   Temperature Pulse Respirations Blood Pressure SpO2   98 °F (36 7 °C) 77 20 151/94 98 %      Temp Source Heart Rate Source Patient Position - Orthostatic VS BP Location FiO2 (%)   Tympanic Monitor Sitting Left arm --      Pain Score       7           Vitals:    02/06/20 0843 02/06/20 1259   BP: 151/94 153/95   Pulse: 77 76   Patient Position - Orthostatic VS: Sitting Lying         Visual Acuity      ED Medications  Medications   sodium chloride 0 9 % bolus 1,000 mL (0 mL Intravenous Stopped 2/6/20 1311)   ondansetron (ZOFRAN) injection 4 mg (4 mg Intravenous Given 2/6/20 0950)   pantoprazole (PROTONIX) injection 40 mg (40 mg Intravenous Given 2/6/20 0950)   iohexol (OMNIPAQUE) 240 MG/ML solution 50 mL (50 mL Oral Given 2/6/20 1311)   iohexol (OMNIPAQUE) 240 MG/ML solution **ADS Override Pull** (50 mL  Given 2/6/20 0939)       Diagnostic Studies  Results Reviewed     Procedure Component Value Units Date/Time    Urine Microscopic [[id number]]  (Abnormal) Collected:  02/06/20 0926    Lab Status:  Final result Specimen:  Urine, Other Updated:  02/06/20 1109     RBC, UA 2-4 /hpf      WBC, UA 4-10 /hpf      Epithelial Cells Moderate /hpf      Bacteria, UA Moderate /hpf      MUCUS THREADS Moderate    Comprehensive metabolic panel [[id number]]  (Abnormal) Collected:  02/06/20 0926    Lab Status:  Final result Specimen:  Blood from Arm, Right Updated:  02/06/20 1041     Sodium 138 mmol/L      Potassium 4 3 mmol/L      Chloride 104 mmol/L      CO2 24 mmol/L      ANION GAP 10 mmol/L      BUN 13 mg/dL      Creatinine 0 53 mg/dL      Glucose 98 mg/dL      Calcium 8 8 mg/dL      AST 21 U/L      ALT 15 U/L      Alkaline Phosphatase 60 U/L      Total Protein 8 0 g/dL      Albumin 4 1 g/dL      Total Bilirubin 0 40 mg/dL      eGFR 119 ml/min/1 73sq m     Narrative:       Hemolysis  National Kidney Disease Foundation guidelines for Chronic Kidney Disease (CKD):     Stage 1 with normal or high GFR (GFR > 90 mL/min/1 73 square meters)    Stage 2 Mild CKD (GFR = 60-89 mL/min/1 73 square meters)    Stage 3A Moderate CKD (GFR = 45-59 mL/min/1 73 square meters)    Stage 3B Moderate CKD (GFR = 30-44 mL/min/1 73 square meters)    Stage 4 Severe CKD (GFR = 15-29 mL/min/1 73 square meters)    Stage 5 End Stage CKD (GFR <15 mL/min/1 73 square meters)  Note: GFR calculation is accurate only with a steady state creatinine    Lipase [[id number]]  (Normal) Collected:  02/06/20 0926    Lab Status:  Final result Specimen:  Blood from Arm, Right Updated:  02/06/20 1041     Lipase 62 u/L     Narrative:       Hemolysis    UA w Reflex to Microscopic w Reflex to Culture [[id number]]  (Abnormal) Collected:  02/06/20 0926    Lab Status:  Final result Specimen:  Urine, Other Updated:  02/06/20 1038     Color, UA Yellow     Clarity, UA Clear     Specific [place], UA 1 020     pH, UA 5 0     Leukocytes,  0     Nitrite, UA Negative     Protein, UA Negative mg/dl      Glucose, UA Negative mg/dl      Ketones, UA Negative mg/dl      Bilirubin, UA Negative Blood,  0     UROBILINOGEN UA Negative mg/dL     CBC and differential [[id number]]  (Abnormal) Collected:  02/06/20 0926    Lab Status:  Final result Specimen:  Blood from Arm, Right Updated:  02/06/20 1023     WBC 7 80 Thousand/uL      RBC 4 47 Million/uL      Hemoglobin 11 7 g/dL      Hematocrit 36 7 %      MCV 82 fL      MCH 26 1 pg      MCHC 31 8 g/dL      RDW 15 6 %      MPV 8 3 fL      Platelets 587 Thousands/uL      Neutrophils Relative 59 %      Lymphocytes Relative 32 %      Monocytes Relative 7 %      Eosinophils Relative 2 %      Basophils Relative 1 %      Neutrophils Absolute 4 60 Thousands/µL      Lymphocytes Absolute 2 50 Thousands/µL      Monocytes Absolute 0 50 Thousand/µL      Eosinophils Absolute 0 10 Thousand/µL      Basophils Absolute 0 10 Thousands/µL                  CT abdomen pelvis wo contrast   Final Result by Kyle Van MD (02/06 1204)      No significant interval change when compared to the prior studies of September 30, 2019 and August 7, 2018  Mild engorgement of the left mesenteric vasculature and scattered subcentimeter lymph nodes, unchanged from the prior study  This may represent chronic panniculitis  Approximate 5 cm cystic structure in the right adnexa  This probably represents a benign cyst in this premenopausal patient  Follow-up evaluation in 6-12 weeks could be performed to assess for resolution    According to current guidelines ([address]    Radiology [id number])                            Workstation performed: [id number]                    Procedures  Procedures         ED Course  ED Course as of Feb 06 1646   Thu Feb 06, 2020   0854 Pt stating now that she is allergic to contrast, will alter order for only PO contrast                                  MDM  Number of Diagnoses or Management Options  Nausea & vomiting:   Diagnosis management comments: 76-year-old female presenting for evaluation after blood-streaked emesis starting yesterday, patient no active episodes of emesis in the ED, labs unremarkable, CT is unchanged since 2018, patient with epigastric tenderness on palpation however likely secondary to vomiting, she appears well, is asking for a work note as she had to leave early yesterday and did not go to work today, will treat symptomatically, likely viral GI syndrome, follow up with PCP as an outpatient    All labs and imaging discussed with patient, strict return to ED precautions discussed  Pt verbalizes understanding and agrees with plan  Pt is stable for discharge    Portions of the record may have been created with voice recognition software  Occasional wrong word or "sound a like" substitutions may have occurred due to the inherent limitations of voice recognition software  Read the chart carefully and recognize, using context, where substitutions have occurred  Disposition  Final diagnoses:   Nausea & vomiting     Time reflects when diagnosis was documented in both MDM as applicable and the Disposition within this note     Time User Action Codes Description Comment    2/6/2020 12:40 PM Anika Be Add [R11 2] Nausea & vomiting       ED Disposition     ED Disposition Condition Date/Time Comment    Discharge Stable u Feb 6, 2020 12:40 PM Cassandra Sanchez discharge to home/self care              Follow-up Information     Follow up With Specialties Details Why Contact Info    Hemant Godwin PA-C Family Medicine, Physician Assistant Call in 1 day  [address]  [address] [facility] [address]             Discharge Medication List as of 2/6/2020 12:41 PM      START taking these medications    Details   ondansetron (ZOFRAN-ODT) 4 mg disintegrating tablet Take 1 tablet (4 mg total) by mouth every 6 (six) hours as needed for nausea or vomiting, Starting Thu 2/6/2020, Print      pantoprazole (PROTONIX) 20 mg tablet Take 1 tablet (20 mg total) by mouth daily, Starting Thu 2/6/2020, Print CONTINUE these medications which have NOT CHANGED    Details   dicyclomine (BENTYL) 10 mg capsule Take 1 capsule (10 mg total) by mouth 4 (four) times a day (before meals and at bedtime), Starting Mon 9/30/2019, Print      famotidine (PEPCID) 20 mg tablet Take 1 tablet (20 mg total) by mouth daily, Starting Wed 11/6/2019, Normal      Abatacept (ORENCIA) 125 MG/ML SOSY Inject 125 mg under the skin Once a week, Starting Wed 9/25/2019, Until Tue 12/24/2019, Historical Med      !! albuterol (PROVENTIL HFA,VENTOLIN HFA) 90 mcg/act inhaler Inhale 2 puffs every 4 (four) hours as needed for wheezing, Starting Sun 12/29/2019, Print      !! albuterol (VENTOLIN HFA) 90 mcg/act inhaler Inhale 2 puffs every 4 (four) hours as needed for wheezing or shortness of breath, Starting Fri 12/13/2019, Normal      benzonatate (TESSALON PERLES) 100 mg capsule Take 1 capsule (100 mg total) by mouth every 8 (eight) hours, Starting Sun 12/29/2019, Print      Calcium Carb-Cholecalciferol (CALCIUM 600+D3) 600-200 MG-UNIT TABS take 1 tablet twice a day, Historical Med      celecoxib (CeleBREX) 200 mg capsule Take 200 mg by mouth 2 (two) times a day, Starting Wed 9/11/2019, Until Thu 9/10/2020, Historical Med      Cholecalciferol (VITAMIN D3) 2000 units capsule take 1 tablet po daily  , Historical Med      citalopram (CeleXA) 20 mg tablet Take 1 tablet (20 mg total) by mouth daily, Starting Fri 8/2/2019, Normal      diclofenac (VOLTAREN) 75 mg EC tablet Take 1 tablet (75 mg total) by mouth 2 (two) times a day, Starting Wed 11/6/2019, Normal      doxepin (SINEquan) 25 mg capsule Take 1 capsule (25 mg total) by mouth daily at bedtime, Starting Fri 8/2/2019, Normal      Elastic Bandages & Supports (CARPAL TUNNEL WRIST DELUXE) MISC by Does not apply route daily at bedtime, Starting Fri 8/2/2019, Print      fexofenadine (ALLEGRA) 60 MG tablet Take 1 tablet (60 mg total) by mouth 2 (two) times a day, Starting Tue 11/19/2019, Until Thu 12/19/2019, Print      !! fluticasone (FLONASE) 50 mcg/act nasal spray 1 spray into each nostril daily, Starting Tue 11/19/2019, Print      !! fluticasone (FLONASE) 50 mcg/act nasal spray 1 spray into each nostril daily, Starting Sun 14/22/1002, Print      folic acid (FOLVITE) 1 mg tablet every 24 hours, Starting Wed 1/27/2016, Historical Med      furosemide (LASIX) 20 mg tablet Take 1 tablet (20 mg total) by mouth daily, Starting Fri 8/2/2019, Normal      gabapentin (NEURONTIN) 400 mg capsule Take 1 capsule (400 mg total) by mouth 3 (three) times a day, Starting Thu 9/26/2019, Normal      guaiFENesin (MUCINEX) 600 mg 12 hr tablet Take 2 tablets (1,200 mg total) by mouth every 12 (twelve) hours, Starting Sun 12/29/2019, Print      ibuprofen (MOTRIN) 600 mg tablet Take 1 tablet (600 mg total) by mouth every 6 (six) hours as needed for moderate pain, Starting Mon 9/30/2019, Print      Incontinence Supply Disposable (POISE PAD) PADS by Does not apply route 4 (four) times a day as needed (incontinence), Starting Wed 9/5/2018, Normal      leflunomide (ARAVA) 10 MG tablet Take 10 mg by mouth daily, Historical Med      levETIRAcetam (KEPPRA) 1000 MG tablet Take 1 tablet (1,000 mg total) by mouth 2 (two) times a day, Starting Fri 8/2/2019, Normal      lidocaine (LMX) 4 % cream Apply topically as needed for mild pain, Starting Wed 11/6/2019, Normal      Multiple Vitamins-Minerals (MULTIVITAMIN WITH MINERALS) tablet Take 1 tablet by mouth daily, Starting Fri 8/2/2019, Normal      nitroglycerin (NITROSTAT) 0 4 mg SL tablet Place 1 tablet (0 4 mg total) under the tongue every 5 (five) minutes as needed for chest pain Call 911 if using 3 doses, Starting Wed 11/6/2019, Normal      polyethylene glycol (GLYCOLAX) powder Take 17 g by mouth daily, Starting Wed 11/6/2019, Normal      polymyxin b-trimethoprim (POLYTRIM) ophthalmic solution Administer 1 drop into the left eye every 4 (four) hours, Starting Thu 5/2/2019, Normal       !! - Potential duplicate medications found  Please discuss with provider  No discharge procedures on file      ED Provider  Electronically Signed by           Maryjo Edward PA-C  02/06/20 4487

## 2020-03-07 ENCOUNTER — APPOINTMENT (EMERGENCY)
Dept: CT IMAGING | Facility: HOSPITAL | Age: 40
End: 2020-03-07
Payer: COMMERCIAL

## 2020-03-07 ENCOUNTER — HOSPITAL ENCOUNTER (EMERGENCY)
Facility: HOSPITAL | Age: 40
Discharge: HOME | End: 2020-03-07
Attending: EMERGENCY MEDICINE | Admitting: EMERGENCY MEDICINE
Payer: COMMERCIAL

## 2020-03-07 VITALS
WEIGHT: 276.5 LBS | OXYGEN SATURATION: 100 % | BODY MASS INDEX: 50.57 KG/M2 | TEMPERATURE: 97.7 F | HEART RATE: 66 BPM | DIASTOLIC BLOOD PRESSURE: 66 MMHG | RESPIRATION RATE: 16 BRPM | SYSTOLIC BLOOD PRESSURE: 144 MMHG

## 2020-03-07 DIAGNOSIS — N30.91 HEMORRHAGIC CYSTITIS: Primary | ICD-10-CM

## 2020-03-07 LAB
ALBUMIN SERPL BCP-MCNC: 4 G/DL (ref 3–5.2)
ALP SERPL-CCNC: 60 U/L (ref 43–122)
ALT SERPL W P-5'-P-CCNC: 18 U/L (ref 9–52)
ANION GAP SERPL CALCULATED.3IONS-SCNC: 8 MMOL/L (ref 5–14)
AST SERPL W P-5'-P-CCNC: 14 U/L (ref 14–36)
BACTERIA UR QL AUTO: ABNORMAL /HPF
BASOPHILS # BLD AUTO: 0.1 THOUSANDS/ΜL (ref 0–0.1)
BASOPHILS NFR BLD AUTO: 1 % (ref 0–1)
BILIRUB SERPL-MCNC: 0.2 MG/DL
BILIRUB UR QL STRIP: NEGATIVE
BUN SERPL-MCNC: 16 MG/DL (ref 5–25)
CALCIUM SERPL-MCNC: 9.1 MG/DL (ref 8.4–10.2)
CHLORIDE SERPL-SCNC: 104 MMOL/L (ref 97–108)
CLARITY UR: ABNORMAL
CO2 SERPL-SCNC: 24 MMOL/L (ref 22–30)
COLOR UR: ABNORMAL
CREAT SERPL-MCNC: 0.65 MG/DL (ref 0.6–1.2)
EOSINOPHIL # BLD AUTO: 0.1 THOUSAND/ΜL (ref 0–0.4)
EOSINOPHIL NFR BLD AUTO: 1 % (ref 0–6)
ERYTHROCYTE [DISTWIDTH] IN BLOOD BY AUTOMATED COUNT: 15 %
EXT PREG TEST URINE: NEGATIVE
EXT. CONTROL ED NAV: NORMAL
GFR SERPL CREATININE-BSD FRML MDRD: 111 ML/MIN/1.73SQ M
GLUCOSE SERPL-MCNC: 99 MG/DL (ref 70–99)
GLUCOSE UR STRIP-MCNC: NEGATIVE MG/DL
HCT VFR BLD AUTO: 36.3 % (ref 36–46)
HGB BLD-MCNC: 12 G/DL (ref 12–16)
HGB UR QL STRIP.AUTO: 250
KETONES UR STRIP-MCNC: NEGATIVE MG/DL
LEUKOCYTE ESTERASE UR QL STRIP: 100
LYMPHOCYTES # BLD AUTO: 2 THOUSANDS/ΜL (ref 0.5–4)
LYMPHOCYTES NFR BLD AUTO: 27 % (ref 25–45)
MCH RBC QN AUTO: 26.7 PG (ref 26–34)
MCHC RBC AUTO-ENTMCNC: 33 G/DL (ref 31–36)
MCV RBC AUTO: 81 FL (ref 80–100)
MONOCYTES # BLD AUTO: 0.6 THOUSAND/ΜL (ref 0.2–0.9)
MONOCYTES NFR BLD AUTO: 8 % (ref 1–10)
MUCOUS THREADS UR QL AUTO: ABNORMAL
NEUTROPHILS # BLD AUTO: 4.5 THOUSANDS/ΜL (ref 1.8–7.8)
NEUTS SEG NFR BLD AUTO: 63 % (ref 45–65)
NITRITE UR QL STRIP: NEGATIVE
NON-SQ EPI CELLS URNS QL MICRO: ABNORMAL /HPF
PH UR STRIP.AUTO: 5 [PH]
PLATELET # BLD AUTO: 286 THOUSANDS/UL (ref 150–450)
PMV BLD AUTO: 8.1 FL (ref 8.9–12.7)
POTASSIUM SERPL-SCNC: 4.4 MMOL/L (ref 3.6–5)
PROT SERPL-MCNC: 7.9 G/DL (ref 5.9–8.4)
PROT UR STRIP-MCNC: ABNORMAL MG/DL
RBC # BLD AUTO: 4.48 MILLION/UL (ref 4–5.2)
RBC #/AREA URNS AUTO: ABNORMAL /HPF
SODIUM SERPL-SCNC: 136 MMOL/L (ref 137–147)
SP GR UR STRIP.AUTO: 1.02 (ref 1–1.04)
UROBILINOGEN UA: NEGATIVE MG/DL
WBC # BLD AUTO: 7.2 THOUSAND/UL (ref 4.5–11)
WBC #/AREA URNS AUTO: ABNORMAL /HPF

## 2020-03-07 PROCEDURE — 99284 EMERGENCY DEPT VISIT MOD MDM: CPT

## 2020-03-07 PROCEDURE — 36415 COLL VENOUS BLD VENIPUNCTURE: CPT | Performed by: PHYSICIAN ASSISTANT

## 2020-03-07 PROCEDURE — 85025 COMPLETE CBC W/AUTO DIFF WBC: CPT | Performed by: PHYSICIAN ASSISTANT

## 2020-03-07 PROCEDURE — 81003 URINALYSIS AUTO W/O SCOPE: CPT | Performed by: PHYSICIAN ASSISTANT

## 2020-03-07 PROCEDURE — 80053 COMPREHEN METABOLIC PANEL: CPT | Performed by: PHYSICIAN ASSISTANT

## 2020-03-07 PROCEDURE — 96361 HYDRATE IV INFUSION ADD-ON: CPT

## 2020-03-07 PROCEDURE — 81025 URINE PREGNANCY TEST: CPT | Performed by: PHYSICIAN ASSISTANT

## 2020-03-07 PROCEDURE — 74176 CT ABD & PELVIS W/O CONTRAST: CPT

## 2020-03-07 PROCEDURE — 96374 THER/PROPH/DIAG INJ IV PUSH: CPT

## 2020-03-07 PROCEDURE — 99284 EMERGENCY DEPT VISIT MOD MDM: CPT | Performed by: PHYSICIAN ASSISTANT

## 2020-03-07 PROCEDURE — 81001 URINALYSIS AUTO W/SCOPE: CPT | Performed by: PHYSICIAN ASSISTANT

## 2020-03-07 RX ORDER — CEPHALEXIN 500 MG/1
500 CAPSULE ORAL EVERY 12 HOURS SCHEDULED
Qty: 30 CAPSULE | Refills: 0 | Status: SHIPPED | OUTPATIENT
Start: 2020-03-07 | End: 2020-03-17

## 2020-03-07 RX ORDER — KETOROLAC TROMETHAMINE 30 MG/ML
15 INJECTION, SOLUTION INTRAMUSCULAR; INTRAVENOUS ONCE
Status: COMPLETED | OUTPATIENT
Start: 2020-03-07 | End: 2020-03-07

## 2020-03-07 RX ORDER — ACETAMINOPHEN 500 MG
500 TABLET ORAL EVERY 6 HOURS PRN
Qty: 30 TABLET | Refills: 0 | Status: SHIPPED | OUTPATIENT
Start: 2020-03-07 | End: 2020-08-13

## 2020-03-07 RX ORDER — PHENAZOPYRIDINE HYDROCHLORIDE 95 MG/1
95 TABLET ORAL 3 TIMES DAILY PRN
Qty: 10 TABLET | Refills: 0 | Status: SHIPPED | OUTPATIENT
Start: 2020-03-07 | End: 2020-04-14

## 2020-03-07 RX ORDER — IBUPROFEN 400 MG/1
400 TABLET ORAL EVERY 6 HOURS PRN
Qty: 20 TABLET | Refills: 0 | Status: SHIPPED | OUTPATIENT
Start: 2020-03-07 | End: 2020-04-14

## 2020-03-07 RX ADMIN — KETOROLAC TROMETHAMINE 15 MG: 30 INJECTION, SOLUTION INTRAMUSCULAR at 11:24

## 2020-03-07 RX ADMIN — SODIUM CHLORIDE 1000 ML: 0.9 INJECTION, SOLUTION INTRAVENOUS at 11:24

## 2020-03-07 NOTE — ED PROVIDER NOTES
History  Chief Complaint   Patient presents with    Blood in Urine     Pt reports blood in urine since monday with RLQ pain radiating to right flank and down R leg     Patient is a 13-year-old female presents today for evaluation of right-sided lower quadrant and flank pain which has been ongoing worsening over the past week associated with hematuria suprapubic pressure  Patient reports she has had pain like this in the past and was diagnosed with hydronephrosis as well as clots in her kidney which required evacuation  Patient denies any history of stones denies any fevers or chills, nausea or upper abdominal pain  Patient reports past surgical history significant for choleycystectomy, appendectomy and C section  History provided by:  Patient   used: No    Blood in Urine   This is a recurrent problem  The current episode started in the past 7 days  The problem is unchanged  She describes the hematuria as gross hematuria  The hematuria occurs throughout her entire urinary stream  She reports clotting at the beginning, middle and end of her urine stream  Her pain is at a severity of 9/10  The pain is moderate  She describes her urine color as dark red  Irritative symptoms include frequency and urgency  Associated symptoms include abdominal pain, chills and flank pain  Pertinent negatives include no dysuria, fever, nausea or vomiting  Prior to Admission Medications   Prescriptions Last Dose Informant Patient Reported? Taking? Abatacept (ORENCIA) 125 MG/ML SOSY   Yes No   Sig: Inject 125 mg under the skin Once a week   Calcium Carb-Cholecalciferol (CALCIUM 600+D3) 600-200 MG-UNIT TABS  Self Yes No   Sig: take 1 tablet twice a day   Cholecalciferol (VITAMIN D3) 2000 units capsule  Self Yes No   Sig: take 1 tablet po daily     Elastic Bandages & Supports (CARPAL TUNNEL WRIST DELUXE) MISC   No No   Sig: by Does not apply route daily at bedtime   Incontinence Supply Disposable (POISE PAD) PADS  Self No No   Sig: by Does not apply route 4 (four) times a day as needed (incontinence)   Multiple Vitamins-Minerals (MULTIVITAMIN WITH MINERALS) tablet   No No   Sig: Take 1 tablet by mouth daily   albuterol (PROVENTIL HFA,VENTOLIN HFA) 90 mcg/act inhaler   No No   Sig: Inhale 2 puffs every 4 (four) hours as needed for wheezing   albuterol (VENTOLIN HFA) 90 mcg/act inhaler   No No   Sig: Inhale 2 puffs every 4 (four) hours as needed for wheezing or shortness of breath   benzonatate (TESSALON PERLES) 100 mg capsule   No No   Sig: Take 1 capsule (100 mg total) by mouth every 8 (eight) hours   celecoxib (CeleBREX) 200 mg capsule   Yes No   Sig: Take 200 mg by mouth 2 (two) times a day   citalopram (CeleXA) 20 mg tablet   No No   Sig: Take 1 tablet (20 mg total) by mouth daily   diclofenac (VOLTAREN) 75 mg EC tablet   No No   Sig: Take 1 tablet (75 mg total) by mouth 2 (two) times a day   dicyclomine (BENTYL) 10 mg capsule   No No   Sig: Take 1 capsule (10 mg total) by mouth 4 (four) times a day (before meals and at bedtime)   doxepin (SINEquan) 25 mg capsule   No No   Sig: Take 1 capsule (25 mg total) by mouth daily at bedtime   famotidine (PEPCID) 20 mg tablet   No No   Sig: Take 1 tablet (20 mg total) by mouth daily   fexofenadine (ALLEGRA) 60 MG tablet   No No   Sig: Take 1 tablet (60 mg total) by mouth 2 (two) times a day   fluticasone (FLONASE) 50 mcg/act nasal spray   No No   Si spray into each nostril daily   fluticasone (FLONASE) 50 mcg/act nasal spray   No No   Si spray into each nostril daily   folic acid (FOLVITE) 1 mg tablet  Self Yes No   Sig: every 24 hours   furosemide (LASIX) 20 mg tablet   No No   Sig: Take 1 tablet (20 mg total) by mouth daily   gabapentin (NEURONTIN) 400 mg capsule   No No   Sig: Take 1 capsule (400 mg total) by mouth 3 (three) times a day   guaiFENesin (MUCINEX) 600 mg 12 hr tablet   No No   Sig: Take 2 tablets (1,200 mg total) by mouth every 12 (twelve) hours ibuprofen (MOTRIN) 600 mg tablet   No No   Sig: Take 1 tablet (600 mg total) by mouth every 6 (six) hours as needed for moderate pain   leflunomide (ARAVA) 10 MG tablet   Yes No   Sig: Take 10 mg by mouth daily   levETIRAcetam (KEPPRA) 1000 MG tablet   No No   Sig: Take 1 tablet (1,000 mg total) by mouth 2 (two) times a day   lidocaine (LMX) 4 % cream   No No   Sig: Apply topically as needed for mild pain   nitroglycerin (NITROSTAT) 0 4 mg SL tablet   No No   Sig: Place 1 tablet (0 4 mg total) under the tongue every 5 (five) minutes as needed for chest pain Call 911 if using 3 doses   ondansetron (ZOFRAN-ODT) 4 mg disintegrating tablet   No No   Sig: Take 1 tablet (4 mg total) by mouth every 6 (six) hours as needed for nausea or vomiting   pantoprazole (PROTONIX) 20 mg tablet   No No   Sig: Take 1 tablet (20 mg total) by mouth daily   polyethylene glycol (GLYCOLAX) powder   No No   Sig: Take 17 g by mouth daily   polymyxin b-trimethoprim (POLYTRIM) ophthalmic solution   No No   Sig: Administer 1 drop into the left eye every 4 (four) hours      Facility-Administered Medications: None       Past Medical History:   Diagnosis Date    [facility] at [facility])     Anxiety     Asthma     Depression     [facility]ilepsy ([facility] )     Fibroid 2012    History of cardiac cath 2006    Hydronephrosis 2007    Hypertension     Migraine without aura     PTSD (post-traumatic stress disorder)     Rheumatoid arteritis ([facility] )        Past Surgical History:   Procedure Laterality Date    APPENDECTOMY      CARDIAC CATHETERIZATION  2006     SECTION  2008    CHOLECYSTECTOMY     [address] [place] [address] KIDNEY SURGERY  2007    TUBAL LIGATION  2008       Family History   Problem Relation Age of Onset    Diabetes Maternal Grandmother     Hypertension Maternal Grandmother     No Known Problems Mother     No Known Problems Sister     No Known Problems Daughter     No Known Problems Paternal Grandmother     No Known Problems Daughter    [facility] Breast cancer Neg Hx     Cancer Neg Hx      I have reviewed and agree with the history as documented  E-Cigarette/Vaping    E-Cigarette Use Never User      E-Cigarette/Vaping Substances     Social History     Tobacco Use    Smoking status: Former Smoker     Types: Cigarettes     Last attempt to quit: 2013     Years since quittin 1    Smokeless tobacco: Former User   Substance Use Topics    Alcohol use: Not Currently     Frequency: Monthly or less     Drinks per session: 1 or 2    Drug use: Never       Review of Systems   Constitutional: Positive for chills  Negative for fatigue and fever  HENT: Negative for congestion, ear pain, rhinorrhea and sore throat  Eyes: Negative for redness  Respiratory: Negative for chest tightness and shortness of breath  Cardiovascular: Negative for chest pain and palpitations  Gastrointestinal: Positive for abdominal pain  Negative for nausea and vomiting  Genitourinary: Positive for flank pain, frequency, hematuria and urgency  Negative for dysuria  Skin: Negative for rash  Neurological: Negative for dizziness, syncope, light-headedness and numbness  Physical Exam  Physical Exam   Constitutional: She is oriented to person, place, and time  She appears well-developed and well-nourished  HENT:   Head: Normocephalic  Eyes: No scleral icterus  Cardiovascular: Normal rate and regular rhythm  Pulmonary/Chest: Effort normal and breath sounds normal  No stridor  Abdominal: Soft  She exhibits no distension  There is tenderness  There is CVA tenderness ( right flank)  Musculoskeletal: Normal range of motion  Neurological: She is alert and oriented to person, place, and time  Skin: Skin is warm and dry  Capillary refill takes less than 2 seconds  Psychiatric: She has a normal mood and affect  Nursing note and vitals reviewed        Vital Signs  ED Triage Vitals [20 1025]   Temperature Pulse Respirations Blood Pressure SpO2   97 7 °F (36 5 °C) 83 20 (!) 175/84 99 %      Temp Source Heart Rate Source Patient Position - Orthostatic VS BP Location FiO2 (%)   Tympanic Monitor Sitting Right arm --      Pain Score       8           Vitals:    03/07/20 1025 03/07/20 1236   BP: (!) 175/84 144/66   Pulse: 83 66   Patient Position - Orthostatic VS: Sitting Lying         Visual Acuity      ED Medications  Medications   sodium chloride 0 9 % bolus 1,000 mL (0 mL Intravenous Stopped 3/7/20 1238)   ketorolac (TORADOL) injection 15 mg (15 mg Intravenous Given 3/7/20 1124)       Diagnostic Studies  Results Reviewed     Procedure Component Value Units Date/Time    Comprehensive metabolic panel [[id number]]  (Abnormal) Collected:  03/07/20 1122    Lab Status:  Final result Specimen:  Blood from Arm, Right Updated:  03/07/20 1144     Sodium 136 mmol/L      Potassium 4 4 mmol/L      Chloride 104 mmol/L      CO2 24 mmol/L      ANION GAP 8 mmol/L      BUN 16 mg/dL      Creatinine 0 65 mg/dL      Glucose 99 mg/dL      Calcium 9 1 mg/dL      AST 14 U/L      ALT 18 U/L      Alkaline Phosphatase 60 U/L      Total Protein 7 9 g/dL      Albumin 4 0 g/dL      Total Bilirubin 0 20 mg/dL      eGFR 111 ml/min/1 73sq m     Narrative:       [place] guidelines for Chronic Kidney Disease (CKD):     Stage 1 with normal or high GFR (GFR > 90 mL/min/1 73 square meters)    Stage 2 Mild CKD (GFR = 60-89 mL/min/1 73 square meters)    Stage 3A Moderate CKD (GFR = 45-59 mL/min/1 73 square meters)    Stage 3B Moderate CKD (GFR = 30-44 mL/min/1 73 square meters)    Stage 4 Severe CKD (GFR = 15-29 mL/min/1 73 square meters)    Stage 5 End Stage CKD (GFR <15 mL/min/1 73 square meters)  Note: GFR calculation is accurate only with a steady state creatinine    CBC and differential [[id number]]  (Abnormal) Collected:  03/07/20 1122    Lab Status:  Final result Specimen:  Blood from Arm, Right Updated:  03/07/20 1135     WBC 7 20 Thousand/uL      RBC 4 48 Million/uL Hemoglobin 12 0 g/dL      Hematocrit 36 3 %      MCV 81 fL      MCH 26 7 pg      MCHC 33 0 g/dL      RDW 15 0 %      MPV 8 1 fL      Platelets 510 Thousands/uL      Neutrophils Relative 63 %      Lymphocytes Relative 27 %      Monocytes Relative 8 %      Eosinophils Relative 1 %      Basophils Relative 1 %      Neutrophils Absolute 4 50 Thousands/µL      Lymphocytes Absolute 2 00 Thousands/µL      Monocytes Absolute 0 60 Thousand/µL      Eosinophils Absolute 0 10 Thousand/µL      Basophils Absolute 0 10 Thousands/µL     Urine Microscopic [[id number]]  (Abnormal) Collected:  03/07/20 1034    Lab Status:  Final result Specimen:  Urine, Clean Catch Updated:  03/07/20 1127     RBC, UA Innumerable /hpf      WBC, UA 4-10 /hpf      Epithelial Cells Occasional /hpf      Bacteria, UA Innumerable /hpf      MUCUS THREADS Occasional    UA w Reflex to Microscopic w Reflex to Culture [[id number]]  (Abnormal) Collected:  03/07/20 1034    Lab Status:  Final result Specimen:  Urine, Clean Catch Updated:  03/07/20 1114     Color, UA Anastasiya     Clarity, UA Cloudy     Specific [place], UA 1 020     pH, UA 5 0     Leukocytes,  0     Nitrite, UA Negative     Protein,  (2+) mg/dl      Glucose, UA Negative mg/dl      Ketones, UA Negative mg/dl      Bilirubin, UA Negative     Blood,  0     UROBILINOGEN UA Negative mg/dL     POCT pregnancy, urine [[id number]]  (Normal) Resulted:  03/07/20 1034    Lab Status:  Final result Updated:  03/07/20 1036     EXT PREG TEST UR (Ref: Negative) negative     Control valid                 CT abdomen pelvis wo contrast   Final Result by Myesha Santillan MD (03/07 1227)      1  No acute abnormality in the abdomen or pelvis  2   Displaced left tubal ligation clip into the cul-de-sac, stable since 2018  3   Small hiatal hernia  The study was marked in EPIC for significant notification           Workstation performed: [id number]                    Procedures  Procedures         ED Course  ED Course as of Mar 07 1247   Sat Mar 07, 2020   1229 Bacteria, UA(!): Innumerable   1231 Patient was reexamined at this time and informed of laboratory and/or imaging results and was found to be stable for discharge  Return to emergency department criteria was reviewed with the patient who verbalized understanding and was agreeable to discharge and the treatment plan at this time  MDM  Number of Diagnoses or Management Options  Diagnosis management comments: Patient is a 24-year-old female past medical history significant for hydronephrosis presents for evaluation of hematuria and abdominal pain which began 1 week prior to evaluation today  Patient reports previously she has required evacuation of clots from her kidney  Patient denies any fevers or chills, upper abdominal pain nausea  Patient reports she is allergic to contrast dye  Will obtain basic lab work, urinalysis and CT scan to evaluate for potential nephrolithiasis, hydronephrosis pyelonephritis  Disposition  Final diagnoses:   Hemorrhagic cystitis     Time reflects when diagnosis was documented in both MDM as applicable and the Disposition within this note     Time User Action Codes Description Comment    3/7/2020 12:29 PM Elvi Randle Add [N30 91] Hemorrhagic cystitis       ED Disposition     ED Disposition Condition Date/Time Comment    Discharge Good Sat Mar 7, 2020 12:29 PM Jose Meek discharge to home/self care              Follow-up Information     Follow up With Specialties Details Why Contact Info Additional Information    Hemant Godwin PA-C Family Medicine, Physician Assistant Schedule an appointment as soon as possible for a visit   [address]  [address] [facility] [address] [phone]       [facility] [place] [facility] Schedule an appointment as soon as possible for a visit in 2 days  [address] [phone]  1  [facility] [place], [address], [place], [place], [address] [phone]          Patient's Medications   Discharge Prescriptions    ACETAMINOPHEN (TYLENOL) 500 MG TABLET    Take 1 tablet (500 mg total) by mouth every 6 (six) hours as needed (pain)       Start Date: 3/7/2020  End Date: --       Order Dose: 500 mg       Quantity: 30 tablet    Refills: 0    CEPHALEXIN (KEFLEX) 500 MG CAPSULE    Take 1 capsule (500 mg total) by mouth every 12 (twelve) hours for 10 days       Start Date: 3/7/2020  End Date: 3/17/2020       Order Dose: 500 mg       Quantity: 30 capsule    Refills: 0    IBUPROFEN (MOTRIN) 400 MG TABLET    Take 1 tablet (400 mg total) by mouth every 6 (six) hours as needed for moderate pain       Start Date: 3/7/2020  End Date: --       Order Dose: 400 mg       Quantity: 20 tablet    Refills: 0    PHENAZOPYRIDINE (PYRIDIUM) 95 MG TABLET    Take 1 tablet (95 mg total) by mouth 3 (three) times a day as needed for bladder spasms       Start Date: 3/7/2020  End Date: --       Order Dose: 95 mg       Quantity: 10 tablet    Refills: 0     No discharge procedures on file      PDMP Review     None          ED Provider  Electronically Signed by           Farzaneh Yanes PA-C  03/07/20 1245

## 2020-04-10 ENCOUNTER — HOSPITAL ENCOUNTER (EMERGENCY)
Facility: HOSPITAL | Age: 40
Discharge: HOME | End: 2020-04-10
Attending: EMERGENCY MEDICINE | Admitting: EMERGENCY MEDICINE
Payer: COMMERCIAL

## 2020-04-10 VITALS
RESPIRATION RATE: 20 BRPM | SYSTOLIC BLOOD PRESSURE: 165 MMHG | WEIGHT: 276.24 LBS | DIASTOLIC BLOOD PRESSURE: 105 MMHG | HEART RATE: 93 BPM | TEMPERATURE: 99.3 F | OXYGEN SATURATION: 99 % | BODY MASS INDEX: 50.52 KG/M2

## 2020-04-10 DIAGNOSIS — R11.2 NON-INTRACTABLE VOMITING WITH NAUSEA, UNSPECIFIED VOMITING TYPE: Primary | ICD-10-CM

## 2020-04-10 PROCEDURE — 99283 EMERGENCY DEPT VISIT LOW MDM: CPT

## 2020-04-10 PROCEDURE — 99284 EMERGENCY DEPT VISIT MOD MDM: CPT | Performed by: EMERGENCY MEDICINE

## 2020-04-10 RX ORDER — ONDANSETRON 4 MG/1
4 TABLET, ORALLY DISINTEGRATING ORAL ONCE
Status: COMPLETED | OUTPATIENT
Start: 2020-04-10 | End: 2020-04-10

## 2020-04-10 RX ORDER — ONDANSETRON 4 MG/1
4 TABLET, ORALLY DISINTEGRATING ORAL EVERY 6 HOURS PRN
Qty: 16 TABLET | Refills: 0 | Status: SHIPPED | OUTPATIENT
Start: 2020-04-10 | End: 2020-08-13

## 2020-04-10 RX ADMIN — ONDANSETRON 4 MG: 4 TABLET, ORALLY DISINTEGRATING ORAL at 16:29

## 2020-04-14 ENCOUNTER — OFFICE VISIT (OUTPATIENT)
Dept: FAMILY MEDICINE CLINIC | Facility: CLINIC | Age: 40
End: 2020-04-14

## 2020-04-14 VITALS
RESPIRATION RATE: 18 BRPM | OXYGEN SATURATION: 99 % | TEMPERATURE: 98.6 F | BODY MASS INDEX: 49.72 KG/M2 | HEIGHT: 62 IN | HEART RATE: 66 BPM | WEIGHT: 270.2 LBS | DIASTOLIC BLOOD PRESSURE: 82 MMHG | SYSTOLIC BLOOD PRESSURE: 128 MMHG

## 2020-04-14 DIAGNOSIS — J45.20 MILD INTERMITTENT ASTHMA WITHOUT COMPLICATION: ICD-10-CM

## 2020-04-14 DIAGNOSIS — J45.909 UNCOMPLICATED ASTHMA, UNSPECIFIED ASTHMA SEVERITY, UNSPECIFIED WHETHER PERSISTENT: ICD-10-CM

## 2020-04-14 DIAGNOSIS — R07.9 CHEST PAIN, UNSPECIFIED TYPE: ICD-10-CM

## 2020-04-14 DIAGNOSIS — I10 ESSENTIAL HYPERTENSION: ICD-10-CM

## 2020-04-14 DIAGNOSIS — J06.9 VIRAL URI: ICD-10-CM

## 2020-04-14 DIAGNOSIS — R56.9 SEIZURE (HCC): ICD-10-CM

## 2020-04-14 DIAGNOSIS — G40.909 NONINTRACTABLE EPILEPSY WITHOUT STATUS EPILEPTICUS, UNSPECIFIED EPILEPSY TYPE (HCC): ICD-10-CM

## 2020-04-14 DIAGNOSIS — M05.40 RHEUMATOID MYOPATHY WITH RHEUMATOID ARTHRITIS (HCC): ICD-10-CM

## 2020-04-14 DIAGNOSIS — J06.9 URI (UPPER RESPIRATORY INFECTION): ICD-10-CM

## 2020-04-14 DIAGNOSIS — E66.01 MORBID OBESITY (HCC): ICD-10-CM

## 2020-04-14 DIAGNOSIS — F41.9 ANXIETY: ICD-10-CM

## 2020-04-14 DIAGNOSIS — G56.03 CARPAL TUNNEL SYNDROME, BILATERAL: ICD-10-CM

## 2020-04-14 DIAGNOSIS — J45.30 MILD PERSISTENT ASTHMA WITHOUT COMPLICATION: Primary | ICD-10-CM

## 2020-04-14 PROCEDURE — 3074F SYST BP LT 130 MM HG: CPT | Performed by: PHYSICIAN ASSISTANT

## 2020-04-14 PROCEDURE — 3008F BODY MASS INDEX DOCD: CPT | Performed by: PHYSICIAN ASSISTANT

## 2020-04-14 PROCEDURE — T1015 CLINIC SERVICE: HCPCS | Performed by: PHYSICIAN ASSISTANT

## 2020-04-14 PROCEDURE — 1036F TOBACCO NON-USER: CPT | Performed by: PHYSICIAN ASSISTANT

## 2020-04-14 PROCEDURE — 93000 ELECTROCARDIOGRAM COMPLETE: CPT | Performed by: PHYSICIAN ASSISTANT

## 2020-04-14 PROCEDURE — 99214 OFFICE O/P EST MOD 30 MIN: CPT | Performed by: PHYSICIAN ASSISTANT

## 2020-04-14 PROCEDURE — 3079F DIAST BP 80-89 MM HG: CPT | Performed by: PHYSICIAN ASSISTANT

## 2020-04-14 RX ORDER — DOXEPIN HYDROCHLORIDE 25 MG/1
25 CAPSULE ORAL
Qty: 30 CAPSULE | Refills: 5 | Status: SHIPPED | OUTPATIENT
Start: 2020-04-14 | End: 2021-03-03 | Stop reason: SDUPTHER

## 2020-04-14 RX ORDER — FLUTICASONE PROPIONATE 110 UG/1
2 AEROSOL, METERED RESPIRATORY (INHALATION) 2 TIMES DAILY
Qty: 1 INHALER | Refills: 2 | Status: SHIPPED | OUTPATIENT
Start: 2020-04-14 | End: 2020-10-15 | Stop reason: SDUPTHER

## 2020-04-14 RX ORDER — ALBUTEROL SULFATE 90 UG/1
2 AEROSOL, METERED RESPIRATORY (INHALATION) EVERY 4 HOURS PRN
Qty: 18 G | Refills: 0 | Status: SHIPPED | OUTPATIENT
Start: 2020-04-14 | End: 2020-08-13 | Stop reason: SDUPTHER

## 2020-04-14 RX ORDER — FUROSEMIDE 40 MG/1
40 TABLET ORAL DAILY
Qty: 30 TABLET | Refills: 3 | Status: SHIPPED | OUTPATIENT
Start: 2020-04-14 | End: 2020-10-15 | Stop reason: SDUPTHER

## 2020-04-14 RX ORDER — LEVETIRACETAM 1000 MG/1
1000 TABLET ORAL 2 TIMES DAILY
Qty: 60 TABLET | Refills: 5 | Status: SHIPPED | OUTPATIENT
Start: 2020-04-14 | End: 2020-11-18 | Stop reason: SDUPTHER

## 2020-04-14 RX ORDER — DULOXETIN HYDROCHLORIDE 30 MG/1
30 CAPSULE, DELAYED RELEASE ORAL DAILY
Qty: 30 CAPSULE | Refills: 5 | Status: SHIPPED | OUTPATIENT
Start: 2020-04-14 | End: 2020-08-13

## 2020-04-14 RX ORDER — FEXOFENADINE HYDROCHLORIDE 60 MG/1
60 TABLET, FILM COATED ORAL 2 TIMES DAILY
Qty: 60 TABLET | Refills: 5 | Status: SHIPPED | OUTPATIENT
Start: 2020-04-14 | End: 2020-08-13

## 2020-04-14 RX ORDER — FLUTICASONE PROPIONATE 50 MCG
1 SPRAY, SUSPENSION (ML) NASAL DAILY
Qty: 16 G | Refills: 5 | Status: SHIPPED | OUTPATIENT
Start: 2020-04-14 | End: 2020-11-18 | Stop reason: SDUPTHER

## 2020-04-14 RX ORDER — CITALOPRAM 20 MG/1
20 TABLET ORAL DAILY
Qty: 30 TABLET | Refills: 5 | Status: SHIPPED | OUTPATIENT
Start: 2020-04-14 | End: 2021-03-03 | Stop reason: SDUPTHER

## 2020-04-14 RX ORDER — POLYETHYLENE GLYCOL 3350 17 G/17G
17 POWDER, FOR SOLUTION ORAL DAILY
Qty: 578 G | Refills: 0 | Status: SHIPPED | OUTPATIENT
Start: 2020-04-14 | End: 2020-10-15 | Stop reason: SDUPTHER

## 2020-05-09 ENCOUNTER — HOSPITAL ENCOUNTER (EMERGENCY)
Facility: HOSPITAL | Age: 40
Discharge: HOME | End: 2020-05-09
Attending: EMERGENCY MEDICINE | Admitting: EMERGENCY MEDICINE
Payer: COMMERCIAL

## 2020-05-09 ENCOUNTER — APPOINTMENT (EMERGENCY)
Dept: RADIOLOGY | Facility: HOSPITAL | Age: 40
End: 2020-05-09
Payer: COMMERCIAL

## 2020-05-09 VITALS
HEART RATE: 58 BPM | TEMPERATURE: 97.6 F | SYSTOLIC BLOOD PRESSURE: 135 MMHG | OXYGEN SATURATION: 100 % | RESPIRATION RATE: 18 BRPM | DIASTOLIC BLOOD PRESSURE: 68 MMHG

## 2020-05-09 DIAGNOSIS — R07.9 CHEST PAIN: Primary | ICD-10-CM

## 2020-05-09 DIAGNOSIS — R51.9 HEADACHE: ICD-10-CM

## 2020-05-09 LAB
ANION GAP SERPL CALCULATED.3IONS-SCNC: 5 MMOL/L (ref 4–13)
ATRIAL RATE: 71 BPM
BASOPHILS # BLD AUTO: 0.03 THOUSANDS/ΜL (ref 0–0.1)
BASOPHILS NFR BLD AUTO: 1 % (ref 0–1)
BUN SERPL-MCNC: 13 MG/DL (ref 5–25)
CALCIUM SERPL-MCNC: 8.9 MG/DL (ref 8.3–10.1)
CHLORIDE SERPL-SCNC: 104 MMOL/L (ref 100–108)
CO2 SERPL-SCNC: 28 MMOL/L (ref 21–32)
CREAT SERPL-MCNC: 0.67 MG/DL (ref 0.6–1.3)
EOSINOPHIL # BLD AUTO: 0.11 THOUSAND/ΜL (ref 0–0.61)
EOSINOPHIL NFR BLD AUTO: 2 % (ref 0–6)
ERYTHROCYTE [DISTWIDTH] IN BLOOD BY AUTOMATED COUNT: 13.5 % (ref 11.6–15.1)
EXT PREG TEST URINE: NEGATIVE
EXT. CONTROL ED NAV: NORMAL
GFR SERPL CREATININE-BSD FRML MDRD: 110 ML/MIN/1.73SQ M
GLUCOSE SERPL-MCNC: 94 MG/DL (ref 65–140)
HCT VFR BLD AUTO: 36.7 % (ref 34.8–46.1)
HGB BLD-MCNC: 11.5 G/DL (ref 11.5–15.4)
IMM GRANULOCYTES # BLD AUTO: 0.03 THOUSAND/UL (ref 0–0.2)
IMM GRANULOCYTES NFR BLD AUTO: 1 % (ref 0–2)
LYMPHOCYTES # BLD AUTO: 1.51 THOUSANDS/ΜL (ref 0.6–4.47)
LYMPHOCYTES NFR BLD AUTO: 23 % (ref 14–44)
MCH RBC QN AUTO: 27.3 PG (ref 26.8–34.3)
MCHC RBC AUTO-ENTMCNC: 31.3 G/DL (ref 31.4–37.4)
MCV RBC AUTO: 87 FL (ref 82–98)
MONOCYTES # BLD AUTO: 0.48 THOUSAND/ΜL (ref 0.17–1.22)
MONOCYTES NFR BLD AUTO: 7 % (ref 4–12)
NEUTROPHILS # BLD AUTO: 4.3 THOUSANDS/ΜL (ref 1.85–7.62)
NEUTS SEG NFR BLD AUTO: 66 % (ref 43–75)
NRBC BLD AUTO-RTO: 0 /100 WBCS
P AXIS: 56 DEGREES
PLATELET # BLD AUTO: 285 THOUSANDS/UL (ref 149–390)
PMV BLD AUTO: 9.9 FL (ref 8.9–12.7)
POTASSIUM SERPL-SCNC: 4.1 MMOL/L (ref 3.5–5.3)
PR INTERVAL: 154 MS
QRS AXIS: 67 DEGREES
QRSD INTERVAL: 86 MS
QT INTERVAL: 380 MS
QTC INTERVAL: 412 MS
RBC # BLD AUTO: 4.22 MILLION/UL (ref 3.81–5.12)
SODIUM SERPL-SCNC: 137 MMOL/L (ref 136–145)
T WAVE AXIS: 37 DEGREES
TROPONIN I SERPL-MCNC: <0.02 NG/ML
VENTRICULAR RATE: 71 BPM
WBC # BLD AUTO: 6.46 THOUSAND/UL (ref 4.31–10.16)

## 2020-05-09 PROCEDURE — 96374 THER/PROPH/DIAG INJ IV PUSH: CPT

## 2020-05-09 PROCEDURE — 93005 ELECTROCARDIOGRAM TRACING: CPT

## 2020-05-09 PROCEDURE — 99285 EMERGENCY DEPT VISIT HI MDM: CPT | Performed by: PHYSICIAN ASSISTANT

## 2020-05-09 PROCEDURE — 36415 COLL VENOUS BLD VENIPUNCTURE: CPT | Performed by: PHYSICIAN ASSISTANT

## 2020-05-09 PROCEDURE — 93010 ELECTROCARDIOGRAM REPORT: CPT | Performed by: INTERNAL MEDICINE

## 2020-05-09 PROCEDURE — 84484 ASSAY OF TROPONIN QUANT: CPT | Performed by: PHYSICIAN ASSISTANT

## 2020-05-09 PROCEDURE — 80048 BASIC METABOLIC PNL TOTAL CA: CPT | Performed by: PHYSICIAN ASSISTANT

## 2020-05-09 PROCEDURE — 99285 EMERGENCY DEPT VISIT HI MDM: CPT

## 2020-05-09 PROCEDURE — 71046 X-RAY EXAM CHEST 2 VIEWS: CPT

## 2020-05-09 PROCEDURE — 81025 URINE PREGNANCY TEST: CPT | Performed by: PHYSICIAN ASSISTANT

## 2020-05-09 PROCEDURE — 85025 COMPLETE CBC W/AUTO DIFF WBC: CPT | Performed by: PHYSICIAN ASSISTANT

## 2020-05-09 RX ORDER — KETOROLAC TROMETHAMINE 30 MG/ML
15 INJECTION, SOLUTION INTRAMUSCULAR; INTRAVENOUS ONCE
Status: COMPLETED | OUTPATIENT
Start: 2020-05-09 | End: 2020-05-09

## 2020-05-09 RX ORDER — NAPROXEN 500 MG/1
500 TABLET ORAL 2 TIMES DAILY WITH MEALS
Qty: 30 TABLET | Refills: 0 | Status: SHIPPED | OUTPATIENT
Start: 2020-05-09 | End: 2020-08-13

## 2020-05-09 RX ADMIN — KETOROLAC TROMETHAMINE 15 MG: 30 INJECTION, SOLUTION INTRAMUSCULAR at 13:35

## 2020-05-15 ENCOUNTER — TELEPHONE (OUTPATIENT)
Dept: FAMILY MEDICINE CLINIC | Facility: CLINIC | Age: 40
End: 2020-05-15

## 2020-06-08 ENCOUNTER — HOSPITAL ENCOUNTER (EMERGENCY)
Facility: HOSPITAL | Age: 40
Discharge: HOME | End: 2020-06-08
Attending: EMERGENCY MEDICINE
Payer: COMMERCIAL

## 2020-06-08 ENCOUNTER — APPOINTMENT (EMERGENCY)
Dept: CT IMAGING | Facility: HOSPITAL | Age: 40
End: 2020-06-08
Payer: COMMERCIAL

## 2020-06-08 VITALS
TEMPERATURE: 98 F | OXYGEN SATURATION: 100 % | DIASTOLIC BLOOD PRESSURE: 78 MMHG | WEIGHT: 266.76 LBS | HEART RATE: 69 BPM | SYSTOLIC BLOOD PRESSURE: 168 MMHG | RESPIRATION RATE: 20 BRPM | BODY MASS INDEX: 48.79 KG/M2

## 2020-06-08 DIAGNOSIS — R11.2 NAUSEA & VOMITING: ICD-10-CM

## 2020-06-08 DIAGNOSIS — R10.13 EPIGASTRIC PAIN: Primary | ICD-10-CM

## 2020-06-08 LAB
ALBUMIN SERPL BCP-MCNC: 3.8 G/DL (ref 3.5–5)
ALP SERPL-CCNC: 63 U/L (ref 46–116)
ALT SERPL W P-5'-P-CCNC: 17 U/L (ref 12–78)
ANION GAP SERPL CALCULATED.3IONS-SCNC: 6 MMOL/L (ref 4–13)
AST SERPL W P-5'-P-CCNC: 12 U/L (ref 5–45)
BACTERIA UR QL AUTO: ABNORMAL /HPF
BASOPHILS # BLD AUTO: 0.03 THOUSANDS/ΜL (ref 0–0.1)
BASOPHILS NFR BLD AUTO: 0 % (ref 0–1)
BILIRUB SERPL-MCNC: 0.25 MG/DL (ref 0.2–1)
BILIRUB UR QL STRIP: NEGATIVE
BUN SERPL-MCNC: 11 MG/DL (ref 5–25)
CALCIUM SERPL-MCNC: 9.1 MG/DL (ref 8.3–10.1)
CHLORIDE SERPL-SCNC: 104 MMOL/L (ref 100–108)
CLARITY UR: CLEAR
CO2 SERPL-SCNC: 28 MMOL/L (ref 21–32)
COLOR UR: YELLOW
CREAT SERPL-MCNC: 0.72 MG/DL (ref 0.6–1.3)
EOSINOPHIL # BLD AUTO: 0.12 THOUSAND/ΜL (ref 0–0.61)
EOSINOPHIL NFR BLD AUTO: 2 % (ref 0–6)
ERYTHROCYTE [DISTWIDTH] IN BLOOD BY AUTOMATED COUNT: 13.3 % (ref 11.6–15.1)
EXT PREG TEST URINE: NORMAL
EXT. CONTROL ED NAV: NORMAL
GFR SERPL CREATININE-BSD FRML MDRD: 105 ML/MIN/1.73SQ M
GLUCOSE SERPL-MCNC: 94 MG/DL (ref 65–140)
GLUCOSE UR STRIP-MCNC: NEGATIVE MG/DL
HCT VFR BLD AUTO: 37.9 % (ref 34.8–46.1)
HGB BLD-MCNC: 11.8 G/DL (ref 11.5–15.4)
HGB UR QL STRIP.AUTO: ABNORMAL
IMM GRANULOCYTES # BLD AUTO: 0.01 THOUSAND/UL (ref 0–0.2)
IMM GRANULOCYTES NFR BLD AUTO: 0 % (ref 0–2)
KETONES UR STRIP-MCNC: NEGATIVE MG/DL
LEUKOCYTE ESTERASE UR QL STRIP: ABNORMAL
LIPASE SERPL-CCNC: 98 U/L (ref 73–393)
LYMPHOCYTES # BLD AUTO: 2.46 THOUSANDS/ΜL (ref 0.6–4.47)
LYMPHOCYTES NFR BLD AUTO: 33 % (ref 14–44)
MCH RBC QN AUTO: 27.4 PG (ref 26.8–34.3)
MCHC RBC AUTO-ENTMCNC: 31.1 G/DL (ref 31.4–37.4)
MCV RBC AUTO: 88 FL (ref 82–98)
MONOCYTES # BLD AUTO: 0.52 THOUSAND/ΜL (ref 0.17–1.22)
MONOCYTES NFR BLD AUTO: 7 % (ref 4–12)
MUCOUS THREADS UR QL AUTO: ABNORMAL
NEUTROPHILS # BLD AUTO: 4.32 THOUSANDS/ΜL (ref 1.85–7.62)
NEUTS SEG NFR BLD AUTO: 58 % (ref 43–75)
NITRITE UR QL STRIP: NEGATIVE
NON-SQ EPI CELLS URNS QL MICRO: ABNORMAL /HPF
NRBC BLD AUTO-RTO: 0 /100 WBCS
PH UR STRIP.AUTO: 6 [PH] (ref 4.5–8)
PLATELET # BLD AUTO: 316 THOUSANDS/UL (ref 149–390)
PMV BLD AUTO: 9.8 FL (ref 8.9–12.7)
POTASSIUM SERPL-SCNC: 4.1 MMOL/L (ref 3.5–5.3)
PROT SERPL-MCNC: 8.2 G/DL (ref 6.4–8.2)
PROT UR STRIP-MCNC: NEGATIVE MG/DL
RBC # BLD AUTO: 4.31 MILLION/UL (ref 3.81–5.12)
RBC #/AREA URNS AUTO: ABNORMAL /HPF
SODIUM SERPL-SCNC: 138 MMOL/L (ref 136–145)
SP GR UR STRIP.AUTO: >=1.03 (ref 1–1.03)
UROBILINOGEN UR QL STRIP.AUTO: 0.2 E.U./DL
WBC # BLD AUTO: 7.46 THOUSAND/UL (ref 4.31–10.16)
WBC #/AREA URNS AUTO: ABNORMAL /HPF

## 2020-06-08 PROCEDURE — 81001 URINALYSIS AUTO W/SCOPE: CPT

## 2020-06-08 PROCEDURE — 74176 CT ABD & PELVIS W/O CONTRAST: CPT

## 2020-06-08 PROCEDURE — 80053 COMPREHEN METABOLIC PANEL: CPT | Performed by: EMERGENCY MEDICINE

## 2020-06-08 PROCEDURE — 99284 EMERGENCY DEPT VISIT MOD MDM: CPT

## 2020-06-08 PROCEDURE — 96361 HYDRATE IV INFUSION ADD-ON: CPT

## 2020-06-08 PROCEDURE — 85025 COMPLETE CBC W/AUTO DIFF WBC: CPT | Performed by: EMERGENCY MEDICINE

## 2020-06-08 PROCEDURE — 99284 EMERGENCY DEPT VISIT MOD MDM: CPT | Performed by: PHYSICIAN ASSISTANT

## 2020-06-08 PROCEDURE — 96375 TX/PRO/DX INJ NEW DRUG ADDON: CPT

## 2020-06-08 PROCEDURE — 36415 COLL VENOUS BLD VENIPUNCTURE: CPT | Performed by: EMERGENCY MEDICINE

## 2020-06-08 PROCEDURE — 83690 ASSAY OF LIPASE: CPT | Performed by: EMERGENCY MEDICINE

## 2020-06-08 PROCEDURE — 81025 URINE PREGNANCY TEST: CPT | Performed by: EMERGENCY MEDICINE

## 2020-06-08 PROCEDURE — 96374 THER/PROPH/DIAG INJ IV PUSH: CPT

## 2020-06-08 RX ORDER — MAGNESIUM HYDROXIDE/ALUMINUM HYDROXICE/SIMETHICONE 120; 1200; 1200 MG/30ML; MG/30ML; MG/30ML
20 SUSPENSION ORAL ONCE
Status: COMPLETED | OUTPATIENT
Start: 2020-06-08 | End: 2020-06-08

## 2020-06-08 RX ORDER — ONDANSETRON 2 MG/ML
4 INJECTION INTRAMUSCULAR; INTRAVENOUS ONCE
Status: COMPLETED | OUTPATIENT
Start: 2020-06-08 | End: 2020-06-08

## 2020-06-08 RX ORDER — METOCLOPRAMIDE 10 MG/1
10 TABLET ORAL EVERY 6 HOURS PRN
Qty: 30 TABLET | Refills: 0 | Status: SHIPPED | OUTPATIENT
Start: 2020-06-08 | End: 2020-08-13

## 2020-06-08 RX ORDER — LIDOCAINE HYDROCHLORIDE 20 MG/ML
10 SOLUTION OROPHARYNGEAL ONCE
Status: COMPLETED | OUTPATIENT
Start: 2020-06-08 | End: 2020-06-08

## 2020-06-08 RX ORDER — FAMOTIDINE 20 MG/1
20 TABLET, FILM COATED ORAL 2 TIMES DAILY
Qty: 30 TABLET | Refills: 0 | Status: SHIPPED | OUTPATIENT
Start: 2020-06-08

## 2020-06-08 RX ORDER — SUCRALFATE 1 G/1
1 TABLET ORAL 4 TIMES DAILY
Qty: 28 TABLET | Refills: 0 | Status: SHIPPED | OUTPATIENT
Start: 2020-06-08 | End: 2020-08-13

## 2020-06-08 RX ADMIN — ONDANSETRON 4 MG: 2 INJECTION INTRAMUSCULAR; INTRAVENOUS at 16:49

## 2020-06-08 RX ADMIN — FAMOTIDINE 20 MG: 10 INJECTION, SOLUTION INTRAVENOUS at 16:49

## 2020-06-08 RX ADMIN — SODIUM CHLORIDE 1000 ML: 0.9 INJECTION, SOLUTION INTRAVENOUS at 16:48

## 2020-06-08 RX ADMIN — ALUMINUM HYDROXIDE, MAGNESIUM HYDROXIDE, AND SIMETHICONE 20 ML: 200; 200; 20 SUSPENSION ORAL at 16:49

## 2020-06-08 RX ADMIN — LIDOCAINE HYDROCHLORIDE 10 ML: 20 SOLUTION ORAL; TOPICAL at 16:49

## 2020-08-08 ENCOUNTER — HOSPITAL ENCOUNTER (EMERGENCY)
Facility: HOSPITAL | Age: 40
Discharge: HOME | End: 2020-08-08
Attending: EMERGENCY MEDICINE | Admitting: EMERGENCY MEDICINE
Payer: COMMERCIAL

## 2020-08-08 VITALS
OXYGEN SATURATION: 100 % | DIASTOLIC BLOOD PRESSURE: 87 MMHG | TEMPERATURE: 98.3 F | BODY MASS INDEX: 49.35 KG/M2 | RESPIRATION RATE: 18 BRPM | HEART RATE: 73 BPM | SYSTOLIC BLOOD PRESSURE: 144 MMHG | WEIGHT: 269.84 LBS

## 2020-08-08 DIAGNOSIS — R42 DIZZINESS: Primary | ICD-10-CM

## 2020-08-08 LAB
ALBUMIN SERPL BCP-MCNC: 3.4 G/DL (ref 3.5–5)
ALP SERPL-CCNC: 61 U/L (ref 46–116)
ALT SERPL W P-5'-P-CCNC: 20 U/L (ref 12–78)
ANION GAP SERPL CALCULATED.3IONS-SCNC: 12 MMOL/L (ref 4–13)
AST SERPL W P-5'-P-CCNC: 19 U/L (ref 5–45)
BACTERIA UR QL AUTO: ABNORMAL /HPF
BASOPHILS # BLD AUTO: 0.03 THOUSANDS/ΜL (ref 0–0.1)
BASOPHILS NFR BLD AUTO: 0 % (ref 0–1)
BILIRUB SERPL-MCNC: 0.27 MG/DL (ref 0.2–1)
BILIRUB UR QL STRIP: NEGATIVE
BUN SERPL-MCNC: 13 MG/DL (ref 5–25)
CALCIUM SERPL-MCNC: 8.9 MG/DL (ref 8.3–10.1)
CHLORIDE SERPL-SCNC: 102 MMOL/L (ref 100–108)
CLARITY UR: CLEAR
CO2 SERPL-SCNC: 24 MMOL/L (ref 21–32)
COLOR UR: YELLOW
COLOR, POC: YELLOW
CREAT SERPL-MCNC: 0.64 MG/DL (ref 0.6–1.3)
EOSINOPHIL # BLD AUTO: 0.12 THOUSAND/ΜL (ref 0–0.61)
EOSINOPHIL NFR BLD AUTO: 2 % (ref 0–6)
ERYTHROCYTE [DISTWIDTH] IN BLOOD BY AUTOMATED COUNT: 13.2 % (ref 11.6–15.1)
EXT PREG TEST URINE: NEGATIVE
EXT. CONTROL ED NAV: NORMAL
GFR SERPL CREATININE-BSD FRML MDRD: 112 ML/MIN/1.73SQ M
GLUCOSE SERPL-MCNC: 60 MG/DL (ref 65–140)
GLUCOSE SERPL-MCNC: 87 MG/DL (ref 65–140)
GLUCOSE SERPL-MCNC: 93 MG/DL (ref 65–140)
GLUCOSE UR STRIP-MCNC: NEGATIVE MG/DL
HCT VFR BLD AUTO: 36.3 % (ref 34.8–46.1)
HGB BLD-MCNC: 11.4 G/DL (ref 11.5–15.4)
HGB UR QL STRIP.AUTO: ABNORMAL
IMM GRANULOCYTES # BLD AUTO: 0.02 THOUSAND/UL (ref 0–0.2)
IMM GRANULOCYTES NFR BLD AUTO: 0 % (ref 0–2)
KETONES UR STRIP-MCNC: NEGATIVE MG/DL
LEUKOCYTE ESTERASE UR QL STRIP: NEGATIVE
LYMPHOCYTES # BLD AUTO: 2.28 THOUSANDS/ΜL (ref 0.6–4.47)
LYMPHOCYTES NFR BLD AUTO: 33 % (ref 14–44)
MCH RBC QN AUTO: 27.7 PG (ref 26.8–34.3)
MCHC RBC AUTO-ENTMCNC: 31.4 G/DL (ref 31.4–37.4)
MCV RBC AUTO: 88 FL (ref 82–98)
MONOCYTES # BLD AUTO: 0.48 THOUSAND/ΜL (ref 0.17–1.22)
MONOCYTES NFR BLD AUTO: 7 % (ref 4–12)
NEUTROPHILS # BLD AUTO: 4.04 THOUSANDS/ΜL (ref 1.85–7.62)
NEUTS SEG NFR BLD AUTO: 58 % (ref 43–75)
NITRITE UR QL STRIP: NEGATIVE
NON-SQ EPI CELLS URNS QL MICRO: ABNORMAL /HPF
NRBC BLD AUTO-RTO: 0 /100 WBCS
PH UR STRIP.AUTO: 5 [PH] (ref 4.5–8)
PLATELET # BLD AUTO: 314 THOUSANDS/UL (ref 149–390)
PMV BLD AUTO: 10.1 FL (ref 8.9–12.7)
POTASSIUM SERPL-SCNC: 4.2 MMOL/L (ref 3.5–5.3)
PROT SERPL-MCNC: 7.8 G/DL (ref 6.4–8.2)
PROT UR STRIP-MCNC: NEGATIVE MG/DL
RBC # BLD AUTO: 4.12 MILLION/UL (ref 3.81–5.12)
RBC #/AREA URNS AUTO: ABNORMAL /HPF
SODIUM SERPL-SCNC: 138 MMOL/L (ref 136–145)
SP GR UR STRIP.AUTO: >=1.03 (ref 1–1.03)
TROPONIN I SERPL-MCNC: <0.02 NG/ML
UROBILINOGEN UR QL STRIP.AUTO: 0.2 E.U./DL
WBC # BLD AUTO: 6.97 THOUSAND/UL (ref 4.31–10.16)
WBC #/AREA URNS AUTO: ABNORMAL /HPF

## 2020-08-08 PROCEDURE — 96360 HYDRATION IV INFUSION INIT: CPT

## 2020-08-08 PROCEDURE — 84484 ASSAY OF TROPONIN QUANT: CPT | Performed by: EMERGENCY MEDICINE

## 2020-08-08 PROCEDURE — 93005 ELECTROCARDIOGRAM TRACING: CPT

## 2020-08-08 PROCEDURE — 81001 URINALYSIS AUTO W/SCOPE: CPT

## 2020-08-08 PROCEDURE — 82948 REAGENT STRIP/BLOOD GLUCOSE: CPT

## 2020-08-08 PROCEDURE — 99285 EMERGENCY DEPT VISIT HI MDM: CPT

## 2020-08-08 PROCEDURE — 99284 EMERGENCY DEPT VISIT MOD MDM: CPT | Performed by: EMERGENCY MEDICINE

## 2020-08-08 PROCEDURE — 80053 COMPREHEN METABOLIC PANEL: CPT | Performed by: EMERGENCY MEDICINE

## 2020-08-08 PROCEDURE — 85025 COMPLETE CBC W/AUTO DIFF WBC: CPT | Performed by: EMERGENCY MEDICINE

## 2020-08-08 PROCEDURE — 81025 URINE PREGNANCY TEST: CPT | Performed by: EMERGENCY MEDICINE

## 2020-08-08 PROCEDURE — 36415 COLL VENOUS BLD VENIPUNCTURE: CPT | Performed by: EMERGENCY MEDICINE

## 2020-08-08 RX ADMIN — SODIUM CHLORIDE 1000 ML: 0.9 INJECTION, SOLUTION INTRAVENOUS at 17:34

## 2020-08-08 NOTE — ED NOTES
Patient provided with juice, elizabeth  Denies dizziness at present        Horris Simmonds, RN  08/08/20 0667

## 2020-08-08 NOTE — Clinical Note
Carly De Luna was seen and treated in our emergency department on 8/8/2020  Diagnosis:     Olegario Me  may return to work on return date  She may return on 08/09/2020  If you have any questions or concerns, please don't hesitate to call        Bibi Umana RN    ______________________________           _______________          _______________  Hospital Representative                              Date                                Time

## 2020-08-08 NOTE — ED PROVIDER NOTES
Pt Name: Magda Schaeffer  MRN: [id number]  [place] 1980  Age/Sex: 35 y o  female  Date of evaluation: 2020  PCP: Shanta Chang PA-C    CHIEF COMPLAINT    Chief Complaint   Patient presents with    Dizziness     EMS reports pt woke up not feeling well and took bgl of 93 prior to work at [employer]  While at work she became lightheaded   for EMS  Pt reports dizzy spells x2 weeks and states bgl has been "a little low"  This morning she felt like she couldn't get out of bed  At work reports "Room was spinning"  HPI    Kings Rojo presents to the Emergency Department complaining of dizziness/ lightheadedness  She was at work as a  when she started to feel dizzy  She had to sit down but never completely collapsed  She has not eaten anything since morning and still is feeling slightly weak  She has otherwise been eating and drinking normally until today  For the last week or so she has had diarrhea  This is unusual for her because she typically has to take miralax for constipation daily  No fever  No abdominal pain          HPI      Past Medical and Surgical History    Past Medical History:   Diagnosis Date    Angina at rest [facility])     Anxiety     Depression     Epilepsy ([facility] )     Fibroid 2012    History of cardiac cath 2006    Hydronephrosis 2007    Hypertension     Migraine without aura     PTSD (post-traumatic stress disorder)     Rheumatoid arteritis ([facility] )        Past Surgical History:   Procedure Laterality Date    APPENDECTOMY      CARDIAC CATHETERIZATION  2006     SECTION  2008    CHOLECYSTECTOMY  2018   Lynda Rose KIDNEY SURGERY  2007    TUBAL LIGATION  2008       Family History   Problem Relation Age of Onset    Diabetes Maternal Grandmother     Hypertension Maternal Grandmother     No Known Problems Mother     No Known Problems Sister     No Known Problems Daughter     No Known Problems Paternal Grandmother     No Known Problems Daughter     Breast cancer Neg Hx     Cancer Neg Hx        Social History     Tobacco Use    Smoking status: Former Smoker     Types: Cigarettes     Last attempt to quit: 2013     Years since quittin 6    Smokeless tobacco: Former User   Substance Use Topics    Alcohol use: Not Currently     Frequency: Monthly or less     Drinks per session: 1 or 2    Drug use: Never           Allergies    Allergies   Allergen Reactions    Codeine     Iodine     Latex     Lovenox [Enoxaparin]     Shellfish-Derived Products Hives    Vaccinium Angustifolium Hives    Vicodin [Hydrocodone-Acetaminophen]     Benadryl [Diphenhydramine] Rash    Oxycontin [Oxycodone] Palpitations    Provera [Medroxyprogesterone] Palpitations       Home Medications    Prior to Admission medications    Medication Sig Start Date End Date Taking? Authorizing Provider   Abatacept (ORENCIA) 125 MG/ML SOSY Inject 125 mg under the skin Once a week 19 Yes Historical Provider, MD   acetaminophen (TYLENOL) 500 mg tablet Take 1 tablet (500 mg total) by mouth every 6 (six) hours as needed (pain) 3/7/20  Yes Sherry Edwards PA-C   albuterol (PROVENTIL HFA,VENTOLIN HFA) 90 mcg/act inhaler Inhale 2 puffs every 4 (four) hours as needed for wheezing 19  Yes Leesa Grey DO   albuterol (Ventolin HFA) 90 mcg/act inhaler Inhale 2 puffs every 4 (four) hours as needed for wheezing or shortness of breath 20  Yes Hemant Godwin PA-C   Calcium Carb-Cholecalciferol (CALCIUM 600+D3) 600-200 MG-UNIT TABS take 1 tablet twice a day 10/5/15  Yes Historical Provider, MD   celecoxib (CeleBREX) 200 mg capsule Take 200 mg by mouth 2 (two) times a day 9/11/19 9/10/20 Yes Historical Provider, MD   Cholecalciferol (VITAMIN D3) 2000 units capsule take 1 tablet po daily   10/5/15  Yes Historical Provider, MD   citalopram (CeleXA) 20 mg tablet Take 1 tablet (20 mg total) by mouth daily 20  Yes Hemant Godwin PA-C   diclofenac (VOLTAREN) 75 mg EC tablet Take 1 tablet (75 mg total) by mouth 2 (two) times a day 11/6/19  Yes Jv Tellez MD   doxepin (SINEquan) 25 mg capsule Take 1 capsule (25 mg total) by mouth daily at bedtime 4/14/20  Yes Hemant Godwin PA-C   DULoxetine (CYMBALTA) 30 mg delayed release capsule Take 1 capsule (30 mg total) by mouth daily 4/14/20  Yes Hemant Godwin PA-C   Elastic Bandages & Supports (CARPAL TUNNEL WRIST DELUXE) MISC by Does not apply route daily at bedtime 8/2/19  Yes Hemant Godwin PA-C   famotidine (PEPCID) 20 mg tablet Take 1 tablet (20 mg total) by mouth 2 (two) times a day 6/8/20  Yes Evy Katz PA-C   fexofenadine (ALLEGRA) 60 MG tablet Take 1 tablet (60 mg total) by mouth 2 (two) times a day 4/14/20 8/8/20 Yes Hemant Godwin PA-C   fluticasone (FLONASE) 50 mcg/act nasal spray 1 spray into each nostril daily 4/14/20  Yes Hemant Godwin PA-C   fluticasone (FLOVENT HFA) 110 MCG/ACT inhaler Inhale 2 puffs 2 (two) times a day Rinse mouth after use   4/14/20  Yes Hemant Gdowin PA-C   folic acid (FOLVITE) 1 mg tablet every 24 hours 1/27/16  Yes Historical Provider, MD   furosemide (LASIX) 40 mg tablet Take 1 tablet (40 mg total) by mouth daily 4/14/20  Yes Hemant Godwin PA-C   Incontinence Supply Disposable (POISE PAD) PADS by Does not apply route 4 (four) times a day as needed (incontinence) 9/5/18  Yes Scotty Jacinto MD   leflunomide (ARAVA) 10 MG tablet Take 10 mg by mouth daily   Yes Historical Provider, MD   levETIRAcetam (KEPPRA) 1000 MG tablet Take 1 tablet (1,000 mg total) by mouth 2 (two) times a day 4/14/20  Yes Hemant Godwin PA-C   metoclopramide (REGLAN) 10 mg tablet Take 1 tablet (10 mg total) by mouth every 6 (six) hours as needed (Nausea/vomiting) 6/8/20  Yes Evy Katz PA-C   Multiple Vitamins-Minerals (MULTIVITAMIN WITH MINERALS) tablet Take 1 tablet by mouth daily 8/2/19  Yes Hemant Godwin PA-C   naproxen (NAPROSYN) 500 mg tablet Take 1 tablet (500 mg total) by mouth 2 (two) times a day with meals 5/9/20 Yes Diaz Miller PA-C   nitroglycerin (NITROSTAT) 0 4 mg SL tablet Place 1 tablet (0 4 mg total) under the tongue every 5 (five) minutes as needed for chest pain Call 911 if using 3 doses 11/6/19  Yes Venancio Rivers MD   ondansetron (ZOFRAN-ODT) 4 mg disintegrating tablet Take 1 tablet (4 mg total) by mouth every 6 (six) hours as needed for nausea or vomiting 4/10/20  Yes Nelva Frankel, MD   polyethylene glycol [facility]) powder Take 17 g by mouth daily 4/14/20  Yes Hemant Godwin PA-C   sucralfate (CARAFATE) 1 g tablet Take 1 tablet (1 g total) by mouth 4 (four) times a day for 7 days 6/8/20 8/8/20 Yes Alton Valle PA-C           Review of Systems    Review of Systems   Constitutional: Negative for activity change, appetite change, chills, diaphoresis, fatigue and fever  HENT: Negative for congestion, postnasal drip, rhinorrhea, sinus pressure, sneezing and sore throat  Eyes: Negative for pain and visual disturbance  Respiratory: Negative for cough, chest tightness and shortness of breath  Cardiovascular: Negative for chest pain, palpitations and leg swelling  Gastrointestinal: Positive for diarrhea  Negative for abdominal distention, abdominal pain, constipation, nausea and vomiting  Endocrine: Negative for polydipsia, polyphagia and polyuria  Genitourinary: Negative for decreased urine volume, difficulty urinating, dysuria, flank pain, frequency and hematuria  Musculoskeletal: Negative for arthralgias, gait problem, joint swelling and neck pain  Skin: Negative for pallor and rash  Allergic/Immunologic: Negative for immunocompromised state  Neurological: Positive for dizziness and light-headedness  Negative for syncope, speech difficulty, weakness, numbness and headaches  All other systems reviewed and are negative        Physical Exam      ED Triage Vitals [08/08/20 1506]   Temperature Pulse Respirations Blood Pressure SpO2   98 3 °F (36 8 °C) 72 18 (!) 159/116 100 % Temp Source Heart Rate Source Patient Position - Orthostatic VS BP Location FiO2 (%)   Oral Monitor Sitting Right arm --      Pain Score       --               Physical Exam  Vitals signs and nursing note reviewed  Constitutional:       General: She is not in acute distress  Appearance: She is well-developed  She is not diaphoretic  HENT:      Head: Normocephalic and atraumatic  Nose: Nose normal    Eyes:      General: Lids are normal       Conjunctiva/sclera: Conjunctivae normal       Pupils: Pupils are equal, round, and reactive to light  Neck:      Musculoskeletal: Normal range of motion and neck supple  Cardiovascular:      Rate and Rhythm: Normal rate and regular rhythm  Heart sounds: Normal heart sounds  No murmur  No friction rub  No gallop  Pulmonary:      Effort: Pulmonary effort is normal  No accessory muscle usage or respiratory distress  Breath sounds: Normal breath sounds  No wheezing or rales  Abdominal:      General: There is no distension  Palpations: Abdomen is soft  Tenderness: There is no abdominal tenderness  There is no guarding or rebound  Skin:     General: Skin is warm and dry  Findings: No erythema or rash  Neurological:      Mental Status: She is alert and oriented to person, place, and time  Cranial Nerves: No cranial nerve deficit  Sensory: No sensory deficit  Psychiatric:         Speech: Speech normal          Behavior: Behavior normal          Thought Content: Thought content normal          Judgment: Judgment normal                 Assessment and Plan    Dominik Millard is a 35 y o  female who presents with dizziness  Physical examination unremarkable  Plan will be to perform diagnostic testing and treat symptomatically  MDM    Diagnostic Results      EKG INTERPRETATION  EKG Interpretation    EKG interpreted by me     Interpretation by Gabbie Vaughn DO  EKG reviewed and interpreted independently      Labs:    Results for orders placed or performed during the hospital encounter of 08/08/20   CBC and differential   Result Value Ref Range    WBC 6 97 4 31 - 10 16 Thousand/uL    RBC 4 12 3 81 - 5 12 Million/uL    Hemoglobin 11 4 (L) 11 5 - 15 4 g/dL    Hematocrit 36 3 34 8 - 46 1 %    MCV 88 82 - 98 fL    MCH 27 7 26 8 - 34 3 pg    MCHC 31 4 31 4 - 37 4 g/dL    RDW 13 2 11 6 - 15 1 %    MPV 10 1 8 9 - 12 7 fL    Platelets 571 065 - 289 Thousands/uL    nRBC 0 /100 WBCs    Neutrophils Relative 58 43 - 75 %    Immat GRANS % 0 0 - 2 %    Lymphocytes Relative 33 14 - 44 %    Monocytes Relative 7 4 - 12 %    Eosinophils Relative 2 0 - 6 %    Basophils Relative 0 0 - 1 %    Neutrophils Absolute 4 04 1 85 - 7 62 Thousands/µL    Immature Grans Absolute 0 02 0 00 - 0 20 Thousand/uL    Lymphocytes Absolute 2 28 0 60 - 4 47 Thousands/µL    Monocytes Absolute 0 48 0 17 - 1 22 Thousand/µL    Eosinophils Absolute 0 12 0 00 - 0 61 Thousand/µL    Basophils Absolute 0 03 0 00 - 0 10 Thousands/µL   Comprehensive metabolic panel   Result Value Ref Range    Sodium 138 136 - 145 mmol/L    Potassium 4 2 3 5 - 5 3 mmol/L    Chloride 102 100 - 108 mmol/L    CO2 24 21 - 32 mmol/L    ANION GAP 12 4 - 13 mmol/L    BUN 13 5 - 25 mg/dL    Creatinine 0 64 0 60 - 1 30 mg/dL    Glucose 87 65 - 140 mg/dL    Calcium 8 9 8 3 - 10 1 mg/dL    AST 19 5 - 45 U/L    ALT 20 12 - 78 U/L    Alkaline Phosphatase 61 46 - 116 U/L    Total Protein 7 8 6 4 - 8 2 g/dL    Albumin 3 4 (L) 3 5 - 5 0 g/dL    Total Bilirubin 0 27 0 20 - 1 00 mg/dL    eGFR 112 ml/min/1 73sq m   Troponin I   Result Value Ref Range    Troponin I <0 02 <=0 04 ng/mL   Urine Microscopic   Result Value Ref Range    RBC, UA 0-1 (A) None Seen, 0-5 /hpf    WBC, UA 0-1 (A) None Seen, 0-5, 5-55, 5-65 /hpf    Epithelial Cells Occasional None Seen, Occasional /hpf    Bacteria, UA Occasional None Seen, Occasional /hpf   POCT pregnancy, urine   Result Value Ref Range    EXT PREG TEST UR (Ref: Negative) negative     Control valid    POCT urinalysis dipstick   Result Value Ref Range    Color, UA yellow    Urine Macroscopic, POC   Result Value Ref Range    Color, UA Yellow     Clarity, UA Clear     pH, UA 5 0 4 5 - 8 0    Leukocytes, UA Negative Negative    Nitrite, UA Negative Negative    Protein, UA Negative Negative mg/dl    Glucose, UA Negative Negative mg/dl    Ketones, UA Negative Negative mg/dl    Urobilinogen, UA 0 2 0 2, 1 0 E U /dl E U /dl    Bilirubin, UA Negative Negative    Blood, UA Moderate (A) Negative    Specific Gravity, UA >=1 030 1 003 - 1 030   Fingerstick Glucose (POCT)   Result Value Ref Range    POC Glucose 60 (L) 65 - 140 mg/dl   Fingerstick Glucose (POCT)   Result Value Ref Range    POC Glucose 93 65 - 140 mg/dl       All labs reviewed and utilized in the medical decision making process    Radiology:    No orders to display       All radiology studies independently viewed by me and interpreted by the radiologist     Procedure    Procedures      ED Course of Care and Re-Assessments      Medications   sodium chloride 0 9 % bolus 1,000 mL (0 mL Intravenous Stopped 8/8/20 1856)           FINAL IMPRESSION    Final diagnoses:   Dizziness         DISPOSITION/PLAN    Time reflects when diagnosis was documented in both MDM as applicable and the Disposition within this note     Time User Action Codes Description Comment    8/8/2020  6:13 PM Faith Harris Add [R42] Dizziness       ED Disposition     ED Disposition Condition Date/Time Comment    Discharge Stable Sat Aug 8, 2020  6:13 PM Autumn Wright discharge to home/self care              Follow-up Information     Follow up With Specialties Details Why Contact Info    Hemant Godwin PA-C Family Medicine, Physician Assistant Schedule an appointment as soon as possible for a visit   [address] [id number] [facility] TO:    Castro Parisi PA-C  [address] [facility]    Schedule an appointment as soon as possible for a visit         DISCHARGE MEDICATIONS:    Discharge Medication List as of 8/8/2020  6:14 PM      CONTINUE these medications which have NOT CHANGED    Details   Abatacept (ORENCIA) 125 MG/ML SOSY Inject 125 mg under the skin Once a week, Starting Wed 9/25/2019, Until Sat 8/8/2020, Historical Med      acetaminophen (TYLENOL) 500 mg tablet Take 1 tablet (500 mg total) by mouth every 6 (six) hours as needed (pain), Starting Sat 3/7/2020, Print      !! albuterol (PROVENTIL HFA,VENTOLIN HFA) 90 mcg/act inhaler Inhale 2 puffs every 4 (four) hours as needed for wheezing, Starting Sun 12/29/2019, Print      !! albuterol (Ventolin HFA) 90 mcg/act inhaler Inhale 2 puffs every 4 (four) hours as needed for wheezing or shortness of breath, Starting Tue 4/14/2020, Normal      Calcium Carb-Cholecalciferol (CALCIUM 600+D3) 600-200 MG-UNIT TABS take 1 tablet twice a day, Historical Med      celecoxib (CeleBREX) 200 mg capsule Take 200 mg by mouth 2 (two) times a day, Starting Wed 9/11/2019, Until Thu 9/10/2020, Historical Med      Cholecalciferol (VITAMIN D3) 2000 units capsule take 1 tablet po daily  , Historical Med      citalopram (CeleXA) 20 mg tablet Take 1 tablet (20 mg total) by mouth daily, Starting Tue 4/14/2020, Normal      diclofenac (VOLTAREN) 75 mg EC tablet Take 1 tablet (75 mg total) by mouth 2 (two) times a day, Starting Wed 11/6/2019, Normal      doxepin (SINEquan) 25 mg capsule Take 1 capsule (25 mg total) by mouth daily at bedtime, Starting Tue 4/14/2020, Normal      DULoxetine (CYMBALTA) 30 mg delayed release capsule Take 1 capsule (30 mg total) by mouth daily, Starting Tue 4/14/2020, Normal      Elastic Bandages & Supports (CARPAL TUNNEL WRIST DELUXE) MISC by Does not apply route daily at bedtime, Starting Fri 8/2/2019, Print      famotidine (PEPCID) 20 mg tablet Take 1 tablet (20 mg total) by mouth 2 (two) times a day, Starting Mon 6/8/2020, Normal      fexofenadine (ALLEGRA) 60 MG tablet Take 1 tablet (60 mg total) by mouth 2 (two) times a day, Starting Tue 4/14/2020, Until Sat 8/8/2020, Normal      fluticasone (FLONASE) 50 mcg/act nasal spray 1 spray into each nostril daily, Starting Tue 4/14/2020, Normal      fluticasone (FLOVENT HFA) 110 MCG/ACT inhaler Inhale 2 puffs 2 (two) times a day Rinse mouth after use , Starting Tue 8/45/4918, Normal      folic acid (FOLVITE) 1 mg tablet every 24 hours, Starting Wed 1/27/2016, Historical Med      furosemide (LASIX) 40 mg tablet Take 1 tablet (40 mg total) by mouth daily, Starting Tue 4/14/2020, Normal      Incontinence Supply Disposable (POISE PAD) PADS by Does not apply route 4 (four) times a day as needed (incontinence), Starting Wed 9/5/2018, Normal      leflunomide (ARAVA) 10 MG tablet Take 10 mg by mouth daily, Historical Med      levETIRAcetam (KEPPRA) 1000 MG tablet Take 1 tablet (1,000 mg total) by mouth 2 (two) times a day, Starting Tue 4/14/2020, Normal      metoclopramide (REGLAN) 10 mg tablet Take 1 tablet (10 mg total) by mouth every 6 (six) hours as needed (Nausea/vomiting), Starting Mon 6/8/2020, Normal      Multiple Vitamins-Minerals (MULTIVITAMIN WITH MINERALS) tablet Take 1 tablet by mouth daily, Starting Fri 8/2/2019, Normal      naproxen (NAPROSYN) 500 mg tablet Take 1 tablet (500 mg total) by mouth 2 (two) times a day with meals, Starting Sat 5/9/2020, Normal      nitroglycerin (NITROSTAT) 0 4 mg SL tablet Place 1 tablet (0 4 mg total) under the tongue every 5 (five) minutes as needed for chest pain Call 911 if using 3 doses, Starting Wed 11/6/2019, Normal      ondansetron (ZOFRAN-ODT) 4 mg disintegrating tablet Take 1 tablet (4 mg total) by mouth every 6 (six) hours as needed for nausea or vomiting, Starting Fri 4/10/2020, Normal      polyethylene glycol (GLYCOLAX) powder Take 17 g by mouth daily, Starting Tue 4/14/2020, Normal      sucralfate (CARAFATE) 1 g tablet Take 1 tablet (1 g total) by mouth 4 (four) times a day for 7 days, Starting Mon 6/8/2020, Until Sat 8/8/2020, Normal       !! - Potential duplicate medications found  Please discuss with provider  No discharge procedures on file           DO Doug Pretty DO  08/09/20 0000

## 2020-08-09 LAB
ATRIAL RATE: 65 BPM
P AXIS: 53 DEGREES
PR INTERVAL: 176 MS
QRS AXIS: 52 DEGREES
QRSD INTERVAL: 92 MS
QT INTERVAL: 386 MS
QTC INTERVAL: 401 MS
T WAVE AXIS: 47 DEGREES
VENTRICULAR RATE: 65 BPM

## 2020-08-09 PROCEDURE — 93010 ELECTROCARDIOGRAM REPORT: CPT | Performed by: INTERNAL MEDICINE

## 2020-08-13 ENCOUNTER — OFFICE VISIT (OUTPATIENT)
Dept: FAMILY MEDICINE CLINIC | Facility: CLINIC | Age: 40
End: 2020-08-13

## 2020-08-13 VITALS
SYSTOLIC BLOOD PRESSURE: 138 MMHG | DIASTOLIC BLOOD PRESSURE: 98 MMHG | HEART RATE: 71 BPM | WEIGHT: 263.1 LBS | OXYGEN SATURATION: 99 % | RESPIRATION RATE: 20 BRPM | HEIGHT: 62 IN | BODY MASS INDEX: 48.42 KG/M2 | TEMPERATURE: 98.2 F

## 2020-08-13 DIAGNOSIS — E66.01 MORBID OBESITY (HCC): ICD-10-CM

## 2020-08-13 DIAGNOSIS — J06.9 VIRAL URI: ICD-10-CM

## 2020-08-13 DIAGNOSIS — E16.2 HYPOGLYCEMIA: Primary | ICD-10-CM

## 2020-08-13 DIAGNOSIS — M05.40 RHEUMATOID MYOPATHY WITH RHEUMATOID ARTHRITIS (HCC): ICD-10-CM

## 2020-08-13 LAB — SL AMB POCT GLUCOSE BLD: 91

## 2020-08-13 PROCEDURE — 3080F DIAST BP >= 90 MM HG: CPT | Performed by: FAMILY MEDICINE

## 2020-08-13 PROCEDURE — 1036F TOBACCO NON-USER: CPT | Performed by: FAMILY MEDICINE

## 2020-08-13 PROCEDURE — 82948 REAGENT STRIP/BLOOD GLUCOSE: CPT | Performed by: FAMILY MEDICINE

## 2020-08-13 PROCEDURE — 99213 OFFICE O/P EST LOW 20 MIN: CPT | Performed by: FAMILY MEDICINE

## 2020-08-13 PROCEDURE — 3008F BODY MASS INDEX DOCD: CPT | Performed by: FAMILY MEDICINE

## 2020-08-13 PROCEDURE — 3075F SYST BP GE 130 - 139MM HG: CPT | Performed by: FAMILY MEDICINE

## 2020-08-13 RX ORDER — FEXOFENADINE HYDROCHLORIDE 60 MG/1
60 TABLET, FILM COATED ORAL DAILY
Qty: 60 TABLET | Refills: 5
Start: 2020-08-13 | End: 2021-01-14

## 2020-08-13 RX ORDER — IBUPROFEN 400 MG/1
400 TABLET ORAL EVERY 8 HOURS PRN
Qty: 60 TABLET | Refills: 5 | Status: SHIPPED | OUTPATIENT
Start: 2020-08-13 | End: 2020-10-15

## 2020-08-13 RX ORDER — ACETAMINOPHEN 500 MG
1000 TABLET ORAL EVERY 8 HOURS PRN
Qty: 30 TABLET | Refills: 0
Start: 2020-08-13 | End: 2020-11-18

## 2020-08-13 NOTE — PROGRESS NOTES
Chief Complaint   Patient presents with    Dizziness     Follow up from ER, pt blood sugar was low     Blood Pressure: 138/98, Pulse: 71, Respirations: 20, Temperature: 98 2 °F (36 8 °C), Temp Source: Temporal, SpO2: 99 %, Weight - Scale: 119 kg (263 lb 1 6 oz), Height: 5' 2" (157 5 cm), Pain Score:   8    Assessment/Plan  1  Clinically significant hypoglycemia evidenced by one blood sugar of 60 and sympathoadrenal symptoms that improve after raising glucose concentration to normal  Differential diagnosis includes failure to regulate insulin levels or failure of counter-regulator mechanisms  Less likely medication side effect based on current list  Given her complex psychiatric history and morbid obesity, strict dieting is a possibility, although she denied it  If she had adrenal insufficiency she would most likely have abnormalities on CMP and a lower BP  Given her charted weight loss of 15 lbs, I do not think she has been taking a hypoglycemic agent, but I did order the lab work to check for this  CT scan of abdomen and pelvis without signs of malignancy  Overall the etiology is unclear and she is aware that it might take some time to determine what the cause is  2  Polypharmacy  She is taking Cymbalta (RX here), Celexa (RX psychiatry), and Doxepin (RX psychiatry)  I explained that this is probably not why she is having symptoms but it is an unsafe combination  She was agreeable to stop the Cymbalta  She has also been prescribed 3 different NSAIDs: diclofenac, celecoxib, and naproxen  Because she has such difficult with pain, sometimes taking up to 2000 mg of Tylenol before bed, I recommended she tries Tylenol 1000 mg and Ibuprofen 400 mg Q8H PRN  She agreed  3  We will call her and let her know the next steps when her labs return  She will get them done before 8 AM  She will also follow them on [facility] to stay up to date      The above was discussed in layman's terms, the patient was engaged using the shared-decision making process and verbalized understanding of the treatment plan  All questions were answered to the patient's satisfaction  Diagnoses and all orders for this visit:    Hypoglycemia  -     Insulin, random; Future  -     C-peptide; Future  -     Beta Hydroxybutyrate; Future  -     Cortisol; Future  -     HEMOGLOBIN A1C W/ EAG ESTIMATION; Future  -     TSH, 3rd generation with Free T4 reflex; Future  -     POCT blood glucose    Morbid obesity (HCC)  -     HEMOGLOBIN A1C W/ EAG ESTIMATION; Future  -     Lipid Panel with Direct LDL reflex; Future    Rheumatoid myopathy with rheumatoid arthritis (HCC)  -     acetaminophen (TYLENOL) 500 mg tablet; Take 2 tablets (1,000 mg total) by mouth every 8 (eight) hours as needed (pain)  -     ibuprofen (MOTRIN) 400 mg tablet; Take 1 tablet (400 mg total) by mouth every 8 (eight) hours as needed (pain)          Subjective/HPI  1  Patient is not well known to me  She has a complex medical history including morbid obesity, rheumatoid arthritis treated by rheumatology, chronic pain, and depression with history of suicidal attempt treated by psychiatry  She went to the ER for dizziness last week and was found to have a blood sugar of 60  CBC and CMP were unremarkable  2 months ago she was in the ER for epigastric pain and CT abd pel was unremarkable  2  Dizziness  Severity: Mild  Duration: Few months, becoming more frequent  Timing: Occurs during the day, irrespective of location  Associated signs and symptoms: Diaphoresis, anxiety, tremor  No falls or LOC  Weight loss of 15 lbs over the last year without any change in her diet  Symptoms resolve a few minutes after administration of sugary foods  Symptoms can occur even a few hours after eating though  3  Patient thinks this is due to hypoglycemia because 17 years ago she had a similar episode and had low blood sugar  She had no more episodes until a few months ago   She has an extensive medication list but reports no changes in medications for several months so does not think it is a medication side effect  FMHx +DM, +hypothyroidism  4  History of seizure disorder  Does not follow with neurology  Takes Keppra  Review of Systems  Constitutional: Negative for activity change, appetite change, chills and fever  Respiratory: Negative for shortness of breath  Cardiovascular: Negative for chest pain  Gastrointestinal: Negative for blood in stool, nausea and vomiting  Genitourinary: Negative for difficulty urinating and dysuria  Psychiatric/Behavioral: Negative for behavioral problems  Pertinent items are noted in chief complaint and HPI  Social History   reports that she quit smoking about 7 years ago  Her smoking use included cigarettes  She has quit using smokeless tobacco     reports previous alcohol use  reports no history of drug use  Objective  Physical Exam   Constitutional: She is oriented to person, place, and time  She appears well-developed and well-nourished  No distress  HENT:   Head: Normocephalic and atraumatic  Eyes: Conjunctivae are normal    Neck: Normal range of motion  Cardiovascular: Normal rate, regular rhythm and normal heart sounds  No murmur heard  Pulmonary/Chest: Effort normal and breath sounds normal  No respiratory distress  She has no wheezes  Musculoskeletal: Normal range of motion  She exhibits no edema  Neurological: She is alert and oriented to person, place, and time  Psychiatric: She has a normal mood and affect  Her behavior is normal    Nursing note and vitals reviewed  No signs of hyperpigmentation  Morbid obesity and short stature  This note has been dictated using [email or web address] software  It may contain errors, including improperly dictated words  Please contact physician directly for any questions         Chart Review/Health Maintenance   The following have been updated: medication list    Allergies   Allergen Reactions    Codeine     Iodine  Latex     Lovenox [Enoxaparin]     Shellfish-Derived Products Hives    Vaccinium Angustifolium Hives    Vicodin [Hydrocodone-Acetaminophen]     Benadryl [Diphenhydramine] Rash    Oxycontin [Oxycodone] Palpitations    Provera [Medroxyprogesterone] Palpitations     Current Outpatient Medications:     acetaminophen (TYLENOL) 500 mg tablet, Take 2 tablets (1,000 mg total) by mouth every 8 (eight) hours as needed (pain), Disp: 30 tablet, Rfl: 0    albuterol (PROVENTIL HFA,VENTOLIN HFA) 90 mcg/act inhaler, Inhale 2 puffs every 4 (four) hours as needed for wheezing, Disp: 1 Inhaler, Rfl: 0    Calcium Carb-Cholecalciferol (CALCIUM 600+D3) 600-200 MG-UNIT TABS, take 1 tablet twice a day, Disp: , Rfl:     Cholecalciferol (VITAMIN D3) 2000 units capsule, take 1 tablet po daily  , Disp: , Rfl:     citalopram (CeleXA) 20 mg tablet, Take 1 tablet (20 mg total) by mouth daily, Disp: 30 tablet, Rfl: 5    doxepin (SINEquan) 25 mg capsule, Take 1 capsule (25 mg total) by mouth daily at bedtime, Disp: 30 capsule, Rfl: 5    Elastic Bandages & Supports (CARPAL TUNNEL WRIST DELUXE) MISC, by Does not apply route daily at bedtime, Disp: 2 each, Rfl: 0    famotidine (PEPCID) 20 mg tablet, Take 1 tablet (20 mg total) by mouth 2 (two) times a day, Disp: 30 tablet, Rfl: 0    fluticasone (FLONASE) 50 mcg/act nasal spray, 1 spray into each nostril daily, Disp: 16 g, Rfl: 5    fluticasone (FLOVENT HFA) 110 MCG/ACT inhaler, Inhale 2 puffs 2 (two) times a day Rinse mouth after use , Disp: 1 Inhaler, Rfl: 2    folic acid (FOLVITE) 1 mg tablet, every 24 hours, Disp: , Rfl:     furosemide (LASIX) 40 mg tablet, Take 1 tablet (40 mg total) by mouth daily, Disp: 30 tablet, Rfl: 3    Incontinence Supply Disposable (POISE PAD) PADS, by Does not apply route 4 (four) times a day as needed (incontinence), Disp: 90 each, Rfl: 0    leflunomide (ARAVA) 10 MG tablet, Take 10 mg by mouth daily, Disp: , Rfl:     levETIRAcetam (KEPPRA) 1000 MG tablet, Take 1 tablet (1,000 mg total) by mouth 2 (two) times a day, Disp: 60 tablet, Rfl: 5    Multiple Vitamins-Minerals (MULTIVITAMIN WITH MINERALS) tablet, Take 1 tablet by mouth daily, Disp: 30 tablet, Rfl: 11    nitroglycerin (NITROSTAT) 0 4 mg SL tablet, Place 1 tablet (0 4 mg total) under the tongue every 5 (five) minutes as needed for chest pain Call 911 if using 3 doses, Disp: 25 tablet, Rfl: 0    polyethylene glycol (GLYCOLAX) powder, Take 17 g by mouth daily, Disp: 578 g, Rfl: 0    Abatacept (ORENCIA) 125 MG/ML SOSY, Inject 125 mg under the skin Once a week, Disp: , Rfl:     fexofenadine (ALLEGRA) 60 MG tablet, Take 1 tablet (60 mg total) by mouth daily, Disp: 60 tablet, Rfl: 5    ibuprofen (MOTRIN) 400 mg tablet, Take 1 tablet (400 mg total) by mouth every 8 (eight) hours as needed (pain), Disp: 60 tablet, Rfl: 5    Patient Active Problem List   Diagnosis    Rheumatoid myopathy with rheumatoid arthritis ([facility] [address] )    Morbid obesity (HCC)    Essential hypertension    Depression    Nonintractable epilepsy without status epilepticus ([facility] [address] )    Anemia    Ambulatory dysfunction    Mild intermittent asthma    Stress incontinence    Hydronephrosis    Encounter for gynecological examination    Easy bruising    Irregular periods    Anxiety    Angina at rest [facility])    Constipation    Proteinuria    Acute bacterial conjunctivitis of left eye    Carpal tunnel syndrome, bilateral    Hand numbness    Epilepsy [facility])     Past Surgical History:   Procedure Laterality Date    APPENDECTOMY      CARDIAC CATHETERIZATION       SECTION      CHOLECYSTECTOMY     [facility]ID[facility]  2007    TUBAL LIGATION  2008     Family History   Problem Relation Age of Onset    Diabetes Maternal Grandmother     Hypertension Maternal Grandmother     No Known Problems Mother     No Known Problems Sister     No Known Problems Daughter     No Known Problems Paternal Grandmother     No Known Problems Daughter     Breast cancer Neg Hx     Cancer Neg Hx      Health Maintenance   Topic Date Due    Annual Physical  10/25/2019    Influenza Vaccine  07/01/2020    MAMMOGRAM  10/10/2020    Pneumococcal Vaccine: Pediatrics (0 to 5 Years) and At-Risk Patients (6 to 59 Years) (2 of 3 - PCV13) 09/26/2020    BMI: Followup Plan  04/14/2021    BMI: Adult  08/13/2021    Cervical Cancer Screening  10/25/2021    DTaP,Tdap,and Td Vaccines (2 - Td) 09/26/2029    HIV Screening  Completed    Hepatitis C Screening  Completed    HIB Vaccine  Aged Out    Hepatitis B Vaccine  Aged Out    IPV Vaccine  Aged Out    Hepatitis A Vaccine  Aged Out    Meningococcal ACWY Vaccine  Aged Out    HPV Vaccine  Aged Out

## 2020-08-13 NOTE — LETTER
August 13, 2020     Patient: Mike Laird   YOB: 1980   Date of Visit: 8/13/2020       To Whom it May Concern:    Mike Laird is under my professional care  She was seen in my office on 8/13/2020  She may return to work immediately  If you have any questions or concerns, please don't hesitate to call           Sincerely,          Jefe Moran MD        CC: No Recipients

## 2020-08-14 ENCOUNTER — APPOINTMENT (OUTPATIENT)
Dept: LAB | Facility: HOSPITAL | Age: 40
End: 2020-08-14
Payer: COMMERCIAL

## 2020-08-14 DIAGNOSIS — E16.2 HYPOGLYCEMIA: ICD-10-CM

## 2020-08-14 DIAGNOSIS — E66.01 MORBID OBESITY (HCC): ICD-10-CM

## 2020-08-14 LAB
BETA-HYDROXYBUTYRATE: 0 MMOL/L
CHOLEST SERPL-MCNC: 153 MG/DL
CORTIS SERPL-MCNC: 8 UG/DL
EST. AVERAGE GLUCOSE BLD GHB EST-MCNC: 111 MG/DL
HBA1C MFR BLD: 5.5 %
HDLC SERPL-MCNC: 47 MG/DL
INSULIN SERPL-ACNC: 10 MU/L (ref 3–25)
LDLC SERPL CALC-MCNC: 79 MG/DL
TRIGL SERPL-MCNC: 136 MG/DL
TSH SERPL DL<=0.05 MIU/L-ACNC: 2.84 UIU/ML (ref 0.47–4.68)

## 2020-08-14 PROCEDURE — 84443 ASSAY THYROID STIM HORMONE: CPT

## 2020-08-14 PROCEDURE — 82010 KETONE BODYS QUAN: CPT

## 2020-08-14 PROCEDURE — 83525 ASSAY OF INSULIN: CPT

## 2020-08-14 PROCEDURE — 84681 ASSAY OF C-PEPTIDE: CPT

## 2020-08-14 PROCEDURE — 36415 COLL VENOUS BLD VENIPUNCTURE: CPT

## 2020-08-14 PROCEDURE — 83036 HEMOGLOBIN GLYCOSYLATED A1C: CPT

## 2020-08-14 PROCEDURE — 82533 TOTAL CORTISOL: CPT

## 2020-08-14 PROCEDURE — 80061 LIPID PANEL: CPT

## 2020-08-15 LAB — C PEPTIDE SERPL-MCNC: 2.2 NG/ML (ref 1.1–4.4)

## 2020-08-21 DIAGNOSIS — E16.2 HYPOGLYCEMIA: Primary | ICD-10-CM

## 2020-09-12 ENCOUNTER — HOSPITAL ENCOUNTER (EMERGENCY)
Facility: HOSPITAL | Age: 40
Discharge: HOME | End: 2020-09-12
Attending: EMERGENCY MEDICINE | Admitting: EMERGENCY MEDICINE
Payer: COMMERCIAL

## 2020-09-12 VITALS
SYSTOLIC BLOOD PRESSURE: 163 MMHG | BODY MASS INDEX: 48.79 KG/M2 | WEIGHT: 266.76 LBS | RESPIRATION RATE: 20 BRPM | DIASTOLIC BLOOD PRESSURE: 81 MMHG | TEMPERATURE: 97.7 F | HEART RATE: 64 BPM | OXYGEN SATURATION: 100 %

## 2020-09-12 DIAGNOSIS — K29.70 GASTRITIS: Primary | ICD-10-CM

## 2020-09-12 DIAGNOSIS — R11.2 NAUSEA AND VOMITING: ICD-10-CM

## 2020-09-12 LAB
BACTERIA UR QL AUTO: ABNORMAL /HPF
BILIRUB UR QL STRIP: NEGATIVE
CLARITY UR: CLEAR
COLOR UR: YELLOW
EXT PREG TEST URINE: NEGATIVE
EXT. CONTROL ED NAV: NORMAL
GLUCOSE UR STRIP-MCNC: NEGATIVE MG/DL
HGB UR QL STRIP.AUTO: ABNORMAL
KETONES UR STRIP-MCNC: NEGATIVE MG/DL
LEUKOCYTE ESTERASE UR QL STRIP: ABNORMAL
MUCOUS THREADS UR QL AUTO: ABNORMAL
NITRITE UR QL STRIP: NEGATIVE
NON-SQ EPI CELLS URNS QL MICRO: ABNORMAL /HPF
PH UR STRIP.AUTO: 5.5 [PH] (ref 4.5–8)
PROT UR STRIP-MCNC: NEGATIVE MG/DL
RBC #/AREA URNS AUTO: ABNORMAL /HPF
SP GR UR STRIP.AUTO: >=1.03 (ref 1–1.03)
UROBILINOGEN UR QL STRIP.AUTO: 0.2 E.U./DL
WBC #/AREA URNS AUTO: ABNORMAL /HPF

## 2020-09-12 PROCEDURE — 87086 URINE CULTURE/COLONY COUNT: CPT

## 2020-09-12 PROCEDURE — 99284 EMERGENCY DEPT VISIT MOD MDM: CPT

## 2020-09-12 PROCEDURE — 99284 EMERGENCY DEPT VISIT MOD MDM: CPT | Performed by: EMERGENCY MEDICINE

## 2020-09-12 PROCEDURE — 81025 URINE PREGNANCY TEST: CPT | Performed by: EMERGENCY MEDICINE

## 2020-09-12 PROCEDURE — 81001 URINALYSIS AUTO W/SCOPE: CPT

## 2020-09-12 RX ORDER — FAMOTIDINE 20 MG/1
20 TABLET, FILM COATED ORAL ONCE
Status: COMPLETED | OUTPATIENT
Start: 2020-09-12 | End: 2020-09-12

## 2020-09-12 RX ORDER — MAGNESIUM HYDROXIDE/ALUMINUM HYDROXICE/SIMETHICONE 120; 1200; 1200 MG/30ML; MG/30ML; MG/30ML
30 SUSPENSION ORAL ONCE
Status: COMPLETED | OUTPATIENT
Start: 2020-09-12 | End: 2020-09-12

## 2020-09-12 RX ORDER — ONDANSETRON 4 MG/1
4 TABLET, ORALLY DISINTEGRATING ORAL ONCE
Status: COMPLETED | OUTPATIENT
Start: 2020-09-12 | End: 2020-09-12

## 2020-09-12 RX ORDER — SUCRALFATE ORAL 1 G/10ML
1000 SUSPENSION ORAL ONCE
Status: COMPLETED | OUTPATIENT
Start: 2020-09-12 | End: 2020-09-12

## 2020-09-12 RX ORDER — FAMOTIDINE 20 MG/1
20 TABLET, FILM COATED ORAL DAILY
Qty: 30 TABLET | Refills: 0 | Status: SHIPPED | OUTPATIENT
Start: 2020-09-12 | End: 2020-10-15

## 2020-09-12 RX ORDER — ONDANSETRON 4 MG/1
4 TABLET, ORALLY DISINTEGRATING ORAL EVERY 8 HOURS PRN
Qty: 10 TABLET | Refills: 0 | Status: SHIPPED | OUTPATIENT
Start: 2020-09-12 | End: 2021-01-18 | Stop reason: SDUPTHER

## 2020-09-12 RX ORDER — SUCRALFATE ORAL 1 G/10ML
1 SUSPENSION ORAL 4 TIMES DAILY
Qty: 200 ML | Refills: 0 | Status: SHIPPED | OUTPATIENT
Start: 2020-09-12 | End: 2020-10-15

## 2020-09-12 RX ADMIN — SUCRALFATE 1000 MG: 1 SUSPENSION ORAL at 15:17

## 2020-09-12 RX ADMIN — FAMOTIDINE 20 MG: 20 TABLET ORAL at 15:07

## 2020-09-12 RX ADMIN — ALUMINUM HYDROXIDE, MAGNESIUM HYDROXIDE, AND SIMETHICONE 30 ML: 200; 200; 20 SUSPENSION ORAL at 15:07

## 2020-09-12 RX ADMIN — ONDANSETRON 4 MG: 4 TABLET, ORALLY DISINTEGRATING ORAL at 15:07

## 2020-09-12 NOTE — DISCHARGE INSTRUCTIONS
Take Pepcid and Carafate as prescribed  Take Zofran as needed for nausea/vomiting  Stay well hydrated  Follow up with family doctor   Return to ED if new/worsening symptoms including but not limited to intractable pain/vomiting, fevers, etc

## 2020-09-12 NOTE — ED PROVIDER NOTES
History  Chief Complaint   Patient presents with    Abdominal Pain     with abd pain and vomiting started today     39 yo F presenting for evaluation of 1 day of upper abdominal discomfort, nausea and vomiting  Normal state of health yesterday/last night, sx started early this morning, tried to go to work but was sent home early  Reports pain to epigastric region without radiation to chest, back or lower abdomen  Associated with NBNB vomiting  Reports last BM was last night, denies loose, dark or bloody stools  No new/different foods  No known sick contacts  Denies fevers, chills, CP, SOB, cough/URI sx, urinary complaints  H/o cholecystectomy, appendectomy, tubal ligation, kidney surgery (for blood clot?)  Per records, seen in ED in June for similar, labs and CT unremarkable  Has never seen GI  Has never had EGD  MDM: 39 yo F with upper abdominal discomfort, N/V- will treat sx, urine preg, if sx improve will DC with meds for gastritis and GI f/u       Per review of records:   6/8/20 ED visit for abdominal pain/vomiting, possible hemoptysis  - GI cocktail/Pepcid  - Labs/CT WNL  - Discharged with pepcid, reglan, carafate    Prior to Admission Medications   Prescriptions Last Dose Informant Patient Reported? Taking? Abatacept (ORENCIA) 125 MG/ML SOSY   Yes No   Sig: Inject 125 mg under the skin Once a week   Calcium Carb-Cholecalciferol (CALCIUM 600+D3) 600-200 MG-UNIT TABS  Self Yes No   Sig: take 1 tablet twice a day   Cholecalciferol (VITAMIN D3) 2000 units capsule  Self Yes No   Sig: take 1 tablet po daily     Elastic Bandages & Supports (CARPAL TUNNEL WRIST DELUXE) MISC   No No   Sig: by Does not apply route daily at bedtime   Incontinence Supply Disposable (POISE PAD) PADS  Self No No   Sig: by Does not apply route 4 (four) times a day as needed (incontinence)   Multiple Vitamins-Minerals (MULTIVITAMIN WITH MINERALS) tablet   No No   Sig: Take 1 tablet by mouth daily   acetaminophen (TYLENOL) 500 mg tablet No No   Sig: Take 2 tablets (1,000 mg total) by mouth every 8 (eight) hours as needed (pain)   albuterol (PROVENTIL HFA,VENTOLIN HFA) 90 mcg/act inhaler   No No   Sig: Inhale 2 puffs every 4 (four) hours as needed for wheezing   citalopram (CeleXA) 20 mg tablet   No No   Sig: Take 1 tablet (20 mg total) by mouth daily   doxepin (SINEquan) 25 mg capsule   No No   Sig: Take 1 capsule (25 mg total) by mouth daily at bedtime   famotidine (PEPCID) 20 mg tablet   No No   Sig: Take 1 tablet (20 mg total) by mouth 2 (two) times a day   fexofenadine (ALLEGRA) 60 MG tablet   No No   Sig: Take 1 tablet (60 mg total) by mouth daily   fluticasone (FLONASE) 50 mcg/act nasal spray   No No   Si spray into each nostril daily   fluticasone (FLOVENT HFA) 110 MCG/ACT inhaler   No No   Sig: Inhale 2 puffs 2 (two) times a day Rinse mouth after use     folic acid (FOLVITE) 1 mg tablet  Self Yes No   Sig: every 24 hours   furosemide (LASIX) 40 mg tablet   No No   Sig: Take 1 tablet (40 mg total) by mouth daily   ibuprofen (MOTRIN) 400 mg tablet   No No   Sig: Take 1 tablet (400 mg total) by mouth every 8 (eight) hours as needed (pain)   leflunomide (ARAVA) 10 MG tablet   Yes No   Sig: Take 10 mg by mouth daily   levETIRAcetam (KEPPRA) 1000 MG tablet   No No   Sig: Take 1 tablet (1,000 mg total) by mouth 2 (two) times a day   nitroglycerin (NITROSTAT) 0 4 mg SL tablet   No No   Sig: Place 1 tablet (0 4 mg total) under the tongue every 5 (five) minutes as needed for chest pain Call 911 if using 3 doses   polyethylene glycol (GLYCOLAX) powder   No No   Sig: Take 17 g by mouth daily      Facility-Administered Medications: None       Past Medical History:   Diagnosis Date    [facility] at rest [facility])     Anxiety     Depression     Epilepsy ([facility] [address] )     Fibroid 2012    History of cardiac cath 2006    Hydronephrosis 2007    Hypertension     Migraine without aura     PTSD (post-traumatic stress disorder)     Rheumatoid arteritis (HCC) Past Surgical History:   Procedure Laterality Date    APPENDECTOMY      CARDIAC CATHETERIZATION  2006     SECTION  2008    CHOLECYSTECTOMY  2018    KIDNEY SURGERY  2007    TUBAL LIGATION  2008       Family History   Problem Relation Age of Onset    Diabetes Maternal Grandmother     Hypertension Maternal Grandmother     No Known Problems Mother     No Known Problems Sister     No Known Problems Daughter     No Known Problems Paternal Grandmother     No Known Problems Daughter     Breast cancer Neg Hx     Cancer Neg Hx      I have reviewed and agree with the history as documented  E-Cigarette/Vaping    E-Cigarette Use Never User      E-Cigarette/Vaping Substances     Social History     Tobacco Use    Smoking status: Former Smoker     Types: Cigarettes     Last attempt to quit: 2013     Years since quittin 7    Smokeless tobacco: Former User   Substance Use Topics    Alcohol use: Not Currently     Frequency: Monthly or less     Drinks per session: 1 or 2    Drug use: Never       Review of Systems   Constitutional: Negative for chills, fever and unexpected weight change  HENT: Negative for ear pain, rhinorrhea and sore throat  Eyes: Negative for pain and visual disturbance  Respiratory: Negative for cough and shortness of breath  Cardiovascular: Negative for chest pain and leg swelling  Gastrointestinal: Positive for abdominal pain, nausea and vomiting  Negative for constipation and diarrhea  Endocrine: Negative for polydipsia, polyphagia and polyuria  Genitourinary: Negative for dysuria, frequency, hematuria and urgency  Musculoskeletal: Negative for back pain, myalgias and neck pain  Skin: Negative for color change and rash  Allergic/Immunologic: Negative for environmental allergies and immunocompromised state  Neurological: Negative for dizziness, weakness, light-headedness, numbness and headaches  Hematological: Negative for adenopathy   Does not bruise/bleed easily  Psychiatric/Behavioral: Negative for agitation and confusion  All other systems reviewed and are negative  Physical Exam  Physical Exam  Vitals signs and nursing note reviewed  Constitutional:       Appearance: She is well-developed  HENT:      Head: Normocephalic and atraumatic  Nose: Nose normal    Eyes:      Conjunctiva/sclera: Conjunctivae normal    Neck:      Musculoskeletal: Normal range of motion and neck supple  Cardiovascular:      Rate and Rhythm: Normal rate and regular rhythm  Heart sounds: Normal heart sounds  Pulmonary:      Effort: Pulmonary effort is normal  No respiratory distress  Breath sounds: Normal breath sounds  No stridor  No wheezing or rales  Chest:      Chest wall: No tenderness  Abdominal:      General: There is no distension  Palpations: Abdomen is soft  Tenderness: There is abdominal tenderness in the epigastric area  There is no guarding or rebound  Comments: Obese abdomen, mild ttp epigastric region, no rebound/guarding, no RUQ ttp, no lower abdominal ttp, no back/CVA ttp   Musculoskeletal:         General: No deformity  Skin:     General: Skin is warm and dry  Findings: No rash  Neurological:      Mental Status: She is alert and oriented to person, place, and time  Motor: No abnormal muscle tone  Coordination: Coordination normal    Psychiatric:         Thought Content:  Thought content normal          Judgment: Judgment normal          Vital Signs  ED Triage Vitals [09/12/20 1418]   Temperature Pulse Respirations Blood Pressure SpO2   97 7 °F (36 5 °C) 64 20 163/81 100 %      Temp Source Heart Rate Source Patient Position - Orthostatic VS BP Location FiO2 (%)   Temporal Monitor Sitting Right arm --      Pain Score       8           Vitals:    09/12/20 1418   BP: 163/81   Pulse: 64   Patient Position - Orthostatic VS: Sitting         Visual Acuity      ED Medications  Medications   ondansetron (ZOFRAN-ODT) dispersible tablet 4 mg (4 mg Oral Given 9/12/20 1507)   aluminum-magnesium hydroxide-simethicone (MYLANTA) 200-200-20 mg/5 mL oral suspension 30 mL (30 mL Oral Given 9/12/20 1507)   sucralfate (CARAFATE) oral suspension 1,000 mg (1,000 mg Oral Given 9/12/20 1517)   famotidine (PEPCID) tablet 20 mg (20 mg Oral Given 9/12/20 1507)       Diagnostic Studies  Results Reviewed     Procedure Component Value Units Date/Time    Urine Microscopic [[id number]]  (Abnormal) Collected:  09/12/20 1521    Lab Status:  Final result Specimen:  Urine, Clean Catch Updated:  09/12/20 1547     RBC, UA 2-4 /hpf      WBC, UA 10-20 /hpf      Epithelial Cells Occasional /hpf      Bacteria, UA Occasional /hpf      MUCUS THREADS Occasional    Urine culture [[id number]] Collected:  09/12/20 1521    Lab Status: In process Specimen:  Urine, Clean Catch Updated:  09/12/20 1547    POCT urinalysis dipstick [[id number]]  (Abnormal) Resulted:  09/12/20 1525    Lab Status:  Final result Updated:  09/12/20 1525    POCT pregnancy, urine [[id number]]  (Normal) Resulted:  09/12/20 1525    Lab Status:  Final result Updated:  09/12/20 1525     EXT PREG TEST UR (Ref: Negative) negative     Control valid    Urine Macroscopic, POC [[id number]]  (Abnormal) Collected:  09/12/20 1521    Lab Status:  Final result Specimen:  Urine Updated:  09/12/20 1523     Color, UA Yellow     Clarity, UA Clear     pH, UA 5 5     Leukocytes, UA Trace     Nitrite, UA Negative     Protein, UA Negative mg/dl      Glucose, UA Negative mg/dl      Ketones, UA Negative mg/dl      Urobilinogen, UA 0 2 E U /dl      Bilirubin, UA Negative     Blood, UA Moderate     Specific Gravity, UA >=1 030    Narrative:       CLINITEK RESULT                 No orders to display              Procedures  Procedures         ED Course  ED Course as of Sep 12 1750   Sat Sep 12, 2020   1525 PREGNANCY TEST URINE: negative   1550 Reports feeling better and well enough to go home  Tolerating PO  Discussed return precautions and PCP f/u  Urine with possible infection but no sx, discussed this with pt, will defer abx at ths time                                          MDM  Number of Diagnoses or Management Options  Gastritis:   Nausea and vomiting:   Diagnosis management comments: 37 yo F with upper abdominal pain/N/V, sx improved with zofran/GI cocktail, tolerated PO and requested to be discharged  Discharged with Rx for Zofran, Pepcid, Carafate  Instructed to f/u with PCP this week  Return precautions discussed at length and pt expressed understanding with plan       Amount and/or Complexity of Data Reviewed  Tests in the radiology section of CPT®: reviewed  Review and summarize past medical records: yes  Independent visualization of images, tracings, or specimens: yes        Disposition  Final diagnoses:   Gastritis   Nausea and vomiting     Time reflects when diagnosis was documented in both MDM as applicable and the Disposition within this note     Time User Action Codes Description Comment    9/12/2020  3:52 PM Anju Malik Add [K29 70] Gastritis     9/12/2020  3:52 PM Anju Malik Add [R11 2] Nausea and vomiting       ED Disposition     ED Disposition Condition Date/Time Comment    Discharge Stable Sat Sep 12, 2020  3:52 PM Christian Jang discharge to home/self care              Follow-up Information     Follow up With Specialties Details Why Contact Info    Hemant Godwin PA-C Family Medicine, Physician Assistant   [address] [facility]  [address] [facility] [facility] [address]  [phone]            Discharge Medication List as of 9/12/2020  3:54 PM      START taking these medications    Details   !! famotidine (PEPCID) 20 mg tablet Take 1 tablet (20 mg total) by mouth daily for 7 days, Starting Sat 9/12/2020, Until Sat 9/19/2020, Print      ondansetron (ZOFRAN-ODT) 4 mg disintegrating tablet Take 1 tablet (4 mg total) by mouth every 8 (eight) hours as needed for nausea for up to 10 doses, Starting Sat 9/12/2020, Print      sucralfate (CARAFATE) 1 g/10 mL suspension Take 10 mL (1 g total) by mouth 4 (four) times a day for 5 days, Starting Sat 9/12/2020, Until Thu 9/17/2020, Print       !! - Potential duplicate medications found  Please discuss with provider  CONTINUE these medications which have NOT CHANGED    Details   Abatacept (ORENCIA) 125 MG/ML SOSY Inject 125 mg under the skin Once a week, Starting Wed 9/25/2019, Until Sat 8/8/2020, Historical Med      acetaminophen (TYLENOL) 500 mg tablet Take 2 tablets (1,000 mg total) by mouth every 8 (eight) hours as needed (pain), Starting Thu 8/13/2020, No Print      albuterol (PROVENTIL HFA,VENTOLIN HFA) 90 mcg/act inhaler Inhale 2 puffs every 4 (four) hours as needed for wheezing, Starting Sun 12/29/2019, Print      Calcium Carb-Cholecalciferol (CALCIUM 600+D3) 600-200 MG-UNIT TABS take 1 tablet twice a day, Historical Med      Cholecalciferol (VITAMIN D3) 2000 units capsule take 1 tablet po daily  , Historical Med      citalopram (CeleXA) 20 mg tablet Take 1 tablet (20 mg total) by mouth daily, Starting Tue 4/14/2020, Normal      doxepin (SINEquan) 25 mg capsule Take 1 capsule (25 mg total) by mouth daily at bedtime, Starting Tue 4/14/2020, Normal      Elastic Bandages & Supports (CARPAL TUNNEL WRIST DELUXE) MISC by Does not apply route daily at bedtime, Starting Fri 8/2/2019, Print      !! famotidine (PEPCID) 20 mg tablet Take 1 tablet (20 mg total) by mouth 2 (two) times a day, Starting Mon 6/8/2020, Normal      fexofenadine (ALLEGRA) 60 MG tablet Take 1 tablet (60 mg total) by mouth daily, Starting Thu 8/13/2020, Until Sat 9/12/2020, No Print      fluticasone (FLONASE) 50 mcg/act nasal spray 1 spray into each nostril daily, Starting Tue 4/14/2020, Normal      fluticasone (FLOVENT HFA) 110 MCG/ACT inhaler Inhale 2 puffs 2 (two) times a day Rinse mouth after use , Starting Tue 6/47/2835, Normal      folic acid (FOLVITE) 1 mg tablet every 24 hours, Starting Wed 1/27/2016, Historical Med      furosemide (LASIX) 40 mg tablet Take 1 tablet (40 mg total) by mouth daily, Starting Tue 4/14/2020, Normal      ibuprofen (MOTRIN) 400 mg tablet Take 1 tablet (400 mg total) by mouth every 8 (eight) hours as needed (pain), Starting Thu 8/13/2020, Normal      Incontinence Supply Disposable (POISE PAD) PADS by Does not apply route 4 (four) times a day as needed (incontinence), Starting Wed 9/5/2018, Normal      leflunomide (ARAVA) 10 MG tablet Take 10 mg by mouth daily, Historical Med      levETIRAcetam (KEPPRA) 1000 MG tablet Take 1 tablet (1,000 mg total) by mouth 2 (two) times a day, Starting Tue 4/14/2020, Normal      Multiple Vitamins-Minerals (MULTIVITAMIN WITH MINERALS) tablet Take 1 tablet by mouth daily, Starting Fri 8/2/2019, Normal      nitroglycerin (NITROSTAT) 0 4 mg SL tablet Place 1 tablet (0 4 mg total) under the tongue every 5 (five) minutes as needed for chest pain Call 911 if using 3 doses, Starting Wed 11/6/2019, Normal      polyethylene glycol (GLYCOLAX) powder Take 17 g by mouth daily, Starting Tue 4/14/2020, Normal       !! - Potential duplicate medications found  Please discuss with provider  No discharge procedures on file      PDMP Review     None          ED Provider  Electronically Signed by           Rafiq Shepherd DO  09/12/20 [id number]

## 2020-09-12 NOTE — Clinical Note
Izabella Cleveland was seen and treated in our emergency department on 9/12/2020  Diagnosis:     Zahra Aguilera  may return to work on return date  She may return on this date: 09/14/2020         If you have any questions or concerns, please don't hesitate to call        Meribeth Gottron, DO    ______________________________           _______________          _______________  Hospital Representative                              Date                                Time

## 2020-09-13 LAB — BACTERIA UR CULT: NORMAL

## 2020-10-04 ENCOUNTER — APPOINTMENT (EMERGENCY)
Dept: NON INVASIVE DIAGNOSTICS | Facility: HOSPITAL | Age: 40
End: 2020-10-04
Payer: COMMERCIAL

## 2020-10-04 ENCOUNTER — HOSPITAL ENCOUNTER (EMERGENCY)
Facility: HOSPITAL | Age: 40
Discharge: HOME | End: 2020-10-04
Attending: EMERGENCY MEDICINE
Payer: COMMERCIAL

## 2020-10-04 ENCOUNTER — APPOINTMENT (EMERGENCY)
Dept: CT IMAGING | Facility: HOSPITAL | Age: 40
End: 2020-10-04
Payer: COMMERCIAL

## 2020-10-04 VITALS
SYSTOLIC BLOOD PRESSURE: 142 MMHG | OXYGEN SATURATION: 100 % | RESPIRATION RATE: 16 BRPM | TEMPERATURE: 98.2 F | DIASTOLIC BLOOD PRESSURE: 91 MMHG | WEIGHT: 268.96 LBS | BODY MASS INDEX: 49.19 KG/M2 | HEART RATE: 79 BPM

## 2020-10-04 DIAGNOSIS — R22.31 LOCALIZED SWELLING ON RIGHT HAND: ICD-10-CM

## 2020-10-04 DIAGNOSIS — M79.601 RIGHT ARM PAIN: Primary | ICD-10-CM

## 2020-10-04 DIAGNOSIS — M79.89 SWELLING OF RIGHT HAND: ICD-10-CM

## 2020-10-04 LAB
ANION GAP SERPL CALCULATED.3IONS-SCNC: 9 MMOL/L (ref 4–13)
BASOPHILS # BLD AUTO: 0.02 THOUSANDS/ΜL (ref 0–0.1)
BASOPHILS NFR BLD AUTO: 0 % (ref 0–1)
BUN SERPL-MCNC: 12 MG/DL (ref 5–25)
CALCIUM SERPL-MCNC: 8.7 MG/DL (ref 8.3–10.1)
CHLORIDE SERPL-SCNC: 104 MMOL/L (ref 100–108)
CO2 SERPL-SCNC: 25 MMOL/L (ref 21–32)
CREAT SERPL-MCNC: 0.86 MG/DL (ref 0.6–1.3)
EOSINOPHIL # BLD AUTO: 0.14 THOUSAND/ΜL (ref 0–0.61)
EOSINOPHIL NFR BLD AUTO: 2 % (ref 0–6)
ERYTHROCYTE [DISTWIDTH] IN BLOOD BY AUTOMATED COUNT: 13.4 % (ref 11.6–15.1)
GFR SERPL CREATININE-BSD FRML MDRD: 84 ML/MIN/1.73SQ M
GLUCOSE SERPL-MCNC: 99 MG/DL (ref 65–140)
HCG SERPL QL: NEGATIVE
HCT VFR BLD AUTO: 35.9 % (ref 34.8–46.1)
HGB BLD-MCNC: 11 G/DL (ref 11.5–15.4)
IMM GRANULOCYTES # BLD AUTO: 0.02 THOUSAND/UL (ref 0–0.2)
IMM GRANULOCYTES NFR BLD AUTO: 0 % (ref 0–2)
LYMPHOCYTES # BLD AUTO: 1.77 THOUSANDS/ΜL (ref 0.6–4.47)
LYMPHOCYTES NFR BLD AUTO: 26 % (ref 14–44)
MCH RBC QN AUTO: 27.2 PG (ref 26.8–34.3)
MCHC RBC AUTO-ENTMCNC: 30.6 G/DL (ref 31.4–37.4)
MCV RBC AUTO: 89 FL (ref 82–98)
MONOCYTES # BLD AUTO: 0.48 THOUSAND/ΜL (ref 0.17–1.22)
MONOCYTES NFR BLD AUTO: 7 % (ref 4–12)
NEUTROPHILS # BLD AUTO: 4.45 THOUSANDS/ΜL (ref 1.85–7.62)
NEUTS SEG NFR BLD AUTO: 65 % (ref 43–75)
NRBC BLD AUTO-RTO: 0 /100 WBCS
PLATELET # BLD AUTO: 335 THOUSANDS/UL (ref 149–390)
PMV BLD AUTO: 9.4 FL (ref 8.9–12.7)
POTASSIUM SERPL-SCNC: 4 MMOL/L (ref 3.5–5.3)
RBC # BLD AUTO: 4.04 MILLION/UL (ref 3.81–5.12)
SODIUM SERPL-SCNC: 138 MMOL/L (ref 136–145)
WBC # BLD AUTO: 6.88 THOUSAND/UL (ref 4.31–10.16)

## 2020-10-04 PROCEDURE — 80048 BASIC METABOLIC PNL TOTAL CA: CPT | Performed by: EMERGENCY MEDICINE

## 2020-10-04 PROCEDURE — 99285 EMERGENCY DEPT VISIT HI MDM: CPT | Performed by: EMERGENCY MEDICINE

## 2020-10-04 PROCEDURE — 99284 EMERGENCY DEPT VISIT MOD MDM: CPT

## 2020-10-04 PROCEDURE — 71250 CT THORAX DX C-: CPT

## 2020-10-04 PROCEDURE — 93971 EXTREMITY STUDY: CPT | Performed by: SURGERY

## 2020-10-04 PROCEDURE — G1004 CDSM NDSC: HCPCS

## 2020-10-04 PROCEDURE — 84703 CHORIONIC GONADOTROPIN ASSAY: CPT | Performed by: EMERGENCY MEDICINE

## 2020-10-04 PROCEDURE — 36415 COLL VENOUS BLD VENIPUNCTURE: CPT | Performed by: EMERGENCY MEDICINE

## 2020-10-04 PROCEDURE — 96374 THER/PROPH/DIAG INJ IV PUSH: CPT

## 2020-10-04 PROCEDURE — 85025 COMPLETE CBC W/AUTO DIFF WBC: CPT | Performed by: EMERGENCY MEDICINE

## 2020-10-04 PROCEDURE — 93971 EXTREMITY STUDY: CPT

## 2020-10-04 RX ORDER — KETOROLAC TROMETHAMINE 30 MG/ML
15 INJECTION, SOLUTION INTRAMUSCULAR; INTRAVENOUS ONCE
Status: COMPLETED | OUTPATIENT
Start: 2020-10-04 | End: 2020-10-04

## 2020-10-04 RX ORDER — PREDNISONE 20 MG/1
40 TABLET ORAL DAILY
Qty: 10 TABLET | Refills: 0 | Status: SHIPPED | OUTPATIENT
Start: 2020-10-04 | End: 2020-10-09

## 2020-10-04 RX ORDER — ACETAMINOPHEN 325 MG/1
975 TABLET ORAL ONCE
Status: COMPLETED | OUTPATIENT
Start: 2020-10-04 | End: 2020-10-04

## 2020-10-04 RX ORDER — PREDNISONE 20 MG/1
60 TABLET ORAL ONCE
Status: COMPLETED | OUTPATIENT
Start: 2020-10-04 | End: 2020-10-04

## 2020-10-04 RX ADMIN — KETOROLAC TROMETHAMINE 15 MG: 30 INJECTION, SOLUTION INTRAMUSCULAR at 17:31

## 2020-10-04 RX ADMIN — PREDNISONE 60 MG: 20 TABLET ORAL at 19:16

## 2020-10-04 RX ADMIN — ACETAMINOPHEN 975 MG: 325 TABLET ORAL at 17:31

## 2020-10-15 ENCOUNTER — OFFICE VISIT (OUTPATIENT)
Dept: FAMILY MEDICINE CLINIC | Facility: CLINIC | Age: 40
End: 2020-10-15

## 2020-10-15 VITALS
WEIGHT: 265 LBS | OXYGEN SATURATION: 99 % | HEIGHT: 62 IN | HEART RATE: 74 BPM | SYSTOLIC BLOOD PRESSURE: 138 MMHG | BODY MASS INDEX: 48.76 KG/M2 | RESPIRATION RATE: 18 BRPM | DIASTOLIC BLOOD PRESSURE: 74 MMHG | TEMPERATURE: 97.7 F

## 2020-10-15 DIAGNOSIS — R32 URINARY INCONTINENCE, UNSPECIFIED TYPE: ICD-10-CM

## 2020-10-15 DIAGNOSIS — F41.9 ANXIETY: ICD-10-CM

## 2020-10-15 DIAGNOSIS — I20.8 ANGINA AT REST (HCC): ICD-10-CM

## 2020-10-15 DIAGNOSIS — M05.40 RHEUMATOID MYOPATHY WITH RHEUMATOID ARTHRITIS (HCC): Primary | ICD-10-CM

## 2020-10-15 DIAGNOSIS — J45.30 MILD PERSISTENT ASTHMA WITHOUT COMPLICATION: ICD-10-CM

## 2020-10-15 DIAGNOSIS — R56.9 SEIZURE (HCC): ICD-10-CM

## 2020-10-15 DIAGNOSIS — I10 ESSENTIAL HYPERTENSION: ICD-10-CM

## 2020-10-15 PROCEDURE — 1036F TOBACCO NON-USER: CPT | Performed by: FAMILY MEDICINE

## 2020-10-15 PROCEDURE — 3008F BODY MASS INDEX DOCD: CPT | Performed by: FAMILY MEDICINE

## 2020-10-15 PROCEDURE — 99214 OFFICE O/P EST MOD 30 MIN: CPT | Performed by: FAMILY MEDICINE

## 2020-10-15 PROCEDURE — 3725F SCREEN DEPRESSION PERFORMED: CPT | Performed by: FAMILY MEDICINE

## 2020-10-15 RX ORDER — FLUTICASONE PROPIONATE 110 UG/1
2 AEROSOL, METERED RESPIRATORY (INHALATION) 2 TIMES DAILY
Qty: 1 INHALER | Refills: 2 | Status: SHIPPED | OUTPATIENT
Start: 2020-10-15 | End: 2021-03-03 | Stop reason: SDUPTHER

## 2020-10-15 RX ORDER — DIAPER,BRIEF,ADULT, DISPOSABLE
EACH MISCELLANEOUS 4 TIMES DAILY PRN
Qty: 90 EACH | Refills: 0 | Status: SHIPPED | OUTPATIENT
Start: 2020-10-15

## 2020-10-15 RX ORDER — NITROGLYCERIN 0.4 MG/1
0.4 TABLET SUBLINGUAL
Qty: 25 TABLET | Refills: 0 | Status: SHIPPED | OUTPATIENT
Start: 2020-10-15

## 2020-10-15 RX ORDER — FUROSEMIDE 40 MG/1
40 TABLET ORAL DAILY
Qty: 30 TABLET | Refills: 3 | Status: SHIPPED | OUTPATIENT
Start: 2020-10-15 | End: 2021-03-03 | Stop reason: SDUPTHER

## 2020-10-15 RX ORDER — POLYETHYLENE GLYCOL 3350 17 G/17G
17 POWDER, FOR SOLUTION ORAL DAILY
Qty: 578 G | Refills: 0 | Status: SHIPPED | OUTPATIENT
Start: 2020-10-15

## 2020-10-24 ENCOUNTER — HOSPITAL ENCOUNTER (EMERGENCY)
Facility: HOSPITAL | Age: 40
Discharge: HOME | End: 2020-10-24
Attending: EMERGENCY MEDICINE | Admitting: EMERGENCY MEDICINE
Payer: COMMERCIAL

## 2020-10-24 ENCOUNTER — APPOINTMENT (EMERGENCY)
Dept: RADIOLOGY | Facility: HOSPITAL | Age: 40
End: 2020-10-24
Payer: COMMERCIAL

## 2020-10-24 VITALS
BODY MASS INDEX: 49.31 KG/M2 | WEIGHT: 269.62 LBS | RESPIRATION RATE: 16 BRPM | DIASTOLIC BLOOD PRESSURE: 63 MMHG | TEMPERATURE: 97.6 F | HEART RATE: 64 BPM | SYSTOLIC BLOOD PRESSURE: 136 MMHG | OXYGEN SATURATION: 100 %

## 2020-10-24 DIAGNOSIS — R07.9 CHEST PAIN: Primary | ICD-10-CM

## 2020-10-24 LAB
ALBUMIN SERPL BCP-MCNC: 3.4 G/DL (ref 3.5–5)
ALP SERPL-CCNC: 66 U/L (ref 46–116)
ALT SERPL W P-5'-P-CCNC: 16 U/L (ref 12–78)
ANION GAP SERPL CALCULATED.3IONS-SCNC: 9 MMOL/L (ref 4–13)
AST SERPL W P-5'-P-CCNC: 10 U/L (ref 5–45)
ATRIAL RATE: 65 BPM
ATRIAL RATE: 67 BPM
BASOPHILS # BLD AUTO: 0.01 THOUSANDS/ΜL (ref 0–0.1)
BASOPHILS NFR BLD AUTO: 0 % (ref 0–1)
BILIRUB SERPL-MCNC: 0.25 MG/DL (ref 0.2–1)
BUN SERPL-MCNC: 21 MG/DL (ref 5–25)
CALCIUM ALBUM COR SERPL-MCNC: 9.2 MG/DL (ref 8.3–10.1)
CALCIUM SERPL-MCNC: 8.7 MG/DL (ref 8.3–10.1)
CHLORIDE SERPL-SCNC: 104 MMOL/L (ref 100–108)
CO2 SERPL-SCNC: 24 MMOL/L (ref 21–32)
CREAT SERPL-MCNC: 0.68 MG/DL (ref 0.6–1.3)
EOSINOPHIL # BLD AUTO: 0.13 THOUSAND/ΜL (ref 0–0.61)
EOSINOPHIL NFR BLD AUTO: 2 % (ref 0–6)
ERYTHROCYTE [DISTWIDTH] IN BLOOD BY AUTOMATED COUNT: 13.2 % (ref 11.6–15.1)
GFR SERPL CREATININE-BSD FRML MDRD: 109 ML/MIN/1.73SQ M
GLUCOSE SERPL-MCNC: 102 MG/DL (ref 65–140)
HCT VFR BLD AUTO: 34.7 % (ref 34.8–46.1)
HGB BLD-MCNC: 10.8 G/DL (ref 11.5–15.4)
IMM GRANULOCYTES # BLD AUTO: 0.01 THOUSAND/UL (ref 0–0.2)
IMM GRANULOCYTES NFR BLD AUTO: 0 % (ref 0–2)
LYMPHOCYTES # BLD AUTO: 1.97 THOUSANDS/ΜL (ref 0.6–4.47)
LYMPHOCYTES NFR BLD AUTO: 30 % (ref 14–44)
MCH RBC QN AUTO: 27.2 PG (ref 26.8–34.3)
MCHC RBC AUTO-ENTMCNC: 31.1 G/DL (ref 31.4–37.4)
MCV RBC AUTO: 87 FL (ref 82–98)
MONOCYTES # BLD AUTO: 0.49 THOUSAND/ΜL (ref 0.17–1.22)
MONOCYTES NFR BLD AUTO: 7 % (ref 4–12)
NEUTROPHILS # BLD AUTO: 4 THOUSANDS/ΜL (ref 1.85–7.62)
NEUTS SEG NFR BLD AUTO: 61 % (ref 43–75)
NRBC BLD AUTO-RTO: 0 /100 WBCS
NT-PROBNP SERPL-MCNC: 199 PG/ML
P AXIS: 45 DEGREES
P AXIS: 45 DEGREES
PLATELET # BLD AUTO: 290 THOUSANDS/UL (ref 149–390)
PMV BLD AUTO: 9.3 FL (ref 8.9–12.7)
POTASSIUM SERPL-SCNC: 3.9 MMOL/L (ref 3.5–5.3)
PR INTERVAL: 162 MS
PR INTERVAL: 168 MS
PROT SERPL-MCNC: 7.9 G/DL (ref 6.4–8.2)
QRS AXIS: 56 DEGREES
QRS AXIS: 67 DEGREES
QRSD INTERVAL: 86 MS
QRSD INTERVAL: 88 MS
QT INTERVAL: 386 MS
QT INTERVAL: 386 MS
QTC INTERVAL: 401 MS
QTC INTERVAL: 407 MS
RBC # BLD AUTO: 3.97 MILLION/UL (ref 3.81–5.12)
SODIUM SERPL-SCNC: 137 MMOL/L (ref 136–145)
T WAVE AXIS: 58 DEGREES
T WAVE AXIS: 62 DEGREES
TROPONIN I SERPL-MCNC: <0.02 NG/ML
TROPONIN I SERPL-MCNC: <0.02 NG/ML
TSH SERPL DL<=0.05 MIU/L-ACNC: 1.33 UIU/ML (ref 0.36–3.74)
VENTRICULAR RATE: 65 BPM
VENTRICULAR RATE: 67 BPM
WBC # BLD AUTO: 6.61 THOUSAND/UL (ref 4.31–10.16)

## 2020-10-24 PROCEDURE — 96374 THER/PROPH/DIAG INJ IV PUSH: CPT

## 2020-10-24 PROCEDURE — 84484 ASSAY OF TROPONIN QUANT: CPT | Performed by: PHYSICIAN ASSISTANT

## 2020-10-24 PROCEDURE — 93005 ELECTROCARDIOGRAM TRACING: CPT

## 2020-10-24 PROCEDURE — 84443 ASSAY THYROID STIM HORMONE: CPT | Performed by: PHYSICIAN ASSISTANT

## 2020-10-24 PROCEDURE — 85025 COMPLETE CBC W/AUTO DIFF WBC: CPT

## 2020-10-24 PROCEDURE — 36415 COLL VENOUS BLD VENIPUNCTURE: CPT

## 2020-10-24 PROCEDURE — 71045 X-RAY EXAM CHEST 1 VIEW: CPT

## 2020-10-24 PROCEDURE — 99285 EMERGENCY DEPT VISIT HI MDM: CPT

## 2020-10-24 PROCEDURE — 93010 ELECTROCARDIOGRAM REPORT: CPT | Performed by: INTERNAL MEDICINE

## 2020-10-24 PROCEDURE — 84484 ASSAY OF TROPONIN QUANT: CPT

## 2020-10-24 PROCEDURE — 99285 EMERGENCY DEPT VISIT HI MDM: CPT | Performed by: EMERGENCY MEDICINE

## 2020-10-24 PROCEDURE — 80053 COMPREHEN METABOLIC PANEL: CPT

## 2020-10-24 PROCEDURE — 83880 ASSAY OF NATRIURETIC PEPTIDE: CPT | Performed by: PHYSICIAN ASSISTANT

## 2020-10-24 RX ORDER — CELECOXIB 200 MG/1
200 CAPSULE ORAL 2 TIMES DAILY
COMMUNITY

## 2020-10-24 RX ORDER — KETOROLAC TROMETHAMINE 30 MG/ML
15 INJECTION, SOLUTION INTRAMUSCULAR; INTRAVENOUS ONCE
Status: COMPLETED | OUTPATIENT
Start: 2020-10-24 | End: 2020-10-24

## 2020-10-24 RX ADMIN — KETOROLAC TROMETHAMINE 15 MG: 30 INJECTION, SOLUTION INTRAMUSCULAR at 14:32

## 2020-11-18 ENCOUNTER — OFFICE VISIT (OUTPATIENT)
Dept: FAMILY MEDICINE CLINIC | Facility: CLINIC | Age: 40
End: 2020-11-18

## 2020-11-18 VITALS
HEIGHT: 62 IN | WEIGHT: 274.6 LBS | BODY MASS INDEX: 50.53 KG/M2 | DIASTOLIC BLOOD PRESSURE: 80 MMHG | HEART RATE: 91 BPM | SYSTOLIC BLOOD PRESSURE: 138 MMHG | RESPIRATION RATE: 18 BRPM | OXYGEN SATURATION: 97 % | TEMPERATURE: 97.6 F

## 2020-11-18 DIAGNOSIS — J06.9 URI (UPPER RESPIRATORY INFECTION): ICD-10-CM

## 2020-11-18 DIAGNOSIS — G40.909 NONINTRACTABLE EPILEPSY WITHOUT STATUS EPILEPTICUS, UNSPECIFIED EPILEPSY TYPE (HCC): ICD-10-CM

## 2020-11-18 DIAGNOSIS — R56.9 SEIZURE (HCC): ICD-10-CM

## 2020-11-18 DIAGNOSIS — E66.01 MORBID OBESITY (HCC): ICD-10-CM

## 2020-11-18 DIAGNOSIS — N30.01 ACUTE CYSTITIS WITH HEMATURIA: ICD-10-CM

## 2020-11-18 DIAGNOSIS — R31.0 GROSS HEMATURIA: ICD-10-CM

## 2020-11-18 DIAGNOSIS — I20.8 ANGINA AT REST (HCC): ICD-10-CM

## 2020-11-18 DIAGNOSIS — Z23 ENCOUNTER FOR IMMUNIZATION: Primary | ICD-10-CM

## 2020-11-18 DIAGNOSIS — R21 RASH AND NONSPECIFIC SKIN ERUPTION: ICD-10-CM

## 2020-11-18 DIAGNOSIS — Z12.31 BREAST CANCER SCREENING BY MAMMOGRAM: ICD-10-CM

## 2020-11-18 DIAGNOSIS — N39.3 STRESS INCONTINENCE: ICD-10-CM

## 2020-11-18 DIAGNOSIS — I10 ESSENTIAL HYPERTENSION: ICD-10-CM

## 2020-11-18 LAB
SL AMB  POCT GLUCOSE, UA: NEGATIVE
SL AMB LEUKOCYTE ESTERASE,UA: POSITIVE
SL AMB POCT BILIRUBIN,UA: NEGATIVE
SL AMB POCT BLOOD,UA: ABNORMAL
SL AMB POCT CLARITY,UA: ABNORMAL
SL AMB POCT COLOR,UA: ABNORMAL
SL AMB POCT KETONES,UA: NEGATIVE
SL AMB POCT NITRITE,UA: NEGATIVE
SL AMB POCT PH,UA: 6
SL AMB POCT SPECIFIC GRAVITY,UA: 1.02
SL AMB POCT URINE PROTEIN: 30
SL AMB POCT UROBILINOGEN: 0.2

## 2020-11-18 PROCEDURE — 90471 IMMUNIZATION ADMIN: CPT

## 2020-11-18 PROCEDURE — 90682 RIV4 VACC RECOMBINANT DNA IM: CPT

## 2020-11-18 PROCEDURE — 99214 OFFICE O/P EST MOD 30 MIN: CPT | Performed by: PHYSICIAN ASSISTANT

## 2020-11-18 PROCEDURE — 1036F TOBACCO NON-USER: CPT | Performed by: PHYSICIAN ASSISTANT

## 2020-11-18 PROCEDURE — 81002 URINALYSIS NONAUTO W/O SCOPE: CPT | Performed by: PHYSICIAN ASSISTANT

## 2020-11-18 RX ORDER — NITROFURANTOIN 25; 75 MG/1; MG/1
100 CAPSULE ORAL 2 TIMES DAILY
Qty: 10 CAPSULE | Refills: 0 | Status: SHIPPED | OUTPATIENT
Start: 2020-11-18 | End: 2020-11-23

## 2020-11-18 RX ORDER — LEVETIRACETAM 1000 MG/1
1000 TABLET ORAL 2 TIMES DAILY
Qty: 60 TABLET | Refills: 5 | Status: SHIPPED | OUTPATIENT
Start: 2020-11-18

## 2020-11-18 RX ORDER — LEFLUNOMIDE 20 MG/1
20 TABLET ORAL DAILY
COMMUNITY

## 2020-11-18 RX ORDER — PREDNISONE 1 MG/1
TABLET ORAL DAILY
COMMUNITY

## 2020-11-18 RX ORDER — FLUTICASONE PROPIONATE 50 MCG
1 SPRAY, SUSPENSION (ML) NASAL DAILY
Qty: 16 G | Refills: 5 | Status: SHIPPED | OUTPATIENT
Start: 2020-11-18 | End: 2021-03-03 | Stop reason: SDUPTHER

## 2020-11-23 ENCOUNTER — TELEPHONE (OUTPATIENT)
Dept: UROLOGY | Facility: MEDICAL CENTER | Age: 40
End: 2020-11-23

## 2020-11-24 ENCOUNTER — OFFICE VISIT (OUTPATIENT)
Dept: UROLOGY | Facility: CLINIC | Age: 40
End: 2020-11-24
Payer: COMMERCIAL

## 2020-11-24 VITALS
WEIGHT: 274 LBS | BODY MASS INDEX: 50.42 KG/M2 | HEART RATE: 71 BPM | DIASTOLIC BLOOD PRESSURE: 80 MMHG | SYSTOLIC BLOOD PRESSURE: 124 MMHG | HEIGHT: 62 IN

## 2020-11-24 DIAGNOSIS — R31.0 GROSS HEMATURIA: Primary | ICD-10-CM

## 2020-11-24 PROCEDURE — 87086 URINE CULTURE/COLONY COUNT: CPT | Performed by: UROLOGY

## 2020-11-24 PROCEDURE — 3008F BODY MASS INDEX DOCD: CPT | Performed by: UROLOGY

## 2020-11-24 PROCEDURE — 3008F BODY MASS INDEX DOCD: CPT | Performed by: PHYSICIAN ASSISTANT

## 2020-11-24 PROCEDURE — 1036F TOBACCO NON-USER: CPT | Performed by: UROLOGY

## 2020-11-24 PROCEDURE — 3079F DIAST BP 80-89 MM HG: CPT | Performed by: UROLOGY

## 2020-11-24 PROCEDURE — 3074F SYST BP LT 130 MM HG: CPT | Performed by: UROLOGY

## 2020-11-24 PROCEDURE — 99214 OFFICE O/P EST MOD 30 MIN: CPT | Performed by: UROLOGY

## 2020-11-26 LAB — BACTERIA UR CULT: NORMAL

## 2020-12-04 ENCOUNTER — HOSPITAL ENCOUNTER (OUTPATIENT)
Dept: NON INVASIVE DIAGNOSTICS | Facility: HOSPITAL | Age: 40
Discharge: HOME | End: 2020-12-04
Payer: COMMERCIAL

## 2020-12-04 ENCOUNTER — HOSPITAL ENCOUNTER (OUTPATIENT)
Dept: RADIOLOGY | Facility: HOSPITAL | Age: 40
Discharge: HOME | End: 2020-12-04
Payer: COMMERCIAL

## 2020-12-04 DIAGNOSIS — I20.8 ANGINA AT REST (HCC): ICD-10-CM

## 2020-12-04 LAB
CHEST PAIN STATEMENT: NORMAL
MAX DIASTOLIC BP: 82 MMHG
MAX HEART RATE: 85 BPM
MAX PREDICTED HEART RATE: 179 BPM
MAX. SYSTOLIC BP: 145 MMHG
PROTOCOL NAME: NORMAL
REASON FOR TERMINATION: NORMAL
TARGET HR FORMULA: NORMAL
TEST INDICATION: NORMAL
TIME IN EXERCISE PHASE: NORMAL

## 2020-12-04 PROCEDURE — 78452 HT MUSCLE IMAGE SPECT MULT: CPT

## 2020-12-04 PROCEDURE — G1004 CDSM NDSC: HCPCS

## 2020-12-04 PROCEDURE — A9502 TC99M TETROFOSMIN: HCPCS

## 2020-12-04 PROCEDURE — 93017 CV STRESS TEST TRACING ONLY: CPT

## 2020-12-04 RX ADMIN — REGADENOSON 0.4 MG: 0.08 INJECTION, SOLUTION INTRAVENOUS at 09:32

## 2020-12-08 PROCEDURE — 93018 CV STRESS TEST I&R ONLY: CPT | Performed by: INTERNAL MEDICINE

## 2020-12-08 PROCEDURE — 78452 HT MUSCLE IMAGE SPECT MULT: CPT | Performed by: INTERNAL MEDICINE

## 2020-12-08 PROCEDURE — 93016 CV STRESS TEST SUPVJ ONLY: CPT | Performed by: INTERNAL MEDICINE

## 2020-12-09 ENCOUNTER — OFFICE VISIT (OUTPATIENT)
Dept: CARDIOLOGY CLINIC | Facility: CLINIC | Age: 40
End: 2020-12-09
Payer: COMMERCIAL

## 2020-12-09 ENCOUNTER — APPOINTMENT (OUTPATIENT)
Dept: LAB | Facility: HOSPITAL | Age: 40
End: 2020-12-09
Attending: UROLOGY
Payer: COMMERCIAL

## 2020-12-09 VITALS
RESPIRATION RATE: 16 BRPM | HEIGHT: 62 IN | WEIGHT: 259.2 LBS | TEMPERATURE: 96.8 F | BODY MASS INDEX: 47.7 KG/M2 | DIASTOLIC BLOOD PRESSURE: 101 MMHG | SYSTOLIC BLOOD PRESSURE: 137 MMHG | HEART RATE: 71 BPM

## 2020-12-09 DIAGNOSIS — R07.2 PRECORDIAL CHEST PAIN: ICD-10-CM

## 2020-12-09 DIAGNOSIS — R00.2 PALPITATIONS: Primary | ICD-10-CM

## 2020-12-09 DIAGNOSIS — I10 ESSENTIAL HYPERTENSION: ICD-10-CM

## 2020-12-09 DIAGNOSIS — E66.01 MORBID OBESITY (HCC): ICD-10-CM

## 2020-12-09 DIAGNOSIS — R31.0 GROSS HEMATURIA: ICD-10-CM

## 2020-12-09 LAB
ANION GAP SERPL CALCULATED.3IONS-SCNC: 9 MMOL/L (ref 4–13)
BUN SERPL-MCNC: 14 MG/DL (ref 5–25)
CALCIUM SERPL-MCNC: 9.1 MG/DL (ref 8.3–10.1)
CHLORIDE SERPL-SCNC: 104 MMOL/L (ref 100–108)
CO2 SERPL-SCNC: 27 MMOL/L (ref 21–32)
CREAT SERPL-MCNC: 0.7 MG/DL (ref 0.6–1.3)
GFR SERPL CREATININE-BSD FRML MDRD: 108 ML/MIN/1.73SQ M
GLUCOSE P FAST SERPL-MCNC: 93 MG/DL (ref 65–99)
POTASSIUM SERPL-SCNC: 3.6 MMOL/L (ref 3.5–5.3)
SODIUM SERPL-SCNC: 140 MMOL/L (ref 136–145)

## 2020-12-09 PROCEDURE — 93000 ELECTROCARDIOGRAM COMPLETE: CPT | Performed by: INTERNAL MEDICINE

## 2020-12-09 PROCEDURE — 1036F TOBACCO NON-USER: CPT | Performed by: INTERNAL MEDICINE

## 2020-12-09 PROCEDURE — 36415 COLL VENOUS BLD VENIPUNCTURE: CPT

## 2020-12-09 PROCEDURE — 3075F SYST BP GE 130 - 139MM HG: CPT | Performed by: INTERNAL MEDICINE

## 2020-12-09 PROCEDURE — 3008F BODY MASS INDEX DOCD: CPT | Performed by: PHYSICIAN ASSISTANT

## 2020-12-09 PROCEDURE — 3080F DIAST BP >= 90 MM HG: CPT | Performed by: INTERNAL MEDICINE

## 2020-12-09 PROCEDURE — 80048 BASIC METABOLIC PNL TOTAL CA: CPT

## 2020-12-09 PROCEDURE — 99244 OFF/OP CNSLTJ NEW/EST MOD 40: CPT | Performed by: INTERNAL MEDICINE

## 2020-12-09 PROCEDURE — 3008F BODY MASS INDEX DOCD: CPT | Performed by: INTERNAL MEDICINE

## 2020-12-09 RX ORDER — AMLODIPINE BESYLATE 5 MG/1
5 TABLET ORAL DAILY
Qty: 90 TABLET | Refills: 1 | Status: SHIPPED | OUTPATIENT
Start: 2020-12-09 | End: 2021-03-03 | Stop reason: SDUPTHER

## 2020-12-12 ENCOUNTER — HOSPITAL ENCOUNTER (OUTPATIENT)
Dept: CT IMAGING | Facility: HOSPITAL | Age: 40
Discharge: HOME | End: 2020-12-12
Attending: UROLOGY
Payer: COMMERCIAL

## 2020-12-12 DIAGNOSIS — R31.0 GROSS HEMATURIA: ICD-10-CM

## 2020-12-12 PROCEDURE — 74176 CT ABD & PELVIS W/O CONTRAST: CPT

## 2020-12-12 PROCEDURE — G1004 CDSM NDSC: HCPCS

## 2020-12-16 ENCOUNTER — TELEPHONE (OUTPATIENT)
Dept: UROLOGY | Facility: CLINIC | Age: 40
End: 2020-12-16

## 2020-12-23 ENCOUNTER — CLINICAL SUPPORT (OUTPATIENT)
Dept: CARDIOLOGY CLINIC | Facility: CLINIC | Age: 40
End: 2020-12-23
Payer: COMMERCIAL

## 2020-12-23 VITALS — SYSTOLIC BLOOD PRESSURE: 126 MMHG | DIASTOLIC BLOOD PRESSURE: 88 MMHG | TEMPERATURE: 97.6 F

## 2020-12-23 DIAGNOSIS — I10 ESSENTIAL HYPERTENSION: Primary | ICD-10-CM

## 2020-12-23 PROCEDURE — 1036F TOBACCO NON-USER: CPT | Performed by: INTERNAL MEDICINE

## 2020-12-23 PROCEDURE — 99211 OFF/OP EST MAY X REQ PHY/QHP: CPT | Performed by: INTERNAL MEDICINE

## 2021-01-11 ENCOUNTER — HOSPITAL ENCOUNTER (OUTPATIENT)
Dept: NON INVASIVE DIAGNOSTICS | Facility: HOSPITAL | Age: 41
Discharge: HOME | End: 2021-01-11
Attending: INTERNAL MEDICINE
Payer: COMMERCIAL

## 2021-01-11 DIAGNOSIS — I10 ESSENTIAL HYPERTENSION: ICD-10-CM

## 2021-01-11 DIAGNOSIS — R00.2 PALPITATIONS: ICD-10-CM

## 2021-01-11 PROCEDURE — 93306 TTE W/DOPPLER COMPLETE: CPT

## 2021-01-11 PROCEDURE — 93306 TTE W/DOPPLER COMPLETE: CPT | Performed by: INTERNAL MEDICINE

## 2021-01-14 ENCOUNTER — TELEPHONE (OUTPATIENT)
Dept: UROLOGY | Facility: MEDICAL CENTER | Age: 41
End: 2021-01-14

## 2021-01-14 ENCOUNTER — HOSPITAL ENCOUNTER (EMERGENCY)
Facility: HOSPITAL | Age: 41
Discharge: HOME | End: 2021-01-14
Attending: EMERGENCY MEDICINE
Payer: COMMERCIAL

## 2021-01-14 ENCOUNTER — APPOINTMENT (EMERGENCY)
Dept: CT IMAGING | Facility: HOSPITAL | Age: 41
End: 2021-01-14
Payer: COMMERCIAL

## 2021-01-14 VITALS
TEMPERATURE: 97.6 F | HEART RATE: 59 BPM | WEIGHT: 259.2 LBS | BODY MASS INDEX: 47.41 KG/M2 | SYSTOLIC BLOOD PRESSURE: 132 MMHG | RESPIRATION RATE: 18 BRPM | DIASTOLIC BLOOD PRESSURE: 75 MMHG | OXYGEN SATURATION: 100 %

## 2021-01-14 DIAGNOSIS — N39.0 UTI (URINARY TRACT INFECTION): ICD-10-CM

## 2021-01-14 DIAGNOSIS — R31.0 GROSS HEMATURIA: Primary | ICD-10-CM

## 2021-01-14 LAB
ALBUMIN SERPL BCP-MCNC: 4 G/DL (ref 3–5.2)
ALP SERPL-CCNC: 51 U/L (ref 43–122)
ALT SERPL W P-5'-P-CCNC: 33 U/L (ref 9–52)
ANION GAP SERPL CALCULATED.3IONS-SCNC: 3 MMOL/L (ref 5–14)
AST SERPL W P-5'-P-CCNC: 31 U/L (ref 14–36)
BACTERIA UR QL AUTO: ABNORMAL /HPF
BASOPHILS # BLD AUTO: 0 THOUSANDS/ΜL (ref 0–0.1)
BASOPHILS NFR BLD AUTO: 1 % (ref 0–1)
BILIRUB SERPL-MCNC: 0.4 MG/DL
BILIRUB UR QL STRIP: NEGATIVE
BUN SERPL-MCNC: 13 MG/DL (ref 5–25)
CALCIUM SERPL-MCNC: 9 MG/DL (ref 8.4–10.2)
CHLORIDE SERPL-SCNC: 107 MMOL/L (ref 97–108)
CLARITY UR: ABNORMAL
CO2 SERPL-SCNC: 31 MMOL/L (ref 22–30)
COLOR UR: ABNORMAL
CREAT SERPL-MCNC: 0.61 MG/DL (ref 0.6–1.2)
EOSINOPHIL # BLD AUTO: 0.1 THOUSAND/ΜL (ref 0–0.4)
EOSINOPHIL NFR BLD AUTO: 1 % (ref 0–6)
ERYTHROCYTE [DISTWIDTH] IN BLOOD BY AUTOMATED COUNT: 15 %
EXT PREG TEST URINE: NORMAL
EXT. CONTROL ED NAV: NORMAL
GFR SERPL CREATININE-BSD FRML MDRD: 113 ML/MIN/1.73SQ M
GLUCOSE SERPL-MCNC: 110 MG/DL (ref 70–99)
GLUCOSE UR STRIP-MCNC: NEGATIVE MG/DL
HCT VFR BLD AUTO: 34 % (ref 36–46)
HGB BLD-MCNC: 10.8 G/DL (ref 12–16)
HGB UR QL STRIP.AUTO: 150
KETONES UR STRIP-MCNC: NEGATIVE MG/DL
LEUKOCYTE ESTERASE UR QL STRIP: 25
LIPASE SERPL-CCNC: 50 U/L (ref 23–300)
LYMPHOCYTES # BLD AUTO: 0.9 THOUSANDS/ΜL (ref 0.5–4)
LYMPHOCYTES NFR BLD AUTO: 14 % (ref 25–45)
MCH RBC QN AUTO: 26.9 PG (ref 26–34)
MCHC RBC AUTO-ENTMCNC: 31.7 G/DL (ref 31–36)
MCV RBC AUTO: 85 FL (ref 80–100)
MONOCYTES # BLD AUTO: 0.3 THOUSAND/ΜL (ref 0.2–0.9)
MONOCYTES NFR BLD AUTO: 4 % (ref 1–10)
NEUTROPHILS # BLD AUTO: 5.2 THOUSANDS/ΜL (ref 1.8–7.8)
NEUTS SEG NFR BLD AUTO: 81 % (ref 45–65)
NITRITE UR QL STRIP: NEGATIVE
NON-SQ EPI CELLS URNS QL MICRO: ABNORMAL /HPF
PH UR STRIP.AUTO: 8 [PH]
PLATELET # BLD AUTO: 267 THOUSANDS/UL (ref 150–450)
PMV BLD AUTO: 8.1 FL (ref 8.9–12.7)
POTASSIUM SERPL-SCNC: 3.8 MMOL/L (ref 3.6–5)
PROT SERPL-MCNC: 7.2 G/DL (ref 5.9–8.4)
PROT UR STRIP-MCNC: ABNORMAL MG/DL
RBC # BLD AUTO: 4.01 MILLION/UL (ref 4–5.2)
RBC #/AREA URNS AUTO: ABNORMAL /HPF
SODIUM SERPL-SCNC: 141 MMOL/L (ref 137–147)
SP GR UR STRIP.AUTO: 1.02 (ref 1–1.04)
UROBILINOGEN UA: NEGATIVE MG/DL
WBC # BLD AUTO: 6.4 THOUSAND/UL (ref 4.5–11)
WBC #/AREA URNS AUTO: ABNORMAL /HPF

## 2021-01-14 PROCEDURE — 96361 HYDRATE IV INFUSION ADD-ON: CPT

## 2021-01-14 PROCEDURE — 36415 COLL VENOUS BLD VENIPUNCTURE: CPT | Performed by: PHYSICIAN ASSISTANT

## 2021-01-14 PROCEDURE — 81001 URINALYSIS AUTO W/SCOPE: CPT | Performed by: PHYSICIAN ASSISTANT

## 2021-01-14 PROCEDURE — 99284 EMERGENCY DEPT VISIT MOD MDM: CPT | Performed by: PHYSICIAN ASSISTANT

## 2021-01-14 PROCEDURE — 74176 CT ABD & PELVIS W/O CONTRAST: CPT

## 2021-01-14 PROCEDURE — 85025 COMPLETE CBC W/AUTO DIFF WBC: CPT | Performed by: PHYSICIAN ASSISTANT

## 2021-01-14 PROCEDURE — G1004 CDSM NDSC: HCPCS

## 2021-01-14 PROCEDURE — 81025 URINE PREGNANCY TEST: CPT | Performed by: PHYSICIAN ASSISTANT

## 2021-01-14 PROCEDURE — 96374 THER/PROPH/DIAG INJ IV PUSH: CPT

## 2021-01-14 PROCEDURE — 80053 COMPREHEN METABOLIC PANEL: CPT | Performed by: PHYSICIAN ASSISTANT

## 2021-01-14 PROCEDURE — 81003 URINALYSIS AUTO W/O SCOPE: CPT | Performed by: PHYSICIAN ASSISTANT

## 2021-01-14 PROCEDURE — 83690 ASSAY OF LIPASE: CPT | Performed by: PHYSICIAN ASSISTANT

## 2021-01-14 PROCEDURE — 99284 EMERGENCY DEPT VISIT MOD MDM: CPT

## 2021-01-14 RX ORDER — KETOROLAC TROMETHAMINE 30 MG/ML
15 INJECTION, SOLUTION INTRAMUSCULAR; INTRAVENOUS ONCE
Status: COMPLETED | OUTPATIENT
Start: 2021-01-14 | End: 2021-01-14

## 2021-01-14 RX ORDER — CEPHALEXIN 500 MG/1
500 CAPSULE ORAL EVERY 12 HOURS SCHEDULED
Qty: 6 CAPSULE | Refills: 0 | Status: SHIPPED | OUTPATIENT
Start: 2021-01-14 | End: 2021-01-17

## 2021-01-14 RX ADMIN — SODIUM CHLORIDE 1000 ML: 0.9 INJECTION, SOLUTION INTRAVENOUS at 14:59

## 2021-01-14 RX ADMIN — KETOROLAC TROMETHAMINE 15 MG: 30 INJECTION, SOLUTION INTRAMUSCULAR; INTRAVENOUS at 15:01

## 2021-01-14 NOTE — Clinical Note
Lorri Mijares was seen and treated in our emergency department on 1/14/2021  Diagnosis:     Corey Castro  may return to work on return date  She may return on this date: 01/15/2021         If you have any questions or concerns, please don't hesitate to call        Natalie Rojo RN    ______________________________           _______________          _______________  Hospital Representative                              Date                                Time

## 2021-01-14 NOTE — TELEPHONE ENCOUNTER
Called and spoke to pt she stated she is having constant blood in the urin every time she urinates and is having pain in bladder area with pressure  Pt stated that it is a very and dark red  No fever chills  Mild back pain  Pt had to call off work  Pt also stated she is tired and laying down to help avoid pain  Pt requested if she should head to the ER    I advised pt to [facility] avoid bladder irritants and head to the Er

## 2021-01-14 NOTE — TELEPHONE ENCOUNTER
This is a patient of Dr Pro Vegas in [facility]  Patient called and said she is urinating blood again since last evening  Please call her back at [phone]

## 2021-01-14 NOTE — ED PROVIDER NOTES
History  Chief Complaint   Patient presents with    Abdominal Pain     suprapubic pain and pressure, also c/o right flank pain which began yesterday  States not soaking through pad, blood only when urinating "dark red" in the toilet  States he urologist informed her to come get evaluated  Tylenol taken at 260       69-year-old female past medical history of RA, migraines, hypertension presenting for evaluation of gross hematuria  Upon chart review this is ongoing issue  For the patient and she currently follows with urology  She states she called them today and advised to come the emergency department for evaluation  She reports starting last night she had gross hematuria suprapubic pain and right flank pain  And 2017 the same symptoms occurred when she had hydronephrosis and needed a stent placement  Pt denies any fevers, chills, sweats, diarrhea, melena or hematochezia  No n/v  She reports taking tylenol this morning with only minimal relief  Prior to Admission Medications   Prescriptions Last Dose Informant Patient Reported? Taking? Abatacept 125 MG/ML SOAJ More than a month at Unknown time Self Yes No   Sig: Inject under the skin   Calcium Carb-Cholecalciferol (CALCIUM 600+D3) 600-200 MG-UNIT TABS 1/13/2021 at Unknown time Self Yes Yes   Sig: take 1 tablet twice a day   Cholecalciferol (VITAMIN D3) 2000 units capsule 1/13/2021 at Unknown time Self Yes Yes   Sig: take 1 tablet po daily     Elastic Bandages & Supports (CARPAL TUNNEL WRIST DELUXE) MISC Past Week at Unknown time Self No Yes   Sig: by Does not apply route daily at bedtime   Incontinence Supply Disposable (Poise Pad) PADS Past Week at Unknown time Self No Yes   Sig: by Does not apply route 4 (four) times a day as needed (incontinence)   Methenamine-Sodium Salicylate 490-527 8 MG TABS 1/13/2021 at Unknown time Self No Yes   Sig: Take 1 tablet by mouth 3 (three) times a day   Multiple Vitamins-Minerals (MULTIVITAMIN WITH MINERALS) tablet 2021 at Unknown time Self No Yes   Sig: Take 1 tablet by mouth daily   albuterol (PROVENTIL HFA,VENTOLIN HFA) 90 mcg/act inhaler Past Week at Unknown time Self No Yes   Sig: Inhale 2 puffs every 4 (four) hours as needed for wheezing   amLODIPine (NORVASC) 5 mg tablet 2021 at Unknown time  No Yes   Sig: Take 1 tablet (5 mg total) by mouth daily   celecoxib (CeleBREX) 200 mg capsule 2021 at Unknown time Self Yes Yes   Sig: Take 200 mg by mouth 2 (two) times a day   citalopram (CeleXA) 20 mg tablet 2021 at Unknown time Self No Yes   Sig: Take 1 tablet (20 mg total) by mouth daily   doxepin (SINEquan) 25 mg capsule 2021 at Unknown time Self No Yes   Sig: Take 1 capsule (25 mg total) by mouth daily at bedtime   famotidine (PEPCID) 20 mg tablet 2021 at Unknown time Self No Yes   Sig: Take 1 tablet (20 mg total) by mouth 2 (two) times a day   fluticasone (FLONASE) 50 mcg/act nasal spray Past Week at Unknown time Self No Yes   Si spray into each nostril daily   fluticasone (FLOVENT HFA) 110 MCG/ACT inhaler Past Week at Unknown time Self No Yes   Sig: Inhale 2 puffs 2 (two) times a day Rinse mouth after use     folic acid (FOLVITE) 1 mg tablet 2021 at Unknown time Self Yes Yes   Sig: every 24 hours   furosemide (LASIX) 40 mg tablet 2021 at Unknown time Self No Yes   Sig: Take 1 tablet (40 mg total) by mouth daily   leflunomide (ARAVA) 20 MG tablet Past Month at Unknown time Self Yes Yes   Sig: Take 20 mg by mouth daily   levETIRAcetam (KEPPRA) 1000 MG tablet 2021 at Unknown time Self No Yes   Sig: Take 1 tablet (1,000 mg total) by mouth 2 (two) times a day   metoprolol tartrate (LOPRESSOR) 25 mg tablet 2021 at Unknown time Self No Yes   Sig: Take 1 tablet (25 mg total) by mouth every 12 (twelve) hours   nitroglycerin (NITROSTAT) 0 4 mg SL tablet More than a month at Unknown time Self No No   Sig: Place 1 tablet (0 4 mg total) under the tongue every 5 (five) minutes as needed for chest pain Call 911 if using 3 doses   ondansetron (ZOFRAN-ODT) 4 mg disintegrating tablet More than a month at Unknown time Self No No   Sig: Take 1 tablet (4 mg total) by mouth every 8 (eight) hours as needed for nausea for up to 10 doses   polyethylene glycol (GLYCOLAX) 17 GM/SCOOP powder 2021 at Unknown time Self No Yes   Sig: Take 17 g by mouth daily   predniSONE 5 mg tablet 2021 at Unknown time Self Yes Yes   Sig: Take by mouth daily      Facility-Administered Medications: None       Past Medical History:   Diagnosis Date    [facility])     Anxiety     Depression     Epilepsy ([place] [address] )     Fibroid 2012    History of cardiac cath 2006    Hydronephrosis 2007    Hypertension     Migraine without aura     PTSD (post-traumatic stress disorder)     Rheumatoid arteritis ([place] [address] )        Past Surgical History:   Procedure Laterality Date    APPENDECTOMY      CARDIAC CATHETERIZATION  2006    CARDIAC CATHETERIZATION       SECTION  2008    [facility]LECYSTECTOMY  2018   [facility]NEY SUR[facility]  2007    TUBAL LIGATION  2008       Family History   Problem Relation Age of Onset    Diabetes Maternal Grandmother     Hypertension Maternal Grandmother     No Known Problems Mother     No Known Problems Sister     No Known Problems Daughter     No Known Problems Paternal Grandmother     No Known Problems Daughter     Breast cancer Neg Hx     Cancer Neg Hx      I have reviewed and agree with the history as documented      E-Cigarette/Vaping    E-Cigarette Use Never User      E-Cigarette/Vaping Substances     Social History     Tobacco Use    Smoking status: Former Smoker     Types: Cigarettes     Quit date: 2013     Years since quittin 0    Smokeless tobacco: Former User   Substance Use Topics    Alcohol use: Not Currently     Frequency: Monthly or less     Drinks per session: 1 or 2    Drug use: Never       Review of Systems   All other systems reviewed and are negative  Physical Exam  Physical Exam  Vitals signs and nursing note reviewed  Constitutional:       General: She is not in acute distress  Appearance: She is well-developed  She is obese  She is not ill-appearing, toxic-appearing or diaphoretic  HENT:      Head: Normocephalic and atraumatic  Eyes:      Conjunctiva/sclera: Conjunctivae normal       Comments: EOM grossly intact   Neck:      Musculoskeletal: Normal range of motion and neck supple  Vascular: No JVD  Cardiovascular:      Rate and Rhythm: Normal rate  Pulmonary:      Effort: Pulmonary effort is normal    Abdominal:      General: Bowel sounds are normal  There is no distension  There are no signs of injury  Palpations: Abdomen is soft  Tenderness: There is abdominal tenderness in the suprapubic area  There is right CVA tenderness  Musculoskeletal:      Comments: FROM, steady gait, cap refill brisk, strength and sensation grossly intact throughout   Skin:     General: Skin is warm and dry  Capillary Refill: Capillary refill takes less than 2 seconds  Neurological:      Mental Status: She is alert and oriented to person, place, and time     Psychiatric:         Behavior: Behavior normal          Vital Signs  ED Triage Vitals [01/14/21 1426]   Temperature Pulse Respirations Blood Pressure SpO2   97 6 °F (36 4 °C) 69 17 130/80 98 %      Temp Source Heart Rate Source Patient Position - Orthostatic VS BP Location FiO2 (%)   Tympanic Monitor Lying Left arm --      Pain Score       8           Vitals:    01/14/21 1426   BP: 130/80   Pulse: 69   Patient Position - Orthostatic VS: Lying         Visual Acuity      ED Medications  Medications   sodium chloride 0 9 % bolus 1,000 mL (1,000 mL Intravenous New Bag 1/14/21 1459)   ketorolac (TORADOL) injection 15 mg (15 mg Intravenous Given 1/14/21 1501)       Diagnostic Studies  Results Reviewed     Procedure Component Value Units Date/Time    Urine Microscopic [[id number]] (Abnormal) Collected: 01/14/21 1443    Lab Status: Final result Specimen: Urine, Other Updated: 01/14/21 1533     RBC, UA Innumerable /hpf      WBC, UA 0-1 /hpf      Epithelial Cells Occasional /hpf      Bacteria, UA Occasional /hpf     Lipase [[id number]]  (Normal) Collected: 01/14/21 1459    Lab Status: Final result Specimen: Blood from Arm, Right Updated: 01/14/21 1528     Lipase 50 u/L     Comprehensive metabolic panel [[id number]]  (Abnormal) Collected: 01/14/21 1459    Lab Status: Final result Specimen: Blood from Arm, Right Updated: 01/14/21 1527     Sodium 141 mmol/L      Potassium 3 8 mmol/L      Chloride 107 mmol/L      CO2 31 mmol/L      ANION GAP 3 mmol/L      BUN 13 mg/dL      Creatinine 0 61 mg/dL      Glucose 110 mg/dL      Calcium 9 0 mg/dL      AST 31 U/L      ALT 33 U/L      Alkaline Phosphatase 51 U/L      Total Protein 7 2 g/dL      Albumin 4 0 g/dL      Total Bilirubin 0 40 mg/dL      eGFR 113 ml/min/1 73sq m     Narrative:      [place] guidelines for Chronic Kidney Disease (CKD):     Stage 1 with normal or high GFR (GFR > 90 mL/min/1 73 square meters)    Stage 2 Mild CKD (GFR = 60-89 mL/min/1 73 square meters)    Stage 3A Moderate CKD (GFR = 45-59 mL/min/1 73 square meters)    Stage 3B Moderate CKD (GFR = 30-44 mL/min/1 73 square meters)    Stage 4 Severe CKD (GFR = 15-29 mL/min/1 73 square meters)    Stage 5 End Stage CKD (GFR <15 mL/min/1 73 square meters)  Note: GFR calculation is accurate only with a steady state creatinine    UA w Reflex to Microscopic w Reflex to Culture [[id number]]  (Abnormal) Collected: 01/14/21 1443    Lab Status: Final result Specimen: Urine, Other Updated: 01/14/21 1522     Color, UA Red     Clarity, UA Turbid     Specific [place], UA 1 020     pH, UA 8 0     Leukocytes, UA 25 0     Nitrite, UA Negative     Protein,  (2+) mg/dl      Glucose, UA Negative mg/dl      Ketones, UA Negative mg/dl      Bilirubin, UA Negative     Blood,  0     UROBILINOGEN UA Negative mg/dL     CBC and differential [[id number]]  (Abnormal) Collected: 01/14/21 1459    Lab Status: Final result Specimen: Blood from Arm, Right Updated: 01/14/21 1522     WBC 6 40 Thousand/uL      RBC 4 01 Million/uL      Hemoglobin 10 8 g/dL      Hematocrit 34 0 %      MCV 85 fL      MCH 26 9 pg      MCHC 31 7 g/dL      RDW 15 0 %      MPV 8 1 fL      Platelets 903 Thousands/uL      Neutrophils Relative 81 %      Lymphocytes Relative 14 %      Monocytes Relative 4 %      Eosinophils Relative 1 %      Basophils Relative 1 %      Neutrophils Absolute 5 20 Thousands/µL      Lymphocytes Absolute 0 90 Thousands/µL      Monocytes Absolute 0 30 Thousand/µL      Eosinophils Absolute 0 10 Thousand/µL      Basophils Absolute 0 00 Thousands/µL     POCT pregnancy, urine [[id number]]  (Normal) Resulted: 01/14/21 1443    Lab Status: Final result Updated: 01/14/21 1443     EXT PREG TEST UR (Ref: Negative) neg preg     Control valid                 CT renal stone study abdomen pelvis wo contrast   Final Result by Kirt Kinney MD (01/14 1610)      No obstructive uropathy  Workstation performed: [id number]                    Procedures  Procedures         ED Course  ED Course as of Jan 14 1620   Thu Jan 14, 2021   1533 Same 2 mo ago   Hemoglobin(!): 10 8                                           MDM  Number of Diagnoses or Management Options  Diagnosis management comments:   11-year-old female presenting for evaluation of gross hematuria since yesterday, patient has had this issue previously caused by hydronephrosis, labs unremarkable, hemoglobin low however stable from 2 months ago, patient with leuks and innumerable red blood cells on urinalysis, CT unremarkable, will treat for uti, f/u with pcp as an outpatient, f/u with urology    All labs and imaging discussed with patient, strict return to ED precautions discussed  Pt verbalizes understanding and agrees with plan   Pt is stable for discharge    Portions of the record may have been created with voice recognition software  Occasional wrong word or "sound a like" substitutions may have occurred due to the inherent limitations of voice recognition software  Read the chart carefully and recognize, using context, where substitutions have occurred  Disposition  Final diagnoses:   Gross hematuria   UTI (urinary tract infection)     Time reflects when diagnosis was documented in both MDM as applicable and the Disposition within this note     Time User Action Codes Description Comment    1/14/2021  4:19 PM Alla Hands Add [R31 0] Gross hematuria     1/14/2021  4:19 PM Alla Hands Add [N39 0] UTI (urinary tract infection)       ED Disposition     ED Disposition Condition Date/Time Comment    Discharge Stable Thu Jan 14, 2021  4:19 PM Mindy Pelt discharge to home/self care  Follow-up Information     Follow up With Specialties Details Why Contact Info Additional Information    Hemant Godwin PA-C Family Medicine, Physician Assistant Call in 1 day  [address] [place] [address] 43        [facility] Emergency Department Emergency Medicine Go to  If symptoms worsen [address] [id number]  [address] [place] [address] Emergency Department          Patient's Medications   Discharge Prescriptions    CEPHALEXIN (KEFLEX) 500 MG CAPSULE    Take 1 capsule (500 mg total) by mouth every 12 (twelve) hours for 3 days       Start Date: 1/14/2021 End Date: 1/17/2021       Order Dose: 500 mg       Quantity: 6 capsule    Refills: 0     No discharge procedures on file      PDMP Review     None          ED Provider  Electronically Signed by           Ethelene Boast, PA-C  01/14/21 7799

## 2021-01-18 ENCOUNTER — TELEMEDICINE (OUTPATIENT)
Dept: FAMILY MEDICINE CLINIC | Facility: CLINIC | Age: 41
End: 2021-01-18

## 2021-01-18 ENCOUNTER — TELEPHONE (OUTPATIENT)
Dept: UROLOGY | Facility: AMBULATORY SURGERY CENTER | Age: 41
End: 2021-01-18

## 2021-01-18 ENCOUNTER — TELEPHONE (OUTPATIENT)
Dept: UROLOGY | Facility: CLINIC | Age: 41
End: 2021-01-18

## 2021-01-18 DIAGNOSIS — R11.2 NON-INTRACTABLE VOMITING WITH NAUSEA, UNSPECIFIED VOMITING TYPE: ICD-10-CM

## 2021-01-18 DIAGNOSIS — R31.0 GROSS HEMATURIA: Primary | ICD-10-CM

## 2021-01-18 PROBLEM — R11.10 NON-INTRACTABLE VOMITING: Status: ACTIVE | Noted: 2021-01-18

## 2021-01-18 PROCEDURE — 1036F TOBACCO NON-USER: CPT | Performed by: PHYSICIAN ASSISTANT

## 2021-01-18 PROCEDURE — 99214 OFFICE O/P EST MOD 30 MIN: CPT | Performed by: PHYSICIAN ASSISTANT

## 2021-01-18 RX ORDER — METHYLPREDNISOLONE 32 MG/1
32 TABLET ORAL ONCE
Qty: 2 TABLET | Refills: 0 | Status: SHIPPED | OUTPATIENT
Start: 2021-01-18 | End: 2021-01-18 | Stop reason: ALTCHOICE

## 2021-01-18 RX ORDER — ONDANSETRON 4 MG/1
4 TABLET, ORALLY DISINTEGRATING ORAL EVERY 8 HOURS PRN
Qty: 20 TABLET | Refills: 0 | Status: SHIPPED | OUTPATIENT
Start: 2021-01-18 | End: 2021-04-09 | Stop reason: SDUPTHER

## 2021-01-18 NOTE — ASSESSMENT & PLAN NOTE
Prescribed zofran today  Recommend if able to tolerate water for an hour then to take famotidine  To do labs/urine from urology today due to a history of hydronephrosis   She will call if she develops any other symtpoms or has worsening symtpoms

## 2021-01-18 NOTE — TELEPHONE ENCOUNTER
Called and spoke with pt  Advised pt of instructions for medrol  1 pills 12 hours before and 1 pill 2 hours prior  Scrip sent to [place] [facility]   Pt verbalized understanding

## 2021-01-18 NOTE — TELEPHONE ENCOUNTER
Called and spoke with pt  Pt advised of Dr Aquilino Barnes recommendations  Pt verbalized understanding  Appt scheduled for 3/26 with Dr Ramon Andres for cysto, Centralized scheduling number provided

## 2021-01-18 NOTE — TELEPHONE ENCOUNTER
Hi   Can someone please clarify if Dr Mynor Molina still wants this patient to have another CT scan? She just had one on 1/14/21  Please let me know    Thanks  Lee Marsh

## 2021-01-18 NOTE — LETTER
January 18, 2021     Patient: Kali Villa   YOB: 1980   Date of Visit: 1/18/2021       To Whom it May Concern:    Kali Villa is under my professional care  She was seen in my office on 1/18/2021  She may return to work on 1/19/2021  Excused for 1/17/2021 also  If you have any questions or concerns, please don't hesitate to call           Sincerely,          Hemant Godwin PA-C        CC: No Recipients

## 2021-01-18 NOTE — TELEPHONE ENCOUNTER
Yes, she has not been able to get CT urogram due to contrast allergy  Continues to bleed   Reordered now with prep

## 2021-01-20 ENCOUNTER — LAB (OUTPATIENT)
Dept: LAB | Facility: HOSPITAL | Age: 41
End: 2021-01-20
Payer: COMMERCIAL

## 2021-01-20 DIAGNOSIS — R31.0 GROSS HEMATURIA: ICD-10-CM

## 2021-01-20 DIAGNOSIS — R31.0 GROSS HEMATURIA: Primary | ICD-10-CM

## 2021-01-20 LAB
ANION GAP SERPL CALCULATED.3IONS-SCNC: 8 MMOL/L (ref 5–14)
BUN SERPL-MCNC: 16 MG/DL (ref 5–25)
CALCIUM SERPL-MCNC: 9.5 MG/DL (ref 8.4–10.2)
CHLORIDE SERPL-SCNC: 106 MMOL/L (ref 97–108)
CO2 SERPL-SCNC: 26 MMOL/L (ref 22–30)
CREAT SERPL-MCNC: 0.65 MG/DL (ref 0.6–1.2)
GFR SERPL CREATININE-BSD FRML MDRD: 111 ML/MIN/1.73SQ M
GLUCOSE P FAST SERPL-MCNC: 93 MG/DL (ref 70–99)
POTASSIUM SERPL-SCNC: 3.9 MMOL/L (ref 3.6–5)
SODIUM SERPL-SCNC: 140 MMOL/L (ref 137–147)

## 2021-01-20 PROCEDURE — 87147 CULTURE TYPE IMMUNOLOGIC: CPT

## 2021-01-20 PROCEDURE — 80048 BASIC METABOLIC PNL TOTAL CA: CPT

## 2021-01-20 PROCEDURE — 36415 COLL VENOUS BLD VENIPUNCTURE: CPT

## 2021-01-20 PROCEDURE — 88112 CYTOPATH CELL ENHANCE TECH: CPT | Performed by: PATHOLOGY

## 2021-01-20 PROCEDURE — 87086 URINE CULTURE/COLONY COUNT: CPT

## 2021-01-20 RX ORDER — METHYLPREDNISOLONE 32 MG/1
32 TABLET ORAL ONCE
Qty: 2 TABLET | Refills: 0 | Status: SHIPPED | OUTPATIENT
Start: 2021-01-20 | End: 2021-01-20 | Stop reason: ALTCHOICE

## 2021-01-22 LAB
BACTERIA UR CULT: ABNORMAL
BACTERIA UR CULT: ABNORMAL

## 2021-01-24 DIAGNOSIS — R31.0 GROSS HEMATURIA: Primary | ICD-10-CM

## 2021-01-24 NOTE — TELEPHONE ENCOUNTER
Script for the requested medication was CORRECTED and requeued to the Advanced Practitioner covering the [facility] location for approval

## 2021-01-25 ENCOUNTER — HOSPITAL ENCOUNTER (OUTPATIENT)
Dept: CT IMAGING | Facility: HOSPITAL | Age: 41
Discharge: HOME | End: 2021-01-25
Payer: COMMERCIAL

## 2021-01-25 ENCOUNTER — CLINICAL SUPPORT (OUTPATIENT)
Dept: CARDIOLOGY CLINIC | Facility: CLINIC | Age: 41
End: 2021-01-25
Payer: COMMERCIAL

## 2021-01-25 DIAGNOSIS — I10 ESSENTIAL HYPERTENSION: ICD-10-CM

## 2021-01-25 DIAGNOSIS — R00.2 PALPITATIONS: ICD-10-CM

## 2021-01-25 DIAGNOSIS — R31.0 GROSS HEMATURIA: ICD-10-CM

## 2021-01-25 PROCEDURE — G1004 CDSM NDSC: HCPCS

## 2021-01-25 PROCEDURE — 74178 CT ABD&PLV WO CNTR FLWD CNTR: CPT

## 2021-01-25 PROCEDURE — 93248 EXT ECG>7D<15D REV&INTERPJ: CPT | Performed by: INTERNAL MEDICINE

## 2021-01-25 RX ORDER — METHYLPREDNISOLONE 32 MG/1
TABLET ORAL
Qty: 2 TABLET | Refills: 0 | Status: SHIPPED | OUTPATIENT
Start: 2021-01-25 | End: 2021-07-16 | Stop reason: SDUPTHER

## 2021-01-25 RX ADMIN — IOHEXOL 120 ML: 350 INJECTION, SOLUTION INTRAVENOUS at 08:09

## 2021-01-25 NOTE — PROGRESS NOTES
- Stable.  - Followed by Dr. Chew ( Cardiology).   [facility] End of Summary report per Dr Cristal Robles

## 2021-01-31 ENCOUNTER — TELEPHONE (OUTPATIENT)
Dept: CARDIOLOGY CLINIC | Facility: CLINIC | Age: 41
End: 2021-01-31

## 2021-02-03 ENCOUNTER — TELEPHONE (OUTPATIENT)
Dept: CARDIOLOGY CLINIC | Facility: CLINIC | Age: 41
End: 2021-02-03

## 2021-02-03 NOTE — TELEPHONE ENCOUNTER
CALLED PT 2/1,3 LWM 2/1 UNABLE TO LEAVE MESSAGE 2/3 ALSO LEFT MESSAGE ON MOTHERS PHONE TO [facility]/S [facility]OW DAY APPT

## 2021-02-04 ENCOUNTER — TELEPHONE (OUTPATIENT)
Dept: FAMILY MEDICINE CLINIC | Facility: CLINIC | Age: 41
End: 2021-02-04

## 2021-02-04 ENCOUNTER — OFFICE VISIT (OUTPATIENT)
Dept: FAMILY MEDICINE CLINIC | Facility: CLINIC | Age: 41
End: 2021-02-04

## 2021-02-04 DIAGNOSIS — R51.9 ACUTE NONINTRACTABLE HEADACHE, UNSPECIFIED HEADACHE TYPE: Primary | ICD-10-CM

## 2021-02-04 PROCEDURE — 99442 PR PHYS/QHP TELEPHONE EVALUATION 11-20 MIN: CPT | Performed by: NURSE PRACTITIONER

## 2021-02-04 NOTE — PROGRESS NOTES
COVID-19 Virtual Visit     Assessment/Plan:    Problem List Items Addressed This Visit     None      Visit Diagnoses     Acute nonintractable headache, unspecified headache type    -  Primary    Relevant Orders    Novel Coronavirus (Covid-19),PCR [facility] - Collected at Mobile [facility] or [facility]    CBC and differential    Comprehensive metabolic panel    Iron Panel (Includes Ferritin, Iron Sat%, Iron, and TIBC)         Disposition:     I referred patient to one of our centralized sites for a COVID-19 swab  Infection prevention discussed  Recommend self quarantine at this time  Advised acetaminophen for pain / fever  Recommend drinking increased fluids and rest  In depth discussion if any respiratory distress, dizziness, fever 104 F or greater, or chest pain to immediately present to the ED  Patient states that they understand and agree with directions and plan  COVID-19 Counseling Check-List    *Discuss the need for immediate isolation, even before results of the test are available  *Advise patients to inform their immediate household/contacts that they may wish to be tested and quarantined as well  Review locations and people they have been in contact with in the past two weeks  *Review the signs and symptoms of COVID-19  *Inform patients that if positive, they will likely be contacted by a public health worker and asked to provide a list of the people they've been with for contact tracing, encourage them to 'answer the call'  *Discuss services that might help the patient successfully isolate and quarantine at home      I have spent 15 minutes directly with the patient  Greater than 50% of this time was spent in counseling/coordination of care regarding: risks and benefits of treatment options, instructions for management, patient and family education, importance of treatment compliance and risk factor reductions          Encounter provider [address] [place] [address]    Provider located at [place] [facility]  [facility]  [address] [facility]  [phone]    Recent Visits  No visits were found meeting these conditions  Showing recent visits within past 7 days and meeting all other requirements     Today's Visits  Date Type Provider Dept   02/04/21 Telephone [facility]   02/04/21 Office Visit  [facility]   Showing today's visits and meeting all other requirements     Future Appointments  No visits were found meeting these conditions  Showing future appointments within next 150 days and meeting all other requirements        Patient agrees to participate in a virtual check in via telephone or video visit instead of presenting to the office to address urgent/immediate medical needs  Patient is aware this is a billable service  After connecting through Telephone, the patient was identified by name and date of birth  Molina Lester was informed that this was a telemedicine visit and that the exam was being conducted confidentially over secure lines  My office door was closed  No one else was in the room  Molina Lester acknowledged consent and understanding of privacy and security of the telemedicine visit  I informed the patient that I have reviewed her record in Epic and presented the opportunity for her to ask any questions regarding the visit today  The patient agreed to participate  It was my intent to perform this visit via video technology but the patient was not able to do a video connection so the visit was completed via audio telephone only  Subjective:   Molina Lester is a 38 y o  female who is concerned about COVID-19  Patient's symptoms include fatigue and headache  Patient denies fever, chills, malaise, congestion, rhinorrhea, sore throat, anosmia, loss of taste, cough, shortness of breath, abdominal pain, nausea, vomiting, diarrhea and myalgias       Date of symptom onset: 2/3/2021    Exposure:   Contact with a person who is under investigation (PUI) for or who is positive for COVID-19 within the last 14 days?: No    Hospitalized recently for fever and/or lower respiratory symptoms?: No      Currently a healthcare worker that is involved in direct patient care?: No      Works in a special setting where the risk of COVID-19 transmission may be high? (this may include long-term care, correctional and intermediate facilities; homeless shelters; assisted-living facilities and group homes ): No      Resident in a special setting where the risk of COVID-19 transmission may be high? (this may include long-term care, correctional and intermediate facilities; homeless shelters; assisted-living facilities and group homes ): No      Patient has sudden onset of HA started yesterday, also dizziness  Reports that she has been having gross hematuria x 1 month       No results found for: Jeffy Amskatalina, [address] [facility], [address] [place] [address],[address], [place] [address]  Past Medical History:   Diagnosis Date    Angina at rest [facility])     Anxiety     Depression     Epilepsy ([facility] )     Fibroid 2012    History of cardiac cath 2006    Hydronephrosis 2007    Hypertension     Migraine without aura     PTSD (post-traumatic stress disorder)     [facility]      Past Surgical History:   Procedure Laterality Date    APPENDECTOMY     [address]  2006    CARDIAC CATHETERIZATION       SECTION  2008    CHOLECYSTECTOMY  2018   Raul Brantley KIDNEY SURGERY  2007    TUBAL LIGATION  2008     Current Outpatient Medications   Medication Sig Dispense Refill    Abatacept 125 MG/ML SOAJ Inject under the skin      albuterol (PROVENTIL HFA,VENTOLIN HFA) 90 mcg/act inhaler Inhale 2 puffs every 4 (four) hours as needed for wheezing 1 Inhaler 0    amLODIPine (NORVASC) 5 mg tablet Take 1 tablet (5 mg total) by mouth daily 90 tablet 1    Calcium Carb-Cholecalciferol (CALCIUM 600+D3) 600-200 MG-UNIT TABS take 1 tablet twice a day      celecoxib (CeleBREX) 200 mg capsule Take 200 mg by mouth 2 (two) times a day      Cholecalciferol (VITAMIN D3) 2000 units capsule take 1 tablet po daily   citalopram (CeleXA) 20 mg tablet Take 1 tablet (20 mg total) by mouth daily 30 tablet 5    doxepin (SINEquan) 25 mg capsule Take 1 capsule (25 mg total) by mouth daily at bedtime 30 capsule 5    Elastic Bandages & Supports (CARPAL TUNNEL WRIST DELUXE) MISC by Does not apply route daily at bedtime 2 each 0    famotidine (PEPCID) 20 mg tablet Take 1 tablet (20 mg total) by mouth 2 (two) times a day 30 tablet 0    fluticasone (FLONASE) 50 mcg/act nasal spray 1 spray into each nostril daily 16 g 5    fluticasone (FLOVENT HFA) 110 MCG/ACT inhaler Inhale 2 puffs 2 (two) times a day Rinse mouth after use  1 Inhaler 2    folic acid (FOLVITE) 1 mg tablet every 24 hours      furosemide (LASIX) 40 mg tablet Take 1 tablet (40 mg total) by mouth daily 30 tablet 3    Incontinence Supply Disposable (Poise Pad) PADS by Does not apply route 4 (four) times a day as needed (incontinence) 90 each 0    leflunomide (ARAVA) 20 MG tablet Take 20 mg by mouth daily      levETIRAcetam (KEPPRA) 1000 MG tablet Take 1 tablet (1,000 mg total) by mouth 2 (two) times a day 60 tablet 5    Methenamine-Sodium Salicylate 279-669 2 MG TABS Take 1 tablet by mouth 3 (three) times a day 90 each 6    methylPREDNISolone (MEDROL) 32 MG tablet Take one (1) tablet 12 hours and one (1) tablet  2 hours PRIOR to CT scan   2 tablet 0    metoprolol tartrate (LOPRESSOR) 25 mg tablet Take 1 tablet (25 mg total) by mouth every 12 (twelve) hours 60 tablet 1    Multiple Vitamins-Minerals (MULTIVITAMIN WITH MINERALS) tablet Take 1 tablet by mouth daily 30 tablet 11    nitroglycerin (NITROSTAT) 0 4 mg SL tablet Place 1 tablet (0 4 mg total) under the tongue every 5 (five) minutes as needed for chest pain Call 911 if using 3 doses 25 tablet 0    ondansetron (ZOFRAN-ODT) 4 mg disintegrating tablet Take 1 tablet (4 mg total) by mouth every 8 (eight) hours as needed for nausea for up to 10 doses 20 tablet 0    polyethylene glycol (GLYCOLAX) 17 GM/SCOOP powder Take 17 g by mouth daily 578 g 0    predniSONE 5 mg tablet Take by mouth daily       No current facility-administered medications for this visit  Allergies   Allergen Reactions    Codeine     Contrast [Iodinated Diagnostic Agents]     Iodine     Latex     Lovenox [Enoxaparin]     Shellfish-Derived Products Hives    Vaccinium Angustifolium Hives    Vicodin [Hydrocodone-Acetaminophen]     Benadryl [Diphenhydramine] Rash    Oxycontin [Oxycodone] Palpitations    Provera [Medroxyprogesterone] Palpitations       Review of Systems   Constitutional: Positive for fatigue  Negative for chills and fever  HENT: Negative for congestion, rhinorrhea and sore throat  Respiratory: Negative for cough and shortness of breath  Gastrointestinal: Negative for abdominal pain, diarrhea, nausea and vomiting  Musculoskeletal: Negative for myalgias  Neurological: Positive for headaches  VIRTUAL VISIT DISCLAIMER    Jose Meek acknowledges that she has consented to an online visit or consultation  She understands that the online visit is based solely on information provided by her, and that, in the absence of a face-to-face physical evaluation by the physician, the diagnosis she receives is both limited and provisional in terms of accuracy and completeness  This is not intended to replace a full medical face-to-face evaluation by the physician  Jose Meek understands and accepts these terms

## 2021-02-04 NOTE — LETTER
February 4, 2021     Patient: Olga Bonner   YOB: 1980   Date of Visit: 2/4/2021       To Whom it May Concern:    Olga Bonner was seen in my clinic on 2/4/2021  She is currently under investigation for COVID and is not able to return to work until results are received  If you have any questions or concerns, please don't hesitate to call           Sincerely,      Irais EMERY     [facility]        CC: No Recipients

## 2021-02-04 NOTE — TELEPHONE ENCOUNTER
Pt called stating she needs a letter for work excusing her till Saturday 2/6/2021 as discussed at todays visit

## 2021-02-05 ENCOUNTER — TELEPHONE (OUTPATIENT)
Dept: FAMILY MEDICINE CLINIC | Facility: CLINIC | Age: 41
End: 2021-02-05

## 2021-02-05 NOTE — TELEPHONE ENCOUNTER
Please call patient, she is stating she was not informed of an order Covid-19 testing was going to be placed, she sounds confused, she is upset she cannot return to work and that it is jeopardizing her job  Please reach out to patient asap

## 2021-02-09 ENCOUNTER — TELEPHONE (OUTPATIENT)
Dept: FAMILY MEDICINE CLINIC | Facility: CLINIC | Age: 41
End: 2021-02-09

## 2021-02-09 NOTE — TELEPHONE ENCOUNTER
good afternoon luisito, i have this patient on the phone Arharshads Loss mrn [id number]   she's asking for a letter to say she was clear for covid and or she doesn't require to be tested    work is giving her a hard time,she would like to go to work  Tomorrow  If any questions please give her a call

## 2021-02-10 NOTE — TELEPHONE ENCOUNTER
Pt called again stating that she does not need the letter the state she is "cleared from covid' she needs it to state that she does not "require covid testing" That she suffers from chronic migraines and is able to return to work on 02/11/21    Please advice

## 2021-02-22 ENCOUNTER — OFFICE VISIT (OUTPATIENT)
Dept: CARDIOLOGY CLINIC | Facility: CLINIC | Age: 41
End: 2021-02-22
Payer: COMMERCIAL

## 2021-02-22 ENCOUNTER — LAB (OUTPATIENT)
Dept: LAB | Facility: HOSPITAL | Age: 41
End: 2021-02-22
Payer: COMMERCIAL

## 2021-02-22 VITALS
WEIGHT: 250.8 LBS | BODY MASS INDEX: 45.87 KG/M2 | SYSTOLIC BLOOD PRESSURE: 120 MMHG | DIASTOLIC BLOOD PRESSURE: 80 MMHG | HEART RATE: 64 BPM | TEMPERATURE: 97.5 F

## 2021-02-22 DIAGNOSIS — R51.9 ACUTE NONINTRACTABLE HEADACHE, UNSPECIFIED HEADACHE TYPE: ICD-10-CM

## 2021-02-22 DIAGNOSIS — G47.33 OSA (OBSTRUCTIVE SLEEP APNEA): ICD-10-CM

## 2021-02-22 DIAGNOSIS — I10 ESSENTIAL HYPERTENSION: ICD-10-CM

## 2021-02-22 DIAGNOSIS — E66.01 MORBID OBESITY (HCC): ICD-10-CM

## 2021-02-22 DIAGNOSIS — R00.2 PALPITATIONS: ICD-10-CM

## 2021-02-22 DIAGNOSIS — R07.2 PRECORDIAL CHEST PAIN: Primary | ICD-10-CM

## 2021-02-22 LAB
ALBUMIN SERPL BCP-MCNC: 3.7 G/DL (ref 3.5–5)
ALP SERPL-CCNC: 53 U/L (ref 46–116)
ALT SERPL W P-5'-P-CCNC: 21 U/L (ref 12–78)
ANION GAP SERPL CALCULATED.3IONS-SCNC: 7 MMOL/L (ref 4–13)
AST SERPL W P-5'-P-CCNC: 18 U/L (ref 5–45)
BASOPHILS # BLD AUTO: 0.03 THOUSANDS/ΜL (ref 0–0.1)
BASOPHILS NFR BLD AUTO: 1 % (ref 0–1)
BILIRUB SERPL-MCNC: 0.38 MG/DL (ref 0.2–1)
BUN SERPL-MCNC: 12 MG/DL (ref 5–25)
CALCIUM SERPL-MCNC: 9 MG/DL (ref 8.3–10.1)
CHLORIDE SERPL-SCNC: 105 MMOL/L (ref 100–108)
CO2 SERPL-SCNC: 27 MMOL/L (ref 21–32)
CREAT SERPL-MCNC: 0.64 MG/DL (ref 0.6–1.3)
EOSINOPHIL # BLD AUTO: 0.17 THOUSAND/ΜL (ref 0–0.61)
EOSINOPHIL NFR BLD AUTO: 3 % (ref 0–6)
ERYTHROCYTE [DISTWIDTH] IN BLOOD BY AUTOMATED COUNT: 14.8 % (ref 11.6–15.1)
FERRITIN SERPL-MCNC: 27 NG/ML (ref 8–388)
GFR SERPL CREATININE-BSD FRML MDRD: 111 ML/MIN/1.73SQ M
GLUCOSE P FAST SERPL-MCNC: 96 MG/DL (ref 65–99)
HCT VFR BLD AUTO: 38.1 % (ref 34.8–46.1)
HGB BLD-MCNC: 11.8 G/DL (ref 11.5–15.4)
IMM GRANULOCYTES # BLD AUTO: 0.01 THOUSAND/UL (ref 0–0.2)
IMM GRANULOCYTES NFR BLD AUTO: 0 % (ref 0–2)
IRON SATN MFR SERPL: 22 %
IRON SERPL-MCNC: 86 UG/DL (ref 50–170)
LYMPHOCYTES # BLD AUTO: 1.93 THOUSANDS/ΜL (ref 0.6–4.47)
LYMPHOCYTES NFR BLD AUTO: 34 % (ref 14–44)
MCH RBC QN AUTO: 27.8 PG (ref 26.8–34.3)
MCHC RBC AUTO-ENTMCNC: 31 G/DL (ref 31.4–37.4)
MCV RBC AUTO: 90 FL (ref 82–98)
MONOCYTES # BLD AUTO: 0.59 THOUSAND/ΜL (ref 0.17–1.22)
MONOCYTES NFR BLD AUTO: 10 % (ref 4–12)
NEUTROPHILS # BLD AUTO: 3.01 THOUSANDS/ΜL (ref 1.85–7.62)
NEUTS SEG NFR BLD AUTO: 52 % (ref 43–75)
NRBC BLD AUTO-RTO: 0 /100 WBCS
PLATELET # BLD AUTO: 218 THOUSANDS/UL (ref 149–390)
PMV BLD AUTO: 10.5 FL (ref 8.9–12.7)
POTASSIUM SERPL-SCNC: 3.6 MMOL/L (ref 3.5–5.3)
PROT SERPL-MCNC: 8 G/DL (ref 6.4–8.2)
RBC # BLD AUTO: 4.24 MILLION/UL (ref 3.81–5.12)
SODIUM SERPL-SCNC: 139 MMOL/L (ref 136–145)
TIBC SERPL-MCNC: 393 UG/DL (ref 250–450)
WBC # BLD AUTO: 5.74 THOUSAND/UL (ref 4.31–10.16)

## 2021-02-22 PROCEDURE — 99214 OFFICE O/P EST MOD 30 MIN: CPT | Performed by: INTERNAL MEDICINE

## 2021-02-22 PROCEDURE — 82728 ASSAY OF FERRITIN: CPT

## 2021-02-22 PROCEDURE — 85025 COMPLETE CBC W/AUTO DIFF WBC: CPT

## 2021-02-22 PROCEDURE — 36415 COLL VENOUS BLD VENIPUNCTURE: CPT

## 2021-02-22 PROCEDURE — 1036F TOBACCO NON-USER: CPT | Performed by: INTERNAL MEDICINE

## 2021-02-22 PROCEDURE — 80053 COMPREHEN METABOLIC PANEL: CPT

## 2021-02-22 PROCEDURE — 83550 IRON BINDING TEST: CPT

## 2021-02-22 PROCEDURE — 83540 ASSAY OF IRON: CPT

## 2021-02-22 NOTE — PROGRESS NOTES
Cardiology Office Note  MD Omega Tellez MD Mikal Estelle, DO, [facility] MD Fortino Quiñones DO, Sharon Mcdaniel DO, [facility] [place]  ----------------------------------------------------------------  [address] [place], [place] [address]    Emir Elliott 38 y o  female MRN: [id number]  Unit/Bed#:  Encounter: [id number]      History of Present Illness: It was a pleasure to see Emir Elliott in the office today for follow up CV evaluation  She has a past medical history of seizure disorder, hypertension, morbid obesity and rheumatoid arthritis  She has been having episodes of chest discomfort the palpitations that have been occurring for the past 1 5 to 2 months now  Symptoms are described as a pounding sensation in the chest   They occur randomly and are sharp in nature  There is no associated shortness of breath, lightheadedness or dizziness  She does admit to associated headache when the symptoms come on  When she got the symptoms, she rests until they resolved  Due to her chest discomfort, she presented to the emergency department at [facility] in [facility] for evaluation  For her chest discomfort, she was ordered a pharmacologic nuclear stress test to assess for any evidence of underlying myocardial ischemia  The stress test was found to be negative  She is here today for follow-up of her echocardiogram and event recorder  She continues to have intermittent episodes of palpitations, but improvement in her chest discomfort  Denies loss of consciousness, lightheadedness or dizziness  Admits to chronic lower extremity edema unchanged for years  Denies orthopnea or paroxysmal nocturnal dyspnea  Of note, she has been told by family members that she both snores and stops breathing at times during her sleep  She has never been evaluated for sleep apnea      Review of Systems:  Review of Systems   Constitution: Negative for decreased appetite, fever, weight gain and weight loss  HENT: Negative for congestion and sore throat  Eyes: Negative for visual disturbance  Cardiovascular: Positive for palpitations  Negative for chest pain, dyspnea on exertion, leg swelling and near-syncope  Respiratory: Negative for cough and shortness of breath  Hematologic/Lymphatic: Negative for bleeding problem  Skin: Negative for rash  Musculoskeletal: Negative for myalgias and neck pain  Gastrointestinal: Negative for abdominal pain and nausea  Neurological: Negative for light-headedness and weakness  Psychiatric/Behavioral: Negative for depression         Past Medical History:   Diagnosis Date    Angina at rest [facility])     Anxiety     Depression     Epilepsy ([facility] )     Fibroid 2012    History of cardiac cath 2006    Hydronephrosis 2007    Hypertension     Migraine without aura     PTSD (post-traumatic stress disorder)     Rheumatoid arteritis ([place] [address] )        Past Surgical History:   Procedure Laterality Date    APPENDECTOMY      CARDIAC CATHETERIZATION      CARDIAC CATHETERIZATION       SECTION  2008    CHOLECYSTECTOMY  2018   [facility]  2007    TUBAL LIGATION  2008       Social History     Socioeconomic History    Marital status: Single     Spouse name: Not on file    Number of children: Not on file    Years of education: Not on file    Highest education level: Not on file   Occupational History    Not on file   Social Needs    Financial resource strain: Hard    Food insecurity     Worry: Never true     Inability: Never true    Transportation needs     Medical: Yes     Non-medical: Yes   Tobacco Use    Smoking status: Former Smoker     Types: Cigarettes     Quit date:      Years since quittin 1    Smokeless tobacco: Former User   Substance and Sexual Activity    Alcohol use: Not Currently     Frequency: Monthly or less     Drinks per session: 1 or 2    Drug use: Never    Sexual activity: Not Currently     Partners: Male     Birth control/protection: Female Sterilization   Lifestyle    Physical activity     Days per week: Not on file     Minutes per session: Not on file    Stress: Not on file   Relationships    Social connections     Talks on phone: Not on file     Gets together: Not on file     Attends Taoist service: Not on file     Active member of club or organization: Not on file     Attends meetings of clubs or organizations: Not on file     Relationship status: Not on file    Intimate partner violence     Fear of current or ex partner: Not on file     Emotionally abused: Not on file     Physically abused: Not on file     Forced sexual activity: Not on file   Other Topics Concern    Not on file   Social History Narrative    Not on file       Family History   Problem Relation Age of Onset    Diabetes Maternal Grandmother     Hypertension Maternal Grandmother     No Known Problems Mother     No Known Problems Sister     No Known Problems Daughter     No Known Problems Paternal Grandmother     No Known Problems Daughter     Breast cancer Neg Hx     Cancer Neg Hx        Allergies   Allergen Reactions    Codeine     Contrast [Iodinated Diagnostic Agents]     Iodine     Latex     Lovenox [Enoxaparin]     Shellfish-Derived Products Hives    Vaccinium Angustifolium Hives    Vicodin [Hydrocodone-Acetaminophen]     Benadryl [Diphenhydramine] Rash    Oxycontin [Oxycodone] Palpitations    Provera [Medroxyprogesterone] Palpitations         Current Outpatient Medications:     Abatacept 125 MG/ML SOAJ, Inject under the skin, Disp: , Rfl:     Adalimumab 40 MG/0 4ML PNKT, Inject 40 mg under the skin every 14 (fourteen) days, Disp: , Rfl:     albuterol (PROVENTIL HFA,VENTOLIN HFA) 90 mcg/act inhaler, Inhale 2 puffs every 4 (four) hours as needed for wheezing, Disp: 1 Inhaler, Rfl: 0    amLODIPine (NORVASC) 5 mg tablet, Take 1 tablet (5 mg total) by mouth daily, Disp: 90 tablet, Rfl: 1    Calcium Carb-Cholecalciferol (CALCIUM 600+D3) 600-200 MG-UNIT TABS, take 1 tablet twice a day, Disp: , Rfl:     celecoxib (CeleBREX) 200 mg capsule, Take 200 mg by mouth 2 (two) times a day, Disp: , Rfl:     Cholecalciferol (VITAMIN D3) 2000 units capsule, take 1 tablet po daily  , Disp: , Rfl:     citalopram (CeleXA) 20 mg tablet, Take 1 tablet (20 mg total) by mouth daily, Disp: 30 tablet, Rfl: 5    doxepin (SINEquan) 25 mg capsule, Take 1 capsule (25 mg total) by mouth daily at bedtime, Disp: 30 capsule, Rfl: 5    Elastic Bandages & Supports (CARPAL TUNNEL WRIST DELUXE) MISC, by Does not apply route daily at bedtime, Disp: 2 each, Rfl: 0    famotidine (PEPCID) 20 mg tablet, Take 1 tablet (20 mg total) by mouth 2 (two) times a day, Disp: 30 tablet, Rfl: 0    fluticasone (FLONASE) 50 mcg/act nasal spray, 1 spray into each nostril daily, Disp: 16 g, Rfl: 5    fluticasone (FLOVENT HFA) 110 MCG/ACT inhaler, Inhale 2 puffs 2 (two) times a day Rinse mouth after use , Disp: 1 Inhaler, Rfl: 2    folic acid (FOLVITE) 1 mg tablet, every 24 hours, Disp: , Rfl:     furosemide (LASIX) 40 mg tablet, Take 1 tablet (40 mg total) by mouth daily, Disp: 30 tablet, Rfl: 3    Incontinence Supply Disposable (Poise Pad) PADS, by Does not apply route 4 (four) times a day as needed (incontinence), Disp: 90 each, Rfl: 0    leflunomide (ARAVA) 20 MG tablet, Take 20 mg by mouth daily, Disp: , Rfl:     levETIRAcetam (KEPPRA) 1000 MG tablet, Take 1 tablet (1,000 mg total) by mouth 2 (two) times a day, Disp: 60 tablet, Rfl: 5    Methenamine-Sodium Salicylate 083-001 1 MG TABS, Take 1 tablet by mouth 3 (three) times a day, Disp: 90 each, Rfl: 6    methylPREDNISolone (MEDROL) 32 MG tablet, Take one (1) tablet 12 hours and one (1) tablet  2 hours PRIOR to CT scan , Disp: 2 tablet, Rfl: 0    metoprolol tartrate (LOPRESSOR) 25 mg tablet, Take 1 tablet (25 mg total) by mouth every 12 (twelve) hours, Disp: 60 tablet, Rfl: 1    Multiple Vitamins-Minerals (MULTIVITAMIN WITH MINERALS) tablet, Take 1 tablet by mouth daily, Disp: 30 tablet, Rfl: 11    nitroglycerin (NITROSTAT) 0 4 mg SL tablet, Place 1 tablet (0 4 mg total) under the tongue every 5 (five) minutes as needed for chest pain Call 911 if using 3 doses, Disp: 25 tablet, Rfl: 0    ondansetron (ZOFRAN-ODT) 4 mg disintegrating tablet, Take 1 tablet (4 mg total) by mouth every 8 (eight) hours as needed for nausea for up to 10 doses, Disp: 20 tablet, Rfl: 0    polyethylene glycol (GLYCOLAX) 17 GM/SCOOP powder, Take 17 g by mouth daily, Disp: 578 g, Rfl: 0    predniSONE 5 mg tablet, Take by mouth daily, Disp: , Rfl:     Vitals:    21 0816   BP: 120/80   BP Location: Right arm   Patient Position: Sitting   Cuff Size: Large   Pulse: 64   Temp: 97 5 °F (36 4 °C)   Weight: 114 kg (250 lb 12 8 oz)       PHYSICAL EXAMINATION:  Gen: Awake, Alert, NAD    HEENT: AT/NC, Anicteric, mmm  Neck: Supple, No elevated JVP  Resp: CTA bilaterally no w/r/r  CV: RRR +S1, S2, No m/r/g  Abd: Soft, obese, NT/ND + BS  Ext: warm, +1 pedal edema bilaterally  Neuro:  Follows commands, moves all extermities  Psych: Appropriate affect    --------------------------------------------------------------------------------  TREADMILL STRESS  Results for orders placed during the hospital encounter of 19   Stress test only, exercise    Narrative [address] [place] [address]    Stress Electrocardiography during Exercise    Name: Tonny Fraser  MR #: [id number]  Account #: [de-identified]  Study date: 2019  : 1980  Age: 43 years  Gender: Female  Height: 62 in  Weight: 278 lb  BSA: 2 2 m squared    Diagnosis: I20 8 - Other forms of angina pectoris, R07 9 - Chest pain, unspecified    Referring Physician:  Amparo Mccray MD  RN:  Nan Zamudio RN  Technician:  Sue Baumgarten  Group:  [facility]  Interpreting Physician:  Pj Izaguirre MD  Report Prepared By[de-identified]  Joe Campo RN    CLINICAL QUESTION: Detection of coronary artery disease  HISTORY: The patient is a 43year old  female  Chest pain status: chest pain  Other symptoms: dyspnea  Coronary artery disease risk factors: hypertension  Co-morbidity: history of lung disease and obesity  Medications: a diuretic  PHYSICAL EXAM: Baseline physical exam screening: no wheezes audible  REST ECG: Sinus rhythm with no significant ST-T wave abnormalities  No evidence of prior infarction, or chamber enlargement or hypertrophy  PROCEDURE: Treadmill exercise testing was performed, using the Tj Protocol  Stress electrocardiographic evaluation was performed  MODIFIED RAJINDER PROTOCOL:  HR bpm SBP mmHg DBP mmHg Symptoms  Baseline 86 132 88 none  Stage 0 114 -- -- severe dyspnea  Stage 1/2 146 177 84 moderate dyspnea  Stage 1 153 -- -- moderate dyspnea  Recovery 1 153 142 78 moderate dyspnea  Recovery 2 104 -- -- mild dyspnea  Recovery 3 96 141 82 subsiding  Recovery 5 96 134 75 none  No medications or fluids given  STRESS SUMMARY: Duration of exercise was 7 min  The patient exercised to protocol stage 1  Maximal work rate was 4 8 METs  Maximal heart rate during stress was 155 bpm ( 85 % of maximal predicted heart rate)  The heart rate response to  stress was exaggerated  There was normal resting blood pressure with an appropriate response to stress  The rate-pressure product for the peak heart rate and blood pressure was 31995  There was no chest pain during stress  The stress test  was terminated due to achievement of maximal (symptom limited) exercise and severe dyspnea  No abnormal ST T wave changes noted with exercise  No significant arrhythmia noted  SUMMARY:  -  Stress results: Duration of exercise was 7 min  Target heart rate was achieved  There was no chest pain during stress      IMPRESSIONS: 1  Negative exercise treadmill stress test for symptoms of ischemia and negative ECG changes for ischemia at 85% of patient's age predicted maximal heart rate  2  Fair exercise capacity, limited due to rheumatoid arthritis mediated myopathy and exertional shortness of breath  Normal oxygenation maintained  3  Normal resting blood pressure with slightly exaggerated blood pressure response to exercise  4  No chest pain reported with exercise  5  No significant arrhythmias noted with exercise  Prepared and signed by    Fern Tobar MD  Signed 2019 17:12:32        --------------------------------------------------------------------------------  NUCLEAR STRESS TEST: No results found for this or any previous visit  Results for orders placed during the hospital encounter of 20   NM myocardial perfusion spect (rx stress and/or rest)    [place] [address] [employer] [address]    Rest/Stress Gated SPECT Myocardial Perfusion Imaging After Regadenoson    Patient: Paul Ponce  MR number: [id number]  Account number: [de-identified]  : 1980  Age: 38 years  Gender: Female  Status: Outpatient  Location: [address] [place] [address]  Height: 62 in  Weight: 274 lb  BP: 137/ 76 mmHg    Diagnosis: I20 8 - Other forms of angina pectoris    Primary Physician:  Celeste Spears MD  RN:  Riley Melgar RN  Referring Physician:  Celeste Spears MD  Technician:  Nakul Johnson  Group:  [facility]  Report Prepared By[de-identified]  Nakul Johnson  Interpreting Physician:  Fern Tobar MD    INDICATIONS: Coronary artery disease  HISTORY: The patient is a 38year old  female  Chest pain status: chest pain  Coronary artery disease risk factors: hypertension and smoking  Cardiovascular history: none significant  Co-morbidity: obesity  Medications: a beta  blocker      PHYSICAL EXAM: Baseline physical exam screening: normal     REST ECG: Sinus Bradycardia rhythm with mild early repolarization, no significant ST-T wave abnormalities  No evidence of prior infarction, or chamber enlargement or hypertrophy  PROCEDURE: The study was performed in the the [facility]  A regadenoson infusion pharmacologic stress test was performed  Gated SPECT myocardial perfusion imaging was performed after stress and at rest  Systolic blood pressure was  137 mmHg, at the start of the study  Diastolic blood pressure was 76 mmHg, at the start of the study  The heart rate was 51 bpm, at the start of the study  Regadenoson protocol:  HR bpm SBP mmHg DBP mmHg Symptoms  Baseline 51 137 76 none  1 min 85 -- -- headache  2 min 78 145 82 subsiding  3 min 76 141 83 subsiding  4 min 71 -- -- subsiding  5 min 67 -- -- subsiding  6 min 67 135 72 subsiding  No medications or fluids given  STRESS SUMMARY: Duration of pharmacologic stress was 3 min and 0 sec  Maximal work rate was 1 METs  Maximal heart rate during stress was 85 bpm  The heart rate response to stress was normal  There was normal resting blood pressure with an  appropriate response to stress  The rate-pressure product for the peak heart rate and blood pressure was 26678  There was no chest pain during stress  The stress test was terminated due to protocol completion  Pre oxygen saturation: 100 %  Peak oxygen saturation: 100 %  The stress ECG was negative for ischemia and normal  No abnormal ST T wave changes were noted with regadenoson injection  Arrhythmia during stress: isolated atrial premature beats  ISOTOPE ADMINISTRATION:  Resting isotope administration Stress isotope administration  Agent Tetrofosmin Tetrofosmin  Dose 15 1 mCi 45 mCi  Date 12/04/2020 12/04/2020  Injection time 08:30 09:40  Imaging time 09:00 10:30  Injection-image interval 30 min 50 min    The radiopharmaceutical was injected at the peak effect of pharmacologic stress  MYOCARDIAL PERFUSION IMAGING:  The image quality was good   Rotating projection images reveal moderate breast attenuation, mild diaphragmatic attenuation, and mild subdiaphragmatic activity  Left ventricular size was normal  The TID ratio was 0 84  PERFUSION DEFECTS:  -  There was a small to moderate, mildly severe, paradoxical (reverse redistribution) myocardial perfusion defect of the entire anterior wall definitely due to attenuation from breast tissue  GATED SPECT:  The calculated left ventricular ejection fraction was 53 %  Left ventricular ejection fraction was within normal limits by visual estimate  SUMMARY:  -  Stress results: There was no chest pain during stress  -  ECG conclusions: The stress ECG was negative for ischemia and normal  No abnormal ST T wave changes were noted with regadenoson injection   -  Perfusion imaging: There was a small to moderate, mildly severe, paradoxical (reverse redistribution) myocardial perfusion defect of the entire anterior wall definitely due to attenuation from breast tissue   -  Gated SPECT: The calculated left ventricular ejection fraction was 53 %  Left ventricular ejection fraction was within normal limits by visual estimate  IMPRESSIONS: 1  Negative regadenoson nuclear stress test for symptoms of angina pectoris, negative for ECG changes of ischemia  2  Normal myocardial perfusion scan with anterior breast attenuation artifact  No definite evidence of reversible or fixed perfusion abnormality  3  Normal left ventricular systolic function and normal regional wall motion  Ejection fraction is determined as 53%    4  Normal resting blood pressure with appropriate blood pressure response during stress test     5  No significant dysrhythmia noted during stress test  Normal study after pharmacologic vasodilation without reproduction of symptoms  The cardiovascular risk is low  Diagnostic sensitivity was limited by submaximal stress      Prepared and signed by    Carol Ann Wren MD  Signed 12/08/2020 11:00:39 --------------------------------------------------------------------------------  CATH:  No results found for this or any previous visit   --------------------------------------------------------------------------------  ECHO:   No results found for this or any previous visit  No results found for this or any previous visit   --------------------------------------------------------------------------------  HOLTER  No results found for this or any previous visit  No results found for this or any previous visit   --------------------------------------------------------------------------------  CAROTIDS  No results found for this or any previous visit    --------------------------------------------------------------------------------  ECGs:  No results found for this visit on 02/22/21  Lab Results   Component Value Date    WBC 6 40 01/14/2021    HGB 10 8 (L) 01/14/2021    HCT 34 0 (L) 01/14/2021    MCV 85 01/14/2021     01/14/2021      Lab Results   Component Value Date    SODIUM 140 01/20/2021    K 3 9 01/20/2021     01/20/2021    CO2 26 01/20/2021    BUN 16 01/20/2021    CREATININE 0 65 01/20/2021    GLUC 110 (H) 01/14/2021    CALCIUM 9 5 01/20/2021      Lab Results   Component Value Date    HGBA1C 5 5 08/14/2020      No results found for: CHOL  Lab Results   Component Value Date    HDL 47 08/14/2020    HDL 57 05/02/2019    HDL 49 07/30/2018     Lab Results   Component Value Date    LDLCALC 79 08/14/2020    LDLCALC 64 05/02/2019    LDLCALC 72 07/30/2018     Lab Results   Component Value Date    TRIG 136 08/14/2020    TRIG 95 05/02/2019    TRIG 159 (H) 07/30/2018     No results found for: CHOLHDL   Lab Results   Component Value Date    INR 1 08 10/25/2018    PROTIME 11 4 10/25/2018        1  Precordial chest pain    2  Palpitations    3  Essential hypertension    4  Morbid obesity ([address] )    5   JIN (obstructive sleep apnea)  -     Ambulatory referral to Sleep Medicine; Future        IMPRESSION:  · Precordial chest pain likely musculoskeletal   · Palpitations  · Pharmacologic nuclear stress test negative for myocardial ischemia, gated EF 53%, December 2020  · Hypertension  · Event recorder w/ SR avg HR 78 bpm, rare APCs/couplets, rare VPCs/couplets, NS PSVT (x5) longest 9 beats, fastest 160 bpm, triggered events correlated with SR/ST, January 2021  · LVEF 46%, normal diastolic function, trace TR, January 2021  · LDL 79 mg/dL, August 2020  · Morbid obesity  · Epilepsy  · Rheumatoid arthritis  · Probable JIN  · Asthma    PLAN:  It was a pleasure to see Jhonatan Garza in the office today for follow up CV evaluation  She is here today to review the results of her echocardiogram and event recorder  Echocardiogram demonstrated normal cardiac structure and function without significant valvular disease  Event recorder demonstrated sinus rhythm with rare atrial and ventricular ectopy as well as rare nonsustained PSVT  The longest it was in the fastest was 160 beats per minute  She had no evidence of VT  Her triggered events correlated with sinus rhythm/sinus tachycardia  I have shared with her the stable news  She has had a negative nuclear stress test in December 2020 for evidence of myocardial ischemia  She has no symptoms concerning for angina and no signs or symptoms of heart failure  Clinically, she appears to be euvolemic  At our last encounter, we have discussed the possibility that her symptoms may be related to her high blood pressure  She was started on amlodipine without any significant adverse effects  Recent TSH has been within normal limits  Based on her clinical presentation, I have the following recommendations:    1  Recommend aggressive blood pressure control to keep her systolic pressure less than 405 and diastolic pressure less than 90  Her blood pressure is stable in the office today  Continue amlodipine and metoprolol  2  Salt restriction has been encouraged  Is currently on late ext for symptomatic relief of her mild lower extremity swelling  I believe her to clinically be euvolemic  Assessment of steroids as per rheumatology  3  Should she have any recurrence in her symptoms especially worsening in frequency or severity or change in quality, I have instructed her to seek immediate medical attention  4  Due to the patient's reported snoring and apneic episodes, I have recommended that she see sleep medicine to be evaluated for sleep apnea  I have provider referral   5  Patient appears to be up-to-date on her CV testing  6  We will follow up with her in 1 year  As always, please do not hesitate to call with any questions  Portions of the record may have been created with voice recognition software  Occasional wrong word or "sound a like" substitutions may have occurred due to the inherent limitations of voice recognition software  Read the chart carefully and recognize, using context, where substitutions have occurred          Signed: Shanita Carrillo DO, [facility]

## 2021-03-03 ENCOUNTER — OFFICE VISIT (OUTPATIENT)
Dept: FAMILY MEDICINE CLINIC | Facility: CLINIC | Age: 41
End: 2021-03-03

## 2021-03-03 ENCOUNTER — LAB (OUTPATIENT)
Dept: LAB | Facility: HOSPITAL | Age: 41
End: 2021-03-03
Payer: COMMERCIAL

## 2021-03-03 VITALS
DIASTOLIC BLOOD PRESSURE: 76 MMHG | BODY MASS INDEX: 46.59 KG/M2 | OXYGEN SATURATION: 97 % | HEIGHT: 62 IN | RESPIRATION RATE: 18 BRPM | WEIGHT: 253.2 LBS | HEART RATE: 76 BPM | SYSTOLIC BLOOD PRESSURE: 126 MMHG | TEMPERATURE: 96.7 F

## 2021-03-03 DIAGNOSIS — G40.909 NONINTRACTABLE EPILEPSY WITHOUT STATUS EPILEPTICUS, UNSPECIFIED EPILEPSY TYPE (HCC): ICD-10-CM

## 2021-03-03 DIAGNOSIS — J06.9 URI (UPPER RESPIRATORY INFECTION): ICD-10-CM

## 2021-03-03 DIAGNOSIS — Z11.3 ROUTINE SCREENING FOR STI (SEXUALLY TRANSMITTED INFECTION): ICD-10-CM

## 2021-03-03 DIAGNOSIS — J45.30 MILD PERSISTENT ASTHMA WITHOUT COMPLICATION: ICD-10-CM

## 2021-03-03 DIAGNOSIS — Z11.3 ROUTINE SCREENING FOR STI (SEXUALLY TRANSMITTED INFECTION): Primary | ICD-10-CM

## 2021-03-03 DIAGNOSIS — I20.8 ANGINA AT REST (HCC): ICD-10-CM

## 2021-03-03 DIAGNOSIS — I10 ESSENTIAL HYPERTENSION: ICD-10-CM

## 2021-03-03 DIAGNOSIS — E66.01 MORBID OBESITY (HCC): ICD-10-CM

## 2021-03-03 DIAGNOSIS — F41.9 ANXIETY: ICD-10-CM

## 2021-03-03 LAB — HCV AB SER QL: NORMAL

## 2021-03-03 PROCEDURE — 86803 HEPATITIS C AB TEST: CPT

## 2021-03-03 PROCEDURE — 36415 COLL VENOUS BLD VENIPUNCTURE: CPT

## 2021-03-03 PROCEDURE — 86592 SYPHILIS TEST NON-TREP QUAL: CPT

## 2021-03-03 PROCEDURE — 3008F BODY MASS INDEX DOCD: CPT | Performed by: INTERNAL MEDICINE

## 2021-03-03 PROCEDURE — 99214 OFFICE O/P EST MOD 30 MIN: CPT | Performed by: PHYSICIAN ASSISTANT

## 2021-03-03 PROCEDURE — 87389 HIV-1 AG W/HIV-1&-2 AB AG IA: CPT

## 2021-03-03 RX ORDER — FLUTICASONE PROPIONATE 50 MCG
1 SPRAY, SUSPENSION (ML) NASAL DAILY
Qty: 16 G | Refills: 5 | Status: SHIPPED | OUTPATIENT
Start: 2021-03-03

## 2021-03-03 RX ORDER — AMLODIPINE BESYLATE 5 MG/1
5 TABLET ORAL DAILY
Qty: 90 TABLET | Refills: 1 | Status: SHIPPED | OUTPATIENT
Start: 2021-03-03 | End: 2021-08-10

## 2021-03-03 RX ORDER — FUROSEMIDE 40 MG/1
40 TABLET ORAL DAILY
Qty: 90 TABLET | Refills: 1 | Status: SHIPPED | OUTPATIENT
Start: 2021-03-03

## 2021-03-03 RX ORDER — FLUTICASONE PROPIONATE 110 UG/1
2 AEROSOL, METERED RESPIRATORY (INHALATION) 2 TIMES DAILY
Qty: 3 INHALER | Refills: 1 | Status: SHIPPED | OUTPATIENT
Start: 2021-03-03

## 2021-03-03 RX ORDER — CITALOPRAM 20 MG/1
20 TABLET ORAL DAILY
Qty: 90 TABLET | Refills: 1 | Status: SHIPPED | OUTPATIENT
Start: 2021-03-03

## 2021-03-03 RX ORDER — DOXEPIN HYDROCHLORIDE 25 MG/1
25 CAPSULE ORAL
Qty: 90 CAPSULE | Refills: 1 | Status: SHIPPED | OUTPATIENT
Start: 2021-03-03

## 2021-03-03 NOTE — PROGRESS NOTES
Assessment/Plan:    Essential hypertension  Controlled-continue norvasc and lasix  Epilepsy ([place] [address] )  Continue keppra  She has been seizure free for more than 1 year    Rheumatoid myopathy with rheumatoid arthritis ([place] [address] )  Continue follow up with rheumatology  She is awaiting response from them about swelling she had after humira injection    Morbid obesity ([place] [address] )  BMI Counseling: Body mass index is 46 31 kg/m²  The BMI is above normal  Nutrition recommendations include 3-5 servings of fruits/vegetables daily, reducing intake of saturated fat and trans fat and reducing intake of cholesterol  Anxiety  Resume past medications  She believes of the chest pain is related to the anxiety and she was getting less chest pain when she was taking citalopram doxepin  Problem List Items Addressed This Visit        Cardiovascular and Mediastinum    Essential hypertension     Controlled-continue norvasc and lasix  Relevant Medications    amLODIPine (NORVASC) 5 mg tablet    furosemide (LASIX) 40 mg tablet    Angina at rest [facility])    Relevant Medications    amLODIPine (NORVASC) 5 mg tablet       Nervous and Auditory    Epilepsy ([place] [address] )     Continue keppra  She has been seizure free for more than 1 year            Other    Morbid obesity ([place] [address] )     BMI Counseling: Body mass index is 46 31 kg/m²  The BMI is above normal  Nutrition recommendations include 3-5 servings of fruits/vegetables daily, reducing intake of saturated fat and trans fat and reducing intake of cholesterol  Anxiety     Resume past medications  She believes of the chest pain is related to the anxiety and she was getting less chest pain when she was taking citalopram doxepin           Relevant Medications    citalopram (CeleXA) 20 mg tablet    doxepin (SINEquan) 25 mg capsule      Other Visit Diagnoses     Routine screening for STI (sexually transmitted infection)    -  Primary    Relevant Orders    Chlamydia/GC amplified DNA by PCR    Human Immunodeficiency Virus 1/2 Antigen / Antibody ( Fourth Generation) with Reflex Testing    RPR (Completed)    Hepatitis C antibody (Completed)    Mild persistent asthma without complication        Relevant Medications    fluticasone (FLOVENT HFA) 110 MCG/ACT inhaler    URI (upper respiratory infection)        Relevant Medications    fluticasone (FLONASE) 50 mcg/act nasal spray            Subjective:      Patient ID: Taylor Norwood is a 38 y o  female  HPI 38year old F here for here for follow up of chest pain  She was having intermittent episodes of palpitations  She is still getting the chest pain  She is going through a  Lot of stress  She is looking for a new job  She is a single mom and child has autism  She is getting scheduled for sleep study  She snores a lot  She has apnea epidosedes  She was seeing psychiatry but because of work she stoped  She has been out of medicaiton   She does itch herself due to anxiety  The medication was helping prior    She is still taking keppra  She is using her inhalers more now that we are closer to spring  She has been having a lot of eye watering  She is getting green mucous from her eye on the left  She is starting to get worse allergy symptoms  She had a lot of swelling with humira  It is coming down  It was liocated   Around the injection site  She did call her rheumatologist today to let them know  She was on arencia in the past     The following portions of the patient's history were reviewed and updated as appropriate:   She  has a past medical history of Angina at rest [facility]), Anxiety, Depression, Epilepsy ([facility] [address] ), Fibroid (2012), History of cardiac cath (2006), Hydronephrosis (2007), Hypertension, Migraine without aura, PTSD (post-traumatic stress disorder), and Rheumatoid arteritis ([facility] [address] )    She   Patient Active Problem List    Diagnosis Date Noted    Non-intractable vomiting 01/18/2021    Rash and nonspecific skin eruption 11/18/2020    [facility] ) 2020    Hand numbness 2019    Carpal tunnel syndrome, bilateral     Constipation 2019    Proteinuria 2019    Anxiety 2019    Angina at rest [facility]) 2019    Encounter for gynecological examination 10/25/2018    Easy bruising 10/25/2018    Irregular periods 10/25/2018    Anemia 2018    Ambulatory dysfunction 2018    Mild intermittent asthma 2018    Stress incontinence 2018    Hydronephrosis 2018    Depression 2018    Nonintractable epilepsy without status epilepticus ([facility] ) 2018    Rheumatoid myopathy with rheumatoid arthritis ([place] [address] ) 2016    Morbid obesity ([place] [address] ) 2012    Essential hypertension 2012     She  has a past surgical history that includes Kidney surgery ();  section (); Tubal ligation (); Cholecystectomy (); Appendectomy (); Cardiac catheterization (); and Cardiac catheterization  Her family history includes Diabetes in her maternal grandmother; Hypertension in her maternal grandmother; No Known Problems in her daughter, daughter, mother, paternal grandmother, and sister  She  reports that she quit smoking about 8 years ago  Her smoking use included cigarettes  She has quit using smokeless tobacco  She reports previous alcohol use  She reports that she does not use drugs    Current Outpatient Medications   Medication Sig Dispense Refill    Abatacept 125 MG/ML SOAJ Inject under the skin      albuterol (PROVENTIL HFA,VENTOLIN HFA) 90 mcg/act inhaler Inhale 2 puffs every 4 (four) hours as needed for wheezing 1 Inhaler 0    amLODIPine (NORVASC) 5 mg tablet Take 1 tablet (5 mg total) by mouth daily 90 tablet 1    Calcium Carb-Cholecalciferol (CALCIUM 600+D3) 600-200 MG-UNIT TABS take 1 tablet twice a day      celecoxib (CeleBREX) 200 mg capsule Take 200 mg by mouth 2 (two) times a day      Cholecalciferol (VITAMIN D3) 2000 units capsule take 1 tablet po daily   citalopram (CeleXA) 20 mg tablet Take 1 tablet (20 mg total) by mouth daily 90 tablet 1    doxepin (SINEquan) 25 mg capsule Take 1 capsule (25 mg total) by mouth daily at bedtime 90 capsule 1    Elastic Bandages & Supports (CARPAL TUNNEL WRIST DELUXE) MISC by Does not apply route daily at bedtime 2 each 0    famotidine (PEPCID) 20 mg tablet Take 1 tablet (20 mg total) by mouth 2 (two) times a day 30 tablet 0    fluticasone (FLONASE) 50 mcg/act nasal spray 1 spray into each nostril daily 16 g 5    fluticasone (FLOVENT HFA) 110 MCG/ACT inhaler Inhale 2 puffs 2 (two) times a day Rinse mouth after use  3 Inhaler 1    folic acid (FOLVITE) 1 mg tablet every 24 hours      furosemide (LASIX) 40 mg tablet Take 1 tablet (40 mg total) by mouth daily 90 tablet 1    Incontinence Supply Disposable (Poise Pad) PADS by Does not apply route 4 (four) times a day as needed (incontinence) 90 each 0    leflunomide (ARAVA) 20 MG tablet Take 20 mg by mouth daily      levETIRAcetam (KEPPRA) 1000 MG tablet Take 1 tablet (1,000 mg total) by mouth 2 (two) times a day 60 tablet 5    Methenamine-Sodium Salicylate 583-005 9 MG TABS Take 1 tablet by mouth 3 (three) times a day 90 each 6    methylPREDNISolone (MEDROL) 32 MG tablet Take one (1) tablet 12 hours and one (1) tablet  2 hours PRIOR to CT scan   2 tablet 0    metoprolol tartrate (LOPRESSOR) 25 mg tablet Take 1 tablet (25 mg total) by mouth every 12 (twelve) hours 60 tablet 1    Multiple Vitamins-Minerals (MULTIVITAMIN WITH MINERALS) tablet Take 1 tablet by mouth daily 30 tablet 11    nitroglycerin (NITROSTAT) 0 4 mg SL tablet Place 1 tablet (0 4 mg total) under the tongue every 5 (five) minutes as needed for chest pain Call 911 if using 3 doses 25 tablet 0    ondansetron (ZOFRAN-ODT) 4 mg disintegrating tablet Take 1 tablet (4 mg total) by mouth every 8 (eight) hours as needed for nausea for up to 10 doses 20 tablet 0    polyethylene glycol (GLYCOLAX) 17 GM/SCOOP powder Take 17 g by mouth daily 578 g 0    predniSONE 5 mg tablet Take by mouth daily      Adalimumab 40 MG/0 4ML PNKT Inject 40 mg under the skin every 14 (fourteen) days       No current facility-administered medications for this visit  Current Outpatient Medications on File Prior to Visit   Medication Sig    Abatacept 125 MG/ML SOAJ Inject under the skin    albuterol (PROVENTIL HFA,VENTOLIN HFA) 90 mcg/act inhaler Inhale 2 puffs every 4 (four) hours as needed for wheezing    Calcium Carb-Cholecalciferol (CALCIUM 600+D3) 600-200 MG-UNIT TABS take 1 tablet twice a day    celecoxib (CeleBREX) 200 mg capsule Take 200 mg by mouth 2 (two) times a day    Cholecalciferol (VITAMIN D3) 2000 units capsule take 1 tablet po daily   Elastic Bandages & Supports (CARPAL TUNNEL WRIST DELUXE) MISC by Does not apply route daily at bedtime    famotidine (PEPCID) 20 mg tablet Take 1 tablet (20 mg total) by mouth 2 (two) times a day    folic acid (FOLVITE) 1 mg tablet every 24 hours    Incontinence Supply Disposable (Poise Pad) PADS by Does not apply route 4 (four) times a day as needed (incontinence)    leflunomide (ARAVA) 20 MG tablet Take 20 mg by mouth daily    levETIRAcetam (KEPPRA) 1000 MG tablet Take 1 tablet (1,000 mg total) by mouth 2 (two) times a day    Methenamine-Sodium Salicylate 035-090 3 MG TABS Take 1 tablet by mouth 3 (three) times a day    methylPREDNISolone (MEDROL) 32 MG tablet Take one (1) tablet 12 hours and one (1) tablet  2 hours PRIOR to CT scan      metoprolol tartrate (LOPRESSOR) 25 mg tablet Take 1 tablet (25 mg total) by mouth every 12 (twelve) hours    Multiple Vitamins-Minerals (MULTIVITAMIN WITH MINERALS) tablet Take 1 tablet by mouth daily    nitroglycerin (NITROSTAT) 0 4 mg SL tablet Place 1 tablet (0 4 mg total) under the tongue every 5 (five) minutes as needed for chest pain Call 911 if using 3 doses    ondansetron (ZOFRAN-ODT) 4 mg disintegrating tablet Take 1 tablet (4 mg total) by mouth every 8 (eight) hours as needed for nausea for up to 10 doses    polyethylene glycol (GLYCOLAX) 17 GM/SCOOP powder Take 17 g by mouth daily    predniSONE 5 mg tablet Take by mouth daily    Adalimumab 40 MG/0 4ML PNKT Inject 40 mg under the skin every 14 (fourteen) days     No current facility-administered medications on file prior to visit  She is allergic to codeine; contrast [iodinated diagnostic agents]; iodine (food allergy); latex; lovenox [enoxaparin]; shellfish-derived products (food allergy); vaccinium angustifolium; vicodin [hydrocodone-acetaminophen]; benadryl [diphenhydramine]; oxycontin [oxycodone]; and provera [medroxyprogesterone]       Review of Systems   Constitutional: Positive for fatigue  Negative for activity change, appetite change, chills and unexpected weight change  HENT: Negative for dental problem, ear pain, hearing loss and sore throat  Eyes: Positive for discharge and itching  Negative for visual disturbance  Respiratory: Positive for shortness of breath  Negative for cough and wheezing  Cardiovascular: Positive for chest pain and palpitations  Gastrointestinal: Negative for abdominal pain, constipation, diarrhea and vomiting  Genitourinary: Negative for difficulty urinating and dysuria  Musculoskeletal: Positive for arthralgias  Negative for back pain and myalgias  Skin: Negative for rash  Neurological: Negative for dizziness and headaches  Psychiatric/Behavioral: Positive for dysphoric mood  Negative for behavioral problems  The patient is nervous/anxious  Objective:      /76 (BP Location: Left arm, Patient Position: Sitting, Cuff Size: Standard)   Pulse 76   Temp (!) 96 7 °F (35 9 °C) (Temporal)   Resp 18   Ht 5' 2" (1 575 m)   Wt 115 kg (253 lb 3 2 oz)   LMP  (LMP Unknown)   SpO2 97%   Breastfeeding No   BMI 46 31 kg/m²          Physical Exam  Vitals signs and nursing note reviewed     Constitutional: General: She is not in acute distress  Appearance: She is well-developed  HENT:      Head: Normocephalic and atraumatic  Right Ear: External ear normal       Left Ear: External ear normal    Eyes:      Conjunctiva/sclera: Conjunctivae normal    Neck:      Musculoskeletal: Normal range of motion and neck supple  Thyroid: No thyromegaly  Cardiovascular:      Rate and Rhythm: Normal rate and regular rhythm  Heart sounds: Normal heart sounds  No murmur  Pulmonary:      Effort: Pulmonary effort is normal  No respiratory distress  Breath sounds: Normal breath sounds  No wheezing  Abdominal:      Comments: Left abdomen swelling left     Lymphadenopathy:      Cervical: No cervical adenopathy  Neurological:      Mental Status: She is alert and oriented to person, place, and time     Psychiatric:         Mood and Affect: Mood normal          Behavior: Behavior normal

## 2021-03-04 ENCOUNTER — HOSPITAL ENCOUNTER (OUTPATIENT)
Dept: MAMMOGRAPHY | Facility: CLINIC | Age: 41
Discharge: HOME | End: 2021-03-04
Payer: COMMERCIAL

## 2021-03-04 VITALS — BODY MASS INDEX: 46.56 KG/M2 | HEIGHT: 62 IN | WEIGHT: 253 LBS

## 2021-03-04 DIAGNOSIS — Z12.31 BREAST CANCER SCREENING BY MAMMOGRAM: ICD-10-CM

## 2021-03-04 LAB
HIV 1+2 AB+HIV1 P24 AG SERPL QL IA: NORMAL
RPR SER QL: NORMAL

## 2021-03-04 PROCEDURE — 77067 SCR MAMMO BI INCL CAD: CPT

## 2021-03-04 PROCEDURE — 77063 BREAST TOMOSYNTHESIS BI: CPT

## 2021-03-04 NOTE — ASSESSMENT & PLAN NOTE
Resume past medications  She believes of the chest pain is related to the anxiety and she was getting less chest pain when she was taking citalopram doxepin

## 2021-03-04 NOTE — ASSESSMENT & PLAN NOTE
BMI Counseling: Body mass index is 46 31 kg/m²  The BMI is above normal  Nutrition recommendations include 3-5 servings of fruits/vegetables daily, reducing intake of saturated fat and trans fat and reducing intake of cholesterol

## 2021-03-04 NOTE — ASSESSMENT & PLAN NOTE
Continue follow up with rheumatology   She is awaiting response from them about swelling she had after humira injection

## 2021-03-05 DIAGNOSIS — Z11.3 ROUTINE SCREENING FOR STI (SEXUALLY TRANSMITTED INFECTION): Primary | ICD-10-CM

## 2021-03-05 LAB
C TRACH DNA SPEC QL NAA+PROBE: ABNORMAL
N GONORRHOEA DNA SPEC QL NAA+PROBE: ABNORMAL

## 2021-03-05 NOTE — RESULT ENCOUNTER NOTE
Test for chlasmydia/gonorrhea was invalid  It said mucous or blood could have interfered so we need to recheck it,  I put in  A new order   Recommend going first thing in morning as it is more accurate

## 2021-03-06 NOTE — RESULT ENCOUNTER NOTE
Adderall and Adderal ER both Last refilled on 6/5/18 for # 30 each with 0 refills  Last seen on 5/11/18  No future appointments. Thank you. Mammo normal can send letter or call   Recheck 1 year

## 2021-03-13 ENCOUNTER — HOSPITAL ENCOUNTER (EMERGENCY)
Facility: HOSPITAL | Age: 41
Discharge: HOME | End: 2021-03-13
Attending: EMERGENCY MEDICINE | Admitting: EMERGENCY MEDICINE
Payer: COMMERCIAL

## 2021-03-13 VITALS
TEMPERATURE: 98.8 F | HEART RATE: 94 BPM | BODY MASS INDEX: 47.02 KG/M2 | DIASTOLIC BLOOD PRESSURE: 66 MMHG | SYSTOLIC BLOOD PRESSURE: 142 MMHG | OXYGEN SATURATION: 96 % | WEIGHT: 257.06 LBS | RESPIRATION RATE: 16 BRPM

## 2021-03-13 DIAGNOSIS — N39.0 URINARY TRACT INFECTION: Primary | ICD-10-CM

## 2021-03-13 LAB
ANION GAP SERPL CALCULATED.3IONS-SCNC: 5 MMOL/L (ref 5–14)
BACTERIA UR QL AUTO: ABNORMAL /HPF
BASOPHILS # BLD AUTO: 0.1 THOUSANDS/ΜL (ref 0–0.1)
BASOPHILS NFR BLD AUTO: 1 % (ref 0–1)
BILIRUB UR QL STRIP: NEGATIVE
BUN SERPL-MCNC: 16 MG/DL (ref 5–25)
CALCIUM SERPL-MCNC: 9.4 MG/DL (ref 8.4–10.2)
CHLORIDE SERPL-SCNC: 104 MMOL/L (ref 97–108)
CLARITY UR: ABNORMAL
CO2 SERPL-SCNC: 30 MMOL/L (ref 22–30)
COLOR UR: ABNORMAL
CREAT SERPL-MCNC: 0.7 MG/DL (ref 0.6–1.2)
EOSINOPHIL # BLD AUTO: 0 THOUSAND/ΜL (ref 0–0.4)
EOSINOPHIL NFR BLD AUTO: 0 % (ref 0–6)
ERYTHROCYTE [DISTWIDTH] IN BLOOD BY AUTOMATED COUNT: 15.3 %
GFR SERPL CREATININE-BSD FRML MDRD: 108 ML/MIN/1.73SQ M
GLUCOSE SERPL-MCNC: 113 MG/DL (ref 70–99)
GLUCOSE UR STRIP-MCNC: NEGATIVE MG/DL
HCT VFR BLD AUTO: 37 % (ref 36–46)
HGB BLD-MCNC: 12 G/DL (ref 12–16)
HGB UR QL STRIP.AUTO: 250
KETONES UR STRIP-MCNC: ABNORMAL MG/DL
LEUKOCYTE ESTERASE UR QL STRIP: 100
LYMPHOCYTES # BLD AUTO: 1.9 THOUSANDS/ΜL (ref 0.5–4)
LYMPHOCYTES NFR BLD AUTO: 24 % (ref 25–45)
MCH RBC QN AUTO: 27.7 PG (ref 26–34)
MCHC RBC AUTO-ENTMCNC: 32.4 G/DL (ref 31–36)
MCV RBC AUTO: 86 FL (ref 80–100)
MONOCYTES # BLD AUTO: 0.7 THOUSAND/ΜL (ref 0.2–0.9)
MONOCYTES NFR BLD AUTO: 8 % (ref 1–10)
NEUTROPHILS # BLD AUTO: 5.3 THOUSANDS/ΜL (ref 1.8–7.8)
NEUTS SEG NFR BLD AUTO: 66 % (ref 45–65)
NITRITE UR QL STRIP: POSITIVE
NON-SQ EPI CELLS URNS QL MICRO: ABNORMAL /HPF
PH UR STRIP.AUTO: 7 [PH]
PLATELET # BLD AUTO: 324 THOUSANDS/UL (ref 150–450)
PMV BLD AUTO: 8.5 FL (ref 8.9–12.7)
POTASSIUM SERPL-SCNC: 4.3 MMOL/L (ref 3.6–5)
PROT UR STRIP-MCNC: ABNORMAL MG/DL
RBC # BLD AUTO: 4.33 MILLION/UL (ref 4–5.2)
RBC #/AREA URNS AUTO: ABNORMAL /HPF
SODIUM SERPL-SCNC: 139 MMOL/L (ref 137–147)
SP GR UR STRIP.AUTO: 1.01 (ref 1–1.04)
UROBILINOGEN UA: NEGATIVE MG/DL
WBC # BLD AUTO: 8 THOUSAND/UL (ref 4.5–11)
WBC #/AREA URNS AUTO: ABNORMAL /HPF

## 2021-03-13 PROCEDURE — 85025 COMPLETE CBC W/AUTO DIFF WBC: CPT | Performed by: EMERGENCY MEDICINE

## 2021-03-13 PROCEDURE — 81001 URINALYSIS AUTO W/SCOPE: CPT | Performed by: EMERGENCY MEDICINE

## 2021-03-13 PROCEDURE — 96374 THER/PROPH/DIAG INJ IV PUSH: CPT

## 2021-03-13 PROCEDURE — 99284 EMERGENCY DEPT VISIT MOD MDM: CPT | Performed by: EMERGENCY MEDICINE

## 2021-03-13 PROCEDURE — 80048 BASIC METABOLIC PNL TOTAL CA: CPT | Performed by: EMERGENCY MEDICINE

## 2021-03-13 PROCEDURE — 81003 URINALYSIS AUTO W/O SCOPE: CPT | Performed by: EMERGENCY MEDICINE

## 2021-03-13 PROCEDURE — 99283 EMERGENCY DEPT VISIT LOW MDM: CPT

## 2021-03-13 PROCEDURE — 36415 COLL VENOUS BLD VENIPUNCTURE: CPT | Performed by: EMERGENCY MEDICINE

## 2021-03-13 PROCEDURE — 87147 CULTURE TYPE IMMUNOLOGIC: CPT | Performed by: EMERGENCY MEDICINE

## 2021-03-13 PROCEDURE — 87086 URINE CULTURE/COLONY COUNT: CPT | Performed by: EMERGENCY MEDICINE

## 2021-03-13 RX ORDER — KETOROLAC TROMETHAMINE 30 MG/ML
15 INJECTION, SOLUTION INTRAMUSCULAR; INTRAVENOUS ONCE
Status: COMPLETED | OUTPATIENT
Start: 2021-03-13 | End: 2021-03-13

## 2021-03-13 RX ORDER — CEPHALEXIN 250 MG/1
500 CAPSULE ORAL EVERY 6 HOURS SCHEDULED
Qty: 56 CAPSULE | Refills: 0 | Status: SHIPPED | OUTPATIENT
Start: 2021-03-13 | End: 2021-03-20

## 2021-03-13 RX ORDER — CEPHALEXIN 500 MG/1
500 CAPSULE ORAL ONCE
Status: COMPLETED | OUTPATIENT
Start: 2021-03-13 | End: 2021-03-13

## 2021-03-13 RX ADMIN — CEPHALEXIN 500 MG: 500 CAPSULE ORAL at 21:49

## 2021-03-13 RX ADMIN — KETOROLAC TROMETHAMINE 15 MG: 30 INJECTION, SOLUTION INTRAMUSCULAR; INTRAVENOUS at 21:12

## 2021-03-13 NOTE — Clinical Note
Emir Earl was seen and treated in our emergency department on 3/13/2021  Diagnosis:     Anival Edna    She may return on this date: 03/14/2021         If you have any questions or concerns, please don't hesitate to call        Tracey Ramos MD    ______________________________           _______________          _______________  Hospital Representative                              Date                                Time

## 2021-03-14 NOTE — ED PROVIDER NOTES
History  Chief Complaint   Patient presents with    Blood in Urine     c/o hematuria and L flank pain with dysuria  pt states she has been seen multiple times for same     Patient is 65-year-old female with a several month history hematuria presents today with a one-week history of worsening symptoms  States intermittent pain in the left side of the abdomen as well as worsening hematuria urinary frequency and urgency  States she called her urologist and is trying to get a sooner appointment for her upcoming cystoscopy  Denies any nausea vomiting diarrhea fevers sweats or chills  Has been seen multiple times this ER for similar  Per review of epic patient has had 7 CT scans in the last 13 months only 1 of which showed a small 1 mm kidney stone  Has been taking over-the-counter Tylenol for pain without relief  Prior to Admission Medications   Prescriptions Last Dose Informant Patient Reported? Taking? Abatacept 125 MG/ML SOAJ  Self Yes No   Sig: Inject under the skin   Adalimumab 40 MG/0 4ML PNKT   Yes No   Sig: Inject 40 mg under the skin every 14 (fourteen) days   Calcium Carb-Cholecalciferol (CALCIUM 600+D3) 600-200 MG-UNIT TABS  Self Yes No   Sig: take 1 tablet twice a day   Cholecalciferol (VITAMIN D3) 2000 units capsule  Self Yes No   Sig: take 1 tablet po daily     Elastic Bandages & Supports (CARPAL TUNNEL WRIST DELUXE) MISC  Self No No   Sig: by Does not apply route daily at bedtime   Incontinence Supply Disposable (Poise Pad) PADS  Self No No   Sig: by Does not apply route 4 (four) times a day as needed (incontinence)   Methenamine-Sodium Salicylate 022-738 8 MG TABS  Self No No   Sig: Take 1 tablet by mouth 3 (three) times a day   Multiple Vitamins-Minerals (MULTIVITAMIN WITH MINERALS) tablet  Self No No   Sig: Take 1 tablet by mouth daily   albuterol (PROVENTIL HFA,VENTOLIN HFA) 90 mcg/act inhaler  Self No No   Sig: Inhale 2 puffs every 4 (four) hours as needed for wheezing   amLODIPine (NORVASC) 5 mg tablet   No No   Sig: Take 1 tablet (5 mg total) by mouth daily   celecoxib (CeleBREX) 200 mg capsule  Self Yes No   Sig: Take 200 mg by mouth 2 (two) times a day   citalopram (CeleXA) 20 mg tablet   No No   Sig: Take 1 tablet (20 mg total) by mouth daily   doxepin (SINEquan) 25 mg capsule   No No   Sig: Take 1 capsule (25 mg total) by mouth daily at bedtime   famotidine (PEPCID) 20 mg tablet  Self No No   Sig: Take 1 tablet (20 mg total) by mouth 2 (two) times a day   fluticasone (FLONASE) 50 mcg/act nasal spray   No No   Si spray into each nostril daily   fluticasone (FLOVENT HFA) 110 MCG/ACT inhaler   No No   Sig: Inhale 2 puffs 2 (two) times a day Rinse mouth after use     folic acid (FOLVITE) 1 mg tablet  Self Yes No   Sig: every 24 hours   furosemide (LASIX) 40 mg tablet   No No   Sig: Take 1 tablet (40 mg total) by mouth daily   leflunomide (ARAVA) 20 MG tablet  Self Yes No   Sig: Take 20 mg by mouth daily   levETIRAcetam (KEPPRA) 1000 MG tablet  Self No No   Sig: Take 1 tablet (1,000 mg total) by mouth 2 (two) times a day   methylPREDNISolone (MEDROL) 32 MG tablet   No No   Sig: Take one (1) tablet 12 hours and one (1) tablet  2 hours PRIOR to CT scan    metoprolol tartrate (LOPRESSOR) 25 mg tablet  Self No No   Sig: Take 1 tablet (25 mg total) by mouth every 12 (twelve) hours   nitroglycerin (NITROSTAT) 0 4 mg SL tablet  Self No No   Sig: Place 1 tablet (0 4 mg total) under the tongue every 5 (five) minutes as needed for chest pain Call 911 if using 3 doses   ondansetron (ZOFRAN-ODT) 4 mg disintegrating tablet   No No   Sig: Take 1 tablet (4 mg total) by mouth every 8 (eight) hours as needed for nausea for up to 10 doses   polyethylene glycol (GLYCOLAX) 17 GM/SCOOP powder  Self No No   Sig: Take 17 g by mouth daily   predniSONE 5 mg tablet  Self Yes No   Sig: Take by mouth daily      Facility-Administered Medications: None       Past Medical History:   Diagnosis Date    [facility] at rest ([facility] )     Anxiety     Depression     Epilepsy ([place] [address] )     Fibroid 2012    History of cardiac cath 2006    Hydronephrosis 2007    Hypertension     Migraine without aura     PTSD (post-traumatic stress disorder)     Rheumatoid arteritis ([facility] )        Past Surgical History:   Procedure Laterality Date    APPENDECTOMY  1997    CARDIAC CATHETERIZATION  2006    CARDIAC CATHETERIZATION       SECTION  2008    CHOLECYSTECTOMY  2018    KIDNEY SURGERY  2007    TUBAL LIGATION  2008       Family History   Problem Relation Age of Onset    Diabetes Maternal Grandmother     Hypertension Maternal Grandmother     No Known Problems Mother     No Known Problems Sister     No Known Problems Daughter     No Known Problems Paternal Grandmother     No Known Problems Daughter     Breast cancer Neg Hx     Cancer Neg Hx      I have reviewed and agree with the history as documented  E-Cigarette/Vaping    E-Cigarette Use Never User      E-Cigarette/Vaping Substances     Social History     Tobacco Use    Smoking status: Former Smoker     Types: Cigarettes     Quit date:      Years since quittin 2    Smokeless tobacco: Former User   Substance Use Topics    Alcohol use: Not Currently     Frequency: Monthly or less     Drinks per session: 1 or 2    Drug use: Never       Review of Systems   Constitutional: Negative  HENT: Negative  Eyes: Negative  Respiratory: Negative  Cardiovascular: Negative  Gastrointestinal: Positive for abdominal pain  Endocrine: Negative  Genitourinary: Positive for frequency, hematuria and urgency  Musculoskeletal: Negative  Skin: Negative  Allergic/Immunologic: Negative  Neurological: Negative  Hematological: Negative  Psychiatric/Behavioral: Negative  All other systems reviewed and are negative  Physical Exam  Physical Exam  Vitals signs and nursing note reviewed  Constitutional:       Appearance: Normal appearance  She is obese  HENT:      Head: Normocephalic  Nose: Nose normal       Mouth/Throat:      Mouth: Mucous membranes are moist    Neck:      Musculoskeletal: Normal range of motion and neck supple  Cardiovascular:      Rate and Rhythm: Normal rate and regular rhythm  Pulses: Normal pulses  Heart sounds: Normal heart sounds  Pulmonary:      Effort: Pulmonary effort is normal       Breath sounds: Normal breath sounds  Abdominal:      General: Bowel sounds are normal       Tenderness: There is abdominal tenderness  Musculoskeletal: Normal range of motion  Right lower leg: No edema  Left lower leg: No edema  Skin:     General: Skin is warm and dry  Capillary Refill: Capillary refill takes less than 2 seconds  Neurological:      General: No focal deficit present  Mental Status: She is alert and oriented to person, place, and time     Psychiatric:         Mood and Affect: Mood normal          Behavior: Behavior normal          Vital Signs  ED Triage Vitals   Temperature Pulse Respirations Blood Pressure SpO2   03/13/21 2058 03/13/21 2058 03/13/21 2058 03/13/21 2102 03/13/21 2058   98 8 °F (37 1 °C) 94 16 142/66 96 %      Temp Source Heart Rate Source Patient Position - Orthostatic VS BP Location FiO2 (%)   03/13/21 2058 03/13/21 2058 03/13/21 2058 03/13/21 2058 --   Oral Monitor Sitting Left arm       Pain Score       03/13/21 2112       4           Vitals:    03/13/21 2058 03/13/21 2102   BP:  142/66   Pulse: 94    Patient Position - Orthostatic VS: Sitting          Visual Acuity      ED Medications  Medications   cephalexin (KEFLEX) capsule 500 mg (has no administration in time range)   ketorolac (TORADOL) injection 15 mg (15 mg Intravenous Given 3/13/21 2112)       Diagnostic Studies  Results Reviewed     Procedure Component Value Units Date/Time    Urine Microscopic [[id number]]  (Abnormal) Collected: 03/13/21 2111    Lab Status: Final result Specimen: Urine, Clean Catch Updated: 03/13/21 2139     RBC, UA Innumerable /hpf      WBC, UA       Field obscured, unable to enumerate     /hpf     Epithelial Cells None Seen /hpf      Bacteria, UA Moderate /hpf     Urine culture [[id number]] Collected: 03/13/21 2111    Lab Status:  In process Specimen: Urine, Clean Catch Updated: 03/13/21 3875    Basic metabolic panel [[id number]]  (Abnormal) Collected: 03/13/21 2111    Lab Status: Final result Specimen: Blood from Arm, Right Updated: 03/13/21 2133     Sodium 139 mmol/L      Potassium 4 3 mmol/L      Chloride 104 mmol/L      CO2 30 mmol/L      ANION GAP 5 mmol/L      BUN 16 mg/dL      Creatinine 0 70 mg/dL      Glucose 113 mg/dL      Calcium 9 4 mg/dL      eGFR 108 ml/min/1 73sq m     Narrative:      Hemolysis  National Kidney Disease Foundation guidelines for Chronic Kidney Disease (CKD):     Stage 1 with normal or high GFR (GFR > 90 mL/min/1 73 square meters)    Stage 2 Mild CKD (GFR = 60-89 mL/min/1 73 square meters)    Stage 3A Moderate CKD (GFR = 45-59 mL/min/1 73 square meters)    Stage 3B Moderate CKD (GFR = 30-44 mL/min/1 73 square meters)    Stage 4 Severe CKD (GFR = 15-29 mL/min/1 73 square meters)    Stage 5 End Stage CKD (GFR <15 mL/min/1 73 square meters)  Note: GFR calculation is accurate only with a steady state creatinine    UA w Reflex to Microscopic w Reflex to Culture [[id number]]  (Abnormal) Collected: 03/13/21 2111    Lab Status: Final result Specimen: Urine, Clean Catch Updated: 03/13/21 2123     Color, UA Red     Clarity, UA Turbid     Specific [place], UA 1 010     pH, UA 7 0     Leukocytes,  0     Nitrite, UA Positive     Protein, UA 30 (1+) mg/dl      Glucose, UA Negative mg/dl      Ketones, UA 5 (Trace) mg/dl      Bilirubin, UA Negative     Blood,  0     UROBILINOGEN UA Negative mg/dL     CBC and differential [[id number]]  (Abnormal) Collected: 03/13/21 2111    Lab Status: Final result Specimen: Blood from Arm, Right Updated: 03/13/21 2123     WBC 8 00 Thousand/uL      RBC 4 33 Million/uL      Hemoglobin 12 0 g/dL      Hematocrit 37 0 %      MCV 86 fL      MCH 27 7 pg      MCHC 32 4 g/dL      RDW 15 3 %      MPV 8 5 fL      Platelets 624 Thousands/uL      Neutrophils Relative 66 %      Lymphocytes Relative 24 %      Monocytes Relative 8 %      Eosinophils Relative 0 %      Basophils Relative 1 %      Neutrophils Absolute 5 30 Thousands/µL      Lymphocytes Absolute 1 90 Thousands/µL      Monocytes Absolute 0 70 Thousand/µL      Eosinophils Absolute 0 00 Thousand/µL      Basophils Absolute 0 10 Thousands/µL                  No orders to display              Procedures  Procedures         ED Course  ED Course as of Mar 13 2147   Sat Mar 13, 2021   2141 Hgb 12  Improved from previous  +UTI  MDM  Number of Diagnoses or Management Options  Urinary tract infection:      Amount and/or Complexity of Data Reviewed  Clinical lab tests: ordered and reviewed  Tests in the medicine section of CPT®: ordered and reviewed  Review and summarize past medical records: yes  Independent visualization of images, tracings, or specimens: yes        Disposition  Final diagnoses:   Urinary tract infection     Time reflects when diagnosis was documented in both MDM as applicable and the Disposition within this note     Time User Action Codes Description Comment    3/13/2021  9:46 PM Jonathan Frances Add [N39 0] Urinary tract infection       ED Disposition     ED Disposition Condition Date/Time Comment    Discharge Stable Sat Mar 13, 2021  9:47 PM Anu Tucker discharge to home/self care              Follow-up Information    None         Patient's Medications   Discharge Prescriptions    CEPHALEXIN (KEFLEX) 250 MG CAPSULE    Take 2 capsules (500 mg total) by mouth every 6 (six) hours for 7 days       Start Date: 3/13/2021 End Date: 3/20/2021       Order Dose: 500 mg       Quantity: 56 capsule    Refills: 0     No discharge procedures on file     PDMP Review     None          ED Provider  Electronically Signed by           Nancy Og MD  03/13/21 8917

## 2021-03-15 LAB — BACTERIA UR CULT: ABNORMAL

## 2021-04-09 ENCOUNTER — OFFICE VISIT (OUTPATIENT)
Dept: FAMILY MEDICINE CLINIC | Facility: CLINIC | Age: 41
End: 2021-04-09

## 2021-04-09 ENCOUNTER — LAB (OUTPATIENT)
Dept: LAB | Facility: CLINIC | Age: 41
End: 2021-04-09
Payer: COMMERCIAL

## 2021-04-09 VITALS
HEIGHT: 62 IN | TEMPERATURE: 96.7 F | RESPIRATION RATE: 18 BRPM | DIASTOLIC BLOOD PRESSURE: 78 MMHG | OXYGEN SATURATION: 99 % | BODY MASS INDEX: 47.11 KG/M2 | HEART RATE: 94 BPM | SYSTOLIC BLOOD PRESSURE: 136 MMHG | WEIGHT: 256 LBS

## 2021-04-09 DIAGNOSIS — R11.2 NON-INTRACTABLE VOMITING WITH NAUSEA, UNSPECIFIED VOMITING TYPE: ICD-10-CM

## 2021-04-09 DIAGNOSIS — R23.8 EASY BRUISING: ICD-10-CM

## 2021-04-09 DIAGNOSIS — R31.9 HEMATURIA, UNSPECIFIED TYPE: Primary | ICD-10-CM

## 2021-04-09 DIAGNOSIS — R10.9 FLANK PAIN: ICD-10-CM

## 2021-04-09 DIAGNOSIS — R31.9 HEMATURIA, UNSPECIFIED TYPE: ICD-10-CM

## 2021-04-09 LAB
APTT PPP: 49 SECONDS (ref 23–37)
BASOPHILS # BLD AUTO: 0.03 THOUSANDS/ΜL (ref 0–0.1)
BASOPHILS NFR BLD AUTO: 0 % (ref 0–1)
EOSINOPHIL # BLD AUTO: 0.19 THOUSAND/ΜL (ref 0–0.61)
EOSINOPHIL NFR BLD AUTO: 2 % (ref 0–6)
ERYTHROCYTE [DISTWIDTH] IN BLOOD BY AUTOMATED COUNT: 12.6 % (ref 11.6–15.1)
HCT VFR BLD AUTO: 39.6 % (ref 34.8–46.1)
HGB BLD-MCNC: 12.1 G/DL (ref 11.5–15.4)
IMM GRANULOCYTES # BLD AUTO: 0.02 THOUSAND/UL (ref 0–0.2)
IMM GRANULOCYTES NFR BLD AUTO: 0 % (ref 0–2)
INR PPP: 1.05 (ref 0.84–1.19)
LYMPHOCYTES # BLD AUTO: 1.6 THOUSANDS/ΜL (ref 0.6–4.47)
LYMPHOCYTES NFR BLD AUTO: 21 % (ref 14–44)
MCH RBC QN AUTO: 27.3 PG (ref 26.8–34.3)
MCHC RBC AUTO-ENTMCNC: 30.6 G/DL (ref 31.4–37.4)
MCV RBC AUTO: 89 FL (ref 82–98)
MONOCYTES # BLD AUTO: 0.57 THOUSAND/ΜL (ref 0.17–1.22)
MONOCYTES NFR BLD AUTO: 7 % (ref 4–12)
NEUTROPHILS # BLD AUTO: 5.38 THOUSANDS/ΜL (ref 1.85–7.62)
NEUTS SEG NFR BLD AUTO: 70 % (ref 43–75)
NRBC BLD AUTO-RTO: 0 /100 WBCS
PLATELET # BLD AUTO: 353 THOUSANDS/UL (ref 149–390)
PMV BLD AUTO: 10.4 FL (ref 8.9–12.7)
PROTHROMBIN TIME: 13.7 SECONDS (ref 11.6–14.5)
RBC # BLD AUTO: 4.44 MILLION/UL (ref 3.81–5.12)
SL AMB  POCT GLUCOSE, UA: NORMAL
SL AMB LEUKOCYTE ESTERASE,UA: ABNORMAL
SL AMB POCT BILIRUBIN,UA: ABNORMAL
SL AMB POCT BLOOD,UA: 250
SL AMB POCT CLARITY,UA: ABNORMAL
SL AMB POCT COLOR,UA: ABNORMAL
SL AMB POCT KETONES,UA: ABNORMAL
SL AMB POCT NITRITE,UA: ABNORMAL
SL AMB POCT PH,UA: 5
SL AMB POCT SPECIFIC GRAVITY,UA: 1.03
SL AMB POCT URINE PROTEIN: ABNORMAL
SL AMB POCT UROBILINOGEN: 1
WBC # BLD AUTO: 7.79 THOUSAND/UL (ref 4.31–10.16)

## 2021-04-09 PROCEDURE — 85610 PROTHROMBIN TIME: CPT

## 2021-04-09 PROCEDURE — 85730 THROMBOPLASTIN TIME PARTIAL: CPT

## 2021-04-09 PROCEDURE — 36415 COLL VENOUS BLD VENIPUNCTURE: CPT

## 2021-04-09 PROCEDURE — 85025 COMPLETE CBC W/AUTO DIFF WBC: CPT

## 2021-04-09 PROCEDURE — 81002 URINALYSIS NONAUTO W/O SCOPE: CPT | Performed by: PHYSICIAN ASSISTANT

## 2021-04-09 PROCEDURE — 99213 OFFICE O/P EST LOW 20 MIN: CPT | Performed by: PHYSICIAN ASSISTANT

## 2021-04-09 PROCEDURE — 87086 URINE CULTURE/COLONY COUNT: CPT

## 2021-04-09 PROCEDURE — 87147 CULTURE TYPE IMMUNOLOGIC: CPT

## 2021-04-09 RX ORDER — CIPROFLOXACIN 500 MG/1
500 TABLET, FILM COATED ORAL EVERY 12 HOURS SCHEDULED
Qty: 10 TABLET | Refills: 0 | Status: SHIPPED | OUTPATIENT
Start: 2021-04-09 | End: 2021-04-14

## 2021-04-09 RX ORDER — ONDANSETRON 4 MG/1
4 TABLET, ORALLY DISINTEGRATING ORAL EVERY 8 HOURS PRN
Qty: 20 TABLET | Refills: 0 | Status: SHIPPED | OUTPATIENT
Start: 2021-04-09 | End: 2021-09-23

## 2021-04-09 RX ORDER — TRAMADOL HYDROCHLORIDE 50 MG/1
50 TABLET ORAL EVERY 6 HOURS PRN
Qty: 12 TABLET | Refills: 0 | Status: SHIPPED | OUTPATIENT
Start: 2021-04-09

## 2021-04-09 NOTE — PROGRESS NOTES
Assessment/Plan:    Hematuria  She is scheduled with urology for cystoscopy  Will check additional labs due to easy bruising  Repeat urine is turbid with gross blood but it does appear to have leukocytes today so will send for additional culture and start cipro for possible UTI     Diagnoses and all orders for this visit:    Hematuria, unspecified type  -     POCT urine dip  -     Cancel: UA w Reflex to Microscopic w Reflex to Culture - [address]; Future  -     APTT; Future  -     CBC and differential; Future    Easy bruising  -     Protime-INR; Future  -     APTT; Future    Non-intractable vomiting with nausea, unspecified vomiting type  -     ondansetron (ZOFRAN-ODT) 4 mg disintegrating tablet; Take 1 tablet (4 mg total) by mouth every 8 (eight) hours as needed for nausea for up to 10 doses    Flank pain  -     ciprofloxacin (CIPRO) 500 mg tablet; Take 1 tablet (500 mg total) by mouth every 12 (twelve) hours for 5 days  -     traMADol (ULTRAM) 50 mg tablet; Take 1 tablet (50 mg total) by mouth every 6 (six) hours as needed for moderate pain  -     CBC and differential; Future          Subjective:      Patient ID: Miah Love is a 38 y o  female with PMH for hydronephrosis presenting today for an ED follow-up from 03/13 for a UTI  She reports she's in a lot of pain today  She left work early on Tuesday and hasn't been able to go back since  She has b/l flank pain that she rates a 10/10 today  She tried acetaminophen and ibuprofen with no relief  She was discharged with Keflex x 7 days upon ED discharge and she completed the full course  She was also given a Toradol injection in the ED and it helped temporarily  Patient has had hematuria for 3-4 months now  She endorses on and off fevers, night chills and sweats, urinary frequency and urgency  She's been nauseous and vomiting on and off for a few days now as well   When she urinates she doesn't having any burning sensation but says she feels pelvic pressure  She has a history of urinary incontinence for several years now and uses a pad daily  She says she started seeing blood clots in her urine a few days ago  She has been lightheaded during the day too  She denies feeling dizzy and fainting from blood loss  Patient says she bruises very easily but isn't aware if she has any bleeding disorders  Patient is scheduled for a cystoscopy on 21  HPI    The following portions of the patient's history were reviewed and updated as appropriate: She  has a past medical history of Angina at rest [facility]), Anxiety, Depression, Epilepsy ([facility] ), Fibroid (), History of cardiac cath (), Hydronephrosis (), Hypertension, Migraine without aura, PTSD (post-traumatic stress disorder), and Rheumatoid arteritis ([place] [address] )  She   Patient Active Problem List    Diagnosis Date Noted    Hematuria 2021    Non-intractable vomiting 2021    Rash and nonspecific skin eruption 2020    Epilepsy ([place] [address] ) 2020    Hand numbness 2019    Carpal tunnel syndrome, bilateral     Constipation 2019    Proteinuria 2019    Anxiety 2019    Angina at rest [facility]) 2019    Encounter for gynecological examination 10/25/2018    Easy bruising 10/25/2018    Irregular periods 10/25/2018    Anemia 2018    Ambulatory dysfunction 2018    Mild intermittent asthma 2018    Stress incontinence 2018    Hydronephrosis 2018    Depression 2018    Nonintractable epilepsy without status epilepticus ([facility] ) 2018    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    Morbid obesity ([facility] ) 2012    Essential hypertension 2012     She  has a past surgical history that includes Kidney surgery ();  section (); Tubal ligation (); Cholecystectomy (); Appendectomy (); Cardiac catheterization (); and Cardiac catheterization    Her family history includes Diabetes in her maternal grandmother; Hypertension in her maternal grandmother; No Known Problems in her daughter, daughter, mother, paternal grandmother, and sister  She  reports that she quit smoking about 8 years ago  Her smoking use included cigarettes  She has quit using smokeless tobacco  She reports previous alcohol use  She reports that she does not use drugs  Current Outpatient Medications   Medication Sig Dispense Refill    Abatacept 125 MG/ML SOAJ Inject under the skin      albuterol (PROVENTIL HFA,VENTOLIN HFA) 90 mcg/act inhaler Inhale 2 puffs every 4 (four) hours as needed for wheezing 1 Inhaler 0    amLODIPine (NORVASC) 5 mg tablet Take 1 tablet (5 mg total) by mouth daily 90 tablet 1    Calcium Carb-Cholecalciferol (CALCIUM 600+D3) 600-200 MG-UNIT TABS take 1 tablet twice a day      celecoxib (CeleBREX) 200 mg capsule Take 200 mg by mouth 2 (two) times a day      Cholecalciferol (VITAMIN D3) 2000 units capsule take 1 tablet po daily   citalopram (CeleXA) 20 mg tablet Take 1 tablet (20 mg total) by mouth daily 90 tablet 1    doxepin (SINEquan) 25 mg capsule Take 1 capsule (25 mg total) by mouth daily at bedtime 90 capsule 1    Elastic Bandages & Supports (CARPAL TUNNEL WRIST DELUXE) MISC by Does not apply route daily at bedtime 2 each 0    famotidine (PEPCID) 20 mg tablet Take 1 tablet (20 mg total) by mouth 2 (two) times a day 30 tablet 0    fluticasone (FLONASE) 50 mcg/act nasal spray 1 spray into each nostril daily 16 g 5    fluticasone (FLOVENT HFA) 110 MCG/ACT inhaler Inhale 2 puffs 2 (two) times a day Rinse mouth after use   3 Inhaler 1    folic acid (FOLVITE) 1 mg tablet every 24 hours      furosemide (LASIX) 40 mg tablet Take 1 tablet (40 mg total) by mouth daily 90 tablet 1    Incontinence Supply Disposable (Poise Pad) PADS by Does not apply route 4 (four) times a day as needed (incontinence) 90 each 0    leflunomide (ARAVA) 20 MG tablet Take 20 mg by mouth daily  levETIRAcetam (KEPPRA) 1000 MG tablet Take 1 tablet (1,000 mg total) by mouth 2 (two) times a day 60 tablet 5    Methenamine-Sodium Salicylate 647-316 4 MG TABS Take 1 tablet by mouth 3 (three) times a day 90 each 6    methylPREDNISolone (MEDROL) 32 MG tablet Take one (1) tablet 12 hours and one (1) tablet  2 hours PRIOR to CT scan  2 tablet 0    metoprolol tartrate (LOPRESSOR) 25 mg tablet Take 1 tablet (25 mg total) by mouth every 12 (twelve) hours 60 tablet 1    Multiple Vitamins-Minerals (MULTIVITAMIN WITH MINERALS) tablet Take 1 tablet by mouth daily 30 tablet 11    nitroglycerin (NITROSTAT) 0 4 mg SL tablet Place 1 tablet (0 4 mg total) under the tongue every 5 (five) minutes as needed for chest pain Call 911 if using 3 doses 25 tablet 0    ondansetron (ZOFRAN-ODT) 4 mg disintegrating tablet Take 1 tablet (4 mg total) by mouth every 8 (eight) hours as needed for nausea for up to 10 doses 20 tablet 0    polyethylene glycol (GLYCOLAX) 17 GM/SCOOP powder Take 17 g by mouth daily 578 g 0    predniSONE 5 mg tablet Take by mouth daily      Adalimumab 40 MG/0 4ML PNKT Inject 40 mg under the skin every 14 (fourteen) days      ciprofloxacin (CIPRO) 500 mg tablet Take 1 tablet (500 mg total) by mouth every 12 (twelve) hours for 5 days 10 tablet 0    traMADol (ULTRAM) 50 mg tablet Take 1 tablet (50 mg total) by mouth every 6 (six) hours as needed for moderate pain 12 tablet 0     No current facility-administered medications for this visit        Current Outpatient Medications on File Prior to Visit   Medication Sig    Abatacept 125 MG/ML SOAJ Inject under the skin    albuterol (PROVENTIL HFA,VENTOLIN HFA) 90 mcg/act inhaler Inhale 2 puffs every 4 (four) hours as needed for wheezing    amLODIPine (NORVASC) 5 mg tablet Take 1 tablet (5 mg total) by mouth daily    Calcium Carb-Cholecalciferol (CALCIUM 600+D3) 600-200 MG-UNIT TABS take 1 tablet twice a day    celecoxib (CeleBREX) 200 mg capsule Take 200 mg by mouth 2 (two) times a day    Cholecalciferol (VITAMIN D3) 2000 units capsule take 1 tablet po daily   citalopram (CeleXA) 20 mg tablet Take 1 tablet (20 mg total) by mouth daily    doxepin (SINEquan) 25 mg capsule Take 1 capsule (25 mg total) by mouth daily at bedtime    Elastic Bandages & Supports (CARPAL TUNNEL WRIST DELUXE) MISC by Does not apply route daily at bedtime    famotidine (PEPCID) 20 mg tablet Take 1 tablet (20 mg total) by mouth 2 (two) times a day    fluticasone (FLONASE) 50 mcg/act nasal spray 1 spray into each nostril daily    fluticasone (FLOVENT HFA) 110 MCG/ACT inhaler Inhale 2 puffs 2 (two) times a day Rinse mouth after use   folic acid (FOLVITE) 1 mg tablet every 24 hours    furosemide (LASIX) 40 mg tablet Take 1 tablet (40 mg total) by mouth daily    Incontinence Supply Disposable (Poise Pad) PADS by Does not apply route 4 (four) times a day as needed (incontinence)    leflunomide (ARAVA) 20 MG tablet Take 20 mg by mouth daily    levETIRAcetam (KEPPRA) 1000 MG tablet Take 1 tablet (1,000 mg total) by mouth 2 (two) times a day    Methenamine-Sodium Salicylate 559-712 7 MG TABS Take 1 tablet by mouth 3 (three) times a day    methylPREDNISolone (MEDROL) 32 MG tablet Take one (1) tablet 12 hours and one (1) tablet  2 hours PRIOR to CT scan      metoprolol tartrate (LOPRESSOR) 25 mg tablet Take 1 tablet (25 mg total) by mouth every 12 (twelve) hours    Multiple Vitamins-Minerals (MULTIVITAMIN WITH MINERALS) tablet Take 1 tablet by mouth daily    nitroglycerin (NITROSTAT) 0 4 mg SL tablet Place 1 tablet (0 4 mg total) under the tongue every 5 (five) minutes as needed for chest pain Call 911 if using 3 doses    polyethylene glycol (GLYCOLAX) 17 GM/SCOOP powder Take 17 g by mouth daily    predniSONE 5 mg tablet Take by mouth daily    Adalimumab 40 MG/0 4ML PNKT Inject 40 mg under the skin every 14 (fourteen) days     No current facility-administered medications on file prior to visit  She is allergic to codeine; contrast [iodinated diagnostic agents]; iodine - food allergy; latex; lovenox [enoxaparin]; shellfish-derived products - food allergy; vaccinium angustifolium; vicodin [hydrocodone-acetaminophen]; benadryl [diphenhydramine]; oxycontin [oxycodone]; and provera [medroxyprogesterone]       Review of Systems   Constitutional: Positive for fatigue  Negative for chills and fever  Respiratory: Negative for cough and shortness of breath  Cardiovascular: Negative for chest pain  Gastrointestinal: Positive for nausea and vomiting  Negative for blood in stool, constipation and diarrhea  Genitourinary: Positive for flank pain (b/l), frequency, hematuria, pelvic pain and urgency  Negative for dysuria  Neurological: Negative for dizziness, syncope, light-headedness and headaches  Hematological: Bruises/bleeds easily  Objective:      /78 (BP Location: Left arm, Patient Position: Sitting, Cuff Size: Standard)   Pulse 94   Temp (!) 96 7 °F (35 9 °C) (Temporal)   Resp 18   Ht 5' 2" (1 575 m)   Wt 116 kg (256 lb)   LMP  (LMP Unknown)   SpO2 99%   Breastfeeding No   BMI 46 82 kg/m²          Physical Exam  Constitutional:       General: She is in acute distress  Appearance: Normal appearance  She is obese  She is not ill-appearing or toxic-appearing  HENT:      Head: Normocephalic and atraumatic  Cardiovascular:      Rate and Rhythm: Normal rate and regular rhythm  Pulses: Normal pulses  Heart sounds: Normal heart sounds  No murmur  No friction rub  No gallop  Pulmonary:      Effort: Pulmonary effort is normal  No respiratory distress  Breath sounds: Normal breath sounds  No wheezing, rhonchi or rales  Abdominal:      General: There is no distension  Palpations: Abdomen is soft  There is no mass  Tenderness: There is abdominal tenderness (LLQ and RLQ as well as lower abdominal region)   There is right CVA tenderness and left CVA tenderness  There is no guarding  Neurological:      Mental Status: She is alert     Psychiatric:         Mood and Affect: Mood normal          Behavior: Behavior normal

## 2021-04-09 NOTE — LETTER
April 9, 2021     Patient: Preethi Boone   YOB: 1980   Date of Visit: 4/9/2021       To Whom it May Concern:    Preethi Boone is under my professional care  She was seen in my office on 4/9/2021  She may return to work on 4/12/2021  She is excused 4/6/2021  If you have any questions or concerns, please don't hesitate to call           Sincerely,          Hemant Godwin PA-C        CC: No Recipients

## 2021-04-10 ENCOUNTER — TRANSCRIBE ORDERS (OUTPATIENT)
Dept: LAB | Facility: HOSPITAL | Age: 41
End: 2021-04-10

## 2021-04-10 DIAGNOSIS — R31.9 HEMATURIA, UNSPECIFIED TYPE: Primary | ICD-10-CM

## 2021-04-11 PROBLEM — R31.9 HEMATURIA: Status: ACTIVE | Noted: 2021-04-11

## 2021-04-11 NOTE — ASSESSMENT & PLAN NOTE
She is scheduled with urology for cystoscopy  Will check additional labs due to easy bruising    Repeat urine is turbid with gross blood but it does appear to have leukocytes today so will send for additional culture and start cipro for possible UTI

## 2021-04-12 DIAGNOSIS — N30.01 ACUTE CYSTITIS WITH HEMATURIA: Primary | ICD-10-CM

## 2021-04-12 RX ORDER — PENICILLIN V POTASSIUM 500 MG/1
500 TABLET ORAL EVERY 8 HOURS SCHEDULED
Qty: 21 TABLET | Refills: 0 | Status: SHIPPED | OUTPATIENT
Start: 2021-04-12 | End: 2021-04-19

## 2021-04-12 NOTE — RESULT ENCOUNTER NOTE
Urine was still growing strep B  Regular penicillin is actually a better antibiotic so I am going to have her switch

## 2021-04-13 LAB — BACTERIA UR CULT: ABNORMAL

## 2021-04-14 ENCOUNTER — TELEPHONE (OUTPATIENT)
Dept: UROLOGY | Facility: CLINIC | Age: 41
End: 2021-04-14

## 2021-04-14 DIAGNOSIS — R79.1 ELEVATED PARTIAL THROMBOPLASTIN TIME (PTT): Primary | ICD-10-CM

## 2021-04-14 NOTE — TELEPHONE ENCOUNTER
Letter Sent to notify patient of appt  rescheduled to 6/17/21 with Rakan Vega @ 8:45 due to Dr Kaye Gaytan being out of the office  She was given a Thursday morning appt per previous note that she only wants Thursday morning appt's

## 2021-04-14 NOTE — RESULT ENCOUNTER NOTE
One of her clotting factors did come back abnormal   This may be related to her rheumatoid arthritis   I am going to put in for additional labs and she may need to see hematology after completion

## 2021-04-27 ENCOUNTER — PROCEDURE VISIT (OUTPATIENT)
Dept: UROLOGY | Facility: CLINIC | Age: 41
End: 2021-04-27
Payer: COMMERCIAL

## 2021-04-27 VITALS — WEIGHT: 253 LBS | HEIGHT: 62 IN | BODY MASS INDEX: 46.56 KG/M2 | HEART RATE: 82 BPM

## 2021-04-27 DIAGNOSIS — R31.0 GROSS HEMATURIA: Primary | ICD-10-CM

## 2021-04-27 PROCEDURE — 99213 OFFICE O/P EST LOW 20 MIN: CPT | Performed by: UROLOGY

## 2021-04-27 PROCEDURE — 1036F TOBACCO NON-USER: CPT | Performed by: UROLOGY

## 2021-04-27 PROCEDURE — 3008F BODY MASS INDEX DOCD: CPT | Performed by: UROLOGY

## 2021-04-27 PROCEDURE — 88112 CYTOPATH CELL ENHANCE TECH: CPT | Performed by: PATHOLOGY

## 2021-04-27 PROCEDURE — 87086 URINE CULTURE/COLONY COUNT: CPT | Performed by: UROLOGY

## 2021-04-27 PROCEDURE — 52000 CYSTOURETHROSCOPY: CPT | Performed by: UROLOGY

## 2021-04-27 NOTE — PROGRESS NOTES
Cystoscopy     Date/Time 4/27/2021 8:46 AM     Performed by  Huber Ferguson MD     Authorized by Huber Ferguson MD      Universal Protocol:  Timeout called at: 4/27/2021 8:46 AM       Procedure Details:  Procedure type: cystoscopy    Patient tolerance: Patient tolerated the procedure well with no immediate complications    Additional Procedure Details:   Cystoscopy Procedure note    Risk and benefits of flexible cystoscopy were discussed  Informed consent was obtained  A urine dip is adequate for cystoscopy  The patient was placed into the modified supine position  Her genitalia was prepped with Betadine and draped in a sterile fashion  Viscous 2% lidocaine was instilled into the urethra and allowed dwell time for local anesthesia  Flexible cystoscopy was then performed with a 16F scope  Urethra was inspected on entrance and exit of the scope  The bladder was thoroughly inspected in a 360 degree fashion  Both ureteral orifices were visualized in their orthotopic location with clear efflux of urine  The bladder mucosa was thoroughly inspected  There was no evidence of mucosal abnormalities, stones, or lesions  Retroflexion for evaluation of the bladder neck was normal       Overall this was a negative cystoscopy  A female office staff member was present throughout the entire procedure

## 2021-04-27 NOTE — PROGRESS NOTES
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, with prior history of gross hematuria seen by Dr Tavo Concepcion  Patient was seen in September of 2018 and reported daily hematuria  She did have a prior history of gross hematuria more than 10 years prior when she resided in [place]  She had hydronephrosis of the right kidney  She underwent a stent placement with resolution of the problem  Patient underwent cystoscopy in October of 2018 which was negative  She was not seen following this visit  Has recurrent hematuria and presents today  Patient does report some suprapubic tenderness and pressure when the bleeding started  She had some clot passage  This resolved this morning and the urine was tea-colored, now yellow in color  She denies fevers or chills  Denies flank pain  Patient has no constipation or diarrhea  Patient was a cigarette smoker more than 10 years ago at 2 packs a day  Has not smoked since  Does not have a family history of kidney or bladder cancer    Patient has continued to have some recurrent hematuria  She also has ongoing musculoskeletal back pain primarily on the left side  She has had urine testing which has continued to demonstrate nonpathologic vaginal contaminants with beta-hemolytic strep  She has been given antibiotics by her primary care team without relief  Now has completed her CT urogram and presents for cystoscopy      Past Medical History:     Past Medical History:   Diagnosis Date    [facility]     Anxiety     Depression     Epilepsy ([facility] )     Fibroid 2012    History of cardiac cath 2006    Hydronephrosis 2007    Hypertension     Migraine without aura     PTSD (post-traumatic stress disorder)     Rheumatoid arteritis ([facility] ) PAST SURGICAL HISTORY:     Past Surgical History:   Procedure Laterality Date    APPENDECTOMY      CARDIAC CATHETERIZATION      CARDIAC CATHETERIZATION       SECTION  2008    CHOLECYSTECTOMY  2018    KIDNEY SURGERY  2007    TUBAL LIGATION  2008       CURRENT MEDICATIONS:     Current Outpatient Medications   Medication Sig Dispense Refill    Abatacept 125 MG/ML SOAJ Inject under the skin      Adalimumab 40 MG/0 4ML PNKT Inject 40 mg under the skin every 14 (fourteen) days      albuterol (PROVENTIL HFA,VENTOLIN HFA) 90 mcg/act inhaler Inhale 2 puffs every 4 (four) hours as needed for wheezing 1 Inhaler 0    amLODIPine (NORVASC) 5 mg tablet Take 1 tablet (5 mg total) by mouth daily 90 tablet 1    Calcium Carb-Cholecalciferol (CALCIUM 600+D3) 600-200 MG-UNIT TABS take 1 tablet twice a day      celecoxib (CeleBREX) 200 mg capsule Take 200 mg by mouth 2 (two) times a day      Cholecalciferol (VITAMIN D3) 2000 units capsule take 1 tablet po daily   citalopram (CeleXA) 20 mg tablet Take 1 tablet (20 mg total) by mouth daily 90 tablet 1    doxepin (SINEquan) 25 mg capsule Take 1 capsule (25 mg total) by mouth daily at bedtime 90 capsule 1    Elastic Bandages & Supports (CARPAL TUNNEL WRIST DELUXE) MISC by Does not apply route daily at bedtime 2 each 0    famotidine (PEPCID) 20 mg tablet Take 1 tablet (20 mg total) by mouth 2 (two) times a day 30 tablet 0    fluticasone (FLONASE) 50 mcg/act nasal spray 1 spray into each nostril daily 16 g 5    fluticasone (FLOVENT HFA) 110 MCG/ACT inhaler Inhale 2 puffs 2 (two) times a day Rinse mouth after use   3 Inhaler 1    folic acid (FOLVITE) 1 mg tablet every 24 hours      furosemide (LASIX) 40 mg tablet Take 1 tablet (40 mg total) by mouth daily 90 tablet 1    Incontinence Supply Disposable (Poise Pad) PADS by Does not apply route 4 (four) times a day as needed (incontinence) 90 each 0    leflunomide (ARAVA) 20 MG tablet Take 20 mg by mouth daily      levETIRAcetam (KEPPRA) 1000 MG tablet Take 1 tablet (1,000 mg total) by mouth 2 (two) times a day 60 tablet 5    Methenamine-Sodium Salicylate 845-876 6 MG TABS Take 1 tablet by mouth 3 (three) times a day 90 each 6    methylPREDNISolone (MEDROL) 32 MG tablet Take one (1) tablet 12 hours and one (1) tablet  2 hours PRIOR to CT scan  2 tablet 0    metoprolol tartrate (LOPRESSOR) 25 mg tablet Take 1 tablet (25 mg total) by mouth every 12 (twelve) hours 60 tablet 1    Multiple Vitamins-Minerals (MULTIVITAMIN WITH MINERALS) tablet Take 1 tablet by mouth daily 30 tablet 11    nitroglycerin (NITROSTAT) 0 4 mg SL tablet Place 1 tablet (0 4 mg total) under the tongue every 5 (five) minutes as needed for chest pain Call 911 if using 3 doses 25 tablet 0    ondansetron (ZOFRAN-ODT) 4 mg disintegrating tablet Take 1 tablet (4 mg total) by mouth every 8 (eight) hours as needed for nausea for up to 10 doses 20 tablet 0    polyethylene glycol (GLYCOLAX) 17 GM/SCOOP powder Take 17 g by mouth daily 578 g 0    predniSONE 5 mg tablet Take by mouth daily      traMADol (ULTRAM) 50 mg tablet Take 1 tablet (50 mg total) by mouth every 6 (six) hours as needed for moderate pain 12 tablet 0     No current facility-administered medications for this visit          ALLERGIES:     Allergies   Allergen Reactions    Codeine     Contrast [Iodinated Diagnostic Agents]     Iodine - Food Allergy     Latex     Lovenox [Enoxaparin]     Shellfish-Derived Products - Food Allergy Hives    Vaccinium Angustifolium Hives    Vicodin [Hydrocodone-Acetaminophen]     Benadryl [Diphenhydramine] Rash    Oxycontin [Oxycodone] Palpitations    Provera [Medroxyprogesterone] Palpitations       SOCIAL HISTORY:     Social History     Socioeconomic History    Marital status: Single     Spouse name: Not on file    Number of children: Not on file    Years of education: Not on file    Highest education level: Not on file   Occupational History    Not on file   Social Needs    Financial resource strain: Hard    Food insecurity     Worry: Never true     Inability: Never true   [employer] needs     Medical: Yes     Non-medical: Yes   Tobacco Use    Smoking status: Former Smoker     Types: Cigarettes     Quit date:      Years since quittin 3    Smokeless tobacco: Former User   Substance and Sexual Activity    Alcohol use: Not Currently     Frequency: Monthly or less     Drinks per session: 1 or 2    Drug use: Never    Sexual activity: Not Currently     Partners: Male     Birth control/protection: Female Sterilization   Lifestyle    Physical activity     Days per week: Not on file     Minutes per session: Not on file    Stress: Not on file   Relationships    Social connections     Talks on phone: Not on file     Gets together: Not on file     Attends Sikhism service: Not on file     Active member of club or organization: Not on file     Attends meetings of clubs or organizations: Not on file     Relationship status: Not on file    Intimate partner violence     Fear of current or ex partner: Not on file     Emotionally abused: Not on file     Physically abused: Not on file     Forced sexual activity: Not on file   Other Topics Concern    Not on file   Social History Narrative    Not on file       SOCIAL HISTORY:     Family History   Problem Relation Age of Onset    Diabetes Maternal Grandmother     Hypertension Maternal Grandmother     No Known Problems Mother     No Known Problems Sister     No Known Problems Daughter     No Known Problems Paternal Grandmother     No Known Problems Daughter     Breast cancer Neg Hx     Cancer Neg Hx        REVIEW OF SYSTEMS:     Review of Systems   Constitutional: Positive for activity change  Respiratory: Negative  Cardiovascular: Positive for leg swelling  Gastrointestinal: Negative  Negative for constipation and diarrhea     Genitourinary: Positive for enuresis and hematuria  Negative for dysuria, flank pain and pelvic pain  Musculoskeletal: Positive for arthralgias, back pain and myalgias  Skin: Negative  Psychiatric/Behavioral: Negative  PHYSICAL EXAM:     Ht 5' 2" (1 575 m)   Wt 115 kg (253 lb)   LMP  (LMP Unknown)   BMI 46 27 kg/m²     General:  Morbidly obese female  in no acute distress  They have a normal affect  There is not appear to be any gross neurologic defects or abnormalities  HEENT:  Normocephalic, atraumatic  Neck is supple without any palpable lymphadenopathy  Cardiovascular:  Patient has normal palpable distal radial pulses  There is no significant peripheral edema  No JVD is noted  Respiratory:  Patient has unlabored respirations  There is no audible wheeze or rhonchi  Abdomen:  Abdomen is obese with healed  scar  Abdomen is soft and nontender  There is no tympany  Inguinal and umbilical hernia are not appreciated  :  Normal external female genitalia without lesions  Musculoskeletal:  Patient does not have significant CVA tenderness in the  flank with palpation or percussion  They full range of motion in all 4 extremities  Strength in all 4 extremities appears congruent  Patient is able to ambulate without assistance or difficulty  Dermatologic:  Patient has no skin abnormalities or rashes        LABS:     CBC:   Lab Results   Component Value Date    WBC 7 79 2021    HGB 12 1 2021    HCT 39 6 2021    MCV 89 2021     2021       BMP:   Lab Results   Component Value Date    GLUCOSE 98 2018    CALCIUM 9 4 2021     2018    K 4 3 2021    CO2 30 2021     2021    BUN 16 2021    CREATININE 0 70 2021       IMAGIN/25/21  CT ABDOMEN AND PELVIS WITH AND WITHOUT IV CONTRAST     INDICATION:   R31 0: Gross hematuria      COMPARISON:  CT 2021      TECHNIQUE: Initial CT of the abdomen and pelvis was performed without intravenous contrast   Subsequent dynamic CT evaluation of the abdomen and pelvis was performed after the administration of intravenous contrast in both nephrographic and delayed   phases after the administration of intravenous contrast   Axial, sagittal, and coronal 2D reformatted images were created from the source data and submitted for interpretation       Radiation dose length product (DLP) for this visit:  1885 mGy-cm   This examination, like all CT scans performed in the [facility], was performed utilizing techniques to minimize radiation dose exposure, including the use of iterative   reconstruction and automated exposure control      IV Contrast:  120 mL of iohexol (OMNIPAQUE)  Enteric Contrast:  Enteric contrast was not administered      FINDINGS:     ABDOMEN     RIGHT KIDNEY AND URETER:  No solid renal mass  No detectable urothelial mass  No hydronephrosis or hydroureter  No urinary tract calculi  No perinephric collection      LEFT KIDNEY AND URETER:  No solid renal mass  No detectable urothelial mass  No hydronephrosis or hydroureter  No urinary tract calculi  No perinephric collection      URINARY BLADDER:  No bladder wall mass  No calculi         LOWER CHEST:  No clinically significant abnormality identified in the visualized lower chest      LIVER/BILIARY TREE:  Unremarkable      GALLBLADDER:  Gallbladder is surgically absent      SPLEEN:  Unremarkable      PANCREAS:  Unremarkable      ADRENAL GLANDS:  Unremarkable      STOMACH AND BOWEL:  There is colonic diverticulosis without evidence of acute diverticulitis      ABDOMINOPELVIC CAVITY:  No ascites  No free intraperitoneal air   No lymphadenopathy      VESSELS:  Unremarkable for patient's age      PELVIS     REPRODUCTIVE ORGANS:  Unremarkable for patient's age      APPENDIX: No findings to suggest appendicitis      ABDOMINAL WALL/INGUINAL REGIONS:  Unremarkable      OSSEOUS STRUCTURES:  No acute fracture or destructive osseous lesion      IMPRESSION:     No CT abnormality identified to account for hematuria      Colonic diverticulosis  PATHOLOGY:     9/14/18  Final Diagnosis   A  Urine, Voided, :  Negative for high grade urothelial carcinoma ([address]) - see comment  PROCEDURE:     SEE NOTE    ASSESSMENT:     38 y o  female with intermittent gross hematuria    PLAN:     Patient has a normal CT urogram and prior normal urine cytology and cystoscopy  Her urine was sent again today for culture and cytology although expect this to show at most beta-hemolytic strep  I will contact her with these results  Her cystoscopy today is normal   I do think there is a possibility that given her rheumatologic issues and medication regimen this is intrinsic renal disease  I have recommended referral to Nephrology

## 2021-04-28 LAB — BACTERIA UR CULT: NORMAL

## 2021-05-05 ENCOUNTER — TELEPHONE (OUTPATIENT)
Dept: FAMILY MEDICINE CLINIC | Facility: CLINIC | Age: 41
End: 2021-05-05

## 2021-05-05 DIAGNOSIS — G47.33 OBSTRUCTIVE SLEEP APNEA: Primary | ICD-10-CM

## 2021-05-05 NOTE — TELEPHONE ENCOUNTER
----- Message from Araceli Chong sent at 5/5/2021 11:43 AM EDT -----  Regarding: PHYSICIAN REFERRAL/ORDER REQUIRED  Patient is scheduled with Sleep Medicine physician and requires a physician referral/order in [address] [place] [address] to physician, not a study  Pt was referred by Cardiology but orders must come from PCP office for insurance to make payment      DX:  [phone]  Obstructive Sleep Apnea

## 2021-05-19 ENCOUNTER — OFFICE VISIT (OUTPATIENT)
Dept: SLEEP CENTER | Facility: CLINIC | Age: 41
End: 2021-05-19
Payer: COMMERCIAL

## 2021-05-19 VITALS
WEIGHT: 255 LBS | DIASTOLIC BLOOD PRESSURE: 72 MMHG | HEIGHT: 62 IN | SYSTOLIC BLOOD PRESSURE: 128 MMHG | BODY MASS INDEX: 46.93 KG/M2 | HEART RATE: 74 BPM

## 2021-05-19 DIAGNOSIS — E66.01 MORBID OBESITY (HCC): ICD-10-CM

## 2021-05-19 DIAGNOSIS — J45.20 MILD INTERMITTENT ASTHMA WITHOUT COMPLICATION: ICD-10-CM

## 2021-05-19 DIAGNOSIS — I10 ESSENTIAL HYPERTENSION: ICD-10-CM

## 2021-05-19 DIAGNOSIS — G47.33 OBSTRUCTIVE SLEEP APNEA: Primary | ICD-10-CM

## 2021-05-19 PROCEDURE — 1036F TOBACCO NON-USER: CPT | Performed by: INTERNAL MEDICINE

## 2021-05-19 PROCEDURE — 99244 OFF/OP CNSLTJ NEW/EST MOD 40: CPT | Performed by: INTERNAL MEDICINE

## 2021-05-19 NOTE — PATIENT INSTRUCTIONS
Sleep Apnea   AMBULATORY CARE:   Sleep apnea  is a condition that causes you to stop breathing for 10 seconds or longer during sleep  Obstructive sleep apnea is the most common kind  The muscles and tissues around your throat relax and block air from passing through  Obesity, use of alcohol or cigarettes, or a family history are common causes  Central sleep apnea means your brain does not send signals to the muscles that control breathing  You do not take a breath even though your airway is open  Common causes include medical conditions such as heart failure, being older than 65, or use of opioids  Common signs and symptoms include the following:   · Snoring loudly, snorting, gasping or choking while you sleep, and waking up suddenly because of these    · A hard time thinking, remembering things, or focusing on your tasks the following day    · Headache or nausea    · Waking up often during the night to urinate    · Feeling sleepy, slow, and tired during the day    Call your local emergency number (911 in the [address] [facility]) if:   · You have chest pain or trouble breathing  Call your doctor if:   · You feel tired or depressed  · You have trouble staying awake during the day  · You have trouble thinking clearly  · You have questions or concerns about your condition or care  How sleep apnea is diagnosed:   · Your healthcare provider will ask about your signs and symptoms, when they began, and how bad they are  He or she may ask about medical conditions you have and what medicines you take  Tell your healthcare provider if you smoke and if anyone in your family has sleep apnea  Your healthcare provider may ask the person who sleeps beside you about your signs  · You may need to wear a device that monitors the oxygen level in your blood while you sleep  You may need to have a sleep study (polysomnography) if you have daytime sleepiness   A sleep study helps show your brain, heart, and respiratory system are working during sleep  Sleep studies may monitor the stages of sleep, oxygen levels, body position, eye movement, and snoring  Treatment  depends on the kind of sleep apnea you have:  · A machine  may be used to help you get more air during sleep  A mask may be placed over your nose and mouth, or just your nose  The mask is hooked to the machine  You will get air through the mask  ? A continuous positive airway pressure (CPAP) machine  is used to keep your airway open during sleep  The machine blows a gentle stream of air into the mask when you breathe  This helps keep your airway open so you can breathe more regularly  Extra oxygen may be given through the machine  ? A bilevel positive airway pressure (BiPAP) machine  gives air but lowers the pressure when you breathe out  ? An adaptive servo-ventilator  is a machine that only gives air when it senses you are not breathing  · A mouth device  that looks like a mouth guard stops your tongue and other tissues from blocking airflow  · Surgery  may be needed to remove extra tissues that block your mouth, throat, or nose  Manage or prevent sleep apnea:   · Do not smoke  Nicotine and other chemicals in cigarettes and cigars can cause lung damage  Ask your healthcare provider for information if you currently smoke and need help to quit  E-cigarettes or smokeless tobacco still contain nicotine  Talk to your healthcare provider before you use these products  · Do not drink alcohol or take sedative medicine before you go to sleep  Alcohol and sedatives can relax the muscles and tissues around your throat  This can block the airflow to your lungs  · Maintain a healthy weight  Your healthcare provider can tell you what weight is healthy for you  He or she can help you create a weight loss plan, if needed  The plan will include healthy foods and regular exercise to help you reach your healthy weight   Exercise can also help you sleep and may reduce stress  · Sleep on your side or use pillows designed to prevent sleep apnea  This prevents your tongue or other tissues from blocking your throat  You can also raise the head of your bed  Follow up with your doctor as directed: You may need to have blood tests during your follow-up visits  You will need to work with your healthcare provider to find the right breathing support equipment and settings for you  Write down your questions so you remember to ask them during your visits  © Copyright [address] Information is for End User's use only and may not be sold, redistributed or otherwise used for commercial purposes  All illustrations and images included in CareNotes® are the copyrighted property of A D A M [employer]  or [address] [place] [address]  The above information is an  only  It is not intended as medical advice for individual conditions or treatments  Talk to your doctor, nurse or pharmacist before following any medical regimen to see if it is safe and effective for you

## 2021-05-19 NOTE — PROGRESS NOTES
Review of Systems      Genitourinary need to urinate more than twice a night   Cardiology Frequent chest pain or angina, , palpitations/fluttering feeling in the chest and ankle/leg swelling   Gastrointestinal none   Neurology frequent headaches and numbness/tingling of an extremity   Constitutional claustrophobia, fatigue, excessive sweating at night and weight change   Integumentary none   Psychiatry anxiety, depression and mood change   Musculoskeletal joint pain, back pain, legs twitching/jerking and leg cramps   Pulmonary shortness of breath with activity, chest tightness and snoring   ENT ringing in ears   Endocrine excessive thirst and frequent urination   Hematological none

## 2021-05-19 NOTE — PROGRESS NOTES
Sleep Consultation   Preethi Boone 38 y o  female MRN: [id number]      Reason for consultation: JIN    Requesting physician: Dr Jessica Shane    Assessment/Plan  1  Obstructive sleep apnea  Assessment & Plan:  · Maryellen He is at high risk for obstructive sleep apnea given her body mass index of 46 6, Mallampati score 4, excessive daytime sleepiness, snoring at night, witnessed gasping and stopping breathing, and feeling tired during the day with a stop Bang score of 5/8  ·  I would like to obtain in-lab diagnostic sleep study    Orders:  -     Ambulatory referral to Sleep Medicine  -     Diagnostic Sleep Study; Future; Expected date: 05/20/2021    2  Mild intermittent asthma without complication  Assessment & Plan:  Currently on using rescue inhaler albuterol she tells me once or twice a week, I recommended that she was back on her inhaled corticosteroid as a maintenance therapy    Orders:  -     Diagnostic Sleep Study; Future; Expected date: 05/20/2021    3  Essential hypertension  Assessment & Plan:  Obstructive sleep apnea is risk for uncontrolled hypertension      4  Morbid obesity ([address] )  Assessment & Plan:  Encourage lifestyle modifications            History of Present Illness   HPI:  Preethi Boone is a 38 y o  female with PMHx as below who comes in for evaluation of possible underlying sleep apnea, she has complains of constant feeling tired during the day she works as a   She goes to bed around 11:00 p m  and she has frequent awakenings during the night for using the bathroom she feels tired and sleepy during the afternoon next day and she would take naps whenever she gets a chance she does not feel refreshed after naps  She snores loudly, chokes, gags and gasps for air while she is sleeping to the point that her boyfriend waking her up  She has chronic a m  daily headache, chronic pain, growing pains and urinary frequency she has sleep talking nightmares and feeling of depression and anxiety    She drinks soda denies energy drinks her coffee  She smoked cigarettes for many years and quit about 13 years ago, she would drink beers occasionally, denies recreational drugs  Her Ava scale is 10/24            Review of Systems      Genitourinary need to urinate more than twice a night   Cardiology Frequent chest pain or angina, , palpitations/fluttering feeling in the chest and ankle/leg swelling   Gastrointestinal none   Neurology frequent headaches and numbness/tingling of an extremity   Constitutional claustrophobia, fatigue, excessive sweating at night and weight change   Integumentary none   Psychiatry anxiety, depression and mood change   Musculoskeletal joint pain, back pain, legs twitching/jerking and leg cramps   Pulmonary shortness of breath with activity, chest tightness and snoring   ENT ringing in ears   Endocrine excessive thirst and frequent urination   Hematological none           Historical Information   Past Medical History:   Diagnosis Date    [facility])     Anxiety     Depression     [facility]ilepsy ([facility] )     Fibroid 2012    History of cardiac cath 2006    Hydronephrosis 2007    Hypertension     Migraine without aura     PTSD (post-traumatic stress disorder)     Rheumatoid arteritis ([facility] )      Past Surgical History:   Procedure Laterality Date    APPENDECTOMY      CARDIAC CATHETERIZATION  2006    CARDIAC CATHETERIZATION       SECTION  2008    CHOLECYSTECTOMY  2018   [facility] SURGERY  2007    TUBAL LIGATION  2008     Family History   Problem Relation Age of Onset    Diabetes Maternal Grandmother     Hypertension Maternal Grandmother     No Known Problems Mother     No Known Problems Sister     No Known Problems Daughter     No Known Problems Paternal Grandmother     No Known Problems Daughter     Breast cancer Neg Hx     Cancer Neg Hx      Social History     Socioeconomic History    Marital status: Single     Spouse name: Not on file    Number of children: Not on file    Years of education: Not on file    Highest education level: Not on file   Occupational History    Not on file   Social Needs    Financial resource strain: Hard    Food insecurity     Worry: Never true     Inability: Never true   [employer] needs     Medical: Yes     Non-medical: Yes   Tobacco Use    Smoking status: Former Smoker     Types: Cigarettes     Quit date:      Years since quittin 3    Smokeless tobacco: Former User   Substance and Sexual Activity    Alcohol use: Not Currently     Frequency: Monthly or less     Drinks per session: 1 or 2    Drug use: Never    Sexual activity: Not Currently     Partners: Male     Birth control/protection: Female Sterilization   Lifestyle    Physical activity     Days per week: Not on file     Minutes per session: Not on file    Stress: Not on file   Relationships    Social connections     Talks on phone: Not on file     Gets together: Not on file     Attends Bahai service: Not on file     Active member of club or organization: Not on file     Attends meetings of clubs or organizations: Not on file     Relationship status: Not on file    Intimate partner violence     Fear of current or ex partner: Not on file     Emotionally abused: Not on file     Physically abused: Not on file     Forced sexual activity: Not on file   Other Topics Concern    Not on file   Social History Narrative    Not on file       Occupational History:      Meds/Allergies   Allergies   Allergen Reactions    Codeine     Contrast [Iodinated Diagnostic Agents]     Iodine - Food Allergy     Latex     Lovenox [Enoxaparin]     Shellfish-Derived Products - Food Allergy Hives    Vaccinium Angustifolium Hives    Vicodin [Hydrocodone-Acetaminophen]     Benadryl [Diphenhydramine] Rash    Oxycontin [Oxycodone] Palpitations    Provera [Medroxyprogesterone] Palpitations       Home medications:  Prior to Admission medications    Medication Sig Start Date End Date Taking? Authorizing Provider   Abatacept 125 MG/ML SOAJ Inject under the skin   Yes Historical Provider, MD   albuterol (PROVENTIL HFA,VENTOLIN HFA) 90 mcg/act inhaler Inhale 2 puffs every 4 (four) hours as needed for wheezing 12/29/19  Yes Satnam Covert, DO   amLODIPine (NORVASC) 5 mg tablet Take 1 tablet (5 mg total) by mouth daily 3/3/21 6/1/21 Yes Hemant Godwin PA-C   Calcium Carb-Cholecalciferol (CALCIUM 600+D3) 600-200 MG-UNIT TABS take 1 tablet twice a day 10/5/15  Yes Historical Provider, MD   celecoxib (CeleBREX) 200 mg capsule Take 200 mg by mouth 2 (two) times a day   Yes Historical Provider, MD   Cholecalciferol (VITAMIN D3) 2000 units capsule take 1 tablet po daily  10/5/15  Yes Historical Provider, MD   citalopram (CeleXA) 20 mg tablet Take 1 tablet (20 mg total) by mouth daily 3/3/21  Yes Hemant Godwin PA-C   doxepin (SINEquan) 25 mg capsule Take 1 capsule (25 mg total) by mouth daily at bedtime 3/3/21  Yes Hemant Godwin PA-C   Elastic Bandages & Supports (CARPAL TUNNEL WRIST DELUXE) MISC by Does not apply route daily at bedtime 8/2/19  Yes Hemant Godwin PA-C   famotidine (PEPCID) 20 mg tablet Take 1 tablet (20 mg total) by mouth 2 (two) times a day 6/8/20  Yes Kimmy Garcia PA-C   fluticasone [facility]) 50 mcg/act nasal spray 1 spray into each nostril daily 3/3/21  Yes Hemant Godwin PA-C   fluticasone (FLOVENT HFA) 110 MCG/ACT inhaler Inhale 2 puffs 2 (two) times a day Rinse mouth after use   3/3/21  Yes Hemant Godwin PA-C   folic acid (FOLVITE) 1 mg tablet every 24 hours 1/27/16  Yes Historical Provider, MD   furosemide (LASIX) 40 mg tablet Take 1 tablet (40 mg total) by mouth daily 3/3/21  Yes Hemant Godwin PA-C   Incontinence Supply Disposable (Poise Pad) PADS by Does not apply route 4 (four) times a day as needed (incontinence) 10/15/20  Yes Jhonathan Golden MD   leflunomide (ARAVA) 20 MG tablet Take 20 mg by mouth daily   Yes Historical Provider, MD   levETIRAcetam (KEPPRA) 1000 MG tablet Take 1 tablet (1,000 mg total) by mouth 2 (two) times a day 11/18/20  Yes Hemant Godwin PA-C   Methenamine-Sodium Salicylate 302-126 5 MG TABS Take 1 tablet by mouth 3 (three) times a day 11/24/20  Yes Kevin Magana MD   methylPREDNISolone (MEDROL) 32 MG tablet Take one (1) tablet 12 hours and one (1) tablet  2 hours PRIOR to CT scan  1/25/21  Yes RUPERT Hughes   metoprolol tartrate (LOPRESSOR) 25 mg tablet Take 1 tablet (25 mg total) by mouth every 12 (twelve) hours 11/18/20  Yes Hemant Godwin PA-C   Multiple Vitamins-Minerals (MULTIVITAMIN WITH MINERALS) tablet Take 1 tablet by mouth daily 8/2/19  Yes Hemant Godwin PA-C   nitroglycerin (NITROSTAT) 0 4 mg SL tablet Place 1 tablet (0 4 mg total) under the tongue every 5 (five) minutes as needed for chest pain Call 911 if using 3 doses 10/15/20  Yes Deion French MD   ondansetron (ZOFRAN-ODT) 4 mg disintegrating tablet Take 1 tablet (4 mg total) by mouth every 8 (eight) hours as needed for nausea for up to 10 doses 4/9/21  Yes Hemant Godwin PA-C   polyethylene glycol (GLYCOLAX) 17 GM/SCOOP powder Take 17 g by mouth daily 10/15/20  Yes Deion French MD   predniSONE 5 mg tablet Take by mouth daily   Yes Historical Provider, MD   traMADol (ULTRAM) 50 mg tablet Take 1 tablet (50 mg total) by mouth every 6 (six) hours as needed for moderate pain 4/9/21  Yes Hemant Godwin PA-C   Adalimumab 40 MG/0 4ML PNKT Inject 40 mg under the skin every 14 (fourteen) days 1/20/21   Historical Provider, MD       Vitals:   Blood pressure 128/72, pulse 74, height 5' 2" (1 575 m), weight 116 kg (255 lb), not currently breastfeeding ,  Body mass index is 46 64 kg/m²    Neck Circumference: 16    Physical Exam  General:  Awake alert and oriented x 3, conversant without conversational dyspnea, NAD, normal affect  HEENT:  PERRL, Sclera noninjected, nonicteric OU, Nares patent,  no craniofacial abnormalities, Mucous membranes, moist, no oral lesions, normal dentition, Mallampati class 4  NECK:  Trachea midline, no accessory muscle use, no stridor, no cervical or supraclavicular adenopathy, JVP not elevated  CARDIAC: Reg, single s1/S2, no m/r/g  PULM: CTA bilaterally no wheezing, rhonchi or rales  ABD:  soft nontender, nondistended, no rebound, no rigidity, no guarding  EXT: No cyanosis, no clubbing, no edema, normal capillary refill  NEURO: no focal neurologic deficits, AAOx3, moving all extremities appropriately    Labs: I have personally reviewed pertinent lab results  , ABG: No results found for: PHART, DMN5DMY, PO2ART, BPR1TPT, E4APZUHA, BEART, SOURCE, BNP: No results found for: BNP, CBC: No results found for: WBC, HGB, HCT, MCV, PLT, ADJUSTEDWBC, MCH, MCHC, RDW, MPV, NRBC, CMP: No results found for: SODIUM, K, CL, CO2, ANIONGAP, BUN, CREATININE, GLUCOSE, CALCIUM, AST, ALT, ALKPHOS, PROT, BILITOT, EGFR, PT/INR: No results found for: PT, INR, Troponin: No results found for: TROPONINI  Lab Results   Component Value Date    WBC 7 79 04/09/2021    HGB 12 1 04/09/2021    HCT 39 6 04/09/2021    MCV 89 04/09/2021     04/09/2021      Lab Results   Component Value Date    GLUCOSE 98 06/22/2018    CALCIUM 9 4 03/13/2021     06/22/2018    K 4 3 03/13/2021    CO2 30 03/13/2021     03/13/2021    BUN 16 03/13/2021    CREATININE 0 70 03/13/2021     Lab Results   Component Value Date    IRON 86 02/22/2021    TIBC 393 02/22/2021    FERRITIN 27 02/22/2021     Lab Results   Component Value Date    KOAPVMFX71 446 09/26/2019     No results found for: MD Keyla Sales [facility]       Portions of the record may have been created with voice recognition software  Occasional wrong word or "sound a like" substitutions may have occurred due to the inherent limitations of voice recognition software  Read the chart carefully and recognize, using context, where substitutions have occurred

## 2021-05-19 NOTE — ASSESSMENT & PLAN NOTE
· Pasquale Hinson is at high risk for obstructive sleep apnea given her body mass index of 46 6, Mallampati score 4, excessive daytime sleepiness, snoring at night, witnessed gasping and stopping breathing, and feeling tired during the day with a stop Bang score of 5/8  ·  I would like to obtain in-lab diagnostic sleep study

## 2021-05-19 NOTE — LETTER
May 19, 2021     Patient: Elaina Ramirez   YOB: 1980   Date of Visit: 5/19/2021       To Whom it May Concern:    Elaina Ramirez is under my professional care  She was seen in my office on 5/19/2021  If you have any questions or concerns, please don't hesitate to call           Sincerely,          James Montez MD

## 2021-05-21 ENCOUNTER — HOSPITAL ENCOUNTER (OUTPATIENT)
Dept: SLEEP CENTER | Facility: CLINIC | Age: 41
Discharge: HOME | End: 2021-05-21
Payer: COMMERCIAL

## 2021-05-21 DIAGNOSIS — G47.33 OBSTRUCTIVE SLEEP APNEA: ICD-10-CM

## 2021-05-21 DIAGNOSIS — J45.20 MILD INTERMITTENT ASTHMA WITHOUT COMPLICATION: ICD-10-CM

## 2021-05-21 PROCEDURE — 95810 POLYSOM 6/> YRS 4/> PARAM: CPT

## 2021-05-21 PROCEDURE — 95810 POLYSOM 6/> YRS 4/> PARAM: CPT | Performed by: INTERNAL MEDICINE

## 2021-05-22 NOTE — PROGRESS NOTES
Sleep Study Documentation    Pre-Sleep Study       Sleep testing procedure explained to patient:YES    Patient napped prior to study:NO    [address] [place] worker after 12PM   Caffeine use:NO    Alcohol:Dayshift workers after 5PM: Alcohol use:NO    Typical day for patient:YES       Study Documentation    Sleep Study Indications:  Snoring, nocturnal choking, EDS, BMI>30, chronic or AM headache, witnessed apneas, unrefreshing sleep  Sleep Study: Diagnostic   Snore: Moderate  Supplemental O2: no    O2 flow rate (L/min) range 0  O2 flow rate (L/min) final 0  Minimum SaO2  86 %  Baseline SaO2  97 9 %            EKG abnormalities: no     EEG abnormalities: yes:  ECG artifact throughout study  Averaged Ms  Sleep Study Recorded < 2 hours: N/A    Sleep Study Recorded > 2 hours but incomplete study: N/A    Sleep Study Recorded 6 hours but no sleep obtained: YES          Post-Sleep Study    Medication used at bedtime or during sleep study:NO    Patient reports time it took to fall asleep:less than 20 minutes    Patient reports waking up during study:1 to 2 times  Patient reports returning to sleep without difficulty  Patient reports sleeping 2 to 4 hours without dreaming  Patient reports sleep during study:typical    Patient rated sleepiness: Very sleepy or tired    PAP treatment:no

## 2021-05-23 DIAGNOSIS — G47.33 OBSTRUCTIVE SLEEP APNEA: Primary | ICD-10-CM

## 2021-05-27 ENCOUNTER — TELEPHONE (OUTPATIENT)
Dept: SLEEP CENTER | Facility: CLINIC | Age: 41
End: 2021-05-27

## 2021-05-27 NOTE — TELEPHONE ENCOUNTER
Left message for patient to call office    Sleep study reveals mild JIN, recommend APAP  Will need to schedule DME set up       Has compliance follow up scheduled 8/2/2021

## 2021-06-01 ENCOUNTER — OFFICE VISIT (OUTPATIENT)
Dept: FAMILY MEDICINE CLINIC | Facility: CLINIC | Age: 41
End: 2021-06-01

## 2021-06-01 VITALS
HEIGHT: 62 IN | SYSTOLIC BLOOD PRESSURE: 132 MMHG | HEART RATE: 73 BPM | RESPIRATION RATE: 16 BRPM | WEIGHT: 256 LBS | OXYGEN SATURATION: 98 % | DIASTOLIC BLOOD PRESSURE: 90 MMHG | TEMPERATURE: 98 F | BODY MASS INDEX: 47.11 KG/M2

## 2021-06-01 DIAGNOSIS — G40.109 PARTIAL SYMPTOMATIC EPILEPSY WITH SIMPLE PARTIAL SEIZURES, NOT INTRACTABLE, WITHOUT STATUS EPILEPTICUS (HCC): ICD-10-CM

## 2021-06-01 DIAGNOSIS — R51.9 ACUTE NONINTRACTABLE HEADACHE, UNSPECIFIED HEADACHE TYPE: Primary | ICD-10-CM

## 2021-06-01 PROCEDURE — 99214 OFFICE O/P EST MOD 30 MIN: CPT | Performed by: NURSE PRACTITIONER

## 2021-06-01 PROCEDURE — 3008F BODY MASS INDEX DOCD: CPT | Performed by: INTERNAL MEDICINE

## 2021-06-01 RX ORDER — ERGOCALCIFEROL (VITAMIN D2) 1250 MCG
50000 CAPSULE ORAL
COMMUNITY
Start: 2021-04-21 | End: 2021-12-02

## 2021-06-01 RX ORDER — RIZATRIPTAN BENZOATE 5 MG/1
5 TABLET ORAL ONCE AS NEEDED
Qty: 9 TABLET | Refills: 0 | Status: SHIPPED | OUTPATIENT
Start: 2021-06-01

## 2021-06-01 NOTE — PROGRESS NOTES
Assessment/Plan:    Problem List Items Addressed This Visit        Nervous and Auditory    Nonintractable epilepsy without status epilepticus ([facility] )    Relevant Orders    Ambulatory referral to Neurology       Other    Acute nonintractable headache - Primary     -Extensive neuro exam normal   -Headache never lasted this long for patient or precipitated by seizure in past   -Sleep apnea likely a contributing factor to the epileptic seizure 3 nights ago; apnea currently being evaluated by sleep medicine    -02/2021 Cardiology visit:  · Precordial chest pain likely musculoskeletal   · Pharmacologic nuclear stress test negative for myocardial ischemia  · Echocardiogram demonstrated normal cardiac structure and function without significant valvular disease  Event recorder demonstrated sinus rhythm with rare atrial and ventricular ectopy  -Due to normal cardio work-up, will trial Maxalt to relieve headache  -CT of head due to onset of headache/migraine after seizure event, non-intractable for >3 days  -ref to neuro         Relevant Medications    rizatriptan (MAXALT) 5 MG tablet    Other Relevant Orders    CT head wo contrast    Ambulatory referral to Neurology            Return if symptoms worsen or fail to improve  A chart review was performed and previous primary care visit notes were reviewed  All applicable imaging studies were reviewed and images were reviewed personally  All applicable laboratory studies were reviewed personally  Care everywhere review was performed if  available and all pertinent notes were reviewed  Subjective:     HPI: Darius Blandon is a 38 y o  female who  has a past medical history of Angina at rest [facility]), Anxiety, Depression, Epilepsy ([facility] ), Fibroid, History of cardiac cath, Hydronephrosis, Hypertension, Migraine without aura, PTSD (post-traumatic stress disorder), and Rheumatoid arteritis ([facility] )  who presented to the office today for an ongoing headache for 4 day(s)  Description of pain: throbbing pain, dull pain, unilateral in the right frontal area, unilateral in the right temporal area  Duration of individual headaches: has not stopped  Associated symptoms: facial pain, light sensitivity, visual disturbance and blurry vision  Pain relief: unable to obtain relief with OTC meds and lying in a darkened room  Precipitating factors: seizure on the night of 5/29/21  She denies a history of recent head injury  Prior neurological history: negative for seizure disorder  Neurologic Review of Systems - no TIA or stroke-like symptoms, no amaurosis, diplopia, abnormal speech, unilateral numbness or weakness  The following portions of the patient's history were reviewed and updated as appropriate: allergies, current medications, past family history, past medical history, past social history, past surgical history, and problem list     Current Outpatient Medications on File Prior to Visit   Medication Sig Dispense Refill    Abatacept 125 MG/ML SOAJ Inject under the skin      Adalimumab 40 MG/0 4ML PNKT Inject 40 mg under the skin every 14 (fourteen) days      albuterol (PROVENTIL HFA,VENTOLIN HFA) 90 mcg/act inhaler Inhale 2 puffs every 4 (four) hours as needed for wheezing 1 Inhaler 0    amLODIPine (NORVASC) 5 mg tablet Take 1 tablet (5 mg total) by mouth daily 90 tablet 1    Calcium Carb-Cholecalciferol (CALCIUM 600+D3) 600-200 MG-UNIT TABS take 1 tablet twice a day      celecoxib (CeleBREX) 200 mg capsule Take 200 mg by mouth 2 (two) times a day      Cholecalciferol (VITAMIN D3) 2000 units capsule take 1 tablet po daily        citalopram (CeleXA) 20 mg tablet Take 1 tablet (20 mg total) by mouth daily 90 tablet 1    doxepin (SINEquan) 25 mg capsule Take 1 capsule (25 mg total) by mouth daily at bedtime 90 capsule 1    Elastic Bandages & Supports (CARPAL TUNNEL WRIST DELUXE) MISC by Does not apply route daily at bedtime 2 each 0    ergocalciferol (Drisdol) 1 25 MG (94758 UT) capsule Take 50,000 Units by mouth      famotidine (PEPCID) 20 mg tablet Take 1 tablet (20 mg total) by mouth 2 (two) times a day 30 tablet 0    fluticasone (FLONASE) 50 mcg/act nasal spray 1 spray into each nostril daily 16 g 5    fluticasone (FLOVENT HFA) 110 MCG/ACT inhaler Inhale 2 puffs 2 (two) times a day Rinse mouth after use  3 Inhaler 1    folic acid (FOLVITE) 1 mg tablet every 24 hours      furosemide (LASIX) 40 mg tablet Take 1 tablet (40 mg total) by mouth daily 90 tablet 1    Incontinence Supply Disposable (Poise Pad) PADS by Does not apply route 4 (four) times a day as needed (incontinence) 90 each 0    leflunomide (ARAVA) 20 MG tablet Take 20 mg by mouth daily      levETIRAcetam (KEPPRA) 1000 MG tablet Take 1 tablet (1,000 mg total) by mouth 2 (two) times a day 60 tablet 5    Methenamine-Sodium Salicylate 263-006 8 MG TABS Take 1 tablet by mouth 3 (three) times a day 90 each 6    methylPREDNISolone (MEDROL) 32 MG tablet Take one (1) tablet 12 hours and one (1) tablet  2 hours PRIOR to CT scan   2 tablet 0    metoprolol tartrate (LOPRESSOR) 25 mg tablet Take 1 tablet (25 mg total) by mouth every 12 (twelve) hours 60 tablet 1    Multiple Vitamins-Minerals (MULTIVITAMIN WITH MINERALS) tablet Take 1 tablet by mouth daily 30 tablet 11    nitroglycerin (NITROSTAT) 0 4 mg SL tablet Place 1 tablet (0 4 mg total) under the tongue every 5 (five) minutes as needed for chest pain Call 911 if using 3 doses 25 tablet 0    ondansetron (ZOFRAN-ODT) 4 mg disintegrating tablet Take 1 tablet (4 mg total) by mouth every 8 (eight) hours as needed for nausea for up to 10 doses 20 tablet 0    polyethylene glycol (GLYCOLAX) 17 GM/SCOOP powder Take 17 g by mouth daily 578 g 0    predniSONE 5 mg tablet Take by mouth daily      traMADol (ULTRAM) 50 mg tablet Take 1 tablet (50 mg total) by mouth every 6 (six) hours as needed for moderate pain 12 tablet 0     No current facility-administered medications on file prior to visit  Review of Systems   Constitutional: Negative  HENT: Negative  Eyes: Positive for photophobia and visual disturbance  Blurry vision   Respiratory: Negative  Cardiovascular: Negative  Gastrointestinal: Negative  Endocrine: Negative  Genitourinary: Negative  Musculoskeletal: Negative  Allergic/Immunologic: Negative  Neurological: Positive for headaches  Psychiatric/Behavioral: Negative  Objective:    /90 (BP Location: Left arm, Patient Position: Sitting, Cuff Size: Large)   Pulse 73   Temp 98 °F (36 7 °C) (Temporal)   Resp 16   Ht 5' 2" (1 575 m)   Wt 116 kg (256 lb)   SpO2 98%   BMI 46 82 kg/m²     Physical Exam  Vitals signs reviewed  Constitutional:       General: She is not in acute distress  Appearance: Normal appearance  She is overweight  HENT:      Head: Normocephalic  Right Ear: Tympanic membrane, ear canal and external ear normal  There is no impacted cerumen  Left Ear: Tympanic membrane, ear canal and external ear normal  There is no impacted cerumen  Nose: Nose normal  No congestion  Mouth/Throat:      Mouth: Mucous membranes are moist    Eyes:      Extraocular Movements: Extraocular movements intact  Conjunctiva/sclera: Conjunctivae normal       Pupils: Pupils are equal, round, and reactive to light  Neck:      Musculoskeletal: Normal range of motion and neck supple  No muscular tenderness  Cardiovascular:      Rate and Rhythm: Normal rate and regular rhythm  Pulses: Normal pulses  Heart sounds: Normal heart sounds  No murmur  No friction rub  No gallop  Pulmonary:      Effort: Pulmonary effort is normal       Breath sounds: Normal breath sounds  No wheezing  Abdominal:      General: Bowel sounds are normal       Palpations: Abdomen is soft  Tenderness: There is no abdominal tenderness  Musculoskeletal: Normal range of motion        Right lower leg: No edema  Left lower leg: No edema  Skin:     General: Skin is warm and dry  Capillary Refill: Capillary refill takes less than 2 seconds  Neurological:      General: No focal deficit present  Mental Status: She is oriented to person, place, and time  She is lethargic  Psychiatric:         Mood and Affect: Mood normal          Behavior: Behavior normal          Nellene Brittle, CRNP  06/01/21  12:38 PM    There are no Patient Instructions on file for this visit

## 2021-06-01 NOTE — ASSESSMENT & PLAN NOTE
-Extensive neuro exam normal   -Headache never lasted this long for patient or precipitated by seizure in past   -Sleep apnea likely a contributing factor to the epileptic seizure 3 nights ago; apnea currently being evaluated by sleep medicine    -02/2021 Cardiology visit:  · Precordial chest pain likely musculoskeletal   · Pharmacologic nuclear stress test negative for myocardial ischemia  · Echocardiogram demonstrated normal cardiac structure and function without significant valvular disease  Event recorder demonstrated sinus rhythm with rare atrial and ventricular ectopy  -Due to normal cardio work-up, will trial Maxalt to relieve headache  -CT of head due to onset of headache/migraine after seizure event, non-intractable for >3 days      -ref to neuro

## 2021-06-01 NOTE — LETTER
June 1, 2021     Patient: Darius Blandon   YOB: 1980   Date of Visit: 6/1/2021       To Whom it May Concern:    Darius Blandon is under my professional care  She was seen in my office on 6/1/2021  She may return to work on 6/2/2021  If you have any questions or concerns, please don't hesitate to call           Sincerely,          [facility] [facility]        CC: No Recipients

## 2021-06-08 ENCOUNTER — HOSPITAL ENCOUNTER (EMERGENCY)
Facility: HOSPITAL | Age: 41
Discharge: HOME | End: 2021-06-09
Attending: EMERGENCY MEDICINE | Admitting: EMERGENCY MEDICINE
Payer: COMMERCIAL

## 2021-06-08 ENCOUNTER — APPOINTMENT (EMERGENCY)
Dept: RADIOLOGY | Facility: HOSPITAL | Age: 41
End: 2021-06-08
Payer: COMMERCIAL

## 2021-06-08 DIAGNOSIS — R07.9 CHEST PAIN: Primary | ICD-10-CM

## 2021-06-08 LAB
BASOPHILS # BLD AUTO: 0.03 THOUSANDS/ΜL (ref 0–0.1)
BASOPHILS NFR BLD AUTO: 0 % (ref 0–1)
EOSINOPHIL # BLD AUTO: 0.23 THOUSAND/ΜL (ref 0–0.61)
EOSINOPHIL NFR BLD AUTO: 3 % (ref 0–6)
ERYTHROCYTE [DISTWIDTH] IN BLOOD BY AUTOMATED COUNT: 12.5 % (ref 11.6–15.1)
EXT PREG TEST URINE: NEGATIVE
EXT. CONTROL ED NAV: NORMAL
HCT VFR BLD AUTO: 32.5 % (ref 34.8–46.1)
HGB BLD-MCNC: 10.2 G/DL (ref 11.5–15.4)
IMM GRANULOCYTES # BLD AUTO: 0.03 THOUSAND/UL (ref 0–0.2)
IMM GRANULOCYTES NFR BLD AUTO: 0 % (ref 0–2)
LYMPHOCYTES # BLD AUTO: 2.52 THOUSANDS/ΜL (ref 0.6–4.47)
LYMPHOCYTES NFR BLD AUTO: 28 % (ref 14–44)
MCH RBC QN AUTO: 25.8 PG (ref 26.8–34.3)
MCHC RBC AUTO-ENTMCNC: 31.4 G/DL (ref 31.4–37.4)
MCV RBC AUTO: 82 FL (ref 82–98)
MONOCYTES # BLD AUTO: 0.72 THOUSAND/ΜL (ref 0.17–1.22)
MONOCYTES NFR BLD AUTO: 8 % (ref 4–12)
NEUTROPHILS # BLD AUTO: 5.5 THOUSANDS/ΜL (ref 1.85–7.62)
NEUTS SEG NFR BLD AUTO: 61 % (ref 43–75)
NRBC BLD AUTO-RTO: 0 /100 WBCS
PLATELET # BLD AUTO: 324 THOUSANDS/UL (ref 149–390)
PMV BLD AUTO: 9.9 FL (ref 8.9–12.7)
RBC # BLD AUTO: 3.95 MILLION/UL (ref 3.81–5.12)
WBC # BLD AUTO: 9.03 THOUSAND/UL (ref 4.31–10.16)

## 2021-06-08 PROCEDURE — 82550 ASSAY OF CK (CPK): CPT | Performed by: EMERGENCY MEDICINE

## 2021-06-08 PROCEDURE — 96361 HYDRATE IV INFUSION ADD-ON: CPT

## 2021-06-08 PROCEDURE — 99285 EMERGENCY DEPT VISIT HI MDM: CPT | Performed by: EMERGENCY MEDICINE

## 2021-06-08 PROCEDURE — 71046 X-RAY EXAM CHEST 2 VIEWS: CPT

## 2021-06-08 PROCEDURE — 85025 COMPLETE CBC W/AUTO DIFF WBC: CPT | Performed by: EMERGENCY MEDICINE

## 2021-06-08 PROCEDURE — 81025 URINE PREGNANCY TEST: CPT | Performed by: EMERGENCY MEDICINE

## 2021-06-08 PROCEDURE — 93005 ELECTROCARDIOGRAM TRACING: CPT

## 2021-06-08 PROCEDURE — 36415 COLL VENOUS BLD VENIPUNCTURE: CPT | Performed by: EMERGENCY MEDICINE

## 2021-06-08 PROCEDURE — 99285 EMERGENCY DEPT VISIT HI MDM: CPT

## 2021-06-08 PROCEDURE — 84484 ASSAY OF TROPONIN QUANT: CPT | Performed by: EMERGENCY MEDICINE

## 2021-06-08 PROCEDURE — 96374 THER/PROPH/DIAG INJ IV PUSH: CPT

## 2021-06-08 PROCEDURE — 81001 URINALYSIS AUTO W/SCOPE: CPT | Performed by: EMERGENCY MEDICINE

## 2021-06-08 PROCEDURE — 87086 URINE CULTURE/COLONY COUNT: CPT | Performed by: EMERGENCY MEDICINE

## 2021-06-08 PROCEDURE — 80053 COMPREHEN METABOLIC PANEL: CPT | Performed by: EMERGENCY MEDICINE

## 2021-06-08 RX ORDER — KETOROLAC TROMETHAMINE 30 MG/ML
15 INJECTION, SOLUTION INTRAMUSCULAR; INTRAVENOUS ONCE
Status: COMPLETED | OUTPATIENT
Start: 2021-06-08 | End: 2021-06-08

## 2021-06-08 RX ADMIN — SODIUM CHLORIDE 1000 ML: 0.9 INJECTION, SOLUTION INTRAVENOUS at 23:27

## 2021-06-08 RX ADMIN — KETOROLAC TROMETHAMINE 15 MG: 30 INJECTION, SOLUTION INTRAMUSCULAR; INTRAVENOUS at 23:32

## 2021-06-08 NOTE — TELEPHONE ENCOUNTER
Called patient and reviewed sleep study results  APAP ordered  Scheduled DME set up 6/22/21 in [facility]  Appointment information emailed to Gareth quan  Patient already has compliance follow up scheduled 8/2/21

## 2021-06-08 NOTE — Clinical Note
Reina Chase was seen and treated in our emergency department on 6/8/2021  Diagnosis:     Yinka  may return to work on return date  She may return on this date: 06/09/2021         If you have any questions or concerns, please don't hesitate to call        Silvestre Hernandez , DO    ______________________________           _______________          _______________  Hospital Representative                              Date                                Time

## 2021-06-09 VITALS
BODY MASS INDEX: 46.37 KG/M2 | DIASTOLIC BLOOD PRESSURE: 80 MMHG | HEART RATE: 88 BPM | WEIGHT: 253.53 LBS | RESPIRATION RATE: 18 BRPM | SYSTOLIC BLOOD PRESSURE: 168 MMHG | OXYGEN SATURATION: 100 % | TEMPERATURE: 98.6 F

## 2021-06-09 LAB
ALBUMIN SERPL BCP-MCNC: 3.7 G/DL (ref 3.5–5)
ALP SERPL-CCNC: 73 U/L (ref 46–116)
ALT SERPL W P-5'-P-CCNC: 14 U/L (ref 12–78)
ANION GAP SERPL CALCULATED.3IONS-SCNC: 9 MMOL/L (ref 4–13)
AST SERPL W P-5'-P-CCNC: 14 U/L (ref 5–45)
ATRIAL RATE: 76 BPM
BACTERIA UR QL AUTO: ABNORMAL /HPF
BILIRUB SERPL-MCNC: 0.29 MG/DL (ref 0.2–1)
BILIRUB UR QL STRIP: ABNORMAL
BUN SERPL-MCNC: 11 MG/DL (ref 5–25)
CALCIUM SERPL-MCNC: 9.1 MG/DL (ref 8.3–10.1)
CHLORIDE SERPL-SCNC: 105 MMOL/L (ref 100–108)
CK SERPL-CCNC: 116 U/L (ref 26–192)
CLARITY UR: ABNORMAL
CO2 SERPL-SCNC: 25 MMOL/L (ref 21–32)
COLOR UR: ABNORMAL
CREAT SERPL-MCNC: 0.79 MG/DL (ref 0.6–1.3)
GFR SERPL CREATININE-BSD FRML MDRD: 93 ML/MIN/1.73SQ M
GLUCOSE SERPL-MCNC: 104 MG/DL (ref 65–140)
GLUCOSE UR STRIP-MCNC: NEGATIVE MG/DL
HGB UR QL STRIP.AUTO: ABNORMAL
KETONES UR STRIP-MCNC: ABNORMAL MG/DL
LEUKOCYTE ESTERASE UR QL STRIP: ABNORMAL
NITRITE UR QL STRIP: POSITIVE
NON-SQ EPI CELLS URNS QL MICRO: ABNORMAL /HPF
P AXIS: 42 DEGREES
PH UR STRIP.AUTO: 6.5 [PH]
POTASSIUM SERPL-SCNC: 3.7 MMOL/L (ref 3.5–5.3)
PR INTERVAL: 154 MS
PROT SERPL-MCNC: 8.1 G/DL (ref 6.4–8.2)
PROT UR STRIP-MCNC: >=300 MG/DL
QRS AXIS: 41 DEGREES
QRSD INTERVAL: 88 MS
QT INTERVAL: 368 MS
QTC INTERVAL: 414 MS
RBC #/AREA URNS AUTO: ABNORMAL /HPF
SODIUM SERPL-SCNC: 139 MMOL/L (ref 136–145)
SP GR UR STRIP.AUTO: >=1.03 (ref 1–1.03)
T WAVE AXIS: 14 DEGREES
TROPONIN I SERPL-MCNC: <0.02 NG/ML
UROBILINOGEN UR QL STRIP.AUTO: 1 E.U./DL
VENTRICULAR RATE: 76 BPM
WBC #/AREA URNS AUTO: ABNORMAL /HPF

## 2021-06-09 PROCEDURE — 93010 ELECTROCARDIOGRAM REPORT: CPT | Performed by: INTERNAL MEDICINE

## 2021-06-09 PROCEDURE — 96361 HYDRATE IV INFUSION ADD-ON: CPT

## 2021-06-09 NOTE — ED PROVIDER NOTES
History  Chief Complaint   Patient presents with    Chest Pain     Woke this morning with midchest pain with radiation to neck and left shoulder  Pain gradually worsening  Hx angina  No relief from 1 tablet NTG SL taken 1230  Patient presents with chest discomfort that she woke up with this morning  States that she has a history of angina but this feels slightly different because it hurts when she stretches her left arm forward  Denies any shortness of breath, syncope, near syncope, nausea, vomiting, diaphoresis, radiation to the back or palpitations  Patient did try taking nitroglycerin this afternoon without relief  Has had a catheterization before but no interventions  History provided by:  Patient   used: No    Chest Pain  Pain location:  L chest  Pain radiates to:  L arm  Pain radiates to the back: no    Pain severity:  Moderate  Onset quality:  Gradual  Duration:  14 hours  Timing:  Constant  Progression:  Worsening  Chronicity:  New  Context: raising an arm and at rest    Context: no trauma    Relieved by:  Nothing  Worsened by: Movement  Ineffective treatments:  Nitroglycerin  Associated symptoms: no abdominal pain, no back pain, no cough, no diaphoresis, no dizziness, no fever, no headache, no nausea, no palpitations, no shortness of breath and not vomiting    Risk factors: hypertension    Risk factors: no coronary artery disease        Prior to Admission Medications   Prescriptions Last Dose Informant Patient Reported? Taking? Abatacept 125 MG/ML SOAJ  Self Yes Yes   Sig: Inject under the skin   Adalimumab 40 MG/0 4ML PNKT  Self Yes No   Sig: Inject 40 mg under the skin every 14 (fourteen) days   Calcium Carb-Cholecalciferol (CALCIUM 600+D3) 600-200 MG-UNIT TABS  Self Yes Yes   Sig: take 1 tablet twice a day   Cholecalciferol (VITAMIN D3) 2000 units capsule  Self Yes Yes   Sig: take 1 tablet po daily     Elastic Bandages & Supports (CARPAL TUNNEL WRIST DELUXE) MISC  Self No No   Sig: by Does not apply route daily at bedtime   Incontinence Supply Disposable (Poise Pad) PADS  Self No No   Sig: by Does not apply route 4 (four) times a day as needed (incontinence)   Methenamine-Sodium Salicylate 703-508 1 MG TABS  Self No No   Sig: Take 1 tablet by mouth 3 (three) times a day   Multiple Vitamins-Minerals (MULTIVITAMIN WITH MINERALS) tablet  Self No Yes   Sig: Take 1 tablet by mouth daily   albuterol (PROVENTIL HFA,VENTOLIN HFA) 90 mcg/act inhaler  Self No Yes   Sig: Inhale 2 puffs every 4 (four) hours as needed for wheezing   amLODIPine (NORVASC) 5 mg tablet  Self No Yes   Sig: Take 1 tablet (5 mg total) by mouth daily   celecoxib (CeleBREX) 200 mg capsule  Self Yes Yes   Sig: Take 200 mg by mouth 2 (two) times a day   citalopram (CeleXA) 20 mg tablet  Self No Yes   Sig: Take 1 tablet (20 mg total) by mouth daily   doxepin (SINEquan) 25 mg capsule  Self No Yes   Sig: Take 1 capsule (25 mg total) by mouth daily at bedtime   ergocalciferol (Drisdol) 1 25 MG (71452 UT) capsule   Yes Yes   Sig: Take 50,000 Units by mouth   famotidine (PEPCID) 20 mg tablet  Self No Yes   Sig: Take 1 tablet (20 mg total) by mouth 2 (two) times a day   fluticasone (FLONASE) 50 mcg/act nasal spray  Self No Yes   Si spray into each nostril daily   fluticasone (FLOVENT HFA) 110 MCG/ACT inhaler  Self No Yes   Sig: Inhale 2 puffs 2 (two) times a day Rinse mouth after use     folic acid (FOLVITE) 1 mg tablet  Self Yes Yes   Sig: every 24 hours   furosemide (LASIX) 40 mg tablet  Self No Yes   Sig: Take 1 tablet (40 mg total) by mouth daily   leflunomide (ARAVA) 20 MG tablet  Self Yes Yes   Sig: Take 20 mg by mouth daily   levETIRAcetam (KEPPRA) 1000 MG tablet  Self No Yes   Sig: Take 1 tablet (1,000 mg total) by mouth 2 (two) times a day   methylPREDNISolone (MEDROL) 32 MG tablet  Self No Yes   Sig: Take one (1) tablet 12 hours and one (1) tablet  2 hours PRIOR to CT scan    metoprolol tartrate (LOPRESSOR) 25 mg tablet  Self No Yes   Sig: Take 1 tablet (25 mg total) by mouth every 12 (twelve) hours   nitroglycerin (NITROSTAT) 0 4 mg SL tablet  Self No Yes   Sig: Place 1 tablet (0 4 mg total) under the tongue every 5 (five) minutes as needed for chest pain Call 911 if using 3 doses   ondansetron (ZOFRAN-ODT) 4 mg disintegrating tablet  Self No Yes   Sig: Take 1 tablet (4 mg total) by mouth every 8 (eight) hours as needed for nausea for up to 10 doses   polyethylene glycol (GLYCOLAX) 17 GM/SCOOP powder  Self No No   Sig: Take 17 g by mouth daily   predniSONE 5 mg tablet  Self Yes Yes   Sig: Take by mouth daily   rizatriptan (MAXALT) 5 MG tablet   No Yes   Sig: Take 1 tablet (5 mg total) by mouth once as needed for migraine for up to 1 dose May repeat in 2 hours if needed   traMADol (ULTRAM) 50 mg tablet  Self No Yes   Sig: Take 1 tablet (50 mg total) by mouth every 6 (six) hours as needed for moderate pain      Facility-Administered Medications: None       Past Medical History:   Diagnosis Date    [facility])     Anxiety     Depression     Epilepsy ([facility] )     Fibroid 2012    History of cardiac cath 2006    Hydronephrosis 2007    Hypertension     Migraine without aura     PTSD (post-traumatic stress disorder)     Rheumatoid arteritis ([facility] )        Past Surgical History:   Procedure Laterality Date    APPENDECTOMY      CARDIAC CATHETERIZATION  2006    CARDIAC CATHETERIZATION       SECTION  2008    CHOLECYSTECTOMY  2018   [facility]NEY SURGERY  2007    TUBAL LIGATION  2008       Family History   Problem Relation Age of Onset    Diabetes Maternal Grandmother     Hypertension Maternal Grandmother     No Known Problems Mother     No Known Problems Sister     No Known Problems Daughter     No Known Problems Paternal Grandmother     No Known Problems Daughter     Breast cancer Neg Hx     Cancer Neg Hx      I have reviewed and agree with the history as documented      E-Cigarette/Vaping    E-Cigarette Use Never User      E-Cigarette/Vaping Substances     Social History     Tobacco Use    Smoking status: Former Smoker     Types: Cigarettes     Quit date:      Years since quittin 4    Smokeless tobacco: Former User   Substance Use Topics    Alcohol use: Not Currently     Frequency: Monthly or less     Drinks per session: 1 or 2    Drug use: Never       Review of Systems   Constitutional: Negative for chills, diaphoresis and fever  HENT: Negative for ear pain and sore throat  Eyes: Negative for pain and visual disturbance  Respiratory: Negative for cough and shortness of breath  Cardiovascular: Positive for chest pain  Negative for palpitations  Gastrointestinal: Negative for abdominal pain, diarrhea, nausea and vomiting  Genitourinary: Negative for difficulty urinating, dysuria, flank pain, frequency, hematuria and urgency  Musculoskeletal: Negative for arthralgias and back pain  Skin: Negative for color change, pallor, rash and wound  Allergic/Immunologic: Negative for immunocompromised state  Neurological: Negative for dizziness, seizures, syncope, light-headedness and headaches  All other systems reviewed and are negative  Physical Exam  Physical Exam  Vitals signs and nursing note reviewed  Constitutional:       General: She is not in acute distress  Appearance: She is well-developed  She is not ill-appearing, toxic-appearing or diaphoretic  HENT:      Head: Normocephalic and atraumatic  Eyes:      General:         Right eye: No discharge  Left eye: No discharge  Conjunctiva/sclera: Conjunctivae normal       Pupils: Pupils are equal, round, and reactive to light  Neck:      Musculoskeletal: Normal range of motion and neck supple  Trachea: No tracheal deviation  Cardiovascular:      Rate and Rhythm: Normal rate and regular rhythm  Heart sounds: Normal heart sounds  No murmur  No friction rub     Pulmonary:      Effort: Pulmonary effort is normal  No tachypnea, accessory muscle usage or respiratory distress  Breath sounds: Normal breath sounds  No stridor  No wheezing or rales  Abdominal:      General: There is no distension  Palpations: Abdomen is soft  Tenderness: There is no abdominal tenderness  There is no guarding or rebound  Musculoskeletal: Normal range of motion  General: No tenderness or deformity  Skin:     General: Skin is warm and dry  Neurological:      Mental Status: She is alert and oriented to person, place, and time  She is not disoriented        Gait: Gait normal    Psychiatric:         Speech: Speech normal          Behavior: Behavior normal          Vital Signs  ED Triage Vitals   Temperature Pulse Respirations Blood Pressure SpO2   06/08/21 2302 06/08/21 2301 06/08/21 2301 06/08/21 2301 06/08/21 2301   98 6 °F (37 °C) 89 18 (!) 173/92 100 %      Temp Source Heart Rate Source Patient Position - Orthostatic VS BP Location FiO2 (%)   06/08/21 2302 06/08/21 2301 06/08/21 2301 06/08/21 2301 --   Oral Monitor Sitting Left arm       Pain Score       06/08/21 2301       7           Vitals:    06/08/21 2301   BP: (!) 173/92   Pulse: 89   Patient Position - Orthostatic VS: Sitting         Visual Acuity      ED Medications  Medications   sodium chloride 0 9 % bolus 1,000 mL (1,000 mL Intravenous New Bag 6/8/21 2327)   ketorolac (TORADOL) injection 15 mg (15 mg Intravenous Given 6/8/21 2332)       Diagnostic Studies  Results Reviewed     Procedure Component Value Units Date/Time    Urine Microscopic [[id number]]  (Abnormal) Collected: 06/08/21 2327    Lab Status: Final result Specimen: Urine, Clean Catch Updated: 06/09/21 0049     RBC, UA Innumerable /hpf      WBC, UA       Field obscured, unable to enumerate     /hpf     Epithelial Cells       Field obscured, unable to enumerate     /hpf     Bacteria, UA       Field obscured, unable to enumerate     /hpf    Urine culture [[id number]] Collected: 06/08/21 2327    Lab Status:  In process Specimen: Urine, Clean Catch Updated: 06/09/21 0048    Comprehensive metabolic panel [[id number]] Collected: 06/08/21 2327    Lab Status: Final result Specimen: Blood from Arm, Right Updated: 06/09/21 0017     Sodium 139 mmol/L      Potassium 3 7 mmol/L      Chloride 105 mmol/L      CO2 25 mmol/L      ANION GAP 9 mmol/L      BUN 11 mg/dL      Creatinine 0 79 mg/dL      Glucose 104 mg/dL      Calcium 9 1 mg/dL      AST 14 U/L      ALT 14 U/L      Alkaline Phosphatase 73 U/L      Total Protein 8 1 g/dL      Albumin 3 7 g/dL      Total Bilirubin 0 29 mg/dL      eGFR 93 ml/min/1 73sq m     Narrative:      National Kidney Disease Foundation guidelines for Chronic Kidney Disease (CKD):     Stage 1 with normal or high GFR (GFR > 90 mL/min/1 73 square meters)    Stage 2 Mild CKD (GFR = 60-89 mL/min/1 73 square meters)    Stage 3A Moderate CKD (GFR = 45-59 mL/min/1 73 square meters)    Stage 3B Moderate CKD (GFR = 30-44 mL/min/1 73 square meters)    Stage 4 Severe CKD (GFR = 15-29 mL/min/1 73 square meters)    Stage 5 End Stage CKD (GFR <15 mL/min/1 73 square meters)  Note: GFR calculation is accurate only with a steady state creatinine    CK Total with Reflex CKMB [[id number]]  (Normal) Collected: 06/08/21 2327    Lab Status: Final result Specimen: Blood from Arm, Right Updated: 06/09/21 0017     Total  U/L     UA w Reflex to Microscopic w Reflex to Culture [[id number]]  (Abnormal) Collected: 06/08/21 2327    Lab Status: Final result Specimen: Urine, Clean Catch Updated: 06/09/21 0002     Color, UA Red     Clarity, UA Turbid     Specific Gravity, UA >=1 030     pH, UA 6 5     Leukocytes, UA Trace     Nitrite, UA Positive     Protein, UA >=300 mg/dl      Glucose, UA Negative mg/dl      Ketones, UA Trace mg/dl      Urobilinogen, UA 1 0 E U /dl      Bilirubin, UA Interference- unable to analyze     Blood, UA Large    Troponin I [[id number]]  (Normal) Collected: 06/08/21 2327    Lab Status: Final result Specimen: Blood from Arm, Right Updated: 06/09/21 0002     Troponin I <0 02 ng/mL     CBC and differential [[id number]]  (Abnormal) Collected: 06/08/21 2327    Lab Status: Final result Specimen: Blood from Arm, Right Updated: 06/08/21 2341     WBC 9 03 Thousand/uL      RBC 3 95 Million/uL      Hemoglobin 10 2 g/dL      Hematocrit 32 5 %      MCV 82 fL      MCH 25 8 pg      MCHC 31 4 g/dL      RDW 12 5 %      MPV 9 9 fL      Platelets 699 Thousands/uL      nRBC 0 /100 WBCs      Neutrophils Relative 61 %      Immat GRANS % 0 %      Lymphocytes Relative 28 %      Monocytes Relative 8 %      Eosinophils Relative 3 %      Basophils Relative 0 %      Neutrophils Absolute 5 50 Thousands/µL      Immature Grans Absolute 0 03 Thousand/uL      Lymphocytes Absolute 2 52 Thousands/µL      Monocytes Absolute 0 72 Thousand/µL      Eosinophils Absolute 0 23 Thousand/µL      Basophils Absolute 0 03 Thousands/µL     POCT pregnancy, urine [[id number]]  (Normal) Resulted: 06/08/21 2330    Lab Status: Final result Updated: 06/08/21 2330     EXT PREG TEST UR (Ref: Negative) negative     Control valid                 XR chest 2 views   ED Interpretation by Chante Norton DO (06/09 0023)   NAD                 Procedures  ECG 12 Lead Documentation Only    Date/Time: 6/8/2021 11:26 PM  Performed by: Chante oNrton DO  Authorized by: Chante Norton DO     Indications / Diagnosis:  Chest pain  ECG reviewed by me, the ED Provider: yes    Patient location:  ED  Previous ECG:     Previous ECG:  Compared to current    Similarity:  Changes noted  Interpretation:     Interpretation: non-specific    Rate:     ECG rate:  76    ECG rate assessment: normal    Rhythm:     Rhythm: sinus rhythm    Ectopy:     Ectopy: none    QRS:     QRS intervals:  Normal  Conduction:     Conduction: normal    ST segments:     ST segments:  Normal  T waves:     T waves: peaked      Peaked:  I, II and aVL             ED Course             HEART Risk Score      Most Recent Value   Heart Score Risk Calculator   History  0 Filed at: 06/09/2021 0049   ECG  1 Filed at: 06/09/2021 0049   Age  0 Filed at: 06/09/2021 0049   Risk Factors  1 Filed at: 06/09/2021 0049   Troponin  0 Filed at: 06/09/2021 0049   HEART Score  2 Filed at: 06/09/2021 0049                      SBIRT 20yo+      Most Recent Value   SBIRT (25 yo +)   In order to provide better care to our patients, we are screening all of our patients for alcohol and drug use  Would it be okay to ask you these screening questions? Yes Filed at: 06/08/2021 2337   Initial Alcohol Screen: US AUDIT-C    1  How often do you have a drink containing alcohol?  0 Filed at: 06/08/2021 2337   2  How many drinks containing alcohol do you have on a typical day you are drinking? 0 Filed at: 06/08/2021 2337   3b  FEMALE Any Age, or MALE 65+: How often do you have 4 or more drinks on one occassion? 0 Filed at: 06/08/2021 2337   Audit-C Score  0 Filed at: 06/08/2021 2337   NAYAN: How many times in the past year have you    Used an illegal drug or used a prescription medication for non-medical reasons? Never Filed at: 06/08/2021 2337                    MDM  Number of Diagnoses or Management Options  Chest pain: new and requires workup  Diagnosis management comments: Patient presents for chest pain that radiates to her left arm  Has a history of hypertension and angina but no history of cardiac stents  Plan is to obtain a cardiac workup, treat with IV Toradol and reassess  On a side note the patient does have extremely dark urine for which she states has been normal for her for the past 6 months  Has been following up with a urologist and states that she has had a cystoscopy without a reason  She is not on cholesterol medications but will add a CK for possible rhabdo  EKG does show some peaked T-waves that are changed from prior  12:50 AM  Workup appears negative  Patient feeling much better after Toradol  Nonspecific EKG findings but no evidence of rhabdomyolysis, hyperkalemia or elevated troponin  Patient's pain has been constant for over 14 hours and with a negative troponin her heart score would be 2 and safe for discharge home  Will have her follow-up with her cardiologist   Patient understands and agrees with plan of care  Will return if anything worsens  Questions and concerns answered  In regards to her urine, she does have positive nitrites but no symptoms  This has been present for several months  Patient's last cultures did not show elevation of bacterial cultures that would warrant antibiotics  Will send for culture but not treat at this time  Amount and/or Complexity of Data Reviewed  Clinical lab tests: ordered and reviewed  Tests in the radiology section of CPT®: ordered and reviewed  Tests in the medicine section of CPT®: reviewed and ordered  Review and summarize past medical records: yes  Independent visualization of images, tracings, or specimens: yes        Disposition  Final diagnoses:   Chest pain     Time reflects when diagnosis was documented in both MDM as applicable and the Disposition within this note     Time User Action Codes Description Comment    6/9/2021 12:50 AM Ryne Shall Add [R07 9] Chest pain       ED Disposition     ED Disposition Condition Date/Time Comment    Discharge Stable Wed Jun 9, 2021 12:50 AM Miah Love discharge to home/self care              Follow-up Information     Follow up With Specialties Details Why Contact Info    Hemant Godwin PA-C Family Medicine, Physician Assistant  As needed [address]  [address] [facility] [address]  [address] [place],[address], [address] Cardiology Call today To schedule an appointment as soon as you can [address] [facility] [address]            Patient's Medications   Discharge Prescriptions    No medications on file     No discharge procedures on file      PDMP Review       Value Time User    PDMP Reviewed  Yes 4/9/2021 11:58 AM Hemant Godwin PA-C          ED Provider  Electronically Signed by           Machelle Britton DO  06/09/21 7141

## 2021-06-10 LAB — BACTERIA UR CULT: NORMAL

## 2021-06-15 ENCOUNTER — HOSPITAL ENCOUNTER (EMERGENCY)
Facility: HOSPITAL | Age: 41
Discharge: HOME | End: 2021-06-15
Attending: EMERGENCY MEDICINE | Admitting: EMERGENCY MEDICINE
Payer: COMMERCIAL

## 2021-06-15 ENCOUNTER — APPOINTMENT (EMERGENCY)
Dept: RADIOLOGY | Facility: HOSPITAL | Age: 41
End: 2021-06-15
Payer: COMMERCIAL

## 2021-06-15 VITALS
WEIGHT: 257.28 LBS | RESPIRATION RATE: 18 BRPM | HEART RATE: 76 BPM | TEMPERATURE: 97.6 F | SYSTOLIC BLOOD PRESSURE: 133 MMHG | OXYGEN SATURATION: 99 % | DIASTOLIC BLOOD PRESSURE: 74 MMHG | BODY MASS INDEX: 47.06 KG/M2

## 2021-06-15 DIAGNOSIS — N39.0 UTI (URINARY TRACT INFECTION): Primary | ICD-10-CM

## 2021-06-15 LAB
ALBUMIN SERPL BCP-MCNC: 2.9 G/DL (ref 3.5–5)
ALP SERPL-CCNC: 61 U/L (ref 46–116)
ALT SERPL W P-5'-P-CCNC: 15 U/L (ref 12–78)
ANION GAP SERPL CALCULATED.3IONS-SCNC: 11 MMOL/L (ref 4–13)
AST SERPL W P-5'-P-CCNC: 9 U/L (ref 5–45)
ATRIAL RATE: 65 BPM
ATRIAL RATE: 70 BPM
BACTERIA UR QL AUTO: ABNORMAL /HPF
BASOPHILS # BLD AUTO: 0.01 THOUSANDS/ΜL (ref 0–0.1)
BASOPHILS NFR BLD AUTO: 0 % (ref 0–1)
BILIRUB SERPL-MCNC: 0.18 MG/DL (ref 0.2–1)
BILIRUB UR QL STRIP: NEGATIVE
BUN SERPL-MCNC: 15 MG/DL (ref 5–25)
CALCIUM ALBUM COR SERPL-MCNC: 9.4 MG/DL (ref 8.3–10.1)
CALCIUM SERPL-MCNC: 8.5 MG/DL (ref 8.3–10.1)
CHLORIDE SERPL-SCNC: 108 MMOL/L (ref 100–108)
CLARITY UR: CLEAR
CO2 SERPL-SCNC: 23 MMOL/L (ref 21–32)
COLOR UR: YELLOW
CREAT SERPL-MCNC: 0.82 MG/DL (ref 0.6–1.3)
EOSINOPHIL # BLD AUTO: 0.18 THOUSAND/ΜL (ref 0–0.61)
EOSINOPHIL NFR BLD AUTO: 3 % (ref 0–6)
ERYTHROCYTE [DISTWIDTH] IN BLOOD BY AUTOMATED COUNT: 12.7 % (ref 11.6–15.1)
GFR SERPL CREATININE-BSD FRML MDRD: 89 ML/MIN/1.73SQ M
GLUCOSE SERPL-MCNC: 85 MG/DL (ref 65–140)
GLUCOSE UR STRIP-MCNC: NEGATIVE MG/DL
HCT VFR BLD AUTO: 29.9 % (ref 34.8–46.1)
HGB BLD-MCNC: 8.9 G/DL (ref 11.5–15.4)
HGB UR QL STRIP.AUTO: ABNORMAL
IMM GRANULOCYTES # BLD AUTO: 0.02 THOUSAND/UL (ref 0–0.2)
IMM GRANULOCYTES NFR BLD AUTO: 0 % (ref 0–2)
KETONES UR STRIP-MCNC: NEGATIVE MG/DL
LEUKOCYTE ESTERASE UR QL STRIP: ABNORMAL
LYMPHOCYTES # BLD AUTO: 1.05 THOUSANDS/ΜL (ref 0.6–4.47)
LYMPHOCYTES NFR BLD AUTO: 17 % (ref 14–44)
MAGNESIUM SERPL-MCNC: 2 MG/DL (ref 1.6–2.6)
MCH RBC QN AUTO: 25.1 PG (ref 26.8–34.3)
MCHC RBC AUTO-ENTMCNC: 29.8 G/DL (ref 31.4–37.4)
MCV RBC AUTO: 85 FL (ref 82–98)
MONOCYTES # BLD AUTO: 0.5 THOUSAND/ΜL (ref 0.17–1.22)
MONOCYTES NFR BLD AUTO: 8 % (ref 4–12)
NEUTROPHILS # BLD AUTO: 4.31 THOUSANDS/ΜL (ref 1.85–7.62)
NEUTS SEG NFR BLD AUTO: 72 % (ref 43–75)
NITRITE UR QL STRIP: NEGATIVE
NON-SQ EPI CELLS URNS QL MICRO: ABNORMAL /HPF
NRBC BLD AUTO-RTO: 0 /100 WBCS
NT-PROBNP SERPL-MCNC: 212 PG/ML
OTHER STN SPEC: ABNORMAL
P AXIS: 37 DEGREES
P AXIS: 41 DEGREES
PH UR STRIP.AUTO: 5.5 [PH] (ref 4.5–8)
PLATELET # BLD AUTO: 302 THOUSANDS/UL (ref 149–390)
PMV BLD AUTO: 9.6 FL (ref 8.9–12.7)
POTASSIUM SERPL-SCNC: 4 MMOL/L (ref 3.5–5.3)
PR INTERVAL: 156 MS
PR INTERVAL: 160 MS
PROT SERPL-MCNC: 6.9 G/DL (ref 6.4–8.2)
PROT UR STRIP-MCNC: >=300 MG/DL
QRS AXIS: 62 DEGREES
QRS AXIS: 68 DEGREES
QRSD INTERVAL: 84 MS
QRSD INTERVAL: 84 MS
QT INTERVAL: 380 MS
QT INTERVAL: 392 MS
QTC INTERVAL: 407 MS
QTC INTERVAL: 410 MS
RBC # BLD AUTO: 3.54 MILLION/UL (ref 3.81–5.12)
RBC #/AREA URNS AUTO: ABNORMAL /HPF
SARS-COV-2 RNA RESP QL NAA+PROBE: NEGATIVE
SODIUM SERPL-SCNC: 142 MMOL/L (ref 136–145)
SP GR UR STRIP.AUTO: 1.02 (ref 1–1.03)
T WAVE AXIS: 39 DEGREES
T WAVE AXIS: 45 DEGREES
TROPONIN I SERPL-MCNC: <0.02 NG/ML
UROBILINOGEN UR QL STRIP.AUTO: 0.2 E.U./DL
VENTRICULAR RATE: 65 BPM
VENTRICULAR RATE: 70 BPM
WBC # BLD AUTO: 6.07 THOUSAND/UL (ref 4.31–10.16)
WBC #/AREA URNS AUTO: ABNORMAL /HPF

## 2021-06-15 PROCEDURE — 96374 THER/PROPH/DIAG INJ IV PUSH: CPT

## 2021-06-15 PROCEDURE — 96375 TX/PRO/DX INJ NEW DRUG ADDON: CPT

## 2021-06-15 PROCEDURE — 71045 X-RAY EXAM CHEST 1 VIEW: CPT

## 2021-06-15 PROCEDURE — U0005 INFEC AGEN DETEC AMPLI PROBE: HCPCS | Performed by: PHYSICIAN ASSISTANT

## 2021-06-15 PROCEDURE — 99285 EMERGENCY DEPT VISIT HI MDM: CPT | Performed by: PHYSICIAN ASSISTANT

## 2021-06-15 PROCEDURE — 84484 ASSAY OF TROPONIN QUANT: CPT | Performed by: PHYSICIAN ASSISTANT

## 2021-06-15 PROCEDURE — 81001 URINALYSIS AUTO W/SCOPE: CPT

## 2021-06-15 PROCEDURE — 93005 ELECTROCARDIOGRAM TRACING: CPT

## 2021-06-15 PROCEDURE — 83880 ASSAY OF NATRIURETIC PEPTIDE: CPT | Performed by: PHYSICIAN ASSISTANT

## 2021-06-15 PROCEDURE — 85025 COMPLETE CBC W/AUTO DIFF WBC: CPT | Performed by: PHYSICIAN ASSISTANT

## 2021-06-15 PROCEDURE — 99285 EMERGENCY DEPT VISIT HI MDM: CPT

## 2021-06-15 PROCEDURE — 93010 ELECTROCARDIOGRAM REPORT: CPT | Performed by: INTERNAL MEDICINE

## 2021-06-15 PROCEDURE — 96361 HYDRATE IV INFUSION ADD-ON: CPT

## 2021-06-15 PROCEDURE — 80053 COMPREHEN METABOLIC PANEL: CPT | Performed by: PHYSICIAN ASSISTANT

## 2021-06-15 PROCEDURE — 36415 COLL VENOUS BLD VENIPUNCTURE: CPT | Performed by: PHYSICIAN ASSISTANT

## 2021-06-15 PROCEDURE — U0003 INFECTIOUS AGENT DETECTION BY NUCLEIC ACID (DNA OR RNA); SEVERE ACUTE RESPIRATORY SYNDROME CORONAVIRUS 2 (SARS-COV-2) (CORONAVIRUS DISEASE [COVID-19]), AMPLIFIED PROBE TECHNIQUE, MAKING USE OF HIGH THROUGHPUT TECHNOLOGIES AS DESCRIBED BY CMS-2020-01-R: HCPCS | Performed by: PHYSICIAN ASSISTANT

## 2021-06-15 PROCEDURE — 83735 ASSAY OF MAGNESIUM: CPT | Performed by: PHYSICIAN ASSISTANT

## 2021-06-15 RX ORDER — ONDANSETRON 2 MG/ML
4 INJECTION INTRAMUSCULAR; INTRAVENOUS ONCE
Status: COMPLETED | OUTPATIENT
Start: 2021-06-15 | End: 2021-06-15

## 2021-06-15 RX ORDER — CEPHALEXIN 250 MG/1
500 CAPSULE ORAL ONCE
Status: COMPLETED | OUTPATIENT
Start: 2021-06-15 | End: 2021-06-15

## 2021-06-15 RX ORDER — KETOROLAC TROMETHAMINE 30 MG/ML
15 INJECTION, SOLUTION INTRAMUSCULAR; INTRAVENOUS ONCE
Status: COMPLETED | OUTPATIENT
Start: 2021-06-15 | End: 2021-06-15

## 2021-06-15 RX ADMIN — SODIUM CHLORIDE 1000 ML: 0.9 INJECTION, SOLUTION INTRAVENOUS at 12:14

## 2021-06-15 RX ADMIN — KETOROLAC TROMETHAMINE 15 MG: 30 INJECTION, SOLUTION INTRAMUSCULAR at 12:20

## 2021-06-15 RX ADMIN — CEPHALEXIN 500 MG: 250 CAPSULE ORAL at 15:06

## 2021-06-15 RX ADMIN — ONDANSETRON 4 MG: 2 INJECTION INTRAMUSCULAR; INTRAVENOUS at 12:21

## 2021-06-15 NOTE — Clinical Note
Miahliz Love was seen and treated in our emergency department on 6/15/2021  Diagnosis:     Kamilla Zamora  may return to work on return date  She may return on this date: 06/17/2021         If you have any questions or concerns, please don't hesitate to call        Julien Cabrera PA-C    ______________________________           _______________          _______________  Hospital Representative                              Date                                Time

## 2021-06-15 NOTE — Clinical Note
Preethi Boone was seen and treated in our emergency department on 6/15/2021  Diagnosis:     Maryellen He  may return to work on return date  She may return on this date: 06/17/2021         If you have any questions or concerns, please don't hesitate to call        Lion Jenkins PA-C    ______________________________           _______________          _______________  Hospital Representative                              Date                                Time

## 2021-06-15 NOTE — ED PROVIDER NOTES
History  Chief Complaint   Patient presents with    Shortness of Breath     Pt reports for fever at home (101) pt reprots blood nose yesterday with SOB, headache and dry cough with wheezing, and N/V/D every 30 minutes yesterdays  Pt denies CP    Flu Symptoms     39 y/o female, PMHx of asthma, HTN, HLD, RA presents to the ED with 24 hour hx of fever at home of 101 overnight, cough, nausea, vomiting and diarrhea  Also with fatigue and headache  Not vaccinated against COVID  Used inhaler with minimal improvement  Denies sore throat, chest pain  Has been able to keep down some sips of Gatorade  Following with urology right now for a work up for hematuria  Denies back pain, dysuria, frequency or urgency  Shortness of Breath  Associated symptoms: diaphoresis, fever, headaches, vomiting and wheezing    Associated symptoms: no abdominal pain, no chest pain, no cough, no ear pain, no rash and no sore throat    Flu Symptoms  Presenting symptoms: diarrhea, fatigue, fever, headache, nausea, shortness of breath and vomiting    Presenting symptoms: no cough and no sore throat    Associated symptoms: chills    Associated symptoms: no ear pain        Prior to Admission Medications   Prescriptions Last Dose Informant Patient Reported? Taking? Abatacept 125 MG/ML SOAJ  Self Yes No   Sig: Inject under the skin   Adalimumab 40 MG/0 4ML PNKT  Self Yes No   Sig: Inject 40 mg under the skin every 14 (fourteen) days   Calcium Carb-Cholecalciferol (CALCIUM 600+D3) 600-200 MG-UNIT TABS  Self Yes No   Sig: take 1 tablet twice a day   Cholecalciferol (VITAMIN D3) 2000 units capsule  Self Yes No   Sig: take 1 tablet po daily     Elastic Bandages & Supports (CARPAL TUNNEL WRIST DELUXE) MISC  Self No No   Sig: by Does not apply route daily at bedtime   Incontinence Supply Disposable (Poise Pad) PADS  Self No No   Sig: by Does not apply route 4 (four) times a day as needed (incontinence)   Methenamine-Sodium Salicylate 892-089 2 MG TABS Self No No   Sig: Take 1 tablet by mouth 3 (three) times a day   Multiple Vitamins-Minerals (MULTIVITAMIN WITH MINERALS) tablet  Self No No   Sig: Take 1 tablet by mouth daily   albuterol (PROVENTIL HFA,VENTOLIN HFA) 90 mcg/act inhaler  Self No No   Sig: Inhale 2 puffs every 4 (four) hours as needed for wheezing   amLODIPine (NORVASC) 5 mg tablet  Self No No   Sig: Take 1 tablet (5 mg total) by mouth daily   celecoxib (CeleBREX) 200 mg capsule  Self Yes No   Sig: Take 200 mg by mouth 2 (two) times a day   citalopram (CeleXA) 20 mg tablet  Self No No   Sig: Take 1 tablet (20 mg total) by mouth daily   doxepin (SINEquan) 25 mg capsule  Self No No   Sig: Take 1 capsule (25 mg total) by mouth daily at bedtime   ergocalciferol (Drisdol) 1 25 MG (27445 UT) capsule   Yes No   Sig: Take 50,000 Units by mouth   famotidine (PEPCID) 20 mg tablet  Self No No   Sig: Take 1 tablet (20 mg total) by mouth 2 (two) times a day   fluticasone (FLONASE) 50 mcg/act nasal spray  Self No No   Si spray into each nostril daily   fluticasone (FLOVENT HFA) 110 MCG/ACT inhaler  Self No No   Sig: Inhale 2 puffs 2 (two) times a day Rinse mouth after use     folic acid (FOLVITE) 1 mg tablet  Self Yes No   Sig: every 24 hours   furosemide (LASIX) 40 mg tablet  Self No No   Sig: Take 1 tablet (40 mg total) by mouth daily   leflunomide (ARAVA) 20 MG tablet  Self Yes No   Sig: Take 20 mg by mouth daily   levETIRAcetam (KEPPRA) 1000 MG tablet  Self No No   Sig: Take 1 tablet (1,000 mg total) by mouth 2 (two) times a day   methylPREDNISolone (MEDROL) 32 MG tablet  Self No No   Sig: Take one (1) tablet 12 hours and one (1) tablet  2 hours PRIOR to CT scan    metoprolol tartrate (LOPRESSOR) 25 mg tablet  Self No No   Sig: Take 1 tablet (25 mg total) by mouth every 12 (twelve) hours   nitroglycerin (NITROSTAT) 0 4 mg SL tablet  Self No No   Sig: Place 1 tablet (0 4 mg total) under the tongue every 5 (five) minutes as needed for chest pain Call 911 if using 3 doses   ondansetron (ZOFRAN-ODT) 4 mg disintegrating tablet  Self No No   Sig: Take 1 tablet (4 mg total) by mouth every 8 (eight) hours as needed for nausea for up to 10 doses   polyethylene glycol (GLYCOLAX) 17 GM/SCOOP powder  Self No No   Sig: Take 17 g by mouth daily   predniSONE 5 mg tablet  Self Yes No   Sig: Take by mouth daily   rizatriptan (MAXALT) 5 MG tablet   No No   Sig: Take 1 tablet (5 mg total) by mouth once as needed for migraine for up to 1 dose May repeat in 2 hours if needed   traMADol (ULTRAM) 50 mg tablet  Self No No   Sig: Take 1 tablet (50 mg total) by mouth every 6 (six) hours as needed for moderate pain      Facility-Administered Medications: None       Past Medical History:   Diagnosis Date    Angina at rest [facility])     Anxiety     Depression     Epilepsy ([place] [address] )     Fibroid 2012    History of cardiac cath 2006    Hydronephrosis 2007    Hypertension     Migraine without aura     PTSD (post-traumatic stress disorder)     Rheumatoid arteritis ([place] [address] )        Past Surgical History:   Procedure Laterality Date    [address]EN[address]OMY     [address]  2006    CARDIAC CATHETERIZATION       SECTION  2008    [facility]LE[facility]    TUBAL LIGATION  2008       Family History   Problem Relation Age of Onset    Diabetes Maternal Grandmother     Hypertension Maternal Grandmother     No Known Problems Mother     No Known Problems Sister     No Known Problems Daughter     No Known Problems Paternal Grandmother     No Known Problems Daughter     Breast cancer Neg Hx     Cancer Neg Hx      I have reviewed and agree with the history as documented      E-Cigarette/Vaping    E-Cigarette Use Never User      E-Cigarette/Vaping Substances     Social History     Tobacco Use    Smoking status: Former Smoker     Types: Cigarettes     Quit date:      Years since quittin 4    Smokeless tobacco: Former User   Vaping Use    Vaping Use: Never used   Substance Use Topics    Alcohol use: Not Currently    Drug use: Never       Review of Systems   Constitutional: Positive for activity change, appetite change, chills, diaphoresis, fatigue and fever  HENT: Negative for ear pain and sore throat  Eyes: Negative for pain and visual disturbance  Respiratory: Positive for shortness of breath and wheezing  Negative for cough  Cardiovascular: Negative for chest pain and palpitations  Gastrointestinal: Positive for diarrhea, nausea and vomiting  Negative for abdominal pain  Genitourinary: Negative for dysuria, flank pain, frequency and hematuria  Musculoskeletal: Negative for arthralgias and back pain  Skin: Negative for color change and rash  Neurological: Positive for headaches  Negative for dizziness, seizures and syncope  All other systems reviewed and are negative  Physical Exam  Physical Exam  Vitals and nursing note reviewed  Constitutional:       Appearance: She is well-developed  HENT:      Head: Normocephalic and atraumatic  Cardiovascular:      Rate and Rhythm: Normal rate and regular rhythm  Pulmonary:      Effort: Pulmonary effort is normal  No tachypnea or respiratory distress  Breath sounds: Normal breath sounds  No stridor  Abdominal:      Palpations: Abdomen is soft  Tenderness: There is no abdominal tenderness  Musculoskeletal:         General: Normal range of motion  Skin:     General: Skin is warm  Capillary Refill: Capillary refill takes less than 2 seconds  Neurological:      General: No focal deficit present  Mental Status: She is alert     Psychiatric:         Mood and Affect: Mood normal          Vital Signs  ED Triage Vitals   Temperature Pulse Respirations Blood Pressure SpO2   06/15/21 1146 06/15/21 1146 06/15/21 1146 06/15/21 1146 06/15/21 1146   97 6 °F (36 4 °C) 89 20 147/97 98 %      Temp Source Heart Rate Source Patient Position - Orthostatic VS BP Location FiO2 (%) 06/15/21 1146 06/15/21 1146 06/15/21 1146 06/15/21 1146 --   Oral Monitor Sitting Right arm       Pain Score       06/15/21 1220       9           Vitals:    06/15/21 1146 06/15/21 1400   BP: 147/97 133/74   Pulse: 89 76   Patient Position - Orthostatic VS: Sitting Lying         Visual Acuity      ED Medications  Medications   sodium chloride 0 9 % bolus 1,000 mL (0 mL Intravenous Stopped 6/15/21 1507)   ketorolac (TORADOL) injection 15 mg (15 mg Intravenous Given 6/15/21 1220)   ondansetron (ZOFRAN) injection 4 mg (4 mg Intravenous Given 6/15/21 1221)   cephalexin (KEFLEX) capsule 500 mg (500 mg Oral Given 6/15/21 1506)       Diagnostic Studies  Results Reviewed     Procedure Component Value Units Date/Time    Urine Microscopic [[id number]]  (Abnormal) Collected: 06/15/21 1341    Lab Status: Final result Specimen: Urine, Clean Catch Updated: 06/15/21 1435     RBC, UA Innumerable /hpf      WBC, UA 4-10 /hpf      Epithelial Cells Moderate /hpf      Bacteria, UA Moderate /hpf      OTHER OBSERVATIONS Yeast Cells Present  Trichomonas Organisms Present    Urine Macroscopic, POC [[id number]]  (Abnormal) Collected: 06/15/21 1341    Lab Status: Final result Specimen: Urine Updated: 06/15/21 1342     Color, UA Yellow     Clarity, UA Clear     pH, UA 5 5     Leukocytes, UA Small     Nitrite, UA Negative     Protein, UA >=300 mg/dl      Glucose, UA Negative mg/dl      Ketones, UA Negative mg/dl      Urobilinogen, UA 0 2 E U /dl      Bilirubin, UA Negative     Blood, UA Large     Specific Gravity, UA 1 025    Narrative:      CLINITEK RESULT    Novel Coronavirus (Covid-19),PCR [facility] - 2 Hour Stat [[id number]]  (Normal) Collected: 06/15/21 1214    Lab Status: Final result Specimen: Nares from Nose Updated: 06/15/21 1316     SARS-CoV-2 Negative    Narrative:       The specimen collection materials, transport medium, and/or testing methodology utilized in the production of these test results have been proven to be reliable in a limited validation with an abbreviated program under the Emergency Utilization Authorization provided by the FDA  Testing reported as "Presumptive positive" will be confirmed with secondary testing to ensure result accuracy  Clinical caution and judgement should be used with the interpretation of these results with consideration of the clinical impression and other laboratory testing  Testing reported as "Positive" or "Negative" has been proven to be accurate according to standard laboratory validation requirements  All testing is performed with control materials showing appropriate reactivity at standard intervals        Magnesium [[id number]]  (Normal) Collected: 06/15/21 1214    Lab Status: Final result Specimen: Blood from Arm, Right Updated: 06/15/21 1258     Magnesium 2 0 mg/dL     NT-BNP PRO [[id number]]  (Abnormal) Collected: 06/15/21 1214    Lab Status: Final result Specimen: Blood from Arm, Right Updated: 06/15/21 1258     NT-proBNP 212 pg/mL     Comprehensive metabolic panel [[id number]]  (Abnormal) Collected: 06/15/21 1214    Lab Status: Final result Specimen: Blood from Arm, Right Updated: 06/15/21 1248     Sodium 142 mmol/L      Potassium 4 0 mmol/L      Chloride 108 mmol/L      CO2 23 mmol/L      ANION GAP 11 mmol/L      BUN 15 mg/dL      Creatinine 0 82 mg/dL      Glucose 85 mg/dL      Calcium 8 5 mg/dL      Corrected Calcium 9 4 mg/dL      AST 9 U/L      ALT 15 U/L      Alkaline Phosphatase 61 U/L      Total Protein 6 9 g/dL      Albumin 2 9 g/dL      Total Bilirubin 0 18 mg/dL      eGFR 89 ml/min/1 73sq m     Narrative:      [place] guidelines for Chronic Kidney Disease (CKD):     Stage 1 with normal or high GFR (GFR > 90 mL/min/1 73 square meters)    Stage 2 Mild CKD (GFR = 60-89 mL/min/1 73 square meters)    Stage 3A Moderate CKD (GFR = 45-59 mL/min/1 73 square meters)    Stage 3B Moderate CKD (GFR = 30-44 mL/min/1 73 square meters)    Stage 4 Severe CKD (GFR = 15-29 mL/min/1 73 square meters)    Stage 5 End Stage CKD (GFR <15 mL/min/1 73 square meters)  Note: GFR calculation is accurate only with a steady state creatinine    Troponin I [[id number]]  (Normal) Collected: 06/15/21 1214    Lab Status: Final result Specimen: Blood from Arm, Right Updated: 06/15/21 1245     Troponin I <0 02 ng/mL     CBC and differential [[id number]]  (Abnormal) Collected: 06/15/21 1214    Lab Status: Final result Specimen: Blood from Arm, Right Updated: 06/15/21 1224     WBC 6 07 Thousand/uL      RBC 3 54 Million/uL      Hemoglobin 8 9 g/dL      Hematocrit 29 9 %      MCV 85 fL      MCH 25 1 pg      MCHC 29 8 g/dL      RDW 12 7 %      MPV 9 6 fL      Platelets 092 Thousands/uL      nRBC 0 /100 WBCs      Neutrophils Relative 72 %      Immat GRANS % 0 %      Lymphocytes Relative 17 %      Monocytes Relative 8 %      Eosinophils Relative 3 %      Basophils Relative 0 %      Neutrophils Absolute 4 31 Thousands/µL      Immature Grans Absolute 0 02 Thousand/uL      Lymphocytes Absolute 1 05 Thousands/µL      Monocytes Absolute 0 50 Thousand/µL      Eosinophils Absolute 0 18 Thousand/µL      Basophils Absolute 0 01 Thousands/µL                  XR chest 1 view portable   ED Interpretation by Suzi Melendez PA-C (06/15 1444)   No acute cardiopulmonary abnormalities noted  Procedures  Procedures         ED Course  ED Course as of Alvaro 15 1523   Tue Alvaro 15, 2021   1231 Hemoglobin 8 9- was 10 2 7 days ago      1241 EKG: Vent rate 70  KS interval: 160  QRS duration 84  QT/Qtc 380/410  PRT axes 41 68 45      1258 CMP unremarkable      1258   Magnesium normal  COVID pending      1317 COVID is negative      1329 Will attempt a p o  Trial at this time        1439 Patient tolerated PO- UA with moderate bacteria- will treat and plan d/c to home                                SBIRT 20yo+      Most Recent Value   SBIRT (22 yo +)   In order to provide better care to our patients, we are screening all of our patients for alcohol and drug use  Would it be okay to ask you these screening questions? No Filed at: 06/15/2021 1222                    [facility]  Number of Diagnoses or Management Options  UTI (urinary tract infection)  Diagnosis management comments: Differential diagnosis includes but is not limited to: URI, pneumonia, COVID, dehydration, electrolyte abnormality, ACS, asthma exacerbation    The patient was seen and examined  Laboratory studies revealed concern for UTI, unremarkable labs  Imaging revealed no evidence of pneumonia  The patient was treated with NSS bolus, toradol and zofran, which improved her symptoms  Given cephalexin prior to discharge for UTI The patient was re-examined after treatment and disposition of discharge to home was made  The test results were discussed with the patient/family  All questions were answered to patient/family's satisfaction  Anticipatory guidance and return precautions were discussed at length  They verbalized understanding and agreement with the plan  The patient remained stable while under my care in the Emergency Department            Amount and/or Complexity of Data Reviewed  Clinical lab tests: ordered and reviewed  Tests in the radiology section of CPT®: ordered and reviewed  Decide to obtain previous medical records or to obtain history from someone other than the patient: yes  Review and summarize past medical records: yes  Independent visualization of images, tracings, or specimens: yes    Risk of Complications, Morbidity, and/or Mortality  Presenting problems: moderate  Diagnostic procedures: moderate  Management options: moderate    Patient Progress  Patient progress: improved      Disposition  Final diagnoses:   UTI (urinary tract infection)     Time reflects when diagnosis was documented in both MDM as applicable and the Disposition within this note     Time User Action Codes Description Comment    6/15/2021  2:43 PM Charles, [facility] [B34 9] Viral syndrome     6/15/2021  2:43 PM Margarita Soto Remove [B34 9] Viral syndrome     6/15/2021  2:43 PM Margarita Soto Add [N39 0] UTI (urinary tract infection)       ED Disposition     ED Disposition Condition Date/Time Comment    Discharge Stable Tue Alvaro 15, 2021  2:45 PM Oris Lunch discharge to home/self care  Follow-up Information     Follow up With Specialties Details Why Contact Info Additional Information    Hemant Godwin PA-C Family Medicine, Physician Assistant  As needed [address]  [address]  101 [facility] Emergency Department Emergency Medicine  If symptoms worsen [facility] [id number]  112 [facility] Emergency Department, [address] [place] [address], [phone]          Discharge Medication List as of 6/15/2021  2:47 PM      CONTINUE these medications which have NOT CHANGED    Details   Abatacept 125 MG/ML SOAJ Inject under the skin, Historical Med      Adalimumab 40 MG/0 4ML PNKT Inject 40 mg under the skin every 14 (fourteen) days, Starting Wed 1/20/2021, Historical Med      albuterol (PROVENTIL HFA,VENTOLIN HFA) 90 mcg/act inhaler Inhale 2 puffs every 4 (four) hours as needed for wheezing, Starting Sun 12/29/2019, Print      amLODIPine (NORVASC) 5 mg tablet Take 1 tablet (5 mg total) by mouth daily, Starting Wed 3/3/2021, Until Tue 6/8/2021, Normal      Calcium Carb-Cholecalciferol (CALCIUM 600+D3) 600-200 MG-UNIT TABS take 1 tablet twice a day, Historical Med      celecoxib (CeleBREX) 200 mg capsule Take 200 mg by mouth 2 (two) times a day, Historical Med      Cholecalciferol (VITAMIN D3) 2000 units capsule take 1 tablet po daily  , Historical Med      citalopram (CeleXA) 20 mg tablet Take 1 tablet (20 mg total) by mouth daily, Starting Wed 3/3/2021, Normal      doxepin (SINEquan) 25 mg capsule Take 1 capsule (25 mg total) by mouth daily at bedtime, Starting Wed 3/3/2021, Normal      Elastic Bandages & Supports (CARPAL TUNNEL WRIST DELUXE) MISC by Does not apply route daily at bedtime, Starting Fri 8/2/2019, Print      ergocalciferol (Drisdol) 1 25 MG (15685 UT) capsule Take 50,000 Units by mouth, Starting Wed 4/21/2021, Until Wed 7/14/2021, Historical Med      famotidine (PEPCID) 20 mg tablet Take 1 tablet (20 mg total) by mouth 2 (two) times a day, Starting Mon 6/8/2020, Normal      fluticasone (FLONASE) 50 mcg/act nasal spray 1 spray into each nostril daily, Starting Wed 3/3/2021, Normal      fluticasone (FLOVENT HFA) 110 MCG/ACT inhaler Inhale 2 puffs 2 (two) times a day Rinse mouth after use , Starting Wed 7/6/9779, Normal      folic acid (FOLVITE) 1 mg tablet every 24 hours, Starting Wed 1/27/2016, Historical Med      furosemide (LASIX) 40 mg tablet Take 1 tablet (40 mg total) by mouth daily, Starting Wed 3/3/2021, Normal      Incontinence Supply Disposable (Poise Pad) PADS by Does not apply route 4 (four) times a day as needed (incontinence), Starting Thu 10/15/2020, Normal      leflunomide (ARAVA) 20 MG tablet Take 20 mg by mouth daily, Historical Med      levETIRAcetam (KEPPRA) 1000 MG tablet Take 1 tablet (1,000 mg total) by mouth 2 (two) times a day, Starting Wed 11/18/2020, Normal      Methenamine-Sodium Salicylate 427-594 6 MG TABS Take 1 tablet by mouth 3 (three) times a day, Starting Tue 11/24/2020, Normal      methylPREDNISolone (MEDROL) 32 MG tablet Take one (1) tablet 12 hours and one (1) tablet  2 hours PRIOR to CT scan , Normal      metoprolol tartrate (LOPRESSOR) 25 mg tablet Take 1 tablet (25 mg total) by mouth every 12 (twelve) hours, Starting Wed 11/18/2020, Normal      Multiple Vitamins-Minerals (MULTIVITAMIN WITH MINERALS) tablet Take 1 tablet by mouth daily, Starting Fri 8/2/2019, Normal      nitroglycerin (NITROSTAT) 0 4 mg SL tablet Place 1 tablet (0 4 mg total) under the tongue every 5 (five) minutes as needed for chest pain Call 911 if using 3 doses, Starting Thu 10/15/2020, Normal      ondansetron (ZOFRAN-ODT) 4 mg disintegrating tablet Take 1 tablet (4 mg total) by mouth every 8 (eight) hours as needed for nausea for up to 10 doses, Starting Fri 4/9/2021, Normal      polyethylene glycol (GLYCOLAX) 17 GM/SCOOP powder Take 17 g by mouth daily, Starting Thu 10/15/2020, Normal      predniSONE 5 mg tablet Take by mouth daily, Historical Med      rizatriptan (MAXALT) 5 MG tablet Take 1 tablet (5 mg total) by mouth once as needed for migraine for up to 1 dose May repeat in 2 hours if needed, Starting Tue 6/1/2021, Normal      traMADol (ULTRAM) 50 mg tablet Take 1 tablet (50 mg total) by mouth every 6 (six) hours as needed for moderate pain, Starting Fri 4/9/2021, Normal           No discharge procedures on file      PDMP Review       Value Time User    PDMP Reviewed  Yes 4/9/2021 11:58 AM Hemant Godwin PA-C          ED Provider  Electronically Signed by           Adi Garza PA-C  06/15/21 5118

## 2021-06-15 NOTE — DISCHARGE INSTRUCTIONS
Results for orders placed or performed during the hospital encounter of 06/15/21   Novel Coronavirus (Covid-19),PCR [facility] - 2 Hour Stat    Specimen: Nose; Nares   Result Value Ref Range    SARS-CoV-2 Negative Negative   CBC and differential   Result Value Ref Range    WBC 6 07 4 31 - 10 16 Thousand/uL    RBC 3 54 (L) 3 81 - 5 12 Million/uL    Hemoglobin 8 9 (L) 11 5 - 15 4 g/dL    Hematocrit 29 9 (L) 34 8 - 46 1 %    MCV 85 82 - 98 fL    MCH 25 1 (L) 26 8 - 34 3 pg    MCHC 29 8 (L) 31 4 - 37 4 g/dL    RDW 12 7 11 6 - 15 1 %    MPV 9 6 8 9 - 12 7 fL    Platelets 529 984 - 683 Thousands/uL    nRBC 0 /100 WBCs    Neutrophils Relative 72 43 - 75 %    Immat GRANS % 0 0 - 2 %    Lymphocytes Relative 17 14 - 44 %    Monocytes Relative 8 4 - 12 %    Eosinophils Relative 3 0 - 6 %    Basophils Relative 0 0 - 1 %    Neutrophils Absolute 4 31 1 85 - 7 62 Thousands/µL    Immature Grans Absolute 0 02 0 00 - 0 20 Thousand/uL    Lymphocytes Absolute 1 05 0 60 - 4 47 Thousands/µL    Monocytes Absolute 0 50 0 17 - 1 22 Thousand/µL    Eosinophils Absolute 0 18 0 00 - 0 61 Thousand/µL    Basophils Absolute 0 01 0 00 - 0 10 Thousands/µL   Comprehensive metabolic panel   Result Value Ref Range    Sodium 142 136 - 145 mmol/L    Potassium 4 0 3 5 - 5 3 mmol/L    Chloride 108 100 - 108 mmol/L    CO2 23 21 - 32 mmol/L    ANION GAP 11 4 - 13 mmol/L    BUN 15 5 - 25 mg/dL    Creatinine 0 82 0 60 - 1 30 mg/dL    Glucose 85 65 - 140 mg/dL    Calcium 8 5 8 3 - 10 1 mg/dL    Corrected Calcium 9 4 8 3 - 10 1 mg/dL    AST 9 5 - 45 U/L    ALT 15 12 - 78 U/L    Alkaline Phosphatase 61 46 - 116 U/L    Total Protein 6 9 6 4 - 8 2 g/dL    Albumin 2 9 (L) 3 5 - 5 0 g/dL    Total Bilirubin 0 18 (L) 0 20 - 1 00 mg/dL    eGFR 89 ml/min/1 73sq m   Magnesium   Result Value Ref Range    Magnesium 2 0 1 6 - 2 6 mg/dL   Troponin I   Result Value Ref Range    Troponin I <0 02 <=0 04 ng/mL   NT-BNP PRO   Result Value Ref Range    NT-proBNP 212 (H) <125 pg/mL Urine Microscopic   Result Value Ref Range    RBC, UA Innumerable (A) None Seen, 2-4 /hpf    WBC, UA 4-10 (A) None Seen, 2-4 /hpf    Epithelial Cells Moderate (A) None Seen, Occasional /hpf    Bacteria, UA Moderate (A) None Seen, Occasional /hpf    OTHER OBSERVATIONS Yeast Cells Present  Trichomonas Organisms Present    Urine Macroscopic, POC   Result Value Ref Range    Color, UA Yellow     Clarity, UA Clear     pH, UA 5 5 4 5 - 8 0    Leukocytes, UA Small (A) Negative    Nitrite, UA Negative Negative    Protein, UA >=300 (A) Negative mg/dl    Glucose, UA Negative Negative mg/dl    Ketones, UA Negative Negative mg/dl    Urobilinogen, UA 0 2 0 2, 1 0 E U /dl E U /dl    Bilirubin, UA Negative Negative    Blood, UA Large (A) Negative    Specific Gravity, UA 1 025 1 003 - 1 030

## 2021-06-22 ENCOUNTER — TELEPHONE (OUTPATIENT)
Dept: SLEEP CENTER | Facility: CLINIC | Age: 41
End: 2021-06-22

## 2021-06-25 ENCOUNTER — HOSPITAL ENCOUNTER (EMERGENCY)
Facility: HOSPITAL | Age: 41
Discharge: HOME | End: 2021-06-25
Attending: EMERGENCY MEDICINE
Payer: COMMERCIAL

## 2021-06-25 ENCOUNTER — APPOINTMENT (EMERGENCY)
Dept: RADIOLOGY | Facility: HOSPITAL | Age: 41
End: 2021-06-25
Payer: COMMERCIAL

## 2021-06-25 VITALS
OXYGEN SATURATION: 100 % | TEMPERATURE: 97.7 F | RESPIRATION RATE: 15 BRPM | HEART RATE: 60 BPM | SYSTOLIC BLOOD PRESSURE: 144 MMHG | DIASTOLIC BLOOD PRESSURE: 75 MMHG

## 2021-06-25 DIAGNOSIS — R07.9 CHEST PAIN, UNSPECIFIED TYPE: Primary | ICD-10-CM

## 2021-06-25 LAB
ALBUMIN SERPL BCP-MCNC: 3 G/DL (ref 3.5–5)
ALP SERPL-CCNC: 59 U/L (ref 46–116)
ALT SERPL W P-5'-P-CCNC: 17 U/L (ref 12–78)
ANION GAP SERPL CALCULATED.3IONS-SCNC: 5 MMOL/L (ref 4–13)
AST SERPL W P-5'-P-CCNC: 12 U/L (ref 5–45)
ATRIAL RATE: 61 BPM
ATRIAL RATE: 83 BPM
BACTERIA UR QL AUTO: ABNORMAL /HPF
BASOPHILS # BLD AUTO: 0.02 THOUSANDS/ΜL (ref 0–0.1)
BASOPHILS NFR BLD AUTO: 0 % (ref 0–1)
BILIRUB SERPL-MCNC: 0.34 MG/DL (ref 0.2–1)
BILIRUB UR QL STRIP: NEGATIVE
BUN SERPL-MCNC: 11 MG/DL (ref 5–25)
CALCIUM ALBUM COR SERPL-MCNC: 9.3 MG/DL (ref 8.3–10.1)
CALCIUM SERPL-MCNC: 8.5 MG/DL (ref 8.3–10.1)
CHLORIDE SERPL-SCNC: 106 MMOL/L (ref 100–108)
CK SERPL-CCNC: 88 U/L (ref 26–192)
CLARITY UR: ABNORMAL
CO2 SERPL-SCNC: 30 MMOL/L (ref 21–32)
COLOR UR: ABNORMAL
CREAT SERPL-MCNC: 0.69 MG/DL (ref 0.6–1.3)
EOSINOPHIL # BLD AUTO: 0.2 THOUSAND/ΜL (ref 0–0.61)
EOSINOPHIL NFR BLD AUTO: 4 % (ref 0–6)
ERYTHROCYTE [DISTWIDTH] IN BLOOD BY AUTOMATED COUNT: 12.7 % (ref 11.6–15.1)
EXT PREG TEST URINE: NEGATIVE
EXT. CONTROL ED NAV: NORMAL
GFR SERPL CREATININE-BSD FRML MDRD: 108 ML/MIN/1.73SQ M
GLUCOSE SERPL-MCNC: 98 MG/DL (ref 65–140)
GLUCOSE UR STRIP-MCNC: NEGATIVE MG/DL
HCT VFR BLD AUTO: 30.5 % (ref 34.8–46.1)
HGB BLD-MCNC: 9.2 G/DL (ref 11.5–15.4)
HGB UR QL STRIP.AUTO: ABNORMAL
IMM GRANULOCYTES # BLD AUTO: 0.01 THOUSAND/UL (ref 0–0.2)
IMM GRANULOCYTES NFR BLD AUTO: 0 % (ref 0–2)
KETONES UR STRIP-MCNC: ABNORMAL MG/DL
LEUKOCYTE ESTERASE UR QL STRIP: ABNORMAL
LYMPHOCYTES # BLD AUTO: 1.39 THOUSANDS/ΜL (ref 0.6–4.47)
LYMPHOCYTES NFR BLD AUTO: 30 % (ref 14–44)
MCH RBC QN AUTO: 24.6 PG (ref 26.8–34.3)
MCHC RBC AUTO-ENTMCNC: 30.2 G/DL (ref 31.4–37.4)
MCV RBC AUTO: 82 FL (ref 82–98)
MONOCYTES # BLD AUTO: 0.36 THOUSAND/ΜL (ref 0.17–1.22)
MONOCYTES NFR BLD AUTO: 8 % (ref 4–12)
NEUTROPHILS # BLD AUTO: 2.71 THOUSANDS/ΜL (ref 1.85–7.62)
NEUTS SEG NFR BLD AUTO: 58 % (ref 43–75)
NITRITE UR QL STRIP: POSITIVE
NON-SQ EPI CELLS URNS QL MICRO: ABNORMAL /HPF
NRBC BLD AUTO-RTO: 0 /100 WBCS
NT-PROBNP SERPL-MCNC: 181 PG/ML
P AXIS: 47 DEGREES
P AXIS: 53 DEGREES
PH UR STRIP.AUTO: 6.5 [PH]
PLATELET # BLD AUTO: 346 THOUSANDS/UL (ref 149–390)
PMV BLD AUTO: 9.4 FL (ref 8.9–12.7)
POTASSIUM SERPL-SCNC: 3.7 MMOL/L (ref 3.5–5.3)
PR INTERVAL: 176 MS
PR INTERVAL: 176 MS
PROT SERPL-MCNC: 7.2 G/DL (ref 6.4–8.2)
PROT UR STRIP-MCNC: ABNORMAL MG/DL
QRS AXIS: 45 DEGREES
QRS AXIS: 61 DEGREES
QRSD INTERVAL: 74 MS
QRSD INTERVAL: 80 MS
QT INTERVAL: 366 MS
QT INTERVAL: 418 MS
QTC INTERVAL: 414 MS
QTC INTERVAL: 420 MS
RBC # BLD AUTO: 3.74 MILLION/UL (ref 3.81–5.12)
RBC #/AREA URNS AUTO: ABNORMAL /HPF
SODIUM SERPL-SCNC: 141 MMOL/L (ref 136–145)
SP GR UR STRIP.AUTO: 1.02 (ref 1–1.03)
T WAVE AXIS: 28 DEGREES
T WAVE AXIS: 41 DEGREES
TROPONIN I SERPL-MCNC: <0.02 NG/ML
TROPONIN I SERPL-MCNC: <0.02 NG/ML
UROBILINOGEN UR QL STRIP.AUTO: 1 E.U./DL
VENTRICULAR RATE: 61 BPM
VENTRICULAR RATE: 77 BPM
WBC # BLD AUTO: 4.69 THOUSAND/UL (ref 4.31–10.16)
WBC #/AREA URNS AUTO: ABNORMAL /HPF

## 2021-06-25 PROCEDURE — 81025 URINE PREGNANCY TEST: CPT | Performed by: PHYSICIAN ASSISTANT

## 2021-06-25 PROCEDURE — 93010 ELECTROCARDIOGRAM REPORT: CPT | Performed by: INTERNAL MEDICINE

## 2021-06-25 PROCEDURE — 71045 X-RAY EXAM CHEST 1 VIEW: CPT

## 2021-06-25 PROCEDURE — 85025 COMPLETE CBC W/AUTO DIFF WBC: CPT | Performed by: PHYSICIAN ASSISTANT

## 2021-06-25 PROCEDURE — 81001 URINALYSIS AUTO W/SCOPE: CPT | Performed by: PHYSICIAN ASSISTANT

## 2021-06-25 PROCEDURE — 96374 THER/PROPH/DIAG INJ IV PUSH: CPT

## 2021-06-25 PROCEDURE — 83880 ASSAY OF NATRIURETIC PEPTIDE: CPT | Performed by: PHYSICIAN ASSISTANT

## 2021-06-25 PROCEDURE — 99285 EMERGENCY DEPT VISIT HI MDM: CPT

## 2021-06-25 PROCEDURE — 99285 EMERGENCY DEPT VISIT HI MDM: CPT | Performed by: PHYSICIAN ASSISTANT

## 2021-06-25 PROCEDURE — 36415 COLL VENOUS BLD VENIPUNCTURE: CPT | Performed by: PHYSICIAN ASSISTANT

## 2021-06-25 PROCEDURE — 82550 ASSAY OF CK (CPK): CPT | Performed by: PHYSICIAN ASSISTANT

## 2021-06-25 PROCEDURE — 80053 COMPREHEN METABOLIC PANEL: CPT | Performed by: PHYSICIAN ASSISTANT

## 2021-06-25 PROCEDURE — 93005 ELECTROCARDIOGRAM TRACING: CPT

## 2021-06-25 PROCEDURE — 84484 ASSAY OF TROPONIN QUANT: CPT | Performed by: PHYSICIAN ASSISTANT

## 2021-06-25 RX ORDER — KETOROLAC TROMETHAMINE 30 MG/ML
15 INJECTION, SOLUTION INTRAMUSCULAR; INTRAVENOUS ONCE
Status: COMPLETED | OUTPATIENT
Start: 2021-06-25 | End: 2021-06-25

## 2021-06-25 RX ADMIN — KETOROLAC TROMETHAMINE 15 MG: 30 INJECTION, SOLUTION INTRAMUSCULAR; INTRAVENOUS at 13:22

## 2021-06-25 NOTE — ED PROVIDER NOTES
History  Chief Complaint   Patient presents with    Chest Pain     Pt reports chest pain beginning around 11am today and radiates down left arm  Patient reports pain worse with movement and sore to touch  Denies sob or dizziness  324mg asa given by ems and 3 nitros taken at home  21-year-old female with a PMH of angina, anxiety, depression, HTN, migraines, PTSD and RA who presents via EMS with chest pain that started at 1100 today  She states the pain started last evening, was very mild and resolved  She states it acutely worsened today  She notes sudden onset of left-sided chest pain that radiated down her left arm shortly prior to arrival   She took sublingual nitro at home and was given 324mg of aspirin by EMS and route and she noted no change in the pain  Movement makes the pain worse  She notes baseline bilateral lower leg swelling, unsure if it is any worse recently  She denies any fevers, chills, headaches, vision changes, neck pain or stiffness, congestion, cough, abdominal pain, diarrhea, urinary changes, rash, recent travel, known sick contacts  Patient has had a cardiac cath in the past, no intervention        History provided by:  Medical records and patient   used: No    Chest Pain  Pain location:  L chest and L lateral chest  Pain quality: sharp    Pain radiates to:  L arm  Pain radiates to the back: no    Onset quality:  Gradual  Timing:  Intermittent  Progression:  Worsening  Chronicity:  Recurrent  Context: movement, at rest and stress    Context: no trauma    Relieved by:  Nothing  Ineffective treatments:  Nitroglycerin and aspirin  Associated symptoms: no abdominal pain, no altered mental status, no back pain, no cough, no diaphoresis, no dizziness, no fever, no headache, no palpitations, no shortness of breath and not vomiting    Risk factors: hypertension    Risk factors: no aortic disease, no birth control, no coronary artery disease, no diabetes mellitus, no high cholesterol, no prior DVT/PE and no surgery        Prior to Admission Medications   Prescriptions Last Dose Informant Patient Reported? Taking? Abatacept 125 MG/ML SOAJ  Self Yes No   Sig: Inject under the skin   Adalimumab 40 MG/0 4ML PNKT  Self Yes No   Sig: Inject 40 mg under the skin every 14 (fourteen) days   Calcium Carb-Cholecalciferol (CALCIUM 600+D3) 600-200 MG-UNIT TABS  Self Yes No   Sig: take 1 tablet twice a day   Cholecalciferol (VITAMIN D3) 2000 units capsule  Self Yes No   Sig: take 1 tablet po daily     Elastic Bandages & Supports (CARPAL TUNNEL WRIST DELUXE) MISC  Self No No   Sig: by Does not apply route daily at bedtime   Incontinence Supply Disposable (Poise Pad) PADS  Self No No   Sig: by Does not apply route 4 (four) times a day as needed (incontinence)   Methenamine-Sodium Salicylate 252-124 1 MG TABS  Self No No   Sig: Take 1 tablet by mouth 3 (three) times a day   Multiple Vitamins-Minerals (MULTIVITAMIN WITH MINERALS) tablet  Self No No   Sig: Take 1 tablet by mouth daily   albuterol (PROVENTIL HFA,VENTOLIN HFA) 90 mcg/act inhaler  Self No No   Sig: Inhale 2 puffs every 4 (four) hours as needed for wheezing   amLODIPine (NORVASC) 5 mg tablet  Self No No   Sig: Take 1 tablet (5 mg total) by mouth daily   celecoxib (CeleBREX) 200 mg capsule  Self Yes No   Sig: Take 200 mg by mouth 2 (two) times a day   citalopram (CeleXA) 20 mg tablet  Self No No   Sig: Take 1 tablet (20 mg total) by mouth daily   doxepin (SINEquan) 25 mg capsule  Self No No   Sig: Take 1 capsule (25 mg total) by mouth daily at bedtime   ergocalciferol (Drisdol) 1 25 MG (67484 UT) capsule   Yes No   Sig: Take 50,000 Units by mouth   famotidine (PEPCID) 20 mg tablet  Self No No   Sig: Take 1 tablet (20 mg total) by mouth 2 (two) times a day   fluticasone (FLONASE) 50 mcg/act nasal spray  Self No No   Si spray into each nostril daily   fluticasone (FLOVENT HFA) 110 MCG/ACT inhaler  Self No No   Sig: Inhale 2 puffs 2 (two) times a day Rinse mouth after use     folic acid (FOLVITE) 1 mg tablet  Self Yes No   Sig: every 24 hours   furosemide (LASIX) 40 mg tablet  Self No No   Sig: Take 1 tablet (40 mg total) by mouth daily   leflunomide (ARAVA) 20 MG tablet  Self Yes No   Sig: Take 20 mg by mouth daily   levETIRAcetam (KEPPRA) 1000 MG tablet  Self No No   Sig: Take 1 tablet (1,000 mg total) by mouth 2 (two) times a day   methylPREDNISolone (MEDROL) 32 MG tablet  Self No No   Sig: Take one (1) tablet 12 hours and one (1) tablet  2 hours PRIOR to CT scan    metoprolol tartrate (LOPRESSOR) 25 mg tablet  Self No No   Sig: Take 1 tablet (25 mg total) by mouth every 12 (twelve) hours   nitroglycerin (NITROSTAT) 0 4 mg SL tablet  Self No No   Sig: Place 1 tablet (0 4 mg total) under the tongue every 5 (five) minutes as needed for chest pain Call 911 if using 3 doses   ondansetron (ZOFRAN-ODT) 4 mg disintegrating tablet  Self No No   Sig: Take 1 tablet (4 mg total) by mouth every 8 (eight) hours as needed for nausea for up to 10 doses   polyethylene glycol (GLYCOLAX) 17 GM/SCOOP powder  Self No No   Sig: Take 17 g by mouth daily   predniSONE 5 mg tablet  Self Yes No   Sig: Take by mouth daily   rizatriptan (MAXALT) 5 MG tablet   No No   Sig: Take 1 tablet (5 mg total) by mouth once as needed for migraine for up to 1 dose May repeat in 2 hours if needed   traMADol (ULTRAM) 50 mg tablet  Self No No   Sig: Take 1 tablet (50 mg total) by mouth every 6 (six) hours as needed for moderate pain      Facility-Administered Medications: None       Past Medical History:   Diagnosis Date    Angina at rest [facility])     Anxiety     Depression     Epilepsy ([facility] [address] )     Fibroid 2012    History of cardiac cath 2006    Hydronephrosis 2007    Hypertension     Migraine without aura     PTSD (post-traumatic stress disorder)     Rheumatoid arteritis (HCC)        Past Surgical History:   Procedure Laterality Date    APPENDECTOMY  1997    CARDIAC CATHETERIZATION 2006    CARDIAC CATHETERIZATION       SECTION  2008    CHOLECYSTECTOMY  2018    KIDNEY SURGERY  2007    TUBAL LIGATION  2008       Family History   Problem Relation Age of Onset    Diabetes Maternal Grandmother     Hypertension Maternal Grandmother     No Known Problems Mother     No Known Problems Sister     No Known Problems Daughter     No Known Problems Paternal Grandmother     No Known Problems Daughter     Breast cancer Neg Hx     Cancer Neg Hx      I have reviewed and agree with the history as documented  E-Cigarette/Vaping    E-Cigarette Use Never User      E-Cigarette/Vaping Substances     Social History     Tobacco Use    Smoking status: Former Smoker     Types: Cigarettes     Quit date:      Years since quittin 4    Smokeless tobacco: Former User   Vaping Use    Vaping Use: Never used   Substance Use Topics    Alcohol use: Not Currently    Drug use: Never       Review of Systems   Constitutional: Negative for diaphoresis and fever  HENT: Negative for congestion  Respiratory: Negative for cough and shortness of breath  Cardiovascular: Positive for chest pain and leg swelling  Negative for palpitations  Gastrointestinal: Negative for abdominal pain, diarrhea and vomiting  Musculoskeletal: Negative for back pain  Skin: Negative for rash and wound  Neurological: Negative for dizziness and headaches  All other systems reviewed and are negative  Physical Exam  Physical Exam  Vitals and nursing note reviewed  Exam conducted with a chaperone present Cameron Robles RN)  Constitutional:       General: She is not in acute distress  Appearance: She is well-developed  She is not ill-appearing or toxic-appearing  Comments: tearful   HENT:      Head: Normocephalic and atraumatic        Right Ear: Hearing and external ear normal       Left Ear: Hearing and external ear normal       Nose: Nose normal    Eyes:      Conjunctiva/sclera: Conjunctivae normal  Cardiovascular:      Rate and Rhythm: Normal rate and regular rhythm  Heart sounds: Normal heart sounds, S1 normal and S2 normal  No murmur heard  Pulmonary:      Effort: Pulmonary effort is normal  No tachypnea, prolonged expiration or retractions  Breath sounds: Normal breath sounds  No decreased breath sounds, wheezing, rhonchi or rales  Chest:      Chest wall: Tenderness present  No crepitus or edema  Abdominal:      General: Bowel sounds are normal       Palpations: Abdomen is soft  Tenderness: There is no abdominal tenderness  There is no guarding or rebound  Musculoskeletal:      Cervical back: Full passive range of motion without pain and neck supple  Right lower le+ Pitting Edema present  Left lower le+ Pitting Edema present  Comments: Normal range of motion; no edema, tenderness, or deformities  Skin:     General: Skin is warm and dry  Findings: No rash or wound  Neurological:      Mental Status: She is alert and oriented to person, place, and time  GCS: GCS eye subscore is 4  GCS verbal subscore is 5  GCS motor subscore is 6     Psychiatric:         Mood and Affect: Mood normal          Speech: Speech normal          Vital Signs  ED Triage Vitals   Temperature Pulse Respirations Blood Pressure SpO2   21 1211 21 1211 21 1211 21 1211 21 1211   97 7 °F (36 5 °C) 77 16 118/60 96 %      Temp Source Heart Rate Source Patient Position - Orthostatic VS BP Location FiO2 (%)   21 1211 21 1211 21 1211 21 1211 --   Oral Monitor Lying Left arm       Pain Score       21 1322       9           Vitals:    21 1211 21 1323 21 1540 21 1545   BP: 118/60 130/80 144/75 144/75   Pulse: 77 65 58 60   Patient Position - Orthostatic VS: Lying Lying Lying          Visual Acuity      ED Medications  Medications   ketorolac (TORADOL) injection 15 mg (15 mg Intravenous Given 21 1322)       Diagnostic Studies  Results Reviewed     Procedure Component Value Units Date/Time    Troponin I [[id number]]  (Normal) Collected: 06/25/21 1553    Lab Status: Final result Specimen: Blood from Arm, Right Updated: 06/25/21 1623     Troponin I <0 02 ng/mL     Urine Microscopic [[id number]]  (Abnormal) Collected: 06/25/21 1329    Lab Status: Final result Specimen: Urine, Clean Catch Updated: 06/25/21 1433     RBC, UA Innumerable /hpf      WBC, UA       Field obscured, unable to enumerate     /hpf     Epithelial Cells       Field obscured, unable to enumerate     /hpf     Bacteria, UA       Field obscured, unable to enumerate     /hpf     MUCUS THREADS --    CK (with reflex to MB) [[id number]]  (Normal) Collected: 06/25/21 1222    Lab Status: Final result Specimen: Blood from Arm, Right Updated: 06/25/21 1340     Total CK 88 U/L     UA (URINE) with reflex to Scope [[id number]]  (Abnormal) Collected: 06/25/21 1329    Lab Status: Final result Specimen: Urine, Clean Catch Updated: 06/25/21 1337     Color, UA Red     Clarity, UA Slightly Cloudy     Specific Gravity, UA 1 025     pH, UA 6 5     Leukocytes, UA Small     Nitrite, UA Positive     Protein,  (2+) mg/dl      Glucose, UA Negative mg/dl      Ketones, UA Trace mg/dl      Urobilinogen, UA 1 0 E U /dl      Bilirubin, UA Negative     Blood, UA Large    POCT pregnancy, urine [[id number]]  (Normal) Resulted: 06/25/21 1329    Lab Status: Final result Updated: 06/25/21 1329     EXT PREG TEST UR (Ref: Negative) NEGATIVE     Control VALID    NT-BNP PRO [[id number]]  (Abnormal) Collected: 06/25/21 1222    Lab Status: Final result Specimen: Blood from Arm, Right Updated: 06/25/21 1253     NT-proBNP 181 pg/mL     Troponin I [[id number]]  (Normal) Collected: 06/25/21 1222    Lab Status: Final result Specimen: Blood from Arm, Right Updated: 06/25/21 1248     Troponin I <0 02 ng/mL     Comprehensive metabolic panel [[id number]]  (Abnormal) Collected: 06/25/21 1222    Lab Status: Final result Specimen: Blood from Arm, Right Updated: 06/25/21 1246     Sodium 141 mmol/L      Potassium 3 7 mmol/L      Chloride 106 mmol/L      CO2 30 mmol/L      ANION GAP 5 mmol/L      BUN 11 mg/dL      Creatinine 0 69 mg/dL      Glucose 98 mg/dL      Calcium 8 5 mg/dL      Corrected Calcium 9 3 mg/dL      AST 12 U/L      ALT 17 U/L      Alkaline Phosphatase 59 U/L      Total Protein 7 2 g/dL      Albumin 3 0 g/dL      Total Bilirubin 0 34 mg/dL      eGFR 108 ml/min/1 73sq m     Narrative:      [place] guidelines for Chronic Kidney Disease (CKD):     Stage 1 with normal or high GFR (GFR > 90 mL/min/1 73 square meters)    Stage 2 Mild CKD (GFR = 60-89 mL/min/1 73 square meters)    Stage 3A Moderate CKD (GFR = 45-59 mL/min/1 73 square meters)    Stage 3B Moderate CKD (GFR = 30-44 mL/min/1 73 square meters)    Stage 4 Severe CKD (GFR = 15-29 mL/min/1 73 square meters)    Stage 5 End Stage CKD (GFR <15 mL/min/1 73 square meters)  Note: GFR calculation is accurate only with a steady state creatinine    CBC and differential [[id number]]  (Abnormal) Collected: 06/25/21 1222    Lab Status: Final result Specimen: Blood from Arm, Right Updated: 06/25/21 1229     WBC 4 69 Thousand/uL      RBC 3 74 Million/uL      Hemoglobin 9 2 g/dL      Hematocrit 30 5 %      MCV 82 fL      MCH 24 6 pg      MCHC 30 2 g/dL      RDW 12 7 %      MPV 9 4 fL      Platelets 761 Thousands/uL      nRBC 0 /100 WBCs      Neutrophils Relative 58 %      Immat GRANS % 0 %      Lymphocytes Relative 30 %      Monocytes Relative 8 %      Eosinophils Relative 4 %      Basophils Relative 0 %      Neutrophils Absolute 2 71 Thousands/µL      Immature Grans Absolute 0 01 Thousand/uL      Lymphocytes Absolute 1 39 Thousands/µL      Monocytes Absolute 0 36 Thousand/µL      Eosinophils Absolute 0 20 Thousand/µL      Basophils Absolute 0 02 Thousands/µL                  XR chest 1 view portable   ED Interpretation by Lizette Cabrera [employer] (06/25 1239)   Pulmonary interpretation by myself:  No acute cardiopulmonary disease  Final Result by Gabrielle Morgan MD (06/25 1413)   No acute cardiopulmonary disease  Findings are stable            Workstation performed: [id number]                    Procedures  ECG 12 Lead Documentation Only    Date/Time: 6/25/2021 12:35 PM  Performed by: Madhu Torre PA-C  Authorized by: Madhu Torre PA-C     Indications / Diagnosis:  Chest pain  ECG reviewed by me, the ED Provider: yes (also Dr Artis Suarez)    Patient location:  ED  Previous ECG:     Previous ECG:  Compared to current    Comparison ECG info:  6/15/2021    Similarity:  No change  Rate:     ECG rate:  77    ECG rate assessment: normal    Rhythm:     Rhythm: sinus rhythm    Ectopy:     Ectopy: none    QRS:     QRS axis:  Normal  Conduction:     Conduction: normal    ST segments:     ST segments:  Normal  T waves:     T waves: normal      ECG 12 Lead Documentation Only    Date/Time: 6/25/2021 3:50 PM  Performed by: Madhu Torre PA-C  Authorized by: Madhu Torre PA-C     Indications / Diagnosis:  Delta  ECG reviewed by me, the ED Provider: yes (also Dr Artis Suarez)    Patient location:  ED  Previous ECG:     Previous ECG:  Compared to current    Comparison ECG info:  6/25/2021 1211    Similarity:  No change  Rate:     ECG rate:  61    ECG rate assessment: normal    Rhythm:     Rhythm: sinus rhythm    QRS:     QRS axis:  Normal  Conduction:     Conduction: normal    ST segments:     ST segments:  Normal  T waves:     T waves: normal               ED Course  ED Course as of Jun 25 2028 Fri Jun 25, 2021   1233 WBC: 4 69   1233 8 9 10 days ago   Hemoglobin(!): 9 2   1233 HCT(!): 30 5   1233 Platelet Count: 221   8885 08 BPM; normal sinus rhythm, no ST elevation, depression, nor T-wave inversion  P are 176,    Compared to EKG Maryuri 15 ,2021    ECG 12 lead   1239 NAD   XR chest 1 view portable   1258 Troponin I: <0 02   1258 212 10 days ago   NT-proBNP(!): 181   1258 Sodium: 141   1258 Creatinine: 0 69   1330 PREGNANCY TEST URINE: NEGATIVE   1333 Blood Pressure: 130/80   1333 Pulse: 65   1333 Respirations: 16   1333 SpO2: 100 %   1346 Leukocytes, UA(!): Small   1346 Nitrite, UA(!): Positive   1346 Ketones, UA(!): Trace   1346 Blood, UA(!): Large   1346 Total CK: 88   1419 IMPRESSION:  No acute cardiopulmonary disease      Findings are stable      XR chest 1 view portable   1547 Blood Pressure: 144/75   1547 Pulse: 58   1547 Respirations: 16   1547 SpO2: 100 %   1550 61 bpm; NSR; No ST elevation, depression, no T wave inversion  , Qtc 420; no change from prior   ECG 12 lead   1624 Troponin I: <0 02             HEART Risk Score      Most Recent Value   Heart Score Risk Calculator   History  0 Filed at: 06/25/2021 1312   ECG  0 Filed at: 06/25/2021 1312   Age  0 Filed at: 06/25/2021 1312   Risk Factors  1 Filed at: 06/25/2021 1312   Troponin  0 Filed at: 06/25/2021 1312   HEART Score  1 Filed at: 06/25/2021 1312              PERC Rule for PE      Most Recent Value   PERC Rule for PE   Age >=50  0 Filed at: 06/25/2021 2027   HR >=100  0 Filed at: 06/25/2021 2027   O2 Sat on room air < 95%  0 Filed at: 06/25/2021 2027   History of PE or DVT  0 Filed at: 06/25/2021 2027   Recent trauma or surgery  0 Filed at: 06/25/2021 2027   Hemoptysis  0 Filed at: 06/25/2021 2027   Exogenous estrogen  0 Filed at: 06/25/2021 2027   Unilateral leg swelling  0 Filed at: 06/25/2021 2027   PERC Rule for PE Results  0 Filed at: 06/25/2021 2027              SBIRT 20yo+      Most Recent Value   SBIRT (25 yo +)   In order to provide better care to our patients, we are screening all of our patients for alcohol and drug use  Would it be okay to ask you these screening questions?   Unable to answer at this time Filed at: 06/25/2021 1212                    MDM  Number of Diagnoses or Management Options  Chest pain, unspecified type  Diagnosis management comments: Based on the HEART score of 1, the patient is at low risk (1 7% or less) for major adverse cardiac events (MACE) in the next 6 weeks  The risks of MACE, the potential benefits of hospitalization (increased diagnostic accurary) as well as potential harms of hospitalization (iatrogenic injury, false positive test results, nococomial infection risk) were discussed  Based on current guidelines, I believe that the best course of action would be to discharge the patient and follow up as an outpatient  The patient said they understood that even with the low HEART score there was the small potential that their current symptoms were due to a cardiac event, but they were comfortable with the low risk and they decided that they wanted to be discharged from the ED and follow up as an outpatient  I do not believe this patient's complaints are from pulmonary embolism or aortic dissection and I believe they would most likely be harmed through false positive test results and other associated test complications by further pursuing the diagnosis of pulmonary embolism or dissection  Due to the worsening of the pain 1 hour prior to arrival, patient stayed for a delta troponin and EKG which were negative and unchanged  On exam, patient has reproducible chest wall pain  Trace pitting edema to BLE  Doubt PE  PERC negative  The management plan was discussed in detail with the patient at bedside and all questions were answered  The prior to discharge, we provided both verbal and written instructions  We discussed with the patient the signs and symptoms for which to return to the emergency department  All questions were answered and patient was comfortable with the plan of care and discharged to home  Instructed the patient to follow up with the primary care provider and/or special as provided and their written instructions    The patient verbalized understanding of our discussion and plan of care, and agrees to return to the Emergency Department for concerns and progression of illness  Disposition  Final diagnoses:   Chest pain, unspecified type     Time reflects when diagnosis was documented in both MDM as applicable and the Disposition within this note     Time User Action Codes Description Comment    6/25/2021  4:25 PM Osmani James Add [R07 9] Chest pain, unspecified type       ED Disposition     ED Disposition Condition Date/Time Comment    Discharge Stable Fri Jun 25, 2021  4:24 PM Brien Bob discharge to home/self care              Follow-up Information     Follow up With Specialties Details Why Contact Info Additional Information    Hemant Godwin PA-C Family Medicine, Physician Assistant Schedule an appointment as soon as possible for a visit in 3 days  [address]  [address] [facility]  [address] [facility] Emergency Department Emergency Medicine Go to  If symptoms worsen [facility] [id number] [address] [facility] Emergency Department, [address] [facility] , [place], [address] [facility], [address]          Discharge Medication List as of 6/25/2021  4:25 PM      CONTINUE these medications which have NOT CHANGED    Details   Abatacept 125 MG/ML SOAJ Inject under the skin, Historical Med      Adalimumab 40 MG/0 4ML PNKT Inject 40 mg under the skin every 14 (fourteen) days, Starting Wed 1/20/2021, Historical Med      albuterol (PROVENTIL HFA,VENTOLIN HFA) 90 mcg/act inhaler Inhale 2 puffs every 4 (four) hours as needed for wheezing, Starting Sun 12/29/2019, Print      amLODIPine (NORVASC) 5 mg tablet Take 1 tablet (5 mg total) by mouth daily, Starting Wed 3/3/2021, Until Tue 6/8/2021, Normal      Calcium Carb-Cholecalciferol (CALCIUM 600+D3) 600-200 MG-UNIT TABS take 1 tablet twice a day, Historical Med      celecoxib (CeleBREX) 200 mg capsule Take 200 mg by mouth 2 (two) times a day, Historical Med      Cholecalciferol (VITAMIN D3) 2000 units capsule take 1 tablet po daily  , Historical Med      citalopram (CeleXA) 20 mg tablet Take 1 tablet (20 mg total) by mouth daily, Starting Wed 3/3/2021, Normal      doxepin (SINEquan) 25 mg capsule Take 1 capsule (25 mg total) by mouth daily at bedtime, Starting Wed 3/3/2021, Normal      Elastic Bandages & Supports (CARPAL TUNNEL WRIST DELUXE) MISC by Does not apply route daily at bedtime, Starting Fri 8/2/2019, Print      ergocalciferol (Drisdol) 1 25 MG (61940 UT) capsule Take 50,000 Units by mouth, Starting Wed 4/21/2021, Until Wed 7/14/2021, Historical Med      famotidine (PEPCID) 20 mg tablet Take 1 tablet (20 mg total) by mouth 2 (two) times a day, Starting Mon 6/8/2020, Normal      fluticasone (FLONASE) 50 mcg/act nasal spray 1 spray into each nostril daily, Starting Wed 3/3/2021, Normal      fluticasone (FLOVENT HFA) 110 MCG/ACT inhaler Inhale 2 puffs 2 (two) times a day Rinse mouth after use , Starting Wed 6/2/0623, Normal      folic acid (FOLVITE) 1 mg tablet every 24 hours, Starting Wed 1/27/2016, Historical Med      furosemide (LASIX) 40 mg tablet Take 1 tablet (40 mg total) by mouth daily, Starting Wed 3/3/2021, Normal      Incontinence Supply Disposable (Poise Pad) PADS by Does not apply route 4 (four) times a day as needed (incontinence), Starting Thu 10/15/2020, Normal      leflunomide (ARAVA) 20 MG tablet Take 20 mg by mouth daily, Historical Med      levETIRAcetam (KEPPRA) 1000 MG tablet Take 1 tablet (1,000 mg total) by mouth 2 (two) times a day, Starting Wed 11/18/2020, Normal      Methenamine-Sodium Salicylate 117-253 4 MG TABS Take 1 tablet by mouth 3 (three) times a day, Starting Tue 11/24/2020, Normal      methylPREDNISolone (MEDROL) 32 MG tablet Take one (1) tablet 12 hours and one (1) tablet  2 hours PRIOR to CT scan , Normal      metoprolol tartrate (LOPRESSOR) 25 mg tablet Take 1 tablet (25 mg total) by mouth every 12 (twelve) hours, Starting Wed 11/18/2020, Normal Multiple Vitamins-Minerals (MULTIVITAMIN WITH MINERALS) tablet Take 1 tablet by mouth daily, Starting Fri 8/2/2019, Normal      nitroglycerin (NITROSTAT) 0 4 mg SL tablet Place 1 tablet (0 4 mg total) under the tongue every 5 (five) minutes as needed for chest pain Call 911 if using 3 doses, Starting Thu 10/15/2020, Normal      ondansetron (ZOFRAN-ODT) 4 mg disintegrating tablet Take 1 tablet (4 mg total) by mouth every 8 (eight) hours as needed for nausea for up to 10 doses, Starting Fri 4/9/2021, Normal      polyethylene glycol (GLYCOLAX) 17 GM/SCOOP powder Take 17 g by mouth daily, Starting Thu 10/15/2020, Normal      predniSONE 5 mg tablet Take by mouth daily, Historical Med      rizatriptan (MAXALT) 5 MG tablet Take 1 tablet (5 mg total) by mouth once as needed for migraine for up to 1 dose May repeat in 2 hours if needed, Starting Tue 6/1/2021, Normal      traMADol (ULTRAM) 50 mg tablet Take 1 tablet (50 mg total) by mouth every 6 (six) hours as needed for moderate pain, Starting Fri 4/9/2021, Normal           No discharge procedures on file      PDMP Review       Value Time User    PDMP Reviewed  Yes 4/9/2021 11:58 AM Hemant Godwin PA-C          ED Provider  Electronically Signed by           Brynn Coleman PA-C  06/25/21 2028

## 2021-06-25 NOTE — Clinical Note
Angelic Belle was seen and treated in our emergency department on 6/25/2021  Diagnosis:     Michelle Hoyos  may return to work on return date  She may return on this date: 06/27/2021         If you have any questions or concerns, please don't hesitate to call        Kwesi Ceballos PA-C    ______________________________           _______________          _______________  Hospital Representative                              Date                                Time

## 2021-07-06 ENCOUNTER — HOSPITAL ENCOUNTER (EMERGENCY)
Facility: HOSPITAL | Age: 41
Discharge: HOME | End: 2021-07-06
Attending: EMERGENCY MEDICINE | Admitting: EMERGENCY MEDICINE
Payer: COMMERCIAL

## 2021-07-06 ENCOUNTER — APPOINTMENT (EMERGENCY)
Dept: NON INVASIVE DIAGNOSTICS | Facility: HOSPITAL | Age: 41
End: 2021-07-06
Payer: COMMERCIAL

## 2021-07-06 VITALS
TEMPERATURE: 98.5 F | SYSTOLIC BLOOD PRESSURE: 130 MMHG | DIASTOLIC BLOOD PRESSURE: 70 MMHG | RESPIRATION RATE: 18 BRPM | HEART RATE: 82 BPM | OXYGEN SATURATION: 100 % | BODY MASS INDEX: 46.17 KG/M2 | WEIGHT: 252.43 LBS

## 2021-07-06 DIAGNOSIS — S80.11XA CONTUSION OF RIGHT LEG, INITIAL ENCOUNTER: Primary | ICD-10-CM

## 2021-07-06 DIAGNOSIS — L84 CORN OF FOOT: ICD-10-CM

## 2021-07-06 PROCEDURE — 99283 EMERGENCY DEPT VISIT LOW MDM: CPT

## 2021-07-06 PROCEDURE — 93971 EXTREMITY STUDY: CPT

## 2021-07-06 PROCEDURE — 93971 EXTREMITY STUDY: CPT | Performed by: SURGERY

## 2021-07-06 PROCEDURE — 99284 EMERGENCY DEPT VISIT MOD MDM: CPT | Performed by: PHYSICIAN ASSISTANT

## 2021-07-06 PROCEDURE — 96372 THER/PROPH/DIAG INJ SC/IM: CPT

## 2021-07-06 RX ORDER — NAPROXEN 500 MG/1
500 TABLET ORAL 2 TIMES DAILY WITH MEALS
Qty: 30 TABLET | Refills: 0 | Status: SHIPPED | OUTPATIENT
Start: 2021-07-06

## 2021-07-06 RX ORDER — KETOROLAC TROMETHAMINE 30 MG/ML
15 INJECTION, SOLUTION INTRAMUSCULAR; INTRAVENOUS ONCE
Status: DISCONTINUED | OUTPATIENT
Start: 2021-07-06 | End: 2021-07-06

## 2021-07-06 RX ORDER — KETOROLAC TROMETHAMINE 30 MG/ML
15 INJECTION, SOLUTION INTRAMUSCULAR; INTRAVENOUS ONCE
Status: COMPLETED | OUTPATIENT
Start: 2021-07-06 | End: 2021-07-06

## 2021-07-06 RX ORDER — GABAPENTIN 100 MG/1
100 CAPSULE ORAL 3 TIMES DAILY
Qty: 42 CAPSULE | Refills: 0 | Status: SHIPPED | OUTPATIENT
Start: 2021-07-06 | End: 2021-12-02

## 2021-07-06 RX ADMIN — KETOROLAC TROMETHAMINE 15 MG: 30 INJECTION, SOLUTION INTRAMUSCULAR; INTRAVENOUS at 13:38

## 2021-07-06 NOTE — ED NOTES
Pt reports pain to right medial calf, noting "a small ball" with increased swelling to leg  Pt also reports "small ball" to bottom of left foot  Skin intact, no redness or drainage noted  PMS intact to bilateral lower extremities   Pt denies CP and SOB     Rosa Figueroa RN  07/06/21 Lex Chiang RN  07/06/21 [id number]

## 2021-07-06 NOTE — Clinical Note
Rafal Caro was seen and treated in our emergency department on 7/6/2021  No work until cleared by Family Doctor/Orthopedics        Diagnosis:     Oliva Andre    She may return on this date: If you have any questions or concerns, please don't hesitate to call        Teri Brewer PA-C    ______________________________           _______________          _______________  Hospital Representative                              Date                                Time

## 2021-07-07 NOTE — ED PROVIDER NOTES
History  Chief Complaint   Patient presents with    Leg Pain     Pt reports right sided leg pain   Pt denies injury  Pt states " i feel a hard ball in my calf"   Pt reports that it was reddened yesterday but has resolved      This is 37 y/o female PMHx RA, HTN, migraines presents with sore left foot and RLE pain  States causing tingling in leg at times, pain  No rashes  Some difficulty walking due to pain  Denies fever or chills  Concerned about swelling under skin  No recent change to medications  Leg Pain  Associated symptoms: no back pain and no fever        Prior to Admission Medications   Prescriptions Last Dose Informant Patient Reported? Taking? Abatacept 125 MG/ML SOAJ  Self Yes Yes   Sig: Inject under the skin   Adalimumab 40 MG/0 4ML PNKT  Self Yes Yes   Sig: Inject 40 mg under the skin every 14 (fourteen) days   Calcium Carb-Cholecalciferol (CALCIUM 600+D3) 600-200 MG-UNIT TABS  Self Yes Yes   Sig: take 1 tablet twice a day   Cholecalciferol (VITAMIN D3) 2000 units capsule  Self Yes Yes   Sig: take 1 tablet po daily     Elastic Bandages & Supports (CARPAL TUNNEL WRIST DELUXE) MISC  Self No No   Sig: by Does not apply route daily at bedtime   Incontinence Supply Disposable (Poise Pad) PADS  Self No No   Sig: by Does not apply route 4 (four) times a day as needed (incontinence)   Methenamine-Sodium Salicylate 704-623 0 MG TABS  Self No Yes   Sig: Take 1 tablet by mouth 3 (three) times a day   Multiple Vitamins-Minerals (MULTIVITAMIN WITH MINERALS) tablet  Self No Yes   Sig: Take 1 tablet by mouth daily   albuterol (PROVENTIL HFA,VENTOLIN HFA) 90 mcg/act inhaler  Self No Yes   Sig: Inhale 2 puffs every 4 (four) hours as needed for wheezing   amLODIPine (NORVASC) 5 mg tablet  Self No Yes   Sig: Take 1 tablet (5 mg total) by mouth daily   celecoxib (CeleBREX) 200 mg capsule  Self Yes Yes   Sig: Take 200 mg by mouth 2 (two) times a day   citalopram (CeleXA) 20 mg tablet  Self No Yes   Sig: Take 1 tablet (20 mg total) by mouth daily   doxepin (SINEquan) 25 mg capsule  Self No Yes   Sig: Take 1 capsule (25 mg total) by mouth daily at bedtime   ergocalciferol (Drisdol) 1 25 MG (20490 UT) capsule   Yes Yes   Sig: Take 50,000 Units by mouth   famotidine (PEPCID) 20 mg tablet  Self No Yes   Sig: Take 1 tablet (20 mg total) by mouth 2 (two) times a day   fluticasone (FLONASE) 50 mcg/act nasal spray  Self No Yes   Si spray into each nostril daily   fluticasone (FLOVENT HFA) 110 MCG/ACT inhaler  Self No Yes   Sig: Inhale 2 puffs 2 (two) times a day Rinse mouth after use     folic acid (FOLVITE) 1 mg tablet  Self Yes Yes   Sig: every 24 hours   furosemide (LASIX) 40 mg tablet  Self No Yes   Sig: Take 1 tablet (40 mg total) by mouth daily   leflunomide (ARAVA) 20 MG tablet  Self Yes Yes   Sig: Take 20 mg by mouth daily   levETIRAcetam (KEPPRA) 1000 MG tablet  Self No Yes   Sig: Take 1 tablet (1,000 mg total) by mouth 2 (two) times a day   methylPREDNISolone (MEDROL) 32 MG tablet  Self No Yes   Sig: Take one (1) tablet 12 hours and one (1) tablet  2 hours PRIOR to CT scan    metoprolol tartrate (LOPRESSOR) 25 mg tablet  Self No Yes   Sig: Take 1 tablet (25 mg total) by mouth every 12 (twelve) hours   nitroglycerin (NITROSTAT) 0 4 mg SL tablet  Self No Yes   Sig: Place 1 tablet (0 4 mg total) under the tongue every 5 (five) minutes as needed for chest pain Call 911 if using 3 doses   ondansetron (ZOFRAN-ODT) 4 mg disintegrating tablet  Self No Yes   Sig: Take 1 tablet (4 mg total) by mouth every 8 (eight) hours as needed for nausea for up to 10 doses   polyethylene glycol (GLYCOLAX) 17 GM/SCOOP powder  Self No Yes   Sig: Take 17 g by mouth daily   predniSONE 5 mg tablet  Self Yes Yes   Sig: Take by mouth daily   rizatriptan (MAXALT) 5 MG tablet   No Yes   Sig: Take 1 tablet (5 mg total) by mouth once as needed for migraine for up to 1 dose May repeat in 2 hours if needed   traMADol (ULTRAM) 50 mg tablet  Self No Yes   Sig: Take 1 tablet (50 mg total) by mouth every 6 (six) hours as needed for moderate pain      Facility-Administered Medications: None       Past Medical History:   Diagnosis Date    [facility])     Anxiety     Depression     Epilepsy ([facility] )     Fibroid 2012    History of cardiac cath 2006    Hydronephrosis 2007    Hypertension     Migraine without aura     PTSD (post-traumatic stress disorder)     Rheumatoid arteritis ([facility] )        Past Surgical History:   Procedure Laterality Date    APPENDECTOMY      CARDIAC CATHETERIZATION      CARDIAC CATHETERIZATION       SECTION  2008    CHOLECYSTECTOMY  2018   [facility] KIDNEY SURGERY  2007    TUBAL LIGATION  2008       Family History   Problem Relation Age of Onset    Diabetes Maternal Grandmother     Hypertension Maternal Grandmother     No Known Problems Mother     No Known Problems Sister     No Known Problems Daughter     No Known Problems Paternal Grandmother     No Known Problems Daughter     Breast cancer Neg Hx     Cancer Neg Hx      I have reviewed and agree with the history as documented  E-Cigarette/Vaping    E-Cigarette Use Never User      E-Cigarette/Vaping Substances     Social History     Tobacco Use    Smoking status: Former Smoker     Types: Cigarettes     Quit date:      Years since quittin 5    Smokeless tobacco: Former User   Vaping Use    Vaping Use: Never used   Substance Use Topics    Alcohol use: Not Currently    Drug use: Never       Review of Systems   Constitutional: Negative for chills and fever  HENT: Negative for ear pain and sore throat  Eyes: Negative for pain and visual disturbance  Respiratory: Negative for cough and shortness of breath  Cardiovascular: Positive for leg swelling  Negative for chest pain and palpitations  Gastrointestinal: Negative for abdominal pain and vomiting  Genitourinary: Negative for dysuria and hematuria     Musculoskeletal: Positive for arthralgias, gait problem and joint swelling  Negative for back pain  Skin: Negative for color change, rash and wound  Neurological: Negative for seizures and syncope  All other systems reviewed and are negative  Physical Exam  Physical Exam  Vitals and nursing note reviewed  Constitutional:       General: She is not in acute distress  Appearance: Normal appearance  She is not ill-appearing or toxic-appearing  HENT:      Head: Normocephalic and atraumatic  Cardiovascular:      Rate and Rhythm: Normal rate and regular rhythm  Pulses: Normal pulses  Heart sounds: Normal heart sounds  Pulmonary:      Effort: Pulmonary effort is normal       Breath sounds: Normal breath sounds  Musculoskeletal:         General: Tenderness (right calf) present  Normal range of motion  Cervical back: Normal range of motion and neck supple  Feet:    Skin:     General: Skin is warm  Capillary Refill: Capillary refill takes less than 2 seconds  Neurological:      General: No focal deficit present  Mental Status: She is alert and oriented to person, place, and time  Cranial Nerves: No cranial nerve deficit  Sensory: No sensory deficit     Psychiatric:         Mood and Affect: Mood normal          Behavior: Behavior normal          Vital Signs  ED Triage Vitals [07/06/21 1211]   Temperature Pulse Respirations Blood Pressure SpO2   98 5 °F (36 9 °C) 82 18 130/70 100 %      Temp Source Heart Rate Source Patient Position - Orthostatic VS BP Location FiO2 (%)   Oral -- Sitting Right arm --      Pain Score       --           Vitals:    07/06/21 1211   BP: 130/70   Pulse: 82   Patient Position - Orthostatic VS: Sitting         Visual Acuity      ED Medications  Medications   ketorolac (TORADOL) injection 15 mg (15 mg Intramuscular Given 7/6/21 7819)       Diagnostic Studies  Results Reviewed     None                 VAS lower limb venous duplex study, unilateral/limited    (Results Pending) Procedures  Procedures         ED Course  ED Course as of Jul 06 2043   Tue Jul 06, 2021   1454 [address] is negative for DVT (+) bruising                                SBIRT 20yo+      Most Recent Value   SBIRT (23 yo +)   In order to provide better care to our patients, we are screening all of our patients for alcohol and drug use  Would it be okay to ask you these screening questions? Yes Filed at: 07/06/2021 1228   Initial Alcohol Screen: US AUDIT-C    1  How often do you have a drink containing alcohol?  0 Filed at: 07/06/2021 1228   2  How many drinks containing alcohol do you have on a typical day you are drinking? 0 Filed at: 07/06/2021 1228   3a  Male UNDER 65: How often do you have five or more drinks on one occasion? 0 Filed at: 07/06/2021 1228   3b  FEMALE Any Age, or MALE 65+: How often do you have 4 or more drinks on one occassion? 0 Filed at: 07/06/2021 1228   Audit-C Score  0 Filed at: 07/06/2021 1228   NAYAN: How many times in the past year have you    Used an illegal drug or used a prescription medication for non-medical reasons? Never Filed at: 07/06/2021 1228                    MDM  Number of Diagnoses or Management Options  Contusion of right leg, initial encounter: new and requires workup  Corn of foot: new and requires workup  Diagnosis management comments: The patient was seen and examined  Imaging revealed no evidence of DVT  The patient was treated with toradol  The patient was re-examined after treatment and disposition of discharge to home with gabapentin, naproxen and podiatry referal  was made  The test results were discussed with the patient/family  All questions were answered to patient/family's satisfaction  Anticipatory guidance and return precautions were discussed at length  They verbalized understanding and agreement with the plan  The patient remained stable while under my care in the Emergency Department            Amount and/or Complexity of Data Reviewed  Tests in the radiology section of CPT®: reviewed and ordered  Independent visualization of images, tracings, or specimens: yes    Risk of Complications, Morbidity, and/or Mortality  Presenting problems: moderate  Diagnostic procedures: moderate  Management options: moderate    Patient Progress  Patient progress: stable      Disposition  Final diagnoses:   Contusion of right leg, initial encounter   Corn of foot     Time reflects when diagnosis was documented in both MDM as applicable and the Disposition within this note     Time User Action Codes Description Comment    7/6/2021  3:20 PM Huy Soto Add [S80 [id number]] Contusion of right leg, initial encounter     7/6/2021  3:20 PM Juanita Soto [address] of foot       ED Disposition     ED Disposition Condition Date/Time Comment    Discharge Stable Tue Jul 6, 2021  3:20 PM Henry Díaz discharge to home/self care              Follow-up Information     Follow up With Specialties Details Why Contact Info Additional Information    [address] [place]  Podiatry  Call to make appointment regarding corn [address] [address] [id number]  [address] [place] [address] [place] [address], [address], [address] [facility]          Discharge Medication List as of 7/6/2021  3:22 PM      START taking these medications    Details   gabapentin (NEURONTIN) 100 mg capsule Take 1 capsule (100 mg total) by mouth 3 (three) times a day for 14 days, Starting Tue 7/6/2021, Until Tue 7/20/2021, Normal      naproxen (NAPROSYN) 500 mg tablet Take 1 tablet (500 mg total) by mouth 2 (two) times a day with meals, Starting Tue 7/6/2021, Normal         CONTINUE these medications which have NOT CHANGED    Details   Abatacept 125 MG/ML SOAJ Inject under the skin, Historical Med      Adalimumab 40 MG/0 4ML PNKT Inject 40 mg under the skin every 14 (fourteen) days, Starting Wed 1/20/2021, Historical Med      albuterol (PROVENTIL HFA,VENTOLIN HFA) 90 mcg/act inhaler Inhale 2 puffs every 4 (four) hours as needed for wheezing, Starting Sun 12/29/2019, Print      amLODIPine (NORVASC) 5 mg tablet Take 1 tablet (5 mg total) by mouth daily, Starting Wed 3/3/2021, Until Tue 7/6/2021, Normal      Calcium Carb-Cholecalciferol (CALCIUM 600+D3) 600-200 MG-UNIT TABS take 1 tablet twice a day, Historical Med      celecoxib (CeleBREX) 200 mg capsule Take 200 mg by mouth 2 (two) times a day, Historical Med      Cholecalciferol (VITAMIN D3) 2000 units capsule take 1 tablet po daily  , Historical Med      citalopram (CeleXA) 20 mg tablet Take 1 tablet (20 mg total) by mouth daily, Starting Wed 3/3/2021, Normal      doxepin (SINEquan) 25 mg capsule Take 1 capsule (25 mg total) by mouth daily at bedtime, Starting Wed 3/3/2021, Normal      ergocalciferol (Drisdol) 1 25 MG (04288 UT) capsule Take 50,000 Units by mouth, Starting Wed 4/21/2021, Until Wed 7/14/2021, Historical Med      famotidine (PEPCID) 20 mg tablet Take 1 tablet (20 mg total) by mouth 2 (two) times a day, Starting Mon 6/8/2020, Normal      fluticasone (FLONASE) 50 mcg/act nasal spray 1 spray into each nostril daily, Starting Wed 3/3/2021, Normal      fluticasone (FLOVENT HFA) 110 MCG/ACT inhaler Inhale 2 puffs 2 (two) times a day Rinse mouth after use , Starting Wed 7/5/9204, Normal      folic acid (FOLVITE) 1 mg tablet every 24 hours, Starting Wed 1/27/2016, Historical Med      furosemide (LASIX) 40 mg tablet Take 1 tablet (40 mg total) by mouth daily, Starting Wed 3/3/2021, Normal      leflunomide (ARAVA) 20 MG tablet Take 20 mg by mouth daily, Historical Med      levETIRAcetam (KEPPRA) 1000 MG tablet Take 1 tablet (1,000 mg total) by mouth 2 (two) times a day, Starting Wed 11/18/2020, Normal      Methenamine-Sodium Salicylate 332-894 7 MG TABS Take 1 tablet by mouth 3 (three) times a day, Starting Tue 11/24/2020, Normal      methylPREDNISolone (MEDROL) 32 MG tablet Take one (1) tablet 12 hours and one (1) tablet  2 hours PRIOR to CT scan , Normal      metoprolol tartrate (LOPRESSOR) 25 mg tablet Take 1 tablet (25 mg total) by mouth every 12 (twelve) hours, Starting Wed 11/18/2020, Normal      Multiple Vitamins-Minerals (MULTIVITAMIN WITH MINERALS) tablet Take 1 tablet by mouth daily, Starting Fri 8/2/2019, Normal      nitroglycerin (NITROSTAT) 0 4 mg SL tablet Place 1 tablet (0 4 mg total) under the tongue every 5 (five) minutes as needed for chest pain Call 911 if using 3 doses, Starting Thu 10/15/2020, Normal      ondansetron (ZOFRAN-ODT) 4 mg disintegrating tablet Take 1 tablet (4 mg total) by mouth every 8 (eight) hours as needed for nausea for up to 10 doses, Starting Fri 4/9/2021, Normal      polyethylene glycol (GLYCOLAX) 17 GM/SCOOP powder Take 17 g by mouth daily, Starting Thu 10/15/2020, Normal      predniSONE 5 mg tablet Take by mouth daily, Historical Med      rizatriptan (MAXALT) 5 MG tablet Take 1 tablet (5 mg total) by mouth once as needed for migraine for up to 1 dose May repeat in 2 hours if needed, Starting Tue 6/1/2021, Normal      traMADol (ULTRAM) 50 mg tablet Take 1 tablet (50 mg total) by mouth every 6 (six) hours as needed for moderate pain, Starting Fri 4/9/2021, Normal      Elastic Bandages & Supports (CARPAL TUNNEL WRIST DELUXE) MISC by Does not apply route daily at bedtime, Starting Fri 8/2/2019, Print      Incontinence Supply Disposable (Poise Pad) PADS by Does not apply route 4 (four) times a day as needed (incontinence), Starting Thu 10/15/2020, Normal           No discharge procedures on file      PDMP Review       Value Time User    PDMP Reviewed  Yes 4/9/2021 11:58 AM Hemant Godwin PA-C          ED Provider  Electronically Signed by           Shaw Rhoades PA-C  07/06/21 2043

## 2021-07-08 ENCOUNTER — OFFICE VISIT (OUTPATIENT)
Dept: PODIATRY | Facility: CLINIC | Age: 41
End: 2021-07-08
Payer: COMMERCIAL

## 2021-07-08 VITALS
HEART RATE: 91 BPM | BODY MASS INDEX: 46.56 KG/M2 | SYSTOLIC BLOOD PRESSURE: 136 MMHG | HEIGHT: 62 IN | DIASTOLIC BLOOD PRESSURE: 91 MMHG | WEIGHT: 253 LBS

## 2021-07-08 DIAGNOSIS — L90.9 FAT PAD ATROPHY OF FOOT: ICD-10-CM

## 2021-07-08 DIAGNOSIS — L84 CALLUS OF FOOT: Primary | ICD-10-CM

## 2021-07-08 PROCEDURE — 3080F DIAST BP >= 90 MM HG: CPT | Performed by: PODIATRIST

## 2021-07-08 PROCEDURE — 3075F SYST BP GE 130 - 139MM HG: CPT | Performed by: PODIATRIST

## 2021-07-08 PROCEDURE — 99242 OFF/OP CONSLTJ NEW/EST SF 20: CPT | Performed by: PODIATRIST

## 2021-07-08 NOTE — LETTER
July 8, 2021     Patient: Teresa Pollard   YOB: 1980   Date of Visit: 7/8/2021       To Whom it May Concern:    Teresa Pollard is under my professional care  She was seen in my office on 7/8/2021  She may return to work on 7/9/21 without restrictions  If you have any questions or concerns, please don't hesitate to call           Sincerely,          Ai aDmon DPM        CC: Teresa Pollard

## 2021-07-08 NOTE — PROGRESS NOTES
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  PMH significant for RA, obesity, asthma, epilepsy, lymphedema  The following portions of the patient's history were reviewed and updated as appropriate: allergies, current medications, past family history, past medical history, past social history, past surgical history and problem list     Review of Systems   Constitutional: Negative  HENT: Negative for sinus pressure and sinus pain  Respiratory: Negative for cough and shortness of breath  Cardiovascular: Positive for leg swelling  Negative for chest pain  Gastrointestinal: Negative for diarrhea, nausea and vomiting  Musculoskeletal: Positive for arthralgias and joint swelling  Skin: Positive for color change  Negative for rash and wound  Neurological: Negative for weakness and numbness  Psychiatric/Behavioral: The patient is not nervous/anxious  Objective:      /91   Pulse 91   Ht 5' 2" (1 575 m)   Wt 115 kg (253 lb)   LMP  (LMP Unknown)   BMI 46 27 kg/m²          Physical Exam      Vitals reviewed    Constitutional: Patient is not distressed  Patient is well developed  Patient is morbidly obese  Vascular: Dorsalis pedis and posterior tibial pulses palpable  Capillary refill time within normal limits to all digits  No erythema     Severe bilateral lower extremity edema with lymphedema changes to the skin  No significant varicosities  Dermatology: No rash  No open lesions  Present pedal hair  Skin has poor turgor  Tender hyperkeratotic lesion plantar aspect of left forefoot    Musculoskeletal: Normal range of motion to ankle, subtalar joint, and midtarsal joint  Normal range of motion first MTPJ  Manual muscle testing 5 out of 5 for inversion/eversion/dorsiflexion/plantarflexion  On stance patients feet are generally rectus  Bilateral fat pad atrophy under the metatarsal heads    Neurological: Monofilament sensation is intact  Vibratory sensation is intact     Achilles reflex is normal    Proprioception is normal

## 2021-07-09 ENCOUNTER — HOSPITAL ENCOUNTER (EMERGENCY)
Facility: HOSPITAL | Age: 41
Discharge: HOME | End: 2021-07-09
Attending: EMERGENCY MEDICINE | Admitting: EMERGENCY MEDICINE
Payer: COMMERCIAL

## 2021-07-09 VITALS
OXYGEN SATURATION: 100 % | DIASTOLIC BLOOD PRESSURE: 58 MMHG | SYSTOLIC BLOOD PRESSURE: 129 MMHG | RESPIRATION RATE: 16 BRPM | HEART RATE: 67 BPM | TEMPERATURE: 98.3 F | WEIGHT: 254.41 LBS | BODY MASS INDEX: 46.53 KG/M2

## 2021-07-09 DIAGNOSIS — N39.0 UTI (URINARY TRACT INFECTION): ICD-10-CM

## 2021-07-09 DIAGNOSIS — D64.9 ANEMIA: ICD-10-CM

## 2021-07-09 DIAGNOSIS — R55 SYNCOPE: Primary | ICD-10-CM

## 2021-07-09 LAB
ANION GAP SERPL CALCULATED.3IONS-SCNC: 6 MMOL/L (ref 4–13)
ATRIAL RATE: 61 BPM
BACTERIA UR QL AUTO: ABNORMAL /HPF
BASOPHILS # BLD AUTO: 0.02 THOUSANDS/ΜL (ref 0–0.1)
BASOPHILS NFR BLD AUTO: 0 % (ref 0–1)
BILIRUB UR QL STRIP: ABNORMAL
BUN SERPL-MCNC: 13 MG/DL (ref 5–25)
CALCIUM SERPL-MCNC: 8.7 MG/DL (ref 8.3–10.1)
CHLORIDE SERPL-SCNC: 107 MMOL/L (ref 100–108)
CLARITY UR: ABNORMAL
CO2 SERPL-SCNC: 28 MMOL/L (ref 21–32)
COLOR UR: ABNORMAL
CREAT SERPL-MCNC: 0.8 MG/DL (ref 0.6–1.3)
EOSINOPHIL # BLD AUTO: 0.22 THOUSAND/ΜL (ref 0–0.61)
EOSINOPHIL NFR BLD AUTO: 4 % (ref 0–6)
ERYTHROCYTE [DISTWIDTH] IN BLOOD BY AUTOMATED COUNT: 13 % (ref 11.6–15.1)
EXT PREG TEST URINE: NEGATIVE
EXT. CONTROL ED NAV: NORMAL
GFR SERPL CREATININE-BSD FRML MDRD: 92 ML/MIN/1.73SQ M
GLUCOSE SERPL-MCNC: 118 MG/DL (ref 65–140)
GLUCOSE UR STRIP-MCNC: ABNORMAL MG/DL
HCT VFR BLD AUTO: 32.5 % (ref 34.8–46.1)
HGB BLD-MCNC: 9.9 G/DL (ref 11.5–15.4)
HGB UR QL STRIP.AUTO: ABNORMAL
IMM GRANULOCYTES # BLD AUTO: 0.01 THOUSAND/UL (ref 0–0.2)
IMM GRANULOCYTES NFR BLD AUTO: 0 % (ref 0–2)
KETONES UR STRIP-MCNC: ABNORMAL MG/DL
LEUKOCYTE ESTERASE UR QL STRIP: ABNORMAL
LYMPHOCYTES # BLD AUTO: 1.32 THOUSANDS/ΜL (ref 0.6–4.47)
LYMPHOCYTES NFR BLD AUTO: 24 % (ref 14–44)
MAGNESIUM SERPL-MCNC: 2 MG/DL (ref 1.6–2.6)
MCH RBC QN AUTO: 24.3 PG (ref 26.8–34.3)
MCHC RBC AUTO-ENTMCNC: 30.5 G/DL (ref 31.4–37.4)
MCV RBC AUTO: 80 FL (ref 82–98)
MONOCYTES # BLD AUTO: 0.3 THOUSAND/ΜL (ref 0.17–1.22)
MONOCYTES NFR BLD AUTO: 6 % (ref 4–12)
NEUTROPHILS # BLD AUTO: 3.61 THOUSANDS/ΜL (ref 1.85–7.62)
NEUTS SEG NFR BLD AUTO: 66 % (ref 43–75)
NITRITE UR QL STRIP: POSITIVE
NON-SQ EPI CELLS URNS QL MICRO: ABNORMAL /HPF
NRBC BLD AUTO-RTO: 0 /100 WBCS
NT-PROBNP SERPL-MCNC: 144 PG/ML
P AXIS: 18 DEGREES
PH UR STRIP.AUTO: 6.5 [PH]
PLATELET # BLD AUTO: 352 THOUSANDS/UL (ref 149–390)
PMV BLD AUTO: 9.6 FL (ref 8.9–12.7)
POTASSIUM SERPL-SCNC: 4 MMOL/L (ref 3.5–5.3)
PR INTERVAL: 174 MS
PROT UR STRIP-MCNC: >=300 MG/DL
QRS AXIS: 37 DEGREES
QRSD INTERVAL: 84 MS
QT INTERVAL: 384 MS
QTC INTERVAL: 386 MS
RBC # BLD AUTO: 4.07 MILLION/UL (ref 3.81–5.12)
RBC #/AREA URNS AUTO: ABNORMAL /HPF
SODIUM SERPL-SCNC: 141 MMOL/L (ref 136–145)
SP GR UR STRIP.AUTO: >=1.03 (ref 1–1.03)
T WAVE AXIS: 19 DEGREES
TROPONIN I SERPL-MCNC: <0.02 NG/ML
UROBILINOGEN UR QL STRIP.AUTO: 4 E.U./DL
VENTRICULAR RATE: 61 BPM
WBC # BLD AUTO: 5.48 THOUSAND/UL (ref 4.31–10.16)
WBC #/AREA URNS AUTO: ABNORMAL /HPF

## 2021-07-09 PROCEDURE — 81001 URINALYSIS AUTO W/SCOPE: CPT | Performed by: EMERGENCY MEDICINE

## 2021-07-09 PROCEDURE — 85025 COMPLETE CBC W/AUTO DIFF WBC: CPT | Performed by: EMERGENCY MEDICINE

## 2021-07-09 PROCEDURE — 83880 ASSAY OF NATRIURETIC PEPTIDE: CPT | Performed by: EMERGENCY MEDICINE

## 2021-07-09 PROCEDURE — 81025 URINE PREGNANCY TEST: CPT | Performed by: EMERGENCY MEDICINE

## 2021-07-09 PROCEDURE — 87147 CULTURE TYPE IMMUNOLOGIC: CPT | Performed by: EMERGENCY MEDICINE

## 2021-07-09 PROCEDURE — 84484 ASSAY OF TROPONIN QUANT: CPT | Performed by: EMERGENCY MEDICINE

## 2021-07-09 PROCEDURE — 87086 URINE CULTURE/COLONY COUNT: CPT | Performed by: EMERGENCY MEDICINE

## 2021-07-09 PROCEDURE — 80048 BASIC METABOLIC PNL TOTAL CA: CPT | Performed by: EMERGENCY MEDICINE

## 2021-07-09 PROCEDURE — 96361 HYDRATE IV INFUSION ADD-ON: CPT

## 2021-07-09 PROCEDURE — 93005 ELECTROCARDIOGRAM TRACING: CPT

## 2021-07-09 PROCEDURE — 36415 COLL VENOUS BLD VENIPUNCTURE: CPT | Performed by: EMERGENCY MEDICINE

## 2021-07-09 PROCEDURE — 99285 EMERGENCY DEPT VISIT HI MDM: CPT

## 2021-07-09 PROCEDURE — 96360 HYDRATION IV INFUSION INIT: CPT

## 2021-07-09 PROCEDURE — 99285 EMERGENCY DEPT VISIT HI MDM: CPT | Performed by: EMERGENCY MEDICINE

## 2021-07-09 PROCEDURE — 83735 ASSAY OF MAGNESIUM: CPT | Performed by: EMERGENCY MEDICINE

## 2021-07-09 PROCEDURE — 93010 ELECTROCARDIOGRAM REPORT: CPT | Performed by: INTERNAL MEDICINE

## 2021-07-09 RX ORDER — CEPHALEXIN 500 MG/1
500 CAPSULE ORAL 3 TIMES DAILY
Qty: 15 CAPSULE | Refills: 0 | Status: SHIPPED | OUTPATIENT
Start: 2021-07-09 | End: 2021-07-14

## 2021-07-09 RX ADMIN — SODIUM CHLORIDE 1000 ML: 0.9 INJECTION, SOLUTION INTRAVENOUS at 12:58

## 2021-07-09 NOTE — ED PROVIDER NOTES
History  Chief Complaint   Patient presents with    Dizziness     Pt brought in by EMS  Pt was working as  and became sweaty and dizzy  Pt sat on floor and reported felling "heavy" and lethargic  Denies recent illness  Denies CP, SOB  History provided by:  Patient   used: No    Dizziness  Quality:  Lightheadedness  Severity:  Severe  Onset quality:  Sudden  Duration: just prior to arrival   Progression:  Resolved  Chronicity:  New  Context comment:  Standing at work as a , became lightheaded and passed out  Denies injury  Relieved by:  Nothing  Worsened by:  Nothing  Ineffective treatments:  None tried  Associated symptoms: syncope    Associated symptoms: no chest pain, no diarrhea, no headaches, no nausea, no palpitations, no shortness of breath, no vomiting and no weakness    Risk factors: no anemia    Has had an extensive workup for hematuria and essentially had negative CTs and cystoscopy  Prior to Admission Medications   Prescriptions Last Dose Informant Patient Reported? Taking? Abatacept 125 MG/ML SOAJ  Self Yes Yes   Sig: Inject under the skin   Adalimumab 40 MG/0 4ML PNKT  Self Yes Yes   Sig: Inject 40 mg under the skin every 14 (fourteen) days   Calcium Carb-Cholecalciferol (CALCIUM 600+D3) 600-200 MG-UNIT TABS  Self Yes Yes   Sig: take 1 tablet twice a day   Cholecalciferol (VITAMIN D3) 2000 units capsule  Self Yes Yes   Sig: take 1 tablet po daily     Elastic Bandages & Supports (CARPAL TUNNEL WRIST DELUXE) MISC  Self No No   Sig: by Does not apply route daily at bedtime   Incontinence Supply Disposable (Poise Pad) PADS  Self No No   Sig: by Does not apply route 4 (four) times a day as needed (incontinence)   Methenamine-Sodium Salicylate 141-117 3 MG TABS  Self No Yes   Sig: Take 1 tablet by mouth 3 (three) times a day   Multiple Vitamins-Minerals (MULTIVITAMIN WITH MINERALS) tablet  Self No Yes   Sig: Take 1 tablet by mouth daily   albuterol (PROVENTIL HFA,VENTOLIN HFA) 90 mcg/act inhaler  Self No Yes   Sig: Inhale 2 puffs every 4 (four) hours as needed for wheezing   amLODIPine (NORVASC) 5 mg tablet  Self No No   Sig: Take 1 tablet (5 mg total) by mouth daily   celecoxib (CeleBREX) 200 mg capsule  Self Yes Yes   Sig: Take 200 mg by mouth 2 (two) times a day   citalopram (CeleXA) 20 mg tablet  Self No Yes   Sig: Take 1 tablet (20 mg total) by mouth daily   doxepin (SINEquan) 25 mg capsule  Self No Yes   Sig: Take 1 capsule (25 mg total) by mouth daily at bedtime   ergocalciferol (Drisdol) 1 25 MG (44189 UT) capsule   Yes Yes   Sig: Take 50,000 Units by mouth   famotidine (PEPCID) 20 mg tablet  Self No Yes   Sig: Take 1 tablet (20 mg total) by mouth 2 (two) times a day   fluticasone (FLONASE) 50 mcg/act nasal spray  Self No Yes   Si spray into each nostril daily   fluticasone (FLOVENT HFA) 110 MCG/ACT inhaler  Self No Yes   Sig: Inhale 2 puffs 2 (two) times a day Rinse mouth after use     folic acid (FOLVITE) 1 mg tablet  Self Yes Yes   Sig: every 24 hours   furosemide (LASIX) 40 mg tablet  Self No Yes   Sig: Take 1 tablet (40 mg total) by mouth daily   gabapentin (NEURONTIN) 100 mg capsule   No Yes   Sig: Take 1 capsule (100 mg total) by mouth 3 (three) times a day for 14 days   leflunomide (ARAVA) 20 MG tablet  Self Yes Yes   Sig: Take 20 mg by mouth daily   levETIRAcetam (KEPPRA) 1000 MG tablet  Self No Yes   Sig: Take 1 tablet (1,000 mg total) by mouth 2 (two) times a day   methylPREDNISolone (MEDROL) 32 MG tablet  Self No Yes   Sig: Take one (1) tablet 12 hours and one (1) tablet  2 hours PRIOR to CT scan    metoprolol tartrate (LOPRESSOR) 25 mg tablet  Self No Yes   Sig: Take 1 tablet (25 mg total) by mouth every 12 (twelve) hours   naproxen (NAPROSYN) 500 mg tablet   No Yes   Sig: Take 1 tablet (500 mg total) by mouth 2 (two) times a day with meals   nitroglycerin (NITROSTAT) 0 4 mg SL tablet  Self No Yes   Sig: Place 1 tablet (0 4 mg total) under the tongue every 5 (five) minutes as needed for chest pain Call 911 if using 3 doses   ondansetron (ZOFRAN-ODT) 4 mg disintegrating tablet  Self No Yes   Sig: Take 1 tablet (4 mg total) by mouth every 8 (eight) hours as needed for nausea for up to 10 doses   polyethylene glycol (GLYCOLAX) 17 GM/SCOOP powder  Self No Yes   Sig: Take 17 g by mouth daily   predniSONE 5 mg tablet  Self Yes Yes   Sig: Take by mouth daily   rizatriptan (MAXALT) 5 MG tablet   No Yes   Sig: Take 1 tablet (5 mg total) by mouth once as needed for migraine for up to 1 dose May repeat in 2 hours if needed   traMADol (ULTRAM) 50 mg tablet  Self No Yes   Sig: Take 1 tablet (50 mg total) by mouth every 6 (six) hours as needed for moderate pain      Facility-Administered Medications: None       Past Medical History:   Diagnosis Date    Angina at rest [facility])     Anxiety     Depression     Epilepsy ([place] [address] )     Fibroid 2012    History of cardiac cath 2006    Hydronephrosis 2007    Hypertension     Migraine without aura     PTSD (post-traumatic stress disorder)     Rhe[facility]oid arteritis ([facility] )        Past Surgical History:   Procedure Laterality Date    APPENDECTOMY      CARDIAC CATHETERIZATION  2006    CARDIAC CATHETERIZATION       SECTION  2008    CHOLECYSTECTOMY  2018   Izzy Birch KIDNEY SURGERY  2007    TUBAL LIGATION  2008       Family History   Problem Relation Age of Onset    Diabetes Maternal Grandmother     Hypertension Maternal Grandmother     No Known Problems Mother     No Known Problems Sister     No Known Problems Daughter     No Known Problems Paternal Grandmother     No Known Problems Daughter     Breast cancer Neg Hx     Cancer Neg Hx      I have reviewed and agree with the history as documented      E-Cigarette/Vaping    E-Cigarette Use Never User      E-Cigarette/Vaping Substances     Social History     Tobacco Use    Smoking status: Former Smoker     Types: Cigarettes     Quit date:      Years since quittin 5    Smokeless tobacco: Former User   Vaping Use    Vaping Use: Never used   Substance Use Topics    Alcohol use: Not Currently    Drug use: Never       Review of Systems   Constitutional: Negative for chills and fever  HENT: Negative for congestion  Respiratory: Negative for cough, chest tightness and shortness of breath  Cardiovascular: Positive for syncope  Negative for chest pain and palpitations  Gastrointestinal: Negative for abdominal pain, diarrhea, nausea and vomiting  Genitourinary: Negative for difficulty urinating and dysuria  Musculoskeletal: Negative for arthralgias, back pain, joint swelling and neck pain  Skin: Negative for rash and wound  Neurological: Positive for syncope and light-headedness  Negative for dizziness, seizures, facial asymmetry, weakness, numbness and headaches  All other systems reviewed and are negative  Physical Exam  Physical Exam  Vitals and nursing note reviewed  Constitutional:       General: She is not in acute distress  Appearance: Normal appearance  She is well-developed  She is not ill-appearing, toxic-appearing or diaphoretic  HENT:      Head: Normocephalic and atraumatic  Right Ear: Hearing normal  No drainage or swelling  Left Ear: Hearing normal  No drainage or swelling  Eyes:      General: Lids are normal          Right eye: No discharge  Left eye: No discharge  Extraocular Movements: Extraocular movements intact  Conjunctiva/sclera: Conjunctivae normal       Pupils: Pupils are equal, round, and reactive to light  Neck:      Vascular: No JVD  Trachea: Trachea normal    Cardiovascular:      Rate and Rhythm: Normal rate and regular rhythm  Pulses: Normal pulses  Heart sounds: Normal heart sounds  No murmur heard  No friction rub  No gallop  Pulmonary:      Effort: Pulmonary effort is normal  No respiratory distress  Breath sounds: Normal breath sounds  No stridor  No wheezing or rales  Abdominal:      Palpations: Abdomen is soft  Tenderness: There is no abdominal tenderness  There is no guarding or rebound  Musculoskeletal:         General: No tenderness  Normal range of motion  Cervical back: Normal range of motion  Skin:     General: Skin is warm and dry  Coloration: Skin is not pale  Findings: No rash  Neurological:      General: No focal deficit present  Mental Status: She is alert  GCS: GCS eye subscore is 4  GCS verbal subscore is 5  GCS motor subscore is 6  Cranial Nerves: Cranial nerves are intact  No cranial nerve deficit  Sensory: Sensation is intact  No sensory deficit  Motor: Motor function is intact  No weakness, tremor or abnormal muscle tone  Coordination: Coordination is intact  Psychiatric:         Speech: Speech normal          Behavior: Behavior is cooperative           Vital Signs  ED Triage Vitals   Temperature Pulse Respirations Blood Pressure SpO2   07/09/21 1225 07/09/21 1225 07/09/21 1225 07/09/21 1221 07/09/21 1221   98 3 °F (36 8 °C) 75 18 127/73 100 %      Temp Source Heart Rate Source Patient Position - Orthostatic VS BP Location FiO2 (%)   07/09/21 1225 07/09/21 1225 07/09/21 1221 07/09/21 1221 --   Oral Monitor Sitting Right arm       Pain Score       07/09/21 1432       No Pain           Vitals:    07/09/21 1400 07/09/21 1401 07/09/21 1402 07/09/21 1432   BP: 122/68 131/65 130/66 129/58   Pulse: 70 76 73 67   Patient Position - Orthostatic VS: Lying - Orthostatic VS Sitting - Orthostatic VS Standing - Orthostatic VS Sitting         Visual Acuity  Visual Acuity      Most Recent Value   L Pupil Size (mm)  3   R Pupil Size (mm)  3          ED Medications  Medications   sodium chloride 0 9 % bolus 1,000 mL (1,000 mL Intravenous New Bag 7/9/21 1258)       Diagnostic Studies  Results Reviewed     Procedure Component Value Units Date/Time    Urine Microscopic [[id number]]  (Abnormal) Collected: 07/09/21 1432    Lab Status: Final result Specimen: Urine, Clean Catch Updated: 07/09/21 1514     RBC, UA Innumerable /hpf      WBC, UA 10-20 /hpf      Epithelial Cells Occasional /hpf      Bacteria, UA Innumerable /hpf     Urine culture [[id number]] Collected: 07/09/21 1432    Lab Status:  In process Specimen: Urine, Clean Catch Updated: 07/09/21 1514    UA w Reflex to Microscopic w Reflex to Culture [[id number]]  (Abnormal) Collected: 07/09/21 1432    Lab Status: Final result Specimen: Urine, Clean Catch Updated: 07/09/21 1440     Color, UA Red     Clarity, UA Cloudy     Specific Gravity, UA >=1 030     pH, UA 6 5     Leukocytes, UA Moderate     Nitrite, UA Positive     Protein, UA >=300 mg/dl      Glucose,  (1/10%) mg/dl      Ketones, UA 15 (1+) mg/dl      Urobilinogen, UA 4 0 E U /dl      Bilirubin, UA Interference- unable to analyze     Blood, UA Large    POCT pregnancy, urine [[id number]]  (Normal) Resulted: 07/09/21 1428    Lab Status: Final result Updated: 07/09/21 1429     EXT PREG TEST UR (Ref: Negative) negative     Control valid    NT-BNP PRO [[id number]]  (Abnormal) Collected: 07/09/21 1250    Lab Status: Final result Specimen: Blood from Arm, Right Updated: 07/09/21 1323     NT-proBNP 144 pg/mL     Basic metabolic panel [[id number]] Collected: 07/09/21 1250    Lab Status: Final result Specimen: Blood from Arm, Right Updated: 07/09/21 1323     Sodium 141 mmol/L      Potassium 4 0 mmol/L      Chloride 107 mmol/L      CO2 28 mmol/L      ANION GAP 6 mmol/L      BUN 13 mg/dL      Creatinine 0 80 mg/dL      Glucose 118 mg/dL      Calcium 8 7 mg/dL      eGFR 92 ml/min/1 73sq m     Narrative:      [place] guidelines for Chronic Kidney Disease (CKD):     Stage 1 with normal or high GFR (GFR > 90 mL/min/1 73 square meters)    Stage 2 Mild CKD (GFR = 60-89 mL/min/1 73 square meters)    Stage 3A Moderate CKD (GFR = 45-59 mL/min/1 73 square meters)    Stage 3B Moderate CKD (GFR = 30-44 mL/min/1 73 square meters)    Stage 4 Severe CKD (GFR = 15-29 mL/min/1 73 square meters)    Stage 5 End Stage CKD (GFR <15 mL/min/1 73 square meters)  Note: GFR calculation is accurate only with a steady state creatinine    Magnesium [[id number]]  (Normal) Collected: 07/09/21 1250    Lab Status: Final result Specimen: Blood from Arm, Right Updated: 07/09/21 1323     Magnesium 2 0 mg/dL     Troponin I [[id number]]  (Normal) Collected: 07/09/21 1250    Lab Status: Final result Specimen: Blood from Arm, Right Updated: 07/09/21 1317     Troponin I <0 02 ng/mL     CBC and differential [[id number]]  (Abnormal) Collected: 07/09/21 1250    Lab Status: Final result Specimen: Blood from Arm, Right Updated: 07/09/21 1256     WBC 5 48 Thousand/uL      RBC 4 07 Million/uL      Hemoglobin 9 9 g/dL      Hematocrit 32 5 %      MCV 80 fL      MCH 24 3 pg      MCHC 30 5 g/dL      RDW 13 0 %      MPV 9 6 fL      Platelets 593 Thousands/uL      nRBC 0 /100 WBCs      Neutrophils Relative 66 %      Immat GRANS % 0 %      Lymphocytes Relative 24 %      Monocytes Relative 6 %      Eosinophils Relative 4 %      Basophils Relative 0 %      Neutrophils Absolute 3 61 Thousands/µL      Immature Grans Absolute 0 01 Thousand/uL      Lymphocytes Absolute 1 32 Thousands/µL      Monocytes Absolute 0 30 Thousand/µL      Eosinophils Absolute 0 22 Thousand/µL      Basophils Absolute 0 02 Thousands/µL                  No orders to display              Procedures  ECG 12 Lead Documentation Only    Date/Time: 7/9/2021 3:36 PM  Performed by: Valente Street MD  Authorized by: Valente Street MD     Indications / Diagnosis:  Syncope  ECG reviewed by me, the ED Provider: yes    Patient location:  ED  Previous ECG:     Comparison to cardiac monitor: Yes    Interpretation:     Interpretation: normal    Rate:     ECG rate:  61    ECG rate assessment: normal    Rhythm:     Rhythm: sinus rhythm    Ectopy:     Ectopy: none    QRS:     QRS days       Start Date: 7/9/2021  End Date: 7/14/2021       Order Dose: 500 mg       Quantity: 15 capsule    Refills: 0     No discharge procedures on file      PDMP Review       Value Time User    PDMP Reviewed  Yes 4/9/2021 11:58 AM Hemant Godwin PA-C          ED Provider  Electronically Signed by           Olya Martell MD  07/09/21 1538       Olya Martell MD  07/09/21 8794

## 2021-07-09 NOTE — ED NOTES
IV placement via US unsuccessful   OK to D/C patient at this time without completion of IV fluids per Dr Jessica Magaña, RN  07/09/21 7809

## 2021-07-09 NOTE — ED NOTES
Asa RN at bedside to assist with IV insertion via US at this time     Sherrell Wiley, FARIDEH  07/09/21 6206

## 2021-07-09 NOTE — Clinical Note
Eloina Hero was seen and treated in our emergency department on 7/9/2021  Diagnosis:     Jaswinder Kelley  may return to work on return date  She may return on this date: 07/10/2021         If you have any questions or concerns, please don't hesitate to call        Jaya Bermeo RN    ______________________________           _______________          _______________  Hospital Representative                              Date                                Time

## 2021-07-10 LAB
BACTERIA UR CULT: ABNORMAL
BACTERIA UR CULT: ABNORMAL

## 2021-07-14 ENCOUNTER — CONSULT (OUTPATIENT)
Dept: NEPHROLOGY | Facility: CLINIC | Age: 41
End: 2021-07-14
Payer: COMMERCIAL

## 2021-07-14 ENCOUNTER — APPOINTMENT (OUTPATIENT)
Dept: LAB | Facility: HOSPITAL | Age: 41
End: 2021-07-14
Attending: INTERNAL MEDICINE
Payer: COMMERCIAL

## 2021-07-14 ENCOUNTER — TELEPHONE (OUTPATIENT)
Dept: NEPHROLOGY | Facility: CLINIC | Age: 41
End: 2021-07-14

## 2021-07-14 ENCOUNTER — PREP FOR PROCEDURE (OUTPATIENT)
Dept: INTERVENTIONAL RADIOLOGY/VASCULAR | Facility: CLINIC | Age: 41
End: 2021-07-14

## 2021-07-14 VITALS
SYSTOLIC BLOOD PRESSURE: 122 MMHG | WEIGHT: 252 LBS | RESPIRATION RATE: 18 BRPM | BODY MASS INDEX: 46.38 KG/M2 | DIASTOLIC BLOOD PRESSURE: 88 MMHG | HEIGHT: 62 IN | HEART RATE: 71 BPM

## 2021-07-14 DIAGNOSIS — R80.1 PERSISTENT PROTEINURIA: ICD-10-CM

## 2021-07-14 DIAGNOSIS — E66.01 MORBID OBESITY (HCC): ICD-10-CM

## 2021-07-14 DIAGNOSIS — I10 ESSENTIAL HYPERTENSION: ICD-10-CM

## 2021-07-14 DIAGNOSIS — R31.0 GROSS HEMATURIA: ICD-10-CM

## 2021-07-14 DIAGNOSIS — D50.0 IRON DEFICIENCY ANEMIA DUE TO CHRONIC BLOOD LOSS: ICD-10-CM

## 2021-07-14 DIAGNOSIS — N18.1 STAGE 1 CHRONIC KIDNEY DISEASE: ICD-10-CM

## 2021-07-14 DIAGNOSIS — N18.1 STAGE 1 CHRONIC KIDNEY DISEASE: Primary | ICD-10-CM

## 2021-07-14 DIAGNOSIS — D50.0 IRON DEFICIENCY ANEMIA DUE TO CHRONIC BLOOD LOSS: Primary | ICD-10-CM

## 2021-07-14 DIAGNOSIS — R31.0 GROSS HEMATURIA: Primary | ICD-10-CM

## 2021-07-14 LAB
25(OH)D3 SERPL-MCNC: 16.6 NG/ML (ref 30–100)
ALBUMIN SERPL BCP-MCNC: 3.6 G/DL (ref 3.5–5)
ANION GAP SERPL CALCULATED.3IONS-SCNC: 7 MMOL/L (ref 4–13)
BACTERIA UR QL AUTO: ABNORMAL /HPF
BILIRUB UR QL STRIP: ABNORMAL
BUN SERPL-MCNC: 14 MG/DL (ref 5–25)
C3 SERPL-MCNC: 126 MG/DL (ref 90–180)
C4 SERPL-MCNC: 22 MG/DL (ref 10–40)
CALCIUM SERPL-MCNC: 8.6 MG/DL (ref 8.3–10.1)
CHLORIDE SERPL-SCNC: 107 MMOL/L (ref 100–108)
CLARITY UR: ABNORMAL
CO2 SERPL-SCNC: 25 MMOL/L (ref 21–32)
COLOR UR: ABNORMAL
CREAT SERPL-MCNC: 0.73 MG/DL (ref 0.6–1.3)
CREAT UR-MCNC: 286.4 MG/DL
FERRITIN SERPL-MCNC: 6 NG/ML (ref 8–388)
GFR SERPL CREATININE-BSD FRML MDRD: 103 ML/MIN/1.73SQ M
GLUCOSE P FAST SERPL-MCNC: 101 MG/DL (ref 65–99)
GLUCOSE UR STRIP-MCNC: NEGATIVE MG/DL
HBV CORE AB SER QL: NORMAL
HBV SURFACE AB SER-ACNC: 13.12 MIU/ML
HCV AB SER QL: NORMAL
HGB UR QL STRIP.AUTO: ABNORMAL
IRON SATN MFR SERPL: 4 %
IRON SERPL-MCNC: 21 UG/DL (ref 50–170)
KETONES UR STRIP-MCNC: ABNORMAL MG/DL
LEUKOCYTE ESTERASE UR QL STRIP: ABNORMAL
NITRITE UR QL STRIP: POSITIVE
NON-SQ EPI CELLS URNS QL MICRO: ABNORMAL /HPF
PH UR STRIP.AUTO: 5 [PH]
PHOSPHATE SERPL-MCNC: 2.9 MG/DL (ref 2.7–4.5)
POTASSIUM SERPL-SCNC: 3.8 MMOL/L (ref 3.5–5.3)
PROT UR STRIP-MCNC: >=300 MG/DL
PROT UR-MCNC: 178 MG/DL
PROT/CREAT UR: 0.62 MG/G{CREAT} (ref 0–0.1)
RBC #/AREA URNS AUTO: ABNORMAL /HPF
SL AMB  POCT GLUCOSE, UA: NEGATIVE
SL AMB LEUKOCYTE ESTERASE,UA: ABNORMAL
SL AMB POCT BILIRUBIN,UA: NEGATIVE
SL AMB POCT BLOOD,UA: 50
SL AMB POCT CLARITY,UA: ABNORMAL
SL AMB POCT COLOR,UA: ABNORMAL
SL AMB POCT KETONES,UA: NEGATIVE
SL AMB POCT NITRITE,UA: NEGATIVE
SL AMB POCT PH,UA: 5
SL AMB POCT SPECIFIC GRAVITY,UA: 1.03
SL AMB POCT URINE PROTEIN: ABNORMAL
SL AMB POCT UROBILINOGEN: 0.2
SODIUM SERPL-SCNC: 139 MMOL/L (ref 136–145)
SP GR UR STRIP.AUTO: >=1.03 (ref 1–1.03)
TIBC SERPL-MCNC: 471 UG/DL (ref 250–450)
UROBILINOGEN UR QL STRIP.AUTO: 1 E.U./DL
WBC #/AREA URNS AUTO: ABNORMAL /HPF

## 2021-07-14 PROCEDURE — 86215 DEOXYRIBONUCLEASE ANTIBODY: CPT

## 2021-07-14 PROCEDURE — 82306 VITAMIN D 25 HYDROXY: CPT

## 2021-07-14 PROCEDURE — 99244 OFF/OP CNSLTJ NEW/EST MOD 40: CPT | Performed by: INTERNAL MEDICINE

## 2021-07-14 PROCEDURE — 86225 DNA ANTIBODY NATIVE: CPT

## 2021-07-14 PROCEDURE — 1036F TOBACCO NON-USER: CPT | Performed by: INTERNAL MEDICINE

## 2021-07-14 PROCEDURE — 83550 IRON BINDING TEST: CPT

## 2021-07-14 PROCEDURE — 83540 ASSAY OF IRON: CPT

## 2021-07-14 PROCEDURE — 84165 PROTEIN E-PHORESIS SERUM: CPT | Performed by: PATHOLOGY

## 2021-07-14 PROCEDURE — 84166 PROTEIN E-PHORESIS/URINE/CSF: CPT

## 2021-07-14 PROCEDURE — 86255 FLUORESCENT ANTIBODY SCREEN: CPT

## 2021-07-14 PROCEDURE — 84165 PROTEIN E-PHORESIS SERUM: CPT

## 2021-07-14 PROCEDURE — 82728 ASSAY OF FERRITIN: CPT

## 2021-07-14 PROCEDURE — 36415 COLL VENOUS BLD VENIPUNCTURE: CPT

## 2021-07-14 PROCEDURE — 83883 ASSAY NEPHELOMETRY NOT SPEC: CPT

## 2021-07-14 PROCEDURE — 80069 RENAL FUNCTION PANEL: CPT

## 2021-07-14 PROCEDURE — 86038 ANTINUCLEAR ANTIBODIES: CPT

## 2021-07-14 PROCEDURE — 3008F BODY MASS INDEX DOCD: CPT | Performed by: INTERNAL MEDICINE

## 2021-07-14 PROCEDURE — 82570 ASSAY OF URINE CREATININE: CPT

## 2021-07-14 PROCEDURE — 81001 URINALYSIS AUTO W/SCOPE: CPT

## 2021-07-14 PROCEDURE — 86704 HEP B CORE ANTIBODY TOTAL: CPT

## 2021-07-14 PROCEDURE — 81002 URINALYSIS NONAUTO W/O SCOPE: CPT | Performed by: INTERNAL MEDICINE

## 2021-07-14 PROCEDURE — 84156 ASSAY OF PROTEIN URINE: CPT

## 2021-07-14 PROCEDURE — 83520 IMMUNOASSAY QUANT NOS NONAB: CPT

## 2021-07-14 PROCEDURE — 84166 PROTEIN E-PHORESIS/URINE/CSF: CPT | Performed by: PATHOLOGY

## 2021-07-14 PROCEDURE — 86803 HEPATITIS C AB TEST: CPT

## 2021-07-14 PROCEDURE — 86160 COMPLEMENT ANTIGEN: CPT

## 2021-07-14 PROCEDURE — 83516 IMMUNOASSAY NONANTIBODY: CPT

## 2021-07-14 PROCEDURE — 86706 HEP B SURFACE ANTIBODY: CPT

## 2021-07-14 RX ORDER — SODIUM CHLORIDE 9 MG/ML
20 INJECTION, SOLUTION INTRAVENOUS ONCE
Status: CANCELLED | OUTPATIENT
Start: 2021-07-22

## 2021-07-14 RX ORDER — SODIUM CHLORIDE 9 MG/ML
75 INJECTION, SOLUTION INTRAVENOUS CONTINUOUS
Status: CANCELLED | OUTPATIENT
Start: 2021-07-14

## 2021-07-14 NOTE — PROGRESS NOTES
Nephrology Initial Consultation  Lowell Narayanan 38 y o  female MRN: [id number]            REASON FOR CONSULTATION:  Lowell Narayanan is a 38 y  o female who was referred by Hemant Godwin PA-C for evaluation of Consult and Hematuria    ASSESSMENT / PLAN:   38 y o   female with pmh of multiple co-morbidities including morbid obesity, JIN on CPAP, hypertension (x5yrs),angina, epilepsy, rheumatoid arthritis ([id number]) presents to the office for evaluation and management of hematuria  CKD stage I / gross hematuria:  - After review of records in In [facility] as well as Care everywhere patient has a baseline creatinine of 0 6-0 9 mg/dL dating as far back as 2018  Most recent labs show a Creatinine of 0 80 mg/dL on 07/09/2021  Renal function remains stable  - CT renal protocol from 01/25/2021 showing bilateral no masses no hydronephrosis no calculi  - etiology of gross hematuria is unclear at this time patient has had recent cystoscopy in April 2021 which was normal   She has history of passage of blood clots in the past for which from what she describes is possible Rebeca had embolization done versus cystoscopy  - at this time will order for CT angiography of the abdomen to see if a potential bleed can be located and if so will be in touch with intervention radiology for cauterization  Ordered for IV Solu-Medrol pre CT scan for contrast allergy preparation  Risks and benefits of procedure from renal standpoint discussed with the patient  - ordered for GN workup including CAROLYN complements Anca hepatitis paraproteinemia workup  Did discuss with her potential of having cortical necrosis leading to such maryjane hematuria however time line does not appear to be consistent advised patient to stop all her NSAIDs for now    - referral for IR for renal biopsy placed will complete [place] form once results are back  - also place referral for Hematology to evaluate for bleeding as she has easy bleeding and bruising  - Acid base and lytes stable  - Clinically the patient appears to be euvolemic  - Recommend to avoid use of NSAIDs, nephrotoxins  Caution advised with regards to exposure to IV contrast dye    - Discussed with the patient in depth her renal status, including the possible etiologies for CKD  - Advised the patient that when her GFR is close to 20mL/min then will start discussing about RRT(renal replacement therapy) options such as renal transplant, peritoneal dialysis and hemodialysis  - Informed the patient about the various options for Renal Replacement therapy  - Discussed with the patient how we need to work together to delay the progression of CKD with optimal BP control based on their age and co-morbidities and trying to reduce proteinuria by the use of anti-proteinuric agents  Hypertension:  - Patient is on Norvasc 5 mg p o  q day, Lasix 40 mg p o  q day  metoprolol 25 mg p o  Q 12   - Goal BP of <  140/90 based on age and comorbidities  - Instructed to follow low sodium (2gm)diet  - depending on degree of proteinuria quantification may need to start her on ACE-inhibitor or ARB    Hemoglobin:  - Goal Hb of 10-12 g/dL  - Most recent labs suggestive of 9 9 grams/deciliter  - check iron studies and then will schedule her for IV iron infusion    CKD-MBD(Mineral Bone Disease): - Based on patients CKD stage following is the goal of therapy  - Maintain calcium phosphorus product of < 55   - Patient is currently at goal   - Continue patient on calcium carbonate plus D, vitamin D3 2000 units p o  q day, ergocalciferol 61348 units p o  q day  - check vitamin-D level to see if dosage needs adjustment since she is on multiple agents    Proteinuria:  - proteinuria most likely secondary to obesity versus underlying GN versus rheumatoid    - most recent UA with glucose, ketones, nitrite positive, proteinuria, leukocytes urine culture positive for group B strep  - most recent microalbumin to creatinine ratio of 12 as of May 2019  - check protein creatinine ratio  - currently on therapy for proteinuria with vitamin-D  - see above for details on hematuria workup    Lipids:  - goal LDL less than 70  - Management as per PCP    Rheumatoid arthritis:  - Management as per primary team  - follow-up with Rheumatology  - on abatacept, arrava, prednisone, naproxen, celecoxib, adalimumab  - advised patient to stop naproxen and celecoxib avoid NSAIDs    Nutrition/morbid obesity:  - Encouraged patient to follow a diet comprising of moderate potassium, low phosphorus and protein restriction to 0 8gm/kg  Advised of dietary habits and weight loss  - Will check serum albumin with next blood work  Followup:  - Patient is to follow-up in 3 months, with lab work to be performed after the visit and then again in a few days prior to the next visit  Advised patient to call me in 10 days to review the results if they do not hear back from me, as I may have not received the results  Pee Clemons MD, [facility], 7/14/2021, 10:43 AM             HISTORY OF PRESENT ILLNESS: 38 y o  female with history of morbid obesity, JIN on CPAP, hypertension (x5yrs),angina, epilepsy, rheumatoid arthritis ([id number]) presents to the office for evaluation and management of hematuria  Review of records shows patient has a baseline creatinine of 0 6-0 9 mg/dL dating as far back as 2018  Most recent creatinine from 07/09/2021 at 0 8 mg/dL  Records reveal patient has had significant hematuria and proteinuria dating as far back as November 2020 and occasionally even as far back as 2018 June  WBCs have also been elevated  Patient follows up routinely with Urology last cystoscopy 04/27/2021 normal   As per history patient had a history of right-sided hydronephrosis (2007) however underwent stent placement and cystoscopy at that time with resolution of hydronephrosis  Does have a smoking history  On celebrex since last yr  Naproxen just started  No other NSAIDs   No issues with pregnancy  No recent transfusions  Not seen hematologist   Never seen a nephrologist before no history of Liam I needing dialysis no history of kidney stones  History is not clear that when she had intervention in 2007 in [place] whether she had IR embolization procedure done versus cystoscopy  Patient is getting tired of noticing increasing hematuria  Just wants to find the diagnosis and treatment  Thankful for all the care information that she has gotten today      Review of Systems   Constitutional: Positive for appetite change and fatigue  Negative for chills and fever  HENT: Negative for congestion, postnasal drip and sore throat  Respiratory: Negative for cough, shortness of breath and wheezing  Cardiovascular: Negative for leg swelling  Gastrointestinal: Positive for nausea  Negative for abdominal pain, constipation, diarrhea and vomiting  Genitourinary: Positive for hematuria  Negative for difficulty urinating and dysuria  Musculoskeletal: Positive for back pain  Chronic   Skin: Negative for rash and wound  Neurological: Positive for light-headedness  Negative for dizziness and headaches  Psychiatric/Behavioral: Negative for agitation and confusion  All other systems reviewed and are negative        PAST MEDICAL HISTORY:  Past Medical History:   Diagnosis Date    Angina at rest [facility])     Anxiety     Depression     Epilepsy ([facility] [address] [facility]     Fibroid 2012    History of cardiac cath 2006    Hydronephrosis 2007    Hypertension     Migraine without aura     PTSD (post-traumatic stress disorder)     Rheumatoid arteritis ([facility] [address] )        PROBLEM LIST    Patient Active Problem List   Diagnosis    Rheumatoid myopathy with rheumatoid arthritis ([facility] [address] )    Morbid obesity ([facility] [facility] )    Essential hypertension    Depression    Nonintractable epilepsy without status epilepticus ([facility] [address] )    Anemia    Ambulatory dysfunction    Mild intermittent asthma    Stress incontinence    Hydronephrosis  Encounter for gynecological examination    Easy bruising    Irregular periods    Anxiety    Angina at rest [facility])    Constipation    Proteinuria    Carpal tunnel syndrome, bilateral    Hand numbness    Epilepsy ([address] )    Rash and nonspecific skin eruption    Non-intractable vomiting    Hematuria    Obstructive sleep apnea    Acute nonintractable headache    Stage 1 chronic kidney disease       PAST SURGICAL HISTORY:  Past Surgical History:   Procedure Laterality Date    APPENDECTOMY      CARDIAC CATHETERIZATION      CARDIAC CATHETERIZATION       SECTION  2008    CHOLECYSTECTOMY  2018   [facility]IDNEY SURGERY      TUBAL LIGATION         SOCIAL HISTORY :   reports that she quit smoking about 8 years ago  Her smoking use included cigarettes  She has quit using smokeless tobacco  She reports previous alcohol use  She reports that she does not use drugs  FAMILY HISTORY:  Family History   Problem Relation Age of Onset    Diabetes Maternal Grandmother     Hypertension Maternal Grandmother     No Known Problems Mother     No Known Problems Sister     No Known Problems Daughter     No Known Problems Paternal Grandmother     No Known Problems Daughter     Breast cancer Neg Hx     Cancer Neg Hx        ALLERGIES:  Allergies   Allergen Reactions    Codeine     Contrast [Iodinated Diagnostic Agents]     Iodine - Food Allergy     Latex     Lovenox [Enoxaparin]     Shellfish-Derived Products - Food Allergy Hives    Vaccinium Angustifolium Hives    Vicodin [Hydrocodone-Acetaminophen]     Benadryl [Diphenhydramine] Rash    Oxycontin [Oxycodone] Palpitations    Provera [Medroxyprogesterone] Palpitations           PHYSICAL EXAM:  Vitals:    21 0915   BP: 122/88   BP Location: Left arm   Patient Position: Sitting   Cuff Size: Large   Pulse: 71   Resp: 18   Weight: 114 kg (252 lb)   Height: 5' 2" (1 575 m)     Body mass index is 46 09 kg/m²      Physical Exam  Vitals reviewed  Constitutional:       General: She is not in acute distress  Appearance: Normal appearance  She is obese  She is not ill-appearing, toxic-appearing or diaphoretic  HENT:      Head: Normocephalic and atraumatic  Mouth/Throat:      Mouth: Mucous membranes are moist       Pharynx: No oropharyngeal exudate  Eyes:      General: No scleral icterus  Conjunctiva/sclera: Conjunctivae normal    Cardiovascular:      Rate and Rhythm: Normal rate  Heart sounds: Normal heart sounds  No friction rub  Pulmonary:      Effort: Pulmonary effort is normal  No respiratory distress  Breath sounds: Normal breath sounds  No wheezing  Abdominal:      General: There is no distension  Palpations: Abdomen is soft  There is no mass  Tenderness: There is no abdominal tenderness  There is no right CVA tenderness or left CVA tenderness  Musculoskeletal:         General: No swelling  Cervical back: Normal range of motion and neck supple  Skin:     General: Skin is warm  Coloration: Skin is not jaundiced  Neurological:      General: No focal deficit present  Mental Status: She is alert and oriented to person, place, and time     Psychiatric:         Mood and Affect: Mood normal          Behavior: Behavior normal            LABORATORY DATA:     Results from last 6 Months   Lab Units 07/09/21  1250 06/25/21  1222 06/15/21  1214 02/22/21  0916   WBC Thousand/uL 5 48 4 69 6 07 5 74   HEMOGLOBIN g/dL 9 9* 9 2* 8 9* 11 8   HEMATOCRIT % 32 5* 30 5* 29 9* 38 1   PLATELETS Thousands/uL 352 346 302 218   POTASSIUM mmol/L 4 0 3 7 4 0 3 6   CHLORIDE mmol/L 107 106 108 105   CO2 mmol/L 28 30 23 27   BUN mg/dL 13 11 15 12   CREATININE mg/dL 0 80 0 69 0 82 0 64   CALCIUM mg/dL 8 7 8 5 8 5 9 0   MAGNESIUM mg/dL 2 0  --  2 0  --    IRON ug/dL  --   --   --  86   FERRITIN ng/mL  --   --   --  27        rest all reviewed    RADIOLOGY:  CTA abdomen pelvis w wo contrast    (Results Pending) Rest all reviewed        MEDICATIONS:    Current Outpatient Medications:     Abatacept 125 MG/ML SOAJ, Inject under the skin, Disp: , Rfl:     Adalimumab 40 MG/0 4ML PNKT, Inject 40 mg under the skin every 14 (fourteen) days, Disp: , Rfl:     albuterol (PROVENTIL HFA,VENTOLIN HFA) 90 mcg/act inhaler, Inhale 2 puffs every 4 (four) hours as needed for wheezing, Disp: 1 Inhaler, Rfl: 0    amLODIPine (NORVASC) 5 mg tablet, Take 1 tablet (5 mg total) by mouth daily, Disp: 90 tablet, Rfl: 1    Calcium Carb-Cholecalciferol (CALCIUM 600+D3) 600-200 MG-UNIT TABS, take 1 tablet twice a day, Disp: , Rfl:     celecoxib (CeleBREX) 200 mg capsule, Take 200 mg by mouth 2 (two) times a day, Disp: , Rfl:     Cholecalciferol (VITAMIN D3) 2000 units capsule, take 1 tablet po daily  , Disp: , Rfl:     citalopram (CeleXA) 20 mg tablet, Take 1 tablet (20 mg total) by mouth daily, Disp: 90 tablet, Rfl: 1    doxepin (SINEquan) 25 mg capsule, Take 1 capsule (25 mg total) by mouth daily at bedtime, Disp: 90 capsule, Rfl: 1    Elastic Bandages & Supports (CARPAL TUNNEL WRIST DELUXE) MISC, by Does not apply route daily at bedtime, Disp: 2 each, Rfl: 0    ergocalciferol (Drisdol) 1 25 MG (67859 UT) capsule, Take 50,000 Units by mouth daily in the early morning , Disp: , Rfl:     famotidine (PEPCID) 20 mg tablet, Take 1 tablet (20 mg total) by mouth 2 (two) times a day, Disp: 30 tablet, Rfl: 0    fluticasone (FLONASE) 50 mcg/act nasal spray, 1 spray into each nostril daily, Disp: 16 g, Rfl: 5    fluticasone (FLOVENT HFA) 110 MCG/ACT inhaler, Inhale 2 puffs 2 (two) times a day Rinse mouth after use , Disp: 3 Inhaler, Rfl: 1    folic acid (FOLVITE) 1 mg tablet, every 24 hours, Disp: , Rfl:     furosemide (LASIX) 40 mg tablet, Take 1 tablet (40 mg total) by mouth daily, Disp: 90 tablet, Rfl: 1    gabapentin (NEURONTIN) 100 mg capsule, Take 1 capsule (100 mg total) by mouth 3 (three) times a day for 14 days, Disp: 42 capsule, Rfl: 0    Incontinence Supply Disposable (Poise Pad) PADS, by Does not apply route 4 (four) times a day as needed (incontinence), Disp: 90 each, Rfl: 0    leflunomide (ARAVA) 20 MG tablet, Take 20 mg by mouth daily, Disp: , Rfl:     levETIRAcetam (KEPPRA) 1000 MG tablet, Take 1 tablet (1,000 mg total) by mouth 2 (two) times a day, Disp: 60 tablet, Rfl: 5    Methenamine-Sodium Salicylate 542-444 0 MG TABS, Take 1 tablet by mouth 3 (three) times a day, Disp: 90 each, Rfl: 6    metoprolol tartrate (LOPRESSOR) 25 mg tablet, Take 1 tablet (25 mg total) by mouth every 12 (twelve) hours, Disp: 60 tablet, Rfl: 1    Multiple Vitamins-Minerals (MULTIVITAMIN WITH MINERALS) tablet, Take 1 tablet by mouth daily, Disp: 30 tablet, Rfl: 11    naproxen (NAPROSYN) 500 mg tablet, Take 1 tablet (500 mg total) by mouth 2 (two) times a day with meals, Disp: 30 tablet, Rfl: 0    nitroglycerin (NITROSTAT) 0 4 mg SL tablet, Place 1 tablet (0 4 mg total) under the tongue every 5 (five) minutes as needed for chest pain Call 911 if using 3 doses, Disp: 25 tablet, Rfl: 0    ondansetron (ZOFRAN-ODT) 4 mg disintegrating tablet, Take 1 tablet (4 mg total) by mouth every 8 (eight) hours as needed for nausea for up to 10 doses, Disp: 20 tablet, Rfl: 0    polyethylene glycol (GLYCOLAX) 17 GM/SCOOP powder, Take 17 g by mouth daily, Disp: 578 g, Rfl: 0    predniSONE 5 mg tablet, Take by mouth daily, Disp: , Rfl:     rizatriptan (MAXALT) 5 MG tablet, Take 1 tablet (5 mg total) by mouth once as needed for migraine for up to 1 dose May repeat in 2 hours if needed, Disp: 9 tablet, Rfl: 0    traMADol (ULTRAM) 50 mg tablet, Take 1 tablet (50 mg total) by mouth every 6 (six) hours as needed for moderate pain, Disp: 12 tablet, Rfl: 0    cephalexin (KEFLEX) 500 mg capsule, Take 1 capsule (500 mg total) by mouth 3 (three) times a day for 5 days (Patient not taking: Reported on 7/14/2021), Disp: 15 capsule, Rfl: 0    methylPREDNISolone (MEDROL) 32 MG tablet, Take one (1) tablet 12 hours and one (1) tablet  2 hours PRIOR to CT scan  (Patient not taking: Reported on 7/14/2021), Disp: 2 tablet, Rfl: 0    methylPREDNISolone sodium succinate 250 mg in sodium chloride 0 9 % 250 mL IVPB, Infuse 250 mg into a venous catheter as needed (prior to CT angiography), Disp: 1 Dose, Rfl: 0        Portions of the record may have been created with voice recognition software  Occasional wrong word or "sound a like" substitutions may have occurred due to the inherent limitations of voice recognition software  Read the chart carefully and recognize, using context, where substitutions have occurred  If you have any questions, please contact the dictating provider

## 2021-07-14 NOTE — PATIENT INSTRUCTIONS
- get blood work after the visit  - get CT abdomen done  - see hematology  - get kidney biopsy done   - stop celebrex and naprosen    - Please call me in 10 days after having your blood work done to review the results if you do not hear back from me or my office, as I may have not received the results  - please remember to perform blood work prior to the next visit  - Please call if the blood pressure top number is greater than 150 or less than 110 consistently  - Please call if you are gaining more than 2lbs in 2 days for adjustment of water pills   ~ Please AVOID the following pain medications  LIST OF NSAIDS (NONSTEROIDAL ANTI-INFLAMMATORY DRUGS) AND FARLEY-2 INHIBITORS    DIFLUNISAL (DOLOBID)  IBUPROFEN (MOTRIN, ADVIL)  FLURBIPROFEN (ANSAID)  KETOPROFEN (ORUDIS, ORUVAIL)  FENOPROFEN (NALFON)  NABUMETONE (RELAFEN)  PIROXICAM (FELDENE)  NAPROXEN (ALEVE, NAPROSYN, NAPRELAN, ANAPROX)  DICLOFENAC (VOLTAREN, CATAFLAM)  INDOMETHACIN (INDOCIN)  SULINDAC (CLINORIL)  TOLMETIN (TOLETIN)  ETODOLAC (LODINE)  MELOXICAM (MOBIC)  KETOROLAC (TORADOL)  OXAPROZIN (DAYPRO)  CELECOXIB (CELEBREX)    Things to do to reduce your blood pressure include working with all your physician to do the following:  ~ stop smoking if you smoke  ~ increase cardiovascular exercise like walking and swimming    ~ modify your diet to decrease fat and salt intake  ~ reduce your weight if you are overweight or obese   ~ increase the consumption of fruits, vegetables and whole grains  ~ decrease alcohol consumption if you consume alcohol    ~ try to minimize stress in your life with lifestyle modifications  ~ be compliant with your anti-hypertensive medications  ~ adjust your medications to help improve your vascular stiffness and decrease risks for heart attacks and strokes  Exercise to Lose Weight     Exercise and a healthy diet may help you lose weight  Your doctor may suggest specific exercises       EXERCISE IDEAS AND TIPS      Choose low-cost things you enjoy doing, such as walking, bicycling, or exercising to workout videos   Take stairs instead of the elevator   Walk during your lunch break   Park your car further away from work or school   Go to a gym or an exercise class   Start with 5 to 10 minutes of exercise each day  Build up to 30 minutes of exercise 4 to 6 days a week   Wear shoes with good support and comfortable clothes   Stretch before and after working out   Work out until you breathe harder and your heart beats faster   Drink extra water when you exercise   Do not do so much that you hurt yourself, feel dizzy, or get very short of breath  Exercises that burn about 150 calories:    Running 1 ½ miles in 15 minutes   Playing volleyball for 45 to 60 minutes   Washing and waxing a car for 45 to 60 minutes   Playing touch football for 45 minutes   Walking 1 ¾ miles in 35 minutes   Pushing a stroller 1 ½ miles in 30 minutes   Playing basketball for 30 minutes   Raking leaves for 30 minutes   Bicycling 5 miles in 30 minutes   Walking 2 miles in 30 minutes   Dancing for 30 minutes   Shoveling snow for 15 minutes   Swimming laps for 20 minutes   Walking up stairs for 15 minutes   Bicycling 4 miles in 15 minutes   Gardening for 30 to 45 minutes   Jumping rope for 15 minutes  Washing windows or floors for 45 to 60 minutes

## 2021-07-14 NOTE — TELEPHONE ENCOUNTER
Called and spoke to patient with regards to some of the blood work from 07/14/2021 significantly iron deficient  Arranging for Feraheme infusion at [facility]  Vitamin-D levels are running low she is still not sure exact dosages of her vitamin-D pills at home she will check them and she will let us know  Therapy Plan placed and will make arrangements for Feraheme infusions for next week  She prefers Thursday if possible since that is usually her day off

## 2021-07-15 ENCOUNTER — TELEPHONE (OUTPATIENT)
Dept: NEPHROLOGY | Facility: CLINIC | Age: 41
End: 2021-07-15

## 2021-07-15 LAB
DSDNA AB SER-ACNC: <1 IU/ML (ref 0–9)
KAPPA LC FREE SER-MCNC: 29.3 MG/L (ref 3.3–19.4)
KAPPA LC FREE/LAMBDA FREE SER: 0.96 {RATIO} (ref 0.26–1.65)
LAMBDA LC FREE SERPL-MCNC: 30.5 MG/L (ref 5.7–26.3)

## 2021-07-15 NOTE — TELEPHONE ENCOUNTER
Pattie from [employer] called stating that a prior authorization is required for patients feraheme infusion scheduled on 7/27

## 2021-07-16 ENCOUNTER — DOCUMENTATION (OUTPATIENT)
Dept: NEPHROLOGY | Facility: CLINIC | Age: 41
End: 2021-07-16

## 2021-07-16 ENCOUNTER — TELEPHONE (OUTPATIENT)
Dept: NEPHROLOGY | Facility: CLINIC | Age: 41
End: 2021-07-16

## 2021-07-16 DIAGNOSIS — R31.0 GROSS HEMATURIA: ICD-10-CM

## 2021-07-16 LAB
ALBUMIN SERPL ELPH-MCNC: 3.59 G/DL (ref 3.5–5)
ALBUMIN SERPL ELPH-MCNC: 49.8 % (ref 52–65)
ALBUMIN UR ELPH-MCNC: 62.9 %
ALPHA1 GLOB MFR UR ELPH: 0 %
ALPHA1 GLOB SERPL ELPH-MCNC: 0.36 G/DL (ref 0.1–0.4)
ALPHA1 GLOB SERPL ELPH-MCNC: 5 % (ref 2.5–5)
ALPHA2 GLOB MFR UR ELPH: 37.1 %
ALPHA2 GLOB SERPL ELPH-MCNC: 0.68 G/DL (ref 0.4–1.2)
ALPHA2 GLOB SERPL ELPH-MCNC: 9.4 % (ref 7–13)
B-GLOBULIN MFR UR ELPH: 0 %
BETA GLOB ABNORMAL SERPL ELPH-MCNC: 0.6 G/DL (ref 0.4–0.8)
BETA1 GLOB SERPL ELPH-MCNC: 8.3 % (ref 5–13)
BETA2 GLOB SERPL ELPH-MCNC: 6.8 % (ref 2–8)
BETA2+GAMMA GLOB SERPL ELPH-MCNC: 0.49 G/DL (ref 0.2–0.5)
GAMMA GLOB ABNORMAL SERPL ELPH-MCNC: 1.49 G/DL (ref 0.5–1.6)
GAMMA GLOB MFR UR ELPH: 0 %
GAMMA GLOB SERPL ELPH-MCNC: 20.7 % (ref 12–22)
GBM AB SER IA-ACNC: 4 UNITS (ref 0–20)
IGG/ALB SER: 0.99 {RATIO} (ref 1.1–1.8)
MYELOPEROXIDASE AB SER IA-ACNC: <9 U/ML (ref 0–9)
PROT PATTERN SERPL ELPH-IMP: ABNORMAL
PROT PATTERN UR ELPH-IMP: ABNORMAL
PROT SERPL-MCNC: 7.2 G/DL (ref 6.4–8.2)
PROT UR-MCNC: 215 MG/DL
PROTEINASE3 AB SER IA-ACNC: 5.3 U/ML (ref 0–3.5)
RYE IGE QN: NEGATIVE
STREP DNASE B SER-ACNC: 226 U/ML (ref 0–120)

## 2021-07-16 RX ORDER — METHYLPREDNISOLONE 32 MG/1
TABLET ORAL
Qty: 2 TABLET | Refills: 0 | Status: SHIPPED | OUTPATIENT
Start: 2021-07-16

## 2021-07-16 NOTE — PROGRESS NOTES
Called by radiology as they were unable to give IV Solu-Medrol prior to CT scan for contrast allergy preferred method is p o  steroids  Order placed for Medrol 32 mg to take 1 tablet 12 hours prior to CT scan and 1 tablet 2 hours prior to CT scan  Radiology will call patient and go over these medication instructions with the patient scripts sent over to patient's pharmacy

## 2021-07-16 NOTE — TELEPHONE ENCOUNTER
Received a call from Giana with [address] [place] [address] Radiology stating that mutual patient has an upcoming appointment for a CTA w/wo contrast  She stated patient previously had an allergic reaction to contrast and had an IV steroid medication substitution  Giana requested an order for an oral steroid allergy prep rather than IV      Call back number for Giana is [phone]

## 2021-07-16 NOTE — TELEPHONE ENCOUNTER
Prior authorizations has been initiated of Feraheme infusions day 1 and 8       Reference # : [id number]

## 2021-07-19 ENCOUNTER — TELEPHONE (OUTPATIENT)
Dept: NEPHROLOGY | Facility: CLINIC | Age: 41
End: 2021-07-19

## 2021-07-19 DIAGNOSIS — R31.0 GROSS HEMATURIA: Primary | ICD-10-CM

## 2021-07-19 LAB
C-ANCA TITR SER IF: ABNORMAL TITER
MYELOPEROXIDASE AB SER IA-ACNC: <9 U/ML (ref 0–9)
P-ANCA ATYPICAL TITR SER IF: ABNORMAL TITER
P-ANCA TITR SER IF: ABNORMAL TITER
PROTEINASE3 AB SER IA-ACNC: 5.2 U/ML (ref 0–3.5)

## 2021-07-19 NOTE — PROGRESS NOTES
Had a detailed discussion with peer to peer with regards to getting CT angio for this patient  They report that they need a negative urine culture prior to them improving the CT angio  I did explain to them in depth this initial need for the CT angio to rule out source for bleed for this patient  She has not have any CT angio in the past   Prior CT scans have been without contrast   Will have medical assistant contact patient to get urine culture done stat  Order has been placed  Also advised patient if she has significant worsening in gross hematuria then she should report to the emergency room for further workup

## 2021-07-19 NOTE — TELEPHONE ENCOUNTER
Prior authorization has been approved for Feraheme from 7/22/21 - 8/22/21    Awaiting authorization number  Authorization has been scanned into chart         Peer to peer is needed for patient's CT scan at : [phone]  Tracking Number : [id number]

## 2021-07-19 NOTE — TELEPHONE ENCOUNTER
----- Message from Kerry Aggarwal MD sent at 7/19/2021 12:29 PM EDT -----  Regarding: Call patient  Can you please call the patient and inform them to go get a stat urine culture already placed the order    They will not approve the CT scan unless her urine cultures negative I tried my best   Also advised the patient that if her hematuria is significantly worse than what she was with when she saw me and if she can not wait then to go to the emergency room for further workup and may be they may do the scan there thanks

## 2021-07-20 ENCOUNTER — LAB (OUTPATIENT)
Dept: LAB | Facility: HOSPITAL | Age: 41
End: 2021-07-20
Attending: INTERNAL MEDICINE
Payer: COMMERCIAL

## 2021-07-20 ENCOUNTER — TELEPHONE (OUTPATIENT)
Dept: HEMATOLOGY ONCOLOGY | Facility: CLINIC | Age: 41
End: 2021-07-20

## 2021-07-20 DIAGNOSIS — R31.0 GROSS HEMATURIA: ICD-10-CM

## 2021-07-20 PROCEDURE — 87086 URINE CULTURE/COLONY COUNT: CPT

## 2021-07-20 NOTE — TELEPHONE ENCOUNTER
New Patient Request   Patient Details:     Teresa Pollard      1980      [id number]      Reason for Appointment   Who is calling to schedule? Patient   If not Patient, what is their name? Fany Gore   What is the diagnosis? R31 0 (ICD-10-CM) - Gross hematuria   R80 1 (ICD-10-CM) - Persistent proteinuria   E66 01 (ICD-10-CM) - Morbid obesity ([facility] [address] )   I10 (ICD-10-CM) - Essential hypertension   N18 1 (ICD-10-CM) - Stage 1 chronic kidney disease   D50 0 (ICD-10-CM) - Iron deficiency anemia due to chronic blood loss      Who is the referring doctor? Luann Rodriguez MD   Scheduling Information   Which department are you scheduling with ? Medical Onc   Preferred [facility]   Best Number to call back on? If calling from the [facility], use the Nurse number   [phone]   Miscellaneous Information:     Please advise the patient, a new patient  will be calling them back within 1 business day

## 2021-07-20 NOTE — TELEPHONE ENCOUNTER
Spoke with patient  She will be going this afternoon for urine culture  Pt will be having this completed at [address] [facility]  Pt states she is still having gross hematuria on a daily basis which is the same from when she was initially seen in the office  Pt advised that should it worsen before her CT she should report to the ED  I will be CC Dr Giovanni Altamirano MA on this message to keep her informed and to be on the look out for culture results  Once received Dr Karlie Adams will need to do another peer to peer review to determine coverage

## 2021-07-21 ENCOUNTER — TELEPHONE (OUTPATIENT)
Dept: NEPHROLOGY | Facility: CLINIC | Age: 41
End: 2021-07-21

## 2021-07-21 NOTE — TELEPHONE ENCOUNTER
Spoke with IR  for patient's renal biopsy and unable to get in touch with IR nurse to inform them that patient's PR3 came back positive  IR  has sent TT to nurse relaying result  Result also faxed to IR

## 2021-07-22 ENCOUNTER — DOCUMENTATION (OUTPATIENT)
Dept: NEPHROLOGY | Facility: CLINIC | Age: 41
End: 2021-07-22

## 2021-07-22 LAB — BACTERIA UR CULT: NORMAL

## 2021-07-22 NOTE — PROGRESS NOTES
Called and participated in peer to peer with regards to CT angio approval  Requested MA to submit urine culture results to insurance  Hopeful once they receive the results today they will approve CTA scheduled for tomorrow   D/w physician the case and need for CTA

## 2021-07-23 ENCOUNTER — TELEPHONE (OUTPATIENT)
Dept: NEPHROLOGY | Facility: CLINIC | Age: 41
End: 2021-07-23

## 2021-07-23 ENCOUNTER — TELEPHONE (OUTPATIENT)
Dept: HEMATOLOGY ONCOLOGY | Facility: CLINIC | Age: 41
End: 2021-07-23

## 2021-07-23 NOTE — TELEPHONE ENCOUNTER
Spoke with [employer] prior auth line  Unfortunately CTA currently denied  Requested determination once labs are received on their end  Peer to peer has already been made with Dr Tianna Smith on 7/22  DARYL requesting another peer to peer  Therefor, CTA is cancelled for today  Patient made aware and ensured we will be in touch on Monday with any updates       [facility] # : [phone]  Tracking ID : [id number]  Will attempt on Monday and resend results to  Fax number [phone]

## 2021-07-23 NOTE — PRE-PROCEDURE INSTRUCTIONS
Phone Consult completed:Pre procedure instructions for Kidney biopsy reviewed with verbal understanding  Allergies,meds,NPO, and ride  Approximate arrival time given,SDS phone call evening before procedure  Covid vaccine completed

## 2021-07-23 NOTE — TELEPHONE ENCOUNTER
Patrizia from [place] [address] called regarding patient's test she has scheduled today, Dr Rishi Mitchell did a peer to peer and she stated the urine culture results were supposed to be sent to [facility], she would like to know if they were sent over and if we knew if it was approved

## 2021-07-23 NOTE — TELEPHONE ENCOUNTER
New Patient Encounter    New Patient Intake Form   Patient Details:  Bridget Sun  1980  [id number]    Background Information:  [address] starts by opening a telephone encounter and gathering the following information   Who is calling to schedule? If not self, relationship to patient? Patient   Referring Provider Dr Ekta Burnett   What is the diagnosis? Gross hematuria / Persistent proteinuria /obesity Iron deficiency anemia due to chronic blood loss   Is this Cancer or Non-Cancer? Non-Cancer   Is this diagnosis confirmed? Yes   When was the diagnosis? 7/2021   Is there a confirmed diagnosis from a biopsy/tissue reviewed by pathology? NA   Were outside slides requested? NA   Is patient aware of diagnosis? Yes   Is there a personal history and what kind? No   Is there a family history and what kind? No   Reason for visit? New Diagnosis   Have you had any imaging or labs done? If so: when, where? yes  [facility]   Are records in [email or web address]? yes   If patient has a prior history of cancer were old records obtained? NA   Was the patient told to bring a disk? No   Does the patient smoke or Vape? No   If yes, how many packs or cartridges per day? Scheduling Information:   Preferred [place]:  [place]     Are there any dates/time the patient cannot be seen? Miscellaneous:    After completing the above information, please route to Financial Counselor and the appropriate Nurse Navigator for review

## 2021-07-27 ENCOUNTER — HOSPITAL ENCOUNTER (OUTPATIENT)
Dept: INFUSION CENTER | Facility: CLINIC | Age: 41
Discharge: HOME | End: 2021-07-27
Payer: COMMERCIAL

## 2021-07-27 VITALS
RESPIRATION RATE: 18 BRPM | TEMPERATURE: 98.9 F | HEART RATE: 90 BPM | DIASTOLIC BLOOD PRESSURE: 83 MMHG | SYSTOLIC BLOOD PRESSURE: 143 MMHG

## 2021-07-27 DIAGNOSIS — D50.0 IRON DEFICIENCY ANEMIA DUE TO CHRONIC BLOOD LOSS: Primary | ICD-10-CM

## 2021-07-27 DIAGNOSIS — R31.0 GROSS HEMATURIA: ICD-10-CM

## 2021-07-27 PROCEDURE — 96365 THER/PROPH/DIAG IV INF INIT: CPT

## 2021-07-27 RX ORDER — SODIUM CHLORIDE 9 MG/ML
20 INJECTION, SOLUTION INTRAVENOUS ONCE
Status: COMPLETED | OUTPATIENT
Start: 2021-07-27 | End: 2021-07-27

## 2021-07-27 RX ORDER — SODIUM CHLORIDE 9 MG/ML
20 INJECTION, SOLUTION INTRAVENOUS ONCE
Status: CANCELLED | OUTPATIENT
Start: 2021-08-03

## 2021-07-27 RX ADMIN — SODIUM CHLORIDE 20 ML/HR: 0.9 INJECTION, SOLUTION INTRAVENOUS at 11:12

## 2021-07-27 RX ADMIN — FERUMOXYTOL 510 MG: 510 INJECTION INTRAVENOUS at 11:12

## 2021-07-27 NOTE — PROGRESS NOTES
Pt arrived to unit without complaint, tolerated Feraheme infusion without any adverse reaction  Pt left unit in stable condition without question or concern, AVS provided

## 2021-07-27 NOTE — TELEPHONE ENCOUNTER
Received fax for feraheme intravenous solution 510 MG/17ML a quantity of 34 mL for 14 days with 0 refills has been approved for 1 month from 07/22/2021 to 08/22/2021  Authorization number: [id number]  I will send approval to right fax as well  Thank you

## 2021-07-29 ENCOUNTER — HOSPITAL ENCOUNTER (OUTPATIENT)
Dept: RADIOLOGY | Facility: HOSPITAL | Age: 41
Discharge: HOME | End: 2021-07-29
Attending: STUDENT IN AN ORGANIZED HEALTH CARE EDUCATION/TRAINING PROGRAM | Admitting: RADIOLOGY
Payer: COMMERCIAL

## 2021-07-29 VITALS
RESPIRATION RATE: 18 BRPM | HEART RATE: 66 BPM | BODY MASS INDEX: 45.82 KG/M2 | HEIGHT: 62 IN | OXYGEN SATURATION: 100 % | WEIGHT: 249 LBS | SYSTOLIC BLOOD PRESSURE: 164 MMHG | DIASTOLIC BLOOD PRESSURE: 87 MMHG | TEMPERATURE: 98.3 F

## 2021-07-29 DIAGNOSIS — N18.1 STAGE 1 CHRONIC KIDNEY DISEASE: ICD-10-CM

## 2021-07-29 LAB
INR PPP: 1.07 (ref 0.84–1.19)
PROTHROMBIN TIME: 13.9 SECONDS (ref 11.6–14.5)

## 2021-07-29 PROCEDURE — 88300 SURGICAL PATH GROSS: CPT | Performed by: PATHOLOGY

## 2021-07-29 PROCEDURE — 99152 MOD SED SAME PHYS/QHP 5/>YRS: CPT | Performed by: RADIOLOGY

## 2021-07-29 PROCEDURE — 77012 CT SCAN FOR NEEDLE BIOPSY: CPT | Performed by: RADIOLOGY

## 2021-07-29 PROCEDURE — 88350 IMFLUOR EA ADDL 1ANTB STN PX: CPT | Performed by: INTERNAL MEDICINE

## 2021-07-29 PROCEDURE — 99153 MOD SED SAME PHYS/QHP EA: CPT

## 2021-07-29 PROCEDURE — 50200 RENAL BIOPSY PERQ: CPT | Performed by: RADIOLOGY

## 2021-07-29 PROCEDURE — 50200 RENAL BIOPSY PERQ: CPT

## 2021-07-29 PROCEDURE — 88346 IMFLUOR 1ST 1ANTB STAIN PX: CPT | Performed by: INTERNAL MEDICINE

## 2021-07-29 PROCEDURE — 77012 CT SCAN FOR NEEDLE BIOPSY: CPT

## 2021-07-29 PROCEDURE — 88313 SPECIAL STAINS GROUP 2: CPT | Performed by: INTERNAL MEDICINE

## 2021-07-29 PROCEDURE — 85610 PROTHROMBIN TIME: CPT | Performed by: STUDENT IN AN ORGANIZED HEALTH CARE EDUCATION/TRAINING PROGRAM

## 2021-07-29 PROCEDURE — 88305 TISSUE EXAM BY PATHOLOGIST: CPT | Performed by: INTERNAL MEDICINE

## 2021-07-29 PROCEDURE — 99152 MOD SED SAME PHYS/QHP 5/>YRS: CPT

## 2021-07-29 PROCEDURE — 88348 ELECTRON MICROSCOPY DX: CPT | Performed by: INTERNAL MEDICINE

## 2021-07-29 RX ORDER — FENTANYL CITRATE 50 UG/ML
INJECTION, SOLUTION INTRAMUSCULAR; INTRAVENOUS CODE/TRAUMA/SEDATION MEDICATION
Status: COMPLETED | OUTPATIENT
Start: 2021-07-29 | End: 2021-07-29

## 2021-07-29 RX ORDER — SODIUM CHLORIDE 9 MG/ML
75 INJECTION, SOLUTION INTRAVENOUS CONTINUOUS
Status: DISCONTINUED | OUTPATIENT
Start: 2021-07-29 | End: 2021-07-29 | Stop reason: HOSPADM

## 2021-07-29 RX ORDER — MIDAZOLAM HYDROCHLORIDE 2 MG/2ML
INJECTION, SOLUTION INTRAMUSCULAR; INTRAVENOUS CODE/TRAUMA/SEDATION MEDICATION
Status: COMPLETED | OUTPATIENT
Start: 2021-07-29 | End: 2021-07-29

## 2021-07-29 RX ORDER — ACETAMINOPHEN 325 MG/1
650 TABLET ORAL ONCE
Status: COMPLETED | OUTPATIENT
Start: 2021-07-29 | End: 2021-07-29

## 2021-07-29 RX ADMIN — FENTANYL CITRATE 50 MCG: 50 INJECTION INTRAMUSCULAR; INTRAVENOUS at 13:30

## 2021-07-29 RX ADMIN — FENTANYL CITRATE 50 MCG: 50 INJECTION INTRAMUSCULAR; INTRAVENOUS at 13:08

## 2021-07-29 RX ADMIN — FENTANYL CITRATE 50 MCG: 50 INJECTION INTRAMUSCULAR; INTRAVENOUS at 13:15

## 2021-07-29 RX ADMIN — MIDAZOLAM 1 MG: 1 INJECTION INTRAMUSCULAR; INTRAVENOUS at 13:36

## 2021-07-29 RX ADMIN — ACETAMINOPHEN 650 MG: 325 TABLET, FILM COATED ORAL at 18:32

## 2021-07-29 RX ADMIN — FENTANYL CITRATE 50 MCG: 50 INJECTION INTRAMUSCULAR; INTRAVENOUS at 13:36

## 2021-07-29 RX ADMIN — MIDAZOLAM 1 MG: 1 INJECTION INTRAMUSCULAR; INTRAVENOUS at 13:15

## 2021-07-29 RX ADMIN — MIDAZOLAM 1 MG: 1 INJECTION INTRAMUSCULAR; INTRAVENOUS at 13:08

## 2021-07-29 NOTE — BRIEF OP NOTE (RAD/CATH)
INTERVENTIONAL RADIOLOGY PROCEDURE NOTE    Date: 7/29/2021    Procedure: IR BIOPSY KIDNEY COLUMBIA KIT NO LATERALITY    Preoperative diagnosis:   1  Stage 1 chronic kidney disease         Postoperative diagnosis: Same  Surgeon: Magdalena Cruz MD     Assistant: None  No qualified resident was available  Blood loss: Minimal    Specimens: 4, 18 G cores     Findings: Non-target left kidney biopsy    Complications: None immediate      Anesthesia: conscious sedation

## 2021-07-29 NOTE — DISCHARGE INSTRUCTIONS
Percutaneous Kidney Biopsy   WHAT YOU NEED TO KNOW:   A percutaneous kidney biopsy is a procedure to remove a small sample of kidney tissue  It may also be done to check for kidney disease or cancer  DISCHARGE INSTRUCTIONS:   Follow up with your healthcare provider as directed:  Write down your questions so you remember to ask them during your visits  Wound care: The Band-Aid may be removed in 24 hours  For more information:   · National Kidney and Urologic Diseases Information Clearinghouse  [place] [address] [address]  Phone: [phone] [phone]  Web Address: http://kidney  niddk nih gov/   Care after your procedure:    1  Limit your activities for 36 hours after your biopsy  2  No driving day of biopsy  3  Return to your normal diet  Flat sips of flat soda helps with mild nausea  4  Remove band-aid or dressing 24 hours after procedure  Contact Interventional Radiology at [phone]    [facility] PATIENTS: Contact Interventional Radiology at [phone]) [facility] Maguire PATIENTS: Contact Interventional Radiology at [phone]) if:    1  Difficulty breathing, nausea or vomiting  2  You feel weak or dizzy  3  Chills or fever above 101 degrees F  You have persistent nausea or vomiting    4  You have severe pain in your abdomen or where You feel weak or dizzy  your           procedure was done  5  You have blood in your urine  You urinate small amounts or not at all  4  Develop any redness, swelling, heat, unusual drainage, heavy bruising or bleeding     from biopsy site  Moderate Sedation   WHAT YOU NEED TO KNOW:   Moderate sedation, or conscious sedation, is medicine used during procedures to help you feel relaxed and calm  You will be awake and able to follow directions without anxiety or pain  You will remember little to none of the procedure   You may feel tired, weak, or unsteady on your feet after you get sedation  You may also have trouble concentrating or short-term memory loss  These symptoms should go away in 24 hours or less  DISCHARGE INSTRUCTIONS:   Call 911 or have someone else call for any of the following:   · You have sudden trouble breathing  · You cannot be woken  Seek care immediately if:   · You have a severe headache or dizziness  · Your heart is beating faster than usual   Contact your healthcare provider if:   · You have a fever  · You have nausea or are vomiting for more than 8 hours after the procedure  · Your skin is itchy, swollen, or you have a rash  · You have questions or concerns about your condition or care  Self-care:   · Have someone stay with you for 24 hours  This person can drive you to errands and help you do things around the house  This person can also watch for problems  · Rest and do quiet activities for 24 hours  Do not exercise, ride a bike, or play sports  Stand up slowly to prevent dizziness and falls  Take short walks around the house with another person  Slowly return to your usual activities the next day  · Do not drive or use dangerous machines or tools for 24 hours  You may injure yourself or others  Examples include a lawnmower, saw, or drill  Do not return to work for 24 hours if you use dangerous machines or tools for work  · Do not make important decisions for 24 hours  For example, do not sign important papers or invest money  · Drink liquids as directed  Liquids help flush the sedation medicine out of your body  Ask how much liquid to drink each day and which liquids are best for you  · Eat small, frequent meals to prevent nausea and vomiting  Start with clear liquids such as juice or broth  If you do not vomit after clear liquids, you can eat your usual foods  · Do not drink alcohol or take medicines that make you drowsy   This includes medicines that help you sleep and anxiety medicines  Ask your healthcare provider if it is safe for you to take pain medicine  Follow up with your healthcare provider as directed: Write down your questions so you remember to ask them during your visits  © 2017 [address] Information is for End User's use only and may not be sold, redistributed or otherwise used for commercial purposes  All illustrations and images included in CareNotes® are the copyrighted property of [employer]  or [employer] Jordan  The above information is an  only  It is not intended as medical advice for individual conditions or treatments  Talk to your doctor, nurse or pharmacist before following any medical regimen to see if it is safe and effective for you

## 2021-07-29 NOTE — SEDATION DOCUMENTATION
Left kidney biopsy completed by Dr Jose Greco without complications  Pt tolerated well  Report called to CW2

## 2021-07-29 NOTE — INTERVAL H&P NOTE
Update: (This section must be completed if the H&P was completed greater than 24 hrs to procedure or admission)    H&P reviewed  After examining the patient, I find no changed to the H&P since it had been written  Patient re-evaluated   Accept as history and physical     Saida Blakely MD/July 29, 2021/12:51 PM

## 2021-07-31 NOTE — PROGRESS NOTES
Hematology Outpatient Office Note    Date of Service: 8/2/2021    [facility] HEMATOLOGY SPECIALISTS [place] [address]  [phone]    Reason for Consultation:   Chief Complaint   Patient presents with    Follow-up       Referral Physician: Cristian Bang MD    Primary Care Physician:  Kayode Cheatham PA-C     Nickname: Alejo Roman      ASSESSMENT & PLAN      Diagnosis ICD-10-CM Associated Orders   1  Iron deficiency anemia due to chronic blood loss  D50 0 Ambulatory referral to Hematology / Oncology     Beta-2 glycoprotein antibodies     Lupus anticoagulant     Cardiolipin antibody     APTT     Protime-INR     Von Willebrand Comprehensive Panel     Factor 8 activity     Fibrinogen     VON WILLEBRAND FACTOR COLLAGEN BINDING ASSAY     CBC and differential   2  Gross hematuria  R31 0 Ambulatory referral to Hematology / Oncology     Beta-2 glycoprotein antibodies     Lupus anticoagulant     Cardiolipin antibody     APTT     Protime-INR     Von Willebrand Comprehensive Panel     Factor 8 activity     Fibrinogen     VON WILLEBRAND FACTOR COLLAGEN BINDING ASSAY   3  Persistent proteinuria  R80 1 Ambulatory referral to Hematology / Oncology   4  Morbid obesity ([address] )  E66 01 Ambulatory referral to Hematology / Oncology   5  Essential hypertension  I10 Ambulatory referral to Hematology / Oncology   6  Stage 1 chronic kidney disease  N18 1 Ambulatory referral to Hematology / Oncology   7   Easy bruising  R23 8 Beta-2 glycoprotein antibodies     Lupus anticoagulant     Cardiolipin antibody     APTT     Protime-INR     Von Willebrand Comprehensive Panel     Factor 8 activity     Fibrinogen     VON WILLEBRAND FACTOR COLLAGEN BINDING ASSAY         This is a 38 y o  being seen in consultation for   Chief Complaint   Patient presents with    Follow-up   Easy bruising    · Discussion of decision making  Today we focused on easy bruising: this is an undiagnosed new problem with uncertain prognosis     Iron Deficiency anemia:  2/2 prolong history of gross hematuria that is being worked up by rheumatology, urology, and nephrology  Patient also has proteinuria that is being worked up by nephrology  Patient is on multiple immunosuppressives agents for Rheumtoid arthritis (abatacept, arrava, prednisone, naproxen, celecoxib, adalimumab)  Any of these medication contributing versus an undiagnosed autoimmune condition? Easy bruising and bleeding: PTT 49 (elevated) on 4/9/2021  PT/INR WNL today  High suspicion for factor deficiency versus Von Willebrand Disease   Cardiolipin testing negative 9/10/2018    VWF antigen: levels influenced by ABO type (lowest levels in Type O)  Levels may be elevated by OCP, pregnancy, liver disease, inflammation, exercise, stress, traumatic venipuncture, post op this makes diagnosis difficult in the acute setting  Acquired vWD is a concern in patients who have no prior history of bleeding and are of advanced age  Causes of AvWD include lymphoproliferative disorders (lymphoma, MGUS, myeloma) in ~30%, lymphoma, SLE, aortic valve disease or LVAD usage  Flood et al (Blood 2010) found that  Americans were over represented as having Type 2M VWD on the basis of decreased R:Co activity  They found that SNPs in the AA population resulted in intact VWF-collagen binding, but impaired binding when exposed to ristocetin as a lab artifact  In particular Exon 28 polymorphism p [id number] was identified in 62% of  Americans, yet in only 17% of Caucasians  VWD Type 2N can clinically present as hemophilia A, with low FVIII activity  The VWF-FVIII binding tests are special sendouts via quest    ________________________________________________________  Pre-test VWD: (5 points for this patient )    vWD [place] Bleeding Assessment Tool: Males  ? 3 and Females  ? 5 had 70% sensitivity and 98% specific for vWD type 1      I personally reviewed the following lab results, the image studies, pathology, other specialty/physicians consult notes and recommendations, and outside medical  I had a lengthy discussion with the patient and shared the work-up findings  We discussed the diagnosis and management plan as below  I spent 46 minutes reviewing the records (labs, clinician notes, outside records, medical history, ordering medicine/tests/procedures, interpreting the imaging/labs previously done) and coordination of care as well as direct time with the patient today, of which greater than 50% of the time was spent in counseling and coordination of care with the patient/family  · Plan/Labs  · Repeat CBC with diff due to recent blood loss  · Beta-2 glycoprotein IgG/IgM, Lupus Anti-coagulant, Cardiolipin Ab  · PTT, PT, Von willebrand testing, Factor VIII, fibrinogen  -fibrinogen and below VWD panel + VWF collagen binding activity:  --VWF:ag  --VWF:Rco  --VWF collagen binding activity  --vWF multimer analysis      Follow Up    All questions were answered to the patient's satisfaction during this encounter  The patient knows the contact information for our office and knows to reach out for any relevant concerns related to this encounter  For all other listed problems and medical diagnosis in their chart - they are managed by PCP and/or other specialists, which the patient acknowledges  Thank you very much for your consultation and making us a part of this patient's care  We are continuing to follow closely with you  Please do not hesitate to reach out to me with any additional questions or concerns  Berkley Alvarez MD  Hematology & Medical Oncology Staff Physician             Disclaimer: This document was prepared using [employer] Direct technology  If a word or phrase is confusing, or does not make sense, this is likely due to recognition error which was not discovered during this clinician's review   If you believe an error has occurred, please contact me through [facility] service line for devin? 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   [employer]  Pain: 7/10 at renal biopsy site      Epistaxis   [x] 0 No, or less than 5 minutes  [] 1 >5, or more than 10 minutes  [] 2 Consultation only  [] 3 Packing, cautery, or antifibrinolytics  [] 4 Blood transfusion, replacement therapy, or desmopressin     Oral Cavity   [] 0 No  [x] 1 At least one  [] 2 Consultation only  [] 3 Surgical hemostasis or antifibrinolytics  [] 4 Blood transfusion, replacement therapy, or desmopressin     Surgical   [] -1 No bleeding in at least 2 surgeries  [x]  0 Not done, or no bleeding in 1 surgery  []  1 Reported in <25% of surgeries  []  2 Reported in >25% of surgeries, no intervention  []  3 Surgical hemostasis or antifibrinolytics  []  4 Blood transfusion, replacement therapy, or desmopressin     Muscle Hematoma   [] 0 Never  [] 1 Post trauma, no therapy  [x] 2 Spontaneous, no therapy  [] 3 Spontaneous or traumatic, desmopressin or replacement therapy  [] 4 Spontaneous or traumatic, requiring surgery or blood transufsion     Cutaneous   [] 0 No, or trivial <1cm  [x] 1 >1cm and no trauma  [] 2 Consultation     GI bleeding   [x] 0 No  [] 1 Assoc with ulcer, portal HTN, hemorrhoid, AVM  [] 2 Spontaneous  [] 3 surgical hemostasis, blood, replacement therapy, antifibrinolytics, or desmopressin     Menorrhagia   [] 0 No  [x] 1 Consultation only  [] 2 Antifibrinolytics or pill use  [] 3 Curettage or iron therapy  [] 4 Blood transfusion, factor replacement, desmopressin, or hysterectomy ________________________________________________________  However, last menstrual period was last October  She had hematuria 7 years ago that was determined to be "blood clots from her kidney"  Clottin years ago she developed in one of her lower extremities  Not sure if it was provoked or not  She went on Lovenox and then changed to ASA 81mg due to "hives" on Lovenox  SUBJECTIVE     Denies F/C, N/V, SOB, CP, abd pain  She has been having gross hematuria every time she urinates and this is being worked up  I have reviewed the relevant past medical, surgical, social and family history  I have also reviewed allergies and medications for this patient  Review of Systems       A 10-point review of system was performed, pertinent positive and negative were detailed as above  Otherwise, the 10-point review of system was negative        Past Medical History:   Diagnosis Date    Angina at rest [facility])     Anxiety     Bipolar 1 disorder ([facility] )     Chronic kidney disease     Depression     Epilepsy ([place] [address] )     Fibroid 2012    Hematuria     History of cardiac cath 2006    Hydronephrosis 2007    Hypertension     Migraine without aura     PTSD (post-traumatic stress disorder)     Rheumatoid arteritis ([place] [address] )        Past Surgical History:   Procedure Laterality Date    APPENDECTOMY      CARDIAC CATHETERIZATION  2006    CARDIAC CATHETERIZATION       SECTION  2008    CHOLECYSTECTOMY  2018    IR BIOPSY KIDNEY COLUMBIA KIT NO LATERALITY  2021    KIDNEY SURGERY  2007    TUBAL LIGATION  2008       Family History   Problem Relation Age of Onset    Diabetes Maternal Grandmother     Hypertension Maternal Grandmother     No Known Problems Mother     No Known Problems Sister     No Known Problems Daughter     No Known Problems Paternal Grandmother     No Known Problems Daughter     Breast cancer Neg Hx     Cancer Neg Hx        Social History     Socioeconomic History    Marital status: Single     Spouse name: Not on file    Number of children: Not on file    Years of education: Not on file    Highest education level: Not on file   Occupational History    Not on file   Tobacco Use    Smoking status: Former Smoker     Types: Cigarettes     Quit date:      Years since quittin 5    Smokeless tobacco: Former User    Tobacco comment: 13-14 years ago   Vaping Use    Vaping Use: Never used   Substance and Sexual Activity    Alcohol use: Not Currently    Drug use: Never    Sexual activity: Not Currently     Partners: Male     Birth control/protection: Female Sterilization   Other Topics Concern    Not on file   Social History Narrative    Not on file     Social Determinants of Health     Financial Resource Strain: High Risk    Difficulty of Paying Living Expenses: Hard   Food Insecurity: No Food Insecurity    Worried About Running Out of Food in the Last Year: Never true    Maria of Food in the Last Year: Never true   Transportation Needs: Unmet Transportation Needs    Lack of Transportation (Medical): Yes    Lack of Transportation (Non-Medical): Yes   Physical Activity:     Days of Exercise per Week:     Minutes of Exercise per Session:    Stress:     Feeling of Stress :    Social Connections:     Frequency of Communication with Friends and Family:     Frequency of Social Gatherings with Friends and Family:     Attends [employer] Services:     Active Member of Clubs or Organizations:     Attends Club or Organization Meetings:     Marital Status:    Intimate Partner Violence:     Fear of Current or Ex-Partner:     Emotionally Abused:     Physically Abused:     Sexually Abused:         Allergies   Allergen Reactions    Codeine     Contrast [Iodinated Diagnostic Agents]     Iodine - Food Allergy     Latex     Lovenox [Enoxaparin]     Shellfish-Derived Products - Food Allergy Hives    Vaccinium Angustifolium Hives    Vicodin [Hydrocodone-Acetaminophen]  Benadryl [Diphenhydramine] Rash    Oxycontin [Oxycodone] Palpitations    Provera [Medroxyprogesterone] Palpitations       Current Outpatient Medications   Medication Sig Dispense Refill    Abatacept 125 MG/ML SOAJ Inject under the skin      Adalimumab 40 MG/0 4ML PNKT Inject 40 mg under the skin every 14 (fourteen) days       albuterol (PROVENTIL HFA,VENTOLIN HFA) 90 mcg/act inhaler Inhale 2 puffs every 4 (four) hours as needed for wheezing 1 Inhaler 0    Calcium Carb-Cholecalciferol (CALCIUM 600+D3) 600-200 MG-UNIT TABS take 1 tablet twice a day      celecoxib (CeleBREX) 200 mg capsule Take 200 mg by mouth 2 (two) times a day      Cholecalciferol (VITAMIN D3) 2000 units capsule take 1 tablet po daily   citalopram (CeleXA) 20 mg tablet Take 1 tablet (20 mg total) by mouth daily 90 tablet 1    doxepin (SINEquan) 25 mg capsule Take 1 capsule (25 mg total) by mouth daily at bedtime 90 capsule 1    Elastic Bandages & Supports (CARPAL TUNNEL WRIST DELUXE) MISC by Does not apply route daily at bedtime 2 each 0    famotidine (PEPCID) 20 mg tablet Take 1 tablet (20 mg total) by mouth 2 (two) times a day 30 tablet 0    fluticasone (FLONASE) 50 mcg/act nasal spray 1 spray into each nostril daily 16 g 5    fluticasone (FLOVENT HFA) 110 MCG/ACT inhaler Inhale 2 puffs 2 (two) times a day Rinse mouth after use   3 Inhaler 1    folic acid (FOLVITE) 1 mg tablet every 24 hours      furosemide (LASIX) 40 mg tablet Take 1 tablet (40 mg total) by mouth daily 90 tablet 1    Incontinence Supply Disposable (Poise Pad) PADS by Does not apply route 4 (four) times a day as needed (incontinence) 90 each 0    leflunomide (ARAVA) 20 MG tablet Take 20 mg by mouth daily      levETIRAcetam (KEPPRA) 1000 MG tablet Take 1 tablet (1,000 mg total) by mouth 2 (two) times a day 60 tablet 5    Methenamine-Sodium Salicylate 423-990 4 MG TABS Take 1 tablet by mouth 3 (three) times a day 90 each 6    methylPREDNISolone (MEDROL) 32 MG tablet Take one (1) tablet 12 hours and one (1) tablet  2 hours PRIOR to CT scan  2 tablet 0    metoprolol tartrate (LOPRESSOR) 25 mg tablet Take 1 tablet (25 mg total) by mouth every 12 (twelve) hours 60 tablet 1    Multiple Vitamins-Minerals (MULTIVITAMIN WITH MINERALS) tablet Take 1 tablet by mouth daily 30 tablet 11    naproxen (NAPROSYN) 500 mg tablet Take 1 tablet (500 mg total) by mouth 2 (two) times a day with meals 30 tablet 0    nitroglycerin (NITROSTAT) 0 4 mg SL tablet Place 1 tablet (0 4 mg total) under the tongue every 5 (five) minutes as needed for chest pain Call 911 if using 3 doses 25 tablet 0    ondansetron (ZOFRAN-ODT) 4 mg disintegrating tablet Take 1 tablet (4 mg total) by mouth every 8 (eight) hours as needed for nausea for up to 10 doses 20 tablet 0    polyethylene glycol (GLYCOLAX) 17 GM/SCOOP powder Take 17 g by mouth daily 578 g 0    predniSONE 5 mg tablet Take by mouth daily      rizatriptan (MAXALT) 5 MG tablet Take 1 tablet (5 mg total) by mouth once as needed for migraine for up to 1 dose May repeat in 2 hours if needed 9 tablet 0    traMADol (ULTRAM) 50 mg tablet Take 1 tablet (50 mg total) by mouth every 6 (six) hours as needed for moderate pain 12 tablet 0    amLODIPine (NORVASC) 5 mg tablet Take 1 tablet (5 mg total) by mouth daily 90 tablet 1    ergocalciferol (Drisdol) 1 25 MG (73034 UT) capsule Take 50,000 Units by mouth daily in the early morning       gabapentin (NEURONTIN) 100 mg capsule Take 1 capsule (100 mg total) by mouth 3 (three) times a day for 14 days 42 capsule 0     No current facility-administered medications for this visit  (Not in a hospital admission)        Objective:     24 Hour Vitals Assessment:     Vitals:    08/02/21 1350   BP: 138/84   Pulse: 81   Resp: 20   Temp: 99 1 °F (37 3 °C)   SpO2: 99%       PHYSICIAN EXAM:    General: Appearance: alert, cooperative, no distress  HEENT: Normocephalic, atraumatic  No scleral icterus  conjunctivae clear  EOMI  Chest: No tenderness  Lungs: Clear to auscultation bilaterally, Respirations unlabored  Cardiac: Regular rate and rhythm, S1and S2 are normal, no murmur, click, rubs or gallops  Abdomen: Soft, non-tender, non-distended  Bowel sounds are normal  No masses  Pelvic: deferred  Extremities:  No cyanosis, clubbing  Non-pitting edema up to the knees b/l  Skin: Skin color, turgor are normal    Neurologic: Alert and oriented, no gross focal deficits noted b/l  Normal strength  DATA REVIEW:    Pathology Result:    Final Diagnosis   Date Value Ref Range Status   07/29/2021   Final    A  Kidney, left, biopsy:  -  Renal cortical parenchyma (gross evaluation only)  - The entire specimen, including tissue inappropriate fixatives for electron microscopy, light microscopy and immunofluorescence is sent to [address] [place] [address] for expert nephropathologist evaluation   - A full report from that institution will follow under separate cover  04/27/2021   Final    A  Urine, Clean Catch:  Negative for high grade urothelial carcinoma ([address]) - see comment  Benign urothelial cells, benign squamous cells, red blood cells and neutrophils  Satisfactory for evaluation  Comment:  The above diagnostic category is from the recently published book, [employer] [place] for Reporting Urinary Cytology, and is in keeping with the ongoing effort for utilization of standardized diagnostic terminology in urine cytology  *    *[employer] [place] for Reporting Urinary Cytology  Ann-Marie Rodriguez; 2016 01/20/2021   Final    A  Urine, Clean Catch (ThinPrep):  - Negative for high grade urothelial carcinoma ([address])  See Note  - Predominantly benign squamous cells, few benign urothelial cells, neutrophils, and red blood cells present       09/14/2018   Final    A  Urine, Voided, :  Negative for high grade urothelial carcinoma ([address]) - see comment  Benign urothelial cells  Benign squamous cells  Neutrophils and lymphocytes    Satisfactory for Evaluation  Comment:  The above diagnostic category is from the recently published book, [facility] [place] [facility] Reporting Urinary Cytology, and is in keeping with the ongoing effort for utilization of standardized diagnostic terminology in urine cytology  *    *[employer] [place] for Reporting Urinary Cytology  Ann-Marie Quiroz; 2016  Interpretation performed at [facility], [address] , [place], [address] [facility]              Image Results:   Image result are reviewed and documented in Hematology/Oncology history    IR biopsy kidney columbia kit no laterality  Narrative: CT-scan guided needle biopsy of left kidney     History: 26-year-old female with history of proteinuria and hematuria here for nontarget renal biopsy  Radiation dose: 2819 39 mGy     Conscious sedation time: 1 hour 15 minutes    Technique: This examination, like all CT scans performed in the [facility], was performed utilizing techniques to minimize radiation dose exposure, including the use of iterative reconstruction and automated exposure control  The patient was brought to the CT scanner and placed prone on the table  After axial images were obtained through the region of interest an area of the skin was then marked, prepped, and draped in usual sterile fashion  Lidocaine was administered to the   skin and a small skin incision was made  A 17-gauge cannula was advanced into the inferior lateral margin of the left kidney  Multiple 18-gauge core biopsy specimens were obtained  The specimen was reviewed by pathology for adequacy  At the end of the procedure, D-Stat was used for hemostasis through the cannula as it was removed  The patient tolerated the procedure well and suffered no complications  Impression: Impression: Successful nontargeted renal biopsy   A full pathology report will follow  Workstation performed: [id number]      LABS:  Lab data are reviewed and documented in HemOnc history  Results for Franny Brennan (MRN [id number]) as of 8/2/2021 14:50   Ref  Range 7/9/2021 12:50   WBC Latest Ref Range: 4 31 - 10 16 Thousand/uL 5 48   Red Blood Cell Count Latest Ref Range: 3 81 - 5 12 Million/uL 4 07   Hemoglobin Latest Ref Range: 11 5 - 15 4 g/dL 9 9 (L)   HCT Latest Ref Range: 34 8 - 46 1 % 32 5 (L)   MCV Latest Ref Range: 82 - 98 fL 80 (L)   MCH Latest Ref Range: 26 8 - 34 3 pg 24 3 (L)   MCHC Latest Ref Range: 31 4 - 37 4 g/dL 30 5 (L)   RDW Latest Ref Range: 11 6 - 15 1 % 13 0   Platelet Count Latest Ref Range: 149 - 390 Thousands/uL 352   MPV Latest Ref Range: 8 9 - 12 7 fL 9 6   nRBC Latest Units: /100 WBCs 0   Neutrophils % Latest Ref Range: 43 - 75 % 66   Immat GRANS % Latest Ref Range: 0 - 2 % 0   Lymphocytes Relative Latest Ref Range: 14 - 44 % 24   Monocytes Relative Latest Ref Range: 4 - 12 % 6   Eosinophils Latest Ref Range: 0 - 6 % 4   Basophils Relative Latest Ref Range: 0 - 1 % 0   Immature Grans Absolute Latest Ref Range: 0 00 - 0 20 Thousand/uL 0 01   Absolute Neutrophils Latest Ref Range: 1 85 - 7 62 Thousands/µL 3 61   Lymphocytes Absolute Latest Ref Range: 0 60 - 4 47 Thousands/µL 1 32   Absolute Monocytes Latest Ref Range: 0 17 - 1 22 Thousand/µL 0 30   Absolute Eosinophils Latest Ref Range: 0 00 - 0 61 Thousand/µL 0 22   Basophils Absolute Latest Ref Range: 0 00 - 0 10 Thousands/µL 0 02       No results found for this or any previous visit (from the past 48 hour(s))  No results for input(s): WBC, CREAT in the last 72 hours      Invalid input(s):  PLT     By:  Zaid Chakraborty MD, 8/2/2021, 2:46 PM

## 2021-08-02 ENCOUNTER — CONSULT (OUTPATIENT)
Dept: HEMATOLOGY ONCOLOGY | Facility: CLINIC | Age: 41
End: 2021-08-02
Payer: COMMERCIAL

## 2021-08-02 ENCOUNTER — TELEPHONE (OUTPATIENT)
Dept: NEPHROLOGY | Facility: CLINIC | Age: 41
End: 2021-08-02

## 2021-08-02 ENCOUNTER — OFFICE VISIT (OUTPATIENT)
Dept: SLEEP CENTER | Facility: CLINIC | Age: 41
End: 2021-08-02
Payer: COMMERCIAL

## 2021-08-02 ENCOUNTER — APPOINTMENT (OUTPATIENT)
Dept: LAB | Facility: MEDICAL CENTER | Age: 41
End: 2021-08-02
Payer: COMMERCIAL

## 2021-08-02 VITALS
HEIGHT: 62 IN | DIASTOLIC BLOOD PRESSURE: 84 MMHG | TEMPERATURE: 99.1 F | HEART RATE: 81 BPM | WEIGHT: 256 LBS | OXYGEN SATURATION: 99 % | SYSTOLIC BLOOD PRESSURE: 138 MMHG | RESPIRATION RATE: 20 BRPM | BODY MASS INDEX: 47.11 KG/M2

## 2021-08-02 VITALS
DIASTOLIC BLOOD PRESSURE: 70 MMHG | HEIGHT: 62 IN | WEIGHT: 249 LBS | SYSTOLIC BLOOD PRESSURE: 130 MMHG | BODY MASS INDEX: 45.82 KG/M2

## 2021-08-02 DIAGNOSIS — J45.20 MILD INTERMITTENT ASTHMA WITHOUT COMPLICATION: ICD-10-CM

## 2021-08-02 DIAGNOSIS — R31.0 GROSS HEMATURIA: ICD-10-CM

## 2021-08-02 DIAGNOSIS — G47.33 OBSTRUCTIVE SLEEP APNEA: Primary | ICD-10-CM

## 2021-08-02 DIAGNOSIS — D50.0 IRON DEFICIENCY ANEMIA DUE TO CHRONIC BLOOD LOSS: Primary | ICD-10-CM

## 2021-08-02 DIAGNOSIS — R23.8 EASY BRUISING: ICD-10-CM

## 2021-08-02 DIAGNOSIS — I10 ESSENTIAL HYPERTENSION: ICD-10-CM

## 2021-08-02 DIAGNOSIS — D50.0 IRON DEFICIENCY ANEMIA DUE TO CHRONIC BLOOD LOSS: ICD-10-CM

## 2021-08-02 DIAGNOSIS — E66.01 MORBID OBESITY (HCC): ICD-10-CM

## 2021-08-02 DIAGNOSIS — N18.1 STAGE 1 CHRONIC KIDNEY DISEASE: ICD-10-CM

## 2021-08-02 DIAGNOSIS — R80.1 PERSISTENT PROTEINURIA: ICD-10-CM

## 2021-08-02 LAB
APTT PPP: 40 SECONDS (ref 23–37)
BASOPHILS # BLD AUTO: 0.03 THOUSANDS/ΜL (ref 0–0.1)
BASOPHILS NFR BLD AUTO: 0 % (ref 0–1)
EOSINOPHIL # BLD AUTO: 0.2 THOUSAND/ΜL (ref 0–0.61)
EOSINOPHIL NFR BLD AUTO: 2 % (ref 0–6)
ERYTHROCYTE [DISTWIDTH] IN BLOOD BY AUTOMATED COUNT: 16.7 % (ref 11.6–15.1)
FIBRINOGEN PPP-MCNC: 378 MG/DL (ref 227–495)
HCT VFR BLD AUTO: 32.8 % (ref 34.8–46.1)
HGB BLD-MCNC: 9.5 G/DL (ref 11.5–15.4)
IMM GRANULOCYTES # BLD AUTO: 0.03 THOUSAND/UL (ref 0–0.2)
IMM GRANULOCYTES NFR BLD AUTO: 0 % (ref 0–2)
INR PPP: 1.07 (ref 0.84–1.19)
LYMPHOCYTES # BLD AUTO: 2.09 THOUSANDS/ΜL (ref 0.6–4.47)
LYMPHOCYTES NFR BLD AUTO: 26 % (ref 14–44)
MCH RBC QN AUTO: 23.2 PG (ref 26.8–34.3)
MCHC RBC AUTO-ENTMCNC: 29 G/DL (ref 31.4–37.4)
MCV RBC AUTO: 80 FL (ref 82–98)
MISCELLANEOUS LAB TEST RESULT: NORMAL
MONOCYTES # BLD AUTO: 0.55 THOUSAND/ΜL (ref 0.17–1.22)
MONOCYTES NFR BLD AUTO: 7 % (ref 4–12)
NEUTROPHILS # BLD AUTO: 5.3 THOUSANDS/ΜL (ref 1.85–7.62)
NEUTS SEG NFR BLD AUTO: 65 % (ref 43–75)
NRBC BLD AUTO-RTO: 0 /100 WBCS
PLATELET # BLD AUTO: 336 THOUSANDS/UL (ref 149–390)
PMV BLD AUTO: 10 FL (ref 8.9–12.7)
PROTHROMBIN TIME: 13.9 SECONDS (ref 11.6–14.5)
RBC # BLD AUTO: 4.09 MILLION/UL (ref 3.81–5.12)
WBC # BLD AUTO: 8.2 THOUSAND/UL (ref 4.31–10.16)

## 2021-08-02 PROCEDURE — 85245 CLOT FACTOR VIII VW RISTOCTN: CPT

## 2021-08-02 PROCEDURE — 85384 FIBRINOGEN ACTIVITY: CPT

## 2021-08-02 PROCEDURE — 85670 THROMBIN TIME PLASMA: CPT

## 2021-08-02 PROCEDURE — 85246 CLOT FACTOR VIII VW ANTIGEN: CPT

## 2021-08-02 PROCEDURE — 85732 THROMBOPLASTIN TIME PARTIAL: CPT

## 2021-08-02 PROCEDURE — 85240 CLOT FACTOR VIII AHG 1 STAGE: CPT

## 2021-08-02 PROCEDURE — 83520 IMMUNOASSAY QUANT NOS NONAB: CPT

## 2021-08-02 PROCEDURE — 85598 HEXAGNAL PHOSPH PLTLT NEUTRL: CPT

## 2021-08-02 PROCEDURE — 85705 THROMBOPLASTIN INHIBITION: CPT

## 2021-08-02 PROCEDURE — 85247 CLOT FACTOR VIII MULTIMETRIC: CPT

## 2021-08-02 PROCEDURE — 99244 OFF/OP CNSLTJ NEW/EST MOD 40: CPT | Performed by: INTERNAL MEDICINE

## 2021-08-02 PROCEDURE — 1036F TOBACCO NON-USER: CPT | Performed by: INTERNAL MEDICINE

## 2021-08-02 PROCEDURE — 85610 PROTHROMBIN TIME: CPT

## 2021-08-02 PROCEDURE — 85613 RUSSELL VIPER VENOM DILUTED: CPT

## 2021-08-02 PROCEDURE — 3075F SYST BP GE 130 - 139MM HG: CPT | Performed by: INTERNAL MEDICINE

## 2021-08-02 PROCEDURE — 3079F DIAST BP 80-89 MM HG: CPT | Performed by: INTERNAL MEDICINE

## 2021-08-02 PROCEDURE — 36415 COLL VENOUS BLD VENIPUNCTURE: CPT

## 2021-08-02 PROCEDURE — 99214 OFFICE O/P EST MOD 30 MIN: CPT | Performed by: INTERNAL MEDICINE

## 2021-08-02 PROCEDURE — 86146 BETA-2 GLYCOPROTEIN ANTIBODY: CPT

## 2021-08-02 PROCEDURE — 3008F BODY MASS INDEX DOCD: CPT | Performed by: INTERNAL MEDICINE

## 2021-08-02 PROCEDURE — 85730 THROMBOPLASTIN TIME PARTIAL: CPT

## 2021-08-02 PROCEDURE — 85025 COMPLETE CBC W/AUTO DIFF WBC: CPT

## 2021-08-02 PROCEDURE — 86147 CARDIOLIPIN ANTIBODY EA IG: CPT

## 2021-08-02 NOTE — ASSESSMENT & PLAN NOTE
·  Average AHI of 12 7, consistent with mild obstructive sleep apnea  The patient has been having issues with dryness of her mouth, and also reportedly face swelling after using the machine when she slept 9 hours with it initially she tells me that overall her stamina and excessive daytime sleepiness have gotten much better when she used the machine however because of the above-mentioned issues she stopped using it and over the last 30 days she only used it for 9 days and 3 out of dose 9 where only more than 4 hours    ·   I went ahead and increase the humidity to 7 up from 4, turned off her ramp, increase the minimal pressure to 6 cm H2O  And increase her flex to 3 for comfort  ·  I would like to have another compliance visit within 8 weeks to assure adequate adherence and treatment

## 2021-08-02 NOTE — PROGRESS NOTES
Review of Systems      Genitourinary need to urinate more than twice a night   Cardiology Frequent chest pain or angina, , palpitations/fluttering feeling in the chest and ankle/leg swelling   Gastrointestinal none   Neurology frequent headaches and awaken with headache   Constitutional claustrophobia and fatigue   Integumentary itching   Psychiatry anxiety, depression and mood change   Musculoskeletal joint pain, muscle aches, back pain and leg cramps   Pulmonary shortness of breath with activity, wheezing and snoring   ENT ringing in ears   Endocrine frequent urination   Hematological abnormal blood loss

## 2021-08-02 NOTE — TELEPHONE ENCOUNTER
8/2/21 [facility]  called and they need the orders for tomorrow released from hold

## 2021-08-02 NOTE — ASSESSMENT & PLAN NOTE
Adherent using Flovent, albuterol, she tells me that her asthma has been better controlled she mouth washes and rinses and spits after using the Flovent

## 2021-08-02 NOTE — PATIENT INSTRUCTIONS
CPAP   AMBULATORY CARE:   CPAP  (continuous positive airway pressure) is a treatment that uses air pressure to keep your airways open while you sleep  CPAP is used to treat obstructive sleep apnea (JIN)  A CPAP machine connects the mask to the machine with a hose  Air pressure prevents your airway from collapsing or becoming blocked during sleep  Use your CPAP machine when you sleep, even when you nap  You may need to use a CPAP machine for the rest of your life  Make CPAP easier to use:   · At first, try to use your CPAP for a few hours every night  Then slowly increase the length of time you use your machine  It takes time to adjust to CPAP treatment  · You may need to use a special moisturizer made for CPAP users  You may need a mask that is a different size, shape, or material  Talk to your healthcare provider if your mask feels uncomfortable or irritates your skin  · Use a saline nasal spray at bedtime to help relieve nasal irritation  A chin strap to help keep your mouth closed  A different type of mask can help dry mouth  Some machines come with a heated humidifier to help relieve these symptoms  · Talk to your healthcare provider if you are having problems adjusting to the air pressure  He or she can tell you how to adjust the air pressure on your CPAP  You may need to start at a lower pressure and slowly increase it over time  Call your healthcare provider if:   · You continue to feel very sleepy during the day, even after you wear your CPAP device as directed  · Your CPAP is causing a problem, such as a rash, that does not improve  · You have questions or concerns about your condition, care, or equipment  Follow up with your healthcare provider as directed:  Tell your healthcare provider if your mask no longer fits properly  Write down your questions so you remember to ask them during your visits     © Copyright [employer] 2021 Information is for End User's use only and may not be sold, redistributed or otherwise used for commercial purposes  All illustrations and images included in CareNotes® are the copyrighted property of [employer]  or [employer]  The above information is an  only  It is not intended as medical advice for individual conditions or treatments  Talk to your doctor, nurse or pharmacist before following any medical regimen to see if it is safe and effective for you

## 2021-08-02 NOTE — PROGRESS NOTES
Pulmonary/Sleep Follow Up Note   Lowell Narayanan 38 y o  female MRN: [id number]  8/2/2021      Assessment and Plan:    1  Obstructive sleep apnea  Assessment & Plan:  ·  Average AHI of 12 7, consistent with mild obstructive sleep apnea  The patient has been having issues with dryness of her mouth, and also reportedly face swelling after using the machine when she slept 9 hours with it initially she tells me that overall her stamina and excessive daytime sleepiness have gotten much better when she used the machine however because of the above-mentioned issues she stopped using it and over the last 30 days she only used it for 9 days and 3 out of dose 9 where only more than 4 hours  ·   I went ahead and increase the humidity to 7 up from 4, turned off her ramp, increase the minimal pressure to 6 cm H2O  And increase her flex to 3 for comfort  ·  I would like to have another compliance visit within 8 weeks to assure adequate adherence and treatment      2  Mild intermittent asthma without complication  Assessment & Plan: Adherent using Flovent, albuterol, she tells me that her asthma has been better controlled she mouth washes and rinses and spits after using the Flovent      3  Essential hypertension  Assessment & Plan:   Treatment of obstructive sleep apnea might help with controlling the blood pressure      4  Morbid obesity ([address] )  Assessment & Plan:   Noted, encourage lifestyle modifications and weight loss          Return in about 8 weeks (around 9/27/2021)      History of Present Illness   HPI:  Lowell Narayanan is a 38 y o  female who  Is here for follow-up is been recently diagnosed with mild obstructive sleep apnea with an average AHI of 12 7 events per hour she has history of excessive daytime sleepiness and bronchial asthma that was not well controlled and she was started on inhaled corticosteroid last visit when I saw her she tells me that her compliance to the machine has been affected by significant dry mouth when she is using the machine and reportedly having a swollen face when she sleeps with it  She felt significantly better when she was using the machine and felt more energetic to the point that she walked to her work 1 day  Review of Systems   All other systems reviewed and are negative        Genitourinary need to urinate more than twice a night   Cardiology Frequent chest pain or angina, , palpitations/fluttering feeling in the chest and ankle/leg swelling   Gastrointestinal none   Neurology frequent headaches and awaken with headache   Constitutional claustrophobia and fatigue   Integumentary itching   Psychiatry anxiety, depression and mood change   Musculoskeletal joint pain, muscle aches, back pain and leg cramps   Pulmonary shortness of breath with activity, wheezing and snoring   ENT ringing in ears   Endocrine frequent urination   Hematological abnormal blood loss       Historical Information   Past Medical History:   Diagnosis Date    [facility] at rest [facility])     Anxiety     Bipolar 1 disorder ([place] [address] )     Chronic kidney disease     Depression     Epilepsy ([place] [address] )     Fibroid 2012    Hematuria     History of cardiac cath 2006    Hydronephrosis 2007    Hypertension     Migraine without aura     PTSD (post-traumatic stress disorder)     Rheumatoid arteritis ([place] [address] )      Past Surgical History:   Procedure Laterality Date    APPENDECTOMY      CARDIAC CATHETERIZATION  2006    CARDIAC CATHETERIZATION       SECTION  2008    CHOLECYSTECTOMY  2018    IR BIOPSY KIDNEY COLUMBIA KIT NO LATERALITY  2021    KIDNEY SURGERY  2007    TUBAL LIGATION  2008     Family History   Problem Relation Age of Onset    Diabetes Maternal Grandmother     Hypertension Maternal Grandmother     No Known Problems Mother     No Known Problems Sister     No Known Problems Daughter     No Known Problems Paternal Grandmother     No Known Problems Daughter     Breast cancer Neg Hx     Cancer Neg Hx          Meds/Allergies     Current Outpatient Medications:     Abatacept 125 MG/ML SOAJ, Inject under the skin, Disp: , Rfl:     albuterol (PROVENTIL HFA,VENTOLIN HFA) 90 mcg/act inhaler, Inhale 2 puffs every 4 (four) hours as needed for wheezing, Disp: 1 Inhaler, Rfl: 0    Calcium Carb-Cholecalciferol (CALCIUM 600+D3) 600-200 MG-UNIT TABS, take 1 tablet twice a day, Disp: , Rfl:     celecoxib (CeleBREX) 200 mg capsule, Take 200 mg by mouth 2 (two) times a day, Disp: , Rfl:     Cholecalciferol (VITAMIN D3) 2000 units capsule, take 1 tablet po daily  , Disp: , Rfl:     citalopram (CeleXA) 20 mg tablet, Take 1 tablet (20 mg total) by mouth daily, Disp: 90 tablet, Rfl: 1    doxepin (SINEquan) 25 mg capsule, Take 1 capsule (25 mg total) by mouth daily at bedtime, Disp: 90 capsule, Rfl: 1    Elastic Bandages & Supports (CARPAL TUNNEL WRIST DELUXE) MISC, by Does not apply route daily at bedtime, Disp: 2 each, Rfl: 0    famotidine (PEPCID) 20 mg tablet, Take 1 tablet (20 mg total) by mouth 2 (two) times a day, Disp: 30 tablet, Rfl: 0    fluticasone (FLONASE) 50 mcg/act nasal spray, 1 spray into each nostril daily, Disp: 16 g, Rfl: 5    fluticasone (FLOVENT HFA) 110 MCG/ACT inhaler, Inhale 2 puffs 2 (two) times a day Rinse mouth after use , Disp: 3 Inhaler, Rfl: 1    folic acid (FOLVITE) 1 mg tablet, every 24 hours, Disp: , Rfl:     Incontinence Supply Disposable (Poise Pad) PADS, by Does not apply route 4 (four) times a day as needed (incontinence), Disp: 90 each, Rfl: 0    leflunomide (ARAVA) 20 MG tablet, Take 20 mg by mouth daily, Disp: , Rfl:     levETIRAcetam (KEPPRA) 1000 MG tablet, Take 1 tablet (1,000 mg total) by mouth 2 (two) times a day, Disp: 60 tablet, Rfl: 5    Methenamine-Sodium Salicylate 807-800 0 MG TABS, Take 1 tablet by mouth 3 (three) times a day, Disp: 90 each, Rfl: 6    methylPREDNISolone (MEDROL) 32 MG tablet, Take one (1) tablet 12 hours and one (1) tablet  2 hours PRIOR to CT scan , Disp: 2 tablet, Rfl: 0    metoprolol tartrate (LOPRESSOR) 25 mg tablet, Take 1 tablet (25 mg total) by mouth every 12 (twelve) hours, Disp: 60 tablet, Rfl: 1    Multiple Vitamins-Minerals (MULTIVITAMIN WITH MINERALS) tablet, Take 1 tablet by mouth daily, Disp: 30 tablet, Rfl: 11    naproxen (NAPROSYN) 500 mg tablet, Take 1 tablet (500 mg total) by mouth 2 (two) times a day with meals, Disp: 30 tablet, Rfl: 0    ondansetron (ZOFRAN-ODT) 4 mg disintegrating tablet, Take 1 tablet (4 mg total) by mouth every 8 (eight) hours as needed for nausea for up to 10 doses, Disp: 20 tablet, Rfl: 0    polyethylene glycol (GLYCOLAX) 17 GM/SCOOP powder, Take 17 g by mouth daily, Disp: 578 g, Rfl: 0    predniSONE 5 mg tablet, Take by mouth daily, Disp: , Rfl:     rizatriptan (MAXALT) 5 MG tablet, Take 1 tablet (5 mg total) by mouth once as needed for migraine for up to 1 dose May repeat in 2 hours if needed, Disp: 9 tablet, Rfl: 0    traMADol (ULTRAM) 50 mg tablet, Take 1 tablet (50 mg total) by mouth every 6 (six) hours as needed for moderate pain, Disp: 12 tablet, Rfl: 0    Adalimumab 40 MG/0 4ML PNKT, Inject 40 mg under the skin every 14 (fourteen) days (Patient not taking: Reported on 8/2/2021), Disp: , Rfl:     amLODIPine (NORVASC) 5 mg tablet, Take 1 tablet (5 mg total) by mouth daily, Disp: 90 tablet, Rfl: 1    ergocalciferol (Drisdol) 1 25 MG (55730 UT) capsule, Take 50,000 Units by mouth daily in the early morning , Disp: , Rfl:     furosemide (LASIX) 40 mg tablet, Take 1 tablet (40 mg total) by mouth daily, Disp: 90 tablet, Rfl: 1    gabapentin (NEURONTIN) 100 mg capsule, Take 1 capsule (100 mg total) by mouth 3 (three) times a day for 14 days, Disp: 42 capsule, Rfl: 0    nitroglycerin (NITROSTAT) 0 4 mg SL tablet, Place 1 tablet (0 4 mg total) under the tongue every 5 (five) minutes as needed for chest pain Call 911 if using 3 doses (Patient not taking: Reported on 7/27/2021), Disp: 25 tablet, Rfl: 0  Allergies   Allergen Reactions    Codeine     Contrast [Iodinated Diagnostic Agents]     Iodine - Food Allergy     Latex     Lovenox [Enoxaparin]     Shellfish-Derived Products - Food Allergy Hives    Vaccinium Angustifolium Hives    Vicodin [Hydrocodone-Acetaminophen]     Benadryl [Diphenhydramine] Rash    Oxycontin [Oxycodone] Palpitations    Provera [Medroxyprogesterone] Palpitations       Vitals: Blood pressure 130/70, height 5' 2" (1 575 m), weight 113 kg (249 lb), not currently breastfeeding  Body mass index is 45 54 kg/m²  Physical Exam  Physical Exam  Constitutional:       General: She is not in acute distress  Appearance: Normal appearance  She is obese  She is not ill-appearing, toxic-appearing or diaphoretic  HENT:      Head: Normocephalic and atraumatic  Nose: No congestion or rhinorrhea  Mouth/Throat:      Mouth: Mucous membranes are moist       Pharynx: Oropharynx is clear  No oropharyngeal exudate or posterior oropharyngeal erythema  Eyes:      General: No scleral icterus  Right eye: No discharge  Left eye: No discharge  Extraocular Movements: Extraocular movements intact  Conjunctiva/sclera: Conjunctivae normal    Cardiovascular:      Rate and Rhythm: Normal rate and regular rhythm  Pulses: Normal pulses  Heart sounds: Normal heart sounds  No murmur heard  No gallop  Pulmonary:      Effort: Pulmonary effort is normal  No respiratory distress  Breath sounds: Normal breath sounds  No wheezing, rhonchi or rales  Abdominal:      General: Abdomen is flat  There is no distension  Palpations: Abdomen is soft  There is no mass  Tenderness: There is no abdominal tenderness  Hernia: No hernia is present  Musculoskeletal:         General: No swelling, tenderness or deformity  Cervical back: Normal range of motion and neck supple  No rigidity  Right lower leg: No edema  Left lower leg: No edema  Skin:     General: Skin is warm and dry  Coloration: Skin is not jaundiced or pale  Findings: No bruising, erythema, lesion or rash  Neurological:      General: No focal deficit present  Mental Status: She is alert and oriented to person, place, and time  Mental status is at baseline  Psychiatric:         Mood and Affect: Mood normal          Behavior: Behavior normal          Thought Content: Thought content normal          Judgment: Judgment normal          Labs: I have personally reviewed pertinent lab results  , ABG: No results found for: PHART, JJG4YHY, PO2ART, BTE6STV, X1FOCZLI, BEART, SOURCE, BNP: No results found for: BNP, CBC: No results found for: WBC, HGB, HCT, MCV, PLT, ADJUSTEDWBC, MCH, MCHC, RDW, MPV, NRBC, CMP: No results found for: SODIUM, K, CL, CO2, ANIONGAP, BUN, CREATININE, GLUCOSE, CALCIUM, AST, ALT, ALKPHOS, PROT, BILITOT, EGFR, PT/INR: No results found for: PT, INR, Troponin: No results found for: TROPONINI  Lab Results   Component Value Date    WBC 5 48 07/09/2021    HGB 9 9 (L) 07/09/2021    HCT 32 5 (L) 07/09/2021    MCV 80 (L) 07/09/2021     07/09/2021     Lab Results   Component Value Date    GLUCOSE 98 06/22/2018    CALCIUM 8 6 07/14/2021     06/22/2018    K 3 8 07/14/2021    CO2 25 07/14/2021     07/14/2021    BUN 14 07/14/2021    CREATININE 0 73 07/14/2021     No results found for: IGE  Lab Results   Component Value Date    ALT 17 06/25/2021    AST 12 06/25/2021    ALKPHOS 59 06/25/2021    BILITOT 0 3 06/22/2018         Sleep studies: I have personally reviewed pertinent reports  PSG 5/2021  AHI 12 7 events per hour consistent with mild obstructive sleep apnea    Compliance report:I have personally reviewed pertinent reports  6/2021-7/2021: 9/30 days use representing 30%, with 10% use more than 4 hours, residual AHI of 5 7 events per hour    Consistent with in adequate compliance to CPAP therapy      Ac Valles MD  [facility]       Portions of the record may have been created with voice recognition software  Occasional wrong word or "sound a like" substitutions may have occurred due to the inherent limitations of voice recognition software  Read the chart carefully and recognize, using context, where substitutions have occurred

## 2021-08-03 ENCOUNTER — HOSPITAL ENCOUNTER (OUTPATIENT)
Dept: INFUSION CENTER | Facility: CLINIC | Age: 41
Discharge: HOME | End: 2021-08-03
Payer: COMMERCIAL

## 2021-08-03 VITALS
TEMPERATURE: 98.8 F | RESPIRATION RATE: 18 BRPM | DIASTOLIC BLOOD PRESSURE: 82 MMHG | HEART RATE: 76 BPM | SYSTOLIC BLOOD PRESSURE: 135 MMHG

## 2021-08-03 DIAGNOSIS — R31.0 GROSS HEMATURIA: ICD-10-CM

## 2021-08-03 DIAGNOSIS — D50.0 IRON DEFICIENCY ANEMIA DUE TO CHRONIC BLOOD LOSS: Primary | ICD-10-CM

## 2021-08-03 PROCEDURE — 96365 THER/PROPH/DIAG IV INF INIT: CPT

## 2021-08-03 RX ORDER — SODIUM CHLORIDE 9 MG/ML
20 INJECTION, SOLUTION INTRAVENOUS ONCE
Status: COMPLETED | OUTPATIENT
Start: 2021-08-03 | End: 2021-08-03

## 2021-08-03 RX ORDER — SODIUM CHLORIDE 9 MG/ML
20 INJECTION, SOLUTION INTRAVENOUS ONCE
Status: CANCELLED | OUTPATIENT
Start: 2021-08-03

## 2021-08-03 RX ADMIN — FERUMOXYTOL 510 MG: 510 INJECTION INTRAVENOUS at 11:07

## 2021-08-03 RX ADMIN — SODIUM CHLORIDE 20 ML/HR: 0.9 INJECTION, SOLUTION INTRAVENOUS at 11:07

## 2021-08-04 DIAGNOSIS — R79.1 PROLONGED PTT: Primary | ICD-10-CM

## 2021-08-04 LAB
APTT HEX PL PPP: 7 SEC (ref 0–11)
APTT SCREEN TO CONFIRM RATIO: 1.11 RATIO (ref 0–1.4)
APTT-LA IMM 4:1 NP PPP: 50 SEC (ref 0–48.9)
B2 GLYCOPROT1 IGA SERPL IA-ACNC: 9.9
B2 GLYCOPROT1 IGG SERPL IA-ACNC: 1.6
B2 GLYCOPROT1 IGM SERPL IA-ACNC: <2.9
CARDIOLIPIN IGA SER IA-ACNC: 11
CARDIOLIPIN IGG SER IA-ACNC: 1.2
CARDIOLIPIN IGM SER IA-ACNC: 5.6
CONFIRM APTT/NORMAL: 44.1 SEC (ref 0–55)
FACT XIIIA PPP-ACNC: 148 % (ref 56–140)
LA PPP-IMP: ABNORMAL
SCREEN APTT: 57.4 SEC (ref 0–51.9)
SCREEN DRVVT: 45 SEC (ref 0–47)
THROMBIN TIME: 17 SEC (ref 0–23)

## 2021-08-04 NOTE — PROGRESS NOTES
Factor 8 inhibitor testing added to last labs due to persistent PTT prolongation, PTT lupus anticoagulation prolongation  We are looking to rule out Factor inhibitors now      Eileen Pepper MD

## 2021-08-08 ENCOUNTER — APPOINTMENT (EMERGENCY)
Dept: CT IMAGING | Facility: HOSPITAL | Age: 41
End: 2021-08-08
Payer: COMMERCIAL

## 2021-08-08 ENCOUNTER — HOSPITAL ENCOUNTER (EMERGENCY)
Facility: HOSPITAL | Age: 41
Discharge: HOME | End: 2021-08-08
Attending: EMERGENCY MEDICINE
Payer: COMMERCIAL

## 2021-08-08 VITALS
HEART RATE: 64 BPM | WEIGHT: 256 LBS | DIASTOLIC BLOOD PRESSURE: 54 MMHG | SYSTOLIC BLOOD PRESSURE: 90 MMHG | OXYGEN SATURATION: 100 % | TEMPERATURE: 98.7 F | RESPIRATION RATE: 16 BRPM | BODY MASS INDEX: 46.82 KG/M2

## 2021-08-08 DIAGNOSIS — R11.0 NAUSEA: ICD-10-CM

## 2021-08-08 DIAGNOSIS — R82.81 PYURIA: ICD-10-CM

## 2021-08-08 DIAGNOSIS — R10.9 LEFT FLANK PAIN: Primary | ICD-10-CM

## 2021-08-08 LAB
ALBUMIN SERPL BCP-MCNC: 3.3 G/DL (ref 3.5–5)
ALP SERPL-CCNC: 55 U/L (ref 46–116)
ALT SERPL W P-5'-P-CCNC: 23 U/L (ref 12–78)
ANION GAP SERPL CALCULATED.3IONS-SCNC: 9 MMOL/L (ref 4–13)
APTT PPP: 40 SECONDS (ref 23–37)
AST SERPL W P-5'-P-CCNC: 14 U/L (ref 5–45)
BACTERIA UR QL AUTO: ABNORMAL /HPF
BASOPHILS # BLD AUTO: 0.02 THOUSANDS/ΜL (ref 0–0.1)
BASOPHILS NFR BLD AUTO: 0 % (ref 0–1)
BILIRUB SERPL-MCNC: 0.26 MG/DL (ref 0.2–1)
BILIRUB UR QL STRIP: ABNORMAL
BUN SERPL-MCNC: 14 MG/DL (ref 5–25)
CALCIUM ALBUM COR SERPL-MCNC: 8.9 MG/DL (ref 8.3–10.1)
CALCIUM SERPL-MCNC: 8.3 MG/DL (ref 8.3–10.1)
CHLORIDE SERPL-SCNC: 107 MMOL/L (ref 100–108)
CLARITY UR: ABNORMAL
CO2 SERPL-SCNC: 25 MMOL/L (ref 21–32)
COLOR UR: ABNORMAL
CREAT SERPL-MCNC: 0.71 MG/DL (ref 0.6–1.3)
EOSINOPHIL # BLD AUTO: 0.16 THOUSAND/ΜL (ref 0–0.61)
EOSINOPHIL NFR BLD AUTO: 3 % (ref 0–6)
ERYTHROCYTE [DISTWIDTH] IN BLOOD BY AUTOMATED COUNT: 20.8 % (ref 11.6–15.1)
EXT PREG TEST URINE: NEGATIVE
EXT. CONTROL ED NAV: NORMAL
GFR SERPL CREATININE-BSD FRML MDRD: 106 ML/MIN/1.73SQ M
GLUCOSE SERPL-MCNC: 93 MG/DL (ref 65–140)
GLUCOSE UR STRIP-MCNC: NEGATIVE MG/DL
HCT VFR BLD AUTO: 28.8 % (ref 34.8–46.1)
HGB BLD-MCNC: 8.5 G/DL (ref 11.5–15.4)
HGB UR QL STRIP.AUTO: ABNORMAL
IMM GRANULOCYTES # BLD AUTO: 0.02 THOUSAND/UL (ref 0–0.2)
IMM GRANULOCYTES NFR BLD AUTO: 0 % (ref 0–2)
INR PPP: 1.11 (ref 0.84–1.19)
KETONES UR STRIP-MCNC: NEGATIVE MG/DL
LEUKOCYTE ESTERASE UR QL STRIP: ABNORMAL
LYMPHOCYTES # BLD AUTO: 1.36 THOUSANDS/ΜL (ref 0.6–4.47)
LYMPHOCYTES NFR BLD AUTO: 26 % (ref 14–44)
MCH RBC QN AUTO: 24.6 PG (ref 26.8–34.3)
MCHC RBC AUTO-ENTMCNC: 29.5 G/DL (ref 31.4–37.4)
MCV RBC AUTO: 84 FL (ref 82–98)
MONOCYTES # BLD AUTO: 0.42 THOUSAND/ΜL (ref 0.17–1.22)
MONOCYTES NFR BLD AUTO: 8 % (ref 4–12)
NEUTROPHILS # BLD AUTO: 3.29 THOUSANDS/ΜL (ref 1.85–7.62)
NEUTS SEG NFR BLD AUTO: 63 % (ref 43–75)
NITRITE UR QL STRIP: NEGATIVE
NON-SQ EPI CELLS URNS QL MICRO: ABNORMAL /HPF
NRBC BLD AUTO-RTO: 0 /100 WBCS
PH UR STRIP.AUTO: 5.5 [PH] (ref 4.5–8)
PLATELET # BLD AUTO: 221 THOUSANDS/UL (ref 149–390)
PMV BLD AUTO: 10.1 FL (ref 8.9–12.7)
POTASSIUM SERPL-SCNC: 4.1 MMOL/L (ref 3.5–5.3)
PROT SERPL-MCNC: 7.1 G/DL (ref 6.4–8.2)
PROT UR STRIP-MCNC: ABNORMAL MG/DL
PROTHROMBIN TIME: 14.1 SECONDS (ref 11.6–14.5)
RBC # BLD AUTO: 3.45 MILLION/UL (ref 3.81–5.12)
RBC #/AREA URNS AUTO: ABNORMAL /HPF
SODIUM SERPL-SCNC: 141 MMOL/L (ref 136–145)
SP GR UR STRIP.AUTO: >=1.03 (ref 1–1.03)
UROBILINOGEN UR QL STRIP.AUTO: 0.2 E.U./DL
WBC # BLD AUTO: 5.27 THOUSAND/UL (ref 4.31–10.16)
WBC #/AREA URNS AUTO: ABNORMAL /HPF

## 2021-08-08 PROCEDURE — 96376 TX/PRO/DX INJ SAME DRUG ADON: CPT

## 2021-08-08 PROCEDURE — 99284 EMERGENCY DEPT VISIT MOD MDM: CPT

## 2021-08-08 PROCEDURE — 96365 THER/PROPH/DIAG IV INF INIT: CPT

## 2021-08-08 PROCEDURE — 85610 PROTHROMBIN TIME: CPT | Performed by: PHYSICIAN ASSISTANT

## 2021-08-08 PROCEDURE — 80053 COMPREHEN METABOLIC PANEL: CPT | Performed by: PHYSICIAN ASSISTANT

## 2021-08-08 PROCEDURE — 96375 TX/PRO/DX INJ NEW DRUG ADDON: CPT

## 2021-08-08 PROCEDURE — 74176 CT ABD & PELVIS W/O CONTRAST: CPT

## 2021-08-08 PROCEDURE — 87086 URINE CULTURE/COLONY COUNT: CPT | Performed by: PHYSICIAN ASSISTANT

## 2021-08-08 PROCEDURE — 36415 COLL VENOUS BLD VENIPUNCTURE: CPT | Performed by: PHYSICIAN ASSISTANT

## 2021-08-08 PROCEDURE — 85730 THROMBOPLASTIN TIME PARTIAL: CPT | Performed by: PHYSICIAN ASSISTANT

## 2021-08-08 PROCEDURE — 96361 HYDRATE IV INFUSION ADD-ON: CPT

## 2021-08-08 PROCEDURE — G1004 CDSM NDSC: HCPCS

## 2021-08-08 PROCEDURE — 81001 URINALYSIS AUTO W/SCOPE: CPT

## 2021-08-08 PROCEDURE — 99285 EMERGENCY DEPT VISIT HI MDM: CPT | Performed by: PHYSICIAN ASSISTANT

## 2021-08-08 PROCEDURE — 85025 COMPLETE CBC W/AUTO DIFF WBC: CPT | Performed by: PHYSICIAN ASSISTANT

## 2021-08-08 PROCEDURE — 81025 URINE PREGNANCY TEST: CPT | Performed by: PHYSICIAN ASSISTANT

## 2021-08-08 RX ORDER — ONDANSETRON 2 MG/ML
4 INJECTION INTRAMUSCULAR; INTRAVENOUS ONCE
Status: COMPLETED | OUTPATIENT
Start: 2021-08-08 | End: 2021-08-08

## 2021-08-08 RX ORDER — ONDANSETRON 4 MG/1
4 TABLET, ORALLY DISINTEGRATING ORAL EVERY 6 HOURS PRN
Qty: 20 TABLET | Refills: 0 | Status: SHIPPED | OUTPATIENT
Start: 2021-08-08 | End: 2021-09-23

## 2021-08-08 RX ORDER — SULFAMETHOXAZOLE AND TRIMETHOPRIM 800; 160 MG/1; MG/1
1 TABLET ORAL 2 TIMES DAILY
Qty: 20 TABLET | Refills: 0 | Status: SHIPPED | OUTPATIENT
Start: 2021-08-08 | End: 2021-08-18

## 2021-08-08 RX ORDER — FENTANYL CITRATE 50 UG/ML
25 INJECTION, SOLUTION INTRAMUSCULAR; INTRAVENOUS ONCE
Status: COMPLETED | OUTPATIENT
Start: 2021-08-08 | End: 2021-08-08

## 2021-08-08 RX ORDER — SENNOSIDES 8.6 MG
650 CAPSULE ORAL EVERY 8 HOURS PRN
Qty: 30 TABLET | Refills: 0 | Status: SHIPPED | OUTPATIENT
Start: 2021-08-08 | End: 2021-10-11 | Stop reason: SDUPTHER

## 2021-08-08 RX ADMIN — ONDANSETRON 4 MG: 2 INJECTION INTRAMUSCULAR; INTRAVENOUS at 08:04

## 2021-08-08 RX ADMIN — FENTANYL CITRATE 25 MCG: 50 INJECTION INTRAMUSCULAR; INTRAVENOUS at 10:08

## 2021-08-08 RX ADMIN — FENTANYL CITRATE 25 MCG: 50 INJECTION INTRAMUSCULAR; INTRAVENOUS at 08:04

## 2021-08-08 RX ADMIN — CEFTRIAXONE SODIUM 1000 MG: 10 INJECTION, POWDER, FOR SOLUTION INTRAVENOUS at 10:12

## 2021-08-08 RX ADMIN — SODIUM CHLORIDE 500 ML: 0.9 INJECTION, SOLUTION INTRAVENOUS at 08:03

## 2021-08-08 NOTE — DISCHARGE INSTRUCTIONS
Please refer to the attached information for strict return instructions  If symptoms worsen or new symptoms develop please return to the ER  Please follow up with nephrology and with your primary care physician for re-evaluation of symptoms

## 2021-08-08 NOTE — Clinical Note
Lynnette Dickinson was seen and treated in our emergency department on 8/8/2021  Diagnosis:     Stanislav Romero    She may return on this date: 08/10/2021         If you have any questions or concerns, please don't hesitate to call        Simone Colon PA-C    ______________________________           _______________          _______________  Hospital Representative                              Date                                Time

## 2021-08-08 NOTE — Clinical Note
Angela Thompson was seen and treated in our emergency department on 8/8/2021  Diagnosis:     Yoan Dugan    She may return on this date: 08/10/2021         If you have any questions or concerns, please don't hesitate to call        Keri Hadley PA-C    ______________________________           _______________          _______________  Hospital Representative                              Date                                Time

## 2021-08-10 ENCOUNTER — TELEPHONE (OUTPATIENT)
Dept: NEPHROLOGY | Facility: CLINIC | Age: 41
End: 2021-08-10

## 2021-08-10 DIAGNOSIS — N05.1 FSGS (FOCAL SEGMENTAL GLOMERULOSCLEROSIS): Primary | ICD-10-CM

## 2021-08-10 DIAGNOSIS — R80.1 PERSISTENT PROTEINURIA: ICD-10-CM

## 2021-08-10 LAB
BACTERIA UR CULT: NORMAL
SCAN RESULT: NORMAL

## 2021-08-10 RX ORDER — LOSARTAN POTASSIUM 25 MG/1
25 TABLET ORAL
Qty: 90 TABLET | Refills: 4 | Status: SHIPPED | OUTPATIENT
Start: 2021-08-10

## 2021-08-10 NOTE — TELEPHONE ENCOUNTER
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

## 2021-08-11 ENCOUNTER — TELEPHONE (OUTPATIENT)
Dept: HEMATOLOGY ONCOLOGY | Facility: CLINIC | Age: 41
End: 2021-08-11

## 2021-08-11 ENCOUNTER — APPOINTMENT (OUTPATIENT)
Dept: LAB | Facility: HOSPITAL | Age: 41
End: 2021-08-11
Payer: COMMERCIAL

## 2021-08-11 ENCOUNTER — TELEPHONE (OUTPATIENT)
Dept: HEMATOLOGY ONCOLOGY | Facility: HOSPITAL | Age: 41
End: 2021-08-11

## 2021-08-11 DIAGNOSIS — R79.1 PROLONGED PTT: ICD-10-CM

## 2021-08-11 DIAGNOSIS — N05.1 FSGS (FOCAL SEGMENTAL GLOMERULOSCLEROSIS): ICD-10-CM

## 2021-08-11 DIAGNOSIS — R80.1 PERSISTENT PROTEINURIA: ICD-10-CM

## 2021-08-11 DIAGNOSIS — D50.0 IRON DEFICIENCY ANEMIA DUE TO CHRONIC BLOOD LOSS: ICD-10-CM

## 2021-08-11 DIAGNOSIS — R79.1 PROLONGED PTT: Primary | ICD-10-CM

## 2021-08-11 LAB
ANION GAP SERPL CALCULATED.3IONS-SCNC: 8 MMOL/L (ref 4–13)
BUN SERPL-MCNC: 19 MG/DL (ref 5–25)
CALCIUM SERPL-MCNC: 8.9 MG/DL (ref 8.3–10.1)
CHLORIDE SERPL-SCNC: 107 MMOL/L (ref 100–108)
CO2 SERPL-SCNC: 24 MMOL/L (ref 21–32)
CREAT SERPL-MCNC: 0.85 MG/DL (ref 0.6–1.3)
GFR SERPL CREATININE-BSD FRML MDRD: 85 ML/MIN/1.73SQ M
GLUCOSE SERPL-MCNC: 102 MG/DL (ref 65–140)
HCT VFR BLD AUTO: 30.2 % (ref 34.8–46.1)
HGB BLD-MCNC: 9.1 G/DL (ref 11.5–15.4)
POTASSIUM SERPL-SCNC: 4.1 MMOL/L (ref 3.5–5.3)
SODIUM SERPL-SCNC: 139 MMOL/L (ref 136–145)

## 2021-08-11 PROCEDURE — 85246 CLOT FACTOR VIII VW ANTIGEN: CPT

## 2021-08-11 PROCEDURE — 80048 BASIC METABOLIC PNL TOTAL CA: CPT

## 2021-08-11 PROCEDURE — 85245 CLOT FACTOR VIII VW RISTOCTN: CPT

## 2021-08-11 PROCEDURE — 85732 THROMBOPLASTIN TIME PARTIAL: CPT

## 2021-08-11 PROCEDURE — 85240 CLOT FACTOR VIII AHG 1 STAGE: CPT

## 2021-08-11 PROCEDURE — 85270 CLOT FACTOR XI PTA: CPT

## 2021-08-11 PROCEDURE — 85018 HEMOGLOBIN: CPT

## 2021-08-11 PROCEDURE — 85250 CLOT FACTOR IX PTC/CHRSTMAS: CPT

## 2021-08-11 PROCEDURE — 85280 CLOT FACTOR XII HAGEMAN: CPT

## 2021-08-11 PROCEDURE — 85014 HEMATOCRIT: CPT

## 2021-08-11 NOTE — ED PROVIDER NOTES
History  Chief Complaint   Patient presents with    Abdominal Pain     Left flank and abdominal pain since yesterday with nausea and vomiting  Reports she had a kidney biopsy done on 7/29 and is having pain at the site of biopsy and in abdomen now  Taking tylenol without relief  Also reports burning with urination and hematuria  Fever at home as well per patient TMAX 101, last dose of tylenol last night   Flank Pain     Elke Waters is a 37 yo F, history of CKD1, hydronephrosis, HTN presenting with left flank pain, nausea, and vomiting for the past one day  She reports she had a renal biopsy performed on 7/29 and reports she had been largely asymptomatic following that procedure until yesterday  She reports occasional radiation of flank pain to L side of abdomen  She also notes urinary urgency, frequency, and gross hematuria  She additionally reports a fever of 101 at home with last dose of tylenol yesterday pm  Denies previous history of similar pain  Denies recent fall/trauma prior to onset  History provided by:  Patient   used: No    Abdominal Pain  Pain location:  L flank  Pain radiation: L side of abdomen    Duration:  1 day  Timing:  Constant  Progression:  Waxing and waning  Chronicity:  New  Context: not sick contacts, not suspicious food intake and not trauma    Relieved by:  Nothing  Worsened by:  Palpation  Ineffective treatments:  Acetaminophen  Associated symptoms: chills, dysuria, fever, hematuria, nausea and vomiting    Associated symptoms: no chest pain, no constipation, no cough, no diarrhea, no melena, no shortness of breath, no sore throat, no vaginal bleeding and no vaginal discharge    Risk factors: no alcohol abuse    Flank Pain  Associated symptoms: chills, dysuria, fever, hematuria, nausea and vomiting    Associated symptoms: no chest pain, no constipation, no cough, no diarrhea, no melena, no shortness of breath, no sore throat, no vaginal bleeding and no vaginal discharge        Prior to Admission Medications   Prescriptions Last Dose Informant Patient Reported? Taking? Abatacept 125 MG/ML SOAJ  Self Yes Yes   Sig: Inject under the skin   Adalimumab 40 MG/0 4ML PNKT  Self Yes Yes   Sig: Inject 40 mg under the skin every 14 (fourteen) days    Calcium Carb-Cholecalciferol (CALCIUM 600+D3) 600-200 MG-UNIT TABS  Self Yes Yes   Sig: take 1 tablet twice a day   Cholecalciferol (VITAMIN D3) 2000 units capsule  Self Yes Yes   Sig: take 1 tablet po daily     Elastic Bandages & Supports (CARPAL TUNNEL WRIST DELUXE) MISC  Self No No   Sig: by Does not apply route daily at bedtime   Incontinence Supply Disposable (Poise Pad) PADS  Self No No   Sig: by Does not apply route 4 (four) times a day as needed (incontinence)   Methenamine-Sodium Salicylate 980-463 1 MG TABS Not Taking at Unknown time Self No No   Sig: Take 1 tablet by mouth 3 (three) times a day   Patient not taking: Reported on 2021   Multiple Vitamins-Minerals (MULTIVITAMIN WITH MINERALS) tablet  Self No Yes   Sig: Take 1 tablet by mouth daily   albuterol (PROVENTIL HFA,VENTOLIN HFA) 90 mcg/act inhaler  Self No Yes   Sig: Inhale 2 puffs every 4 (four) hours as needed for wheezing   celecoxib (CeleBREX) 200 mg capsule  Self Yes Yes   Sig: Take 200 mg by mouth 2 (two) times a day   citalopram (CeleXA) 20 mg tablet  Self No Yes   Sig: Take 1 tablet (20 mg total) by mouth daily   doxepin (SINEquan) 25 mg capsule  Self No Yes   Sig: Take 1 capsule (25 mg total) by mouth daily at bedtime   ergocalciferol (Drisdol) 1 25 MG (49257 UT) capsule  Self Yes Yes   Sig: Take 50,000 Units by mouth daily in the early morning    famotidine (PEPCID) 20 mg tablet  Self No Yes   Sig: Take 1 tablet (20 mg total) by mouth 2 (two) times a day   fluticasone (FLONASE) 50 mcg/act nasal spray  Self No Yes   Si spray into each nostril daily   fluticasone (FLOVENT HFA) 110 MCG/ACT inhaler  Self No Yes   Sig: Inhale 2 puffs 2 (two) times a day Rinse mouth after use     folic acid (FOLVITE) 1 mg tablet  Self Yes Yes   Sig: every 24 hours   furosemide (LASIX) 40 mg tablet  Self No Yes   Sig: Take 1 tablet (40 mg total) by mouth daily   gabapentin (NEURONTIN) 100 mg capsule   No Yes   Sig: Take 1 capsule (100 mg total) by mouth 3 (three) times a day for 14 days   leflunomide (ARAVA) 20 MG tablet  Self Yes Yes   Sig: Take 20 mg by mouth daily   levETIRAcetam (KEPPRA) 1000 MG tablet  Self No Yes   Sig: Take 1 tablet (1,000 mg total) by mouth 2 (two) times a day   methylPREDNISolone (MEDROL) 32 MG tablet   No Yes   Sig: Take one (1) tablet 12 hours and one (1) tablet  2 hours PRIOR to CT scan    metoprolol tartrate (LOPRESSOR) 25 mg tablet  Self No Yes   Sig: Take 1 tablet (25 mg total) by mouth every 12 (twelve) hours   naproxen (NAPROSYN) 500 mg tablet   No Yes   Sig: Take 1 tablet (500 mg total) by mouth 2 (two) times a day with meals   nitroglycerin (NITROSTAT) 0 4 mg SL tablet  Self No Yes   Sig: Place 1 tablet (0 4 mg total) under the tongue every 5 (five) minutes as needed for chest pain Call 911 if using 3 doses   ondansetron (ZOFRAN-ODT) 4 mg disintegrating tablet  Self No Yes   Sig: Take 1 tablet (4 mg total) by mouth every 8 (eight) hours as needed for nausea for up to 10 doses   polyethylene glycol (GLYCOLAX) 17 GM/SCOOP powder  Self No Yes   Sig: Take 17 g by mouth daily   predniSONE 5 mg tablet  Self Yes Yes   Sig: Take by mouth daily   rizatriptan (MAXALT) 5 MG tablet   No No   Sig: Take 1 tablet (5 mg total) by mouth once as needed for migraine for up to 1 dose May repeat in 2 hours if needed   traMADol (ULTRAM) 50 mg tablet  Self No Yes   Sig: Take 1 tablet (50 mg total) by mouth every 6 (six) hours as needed for moderate pain      Facility-Administered Medications: None       Past Medical History:   Diagnosis Date    [facility])     Anxiety     Asthma     Bipolar 1 disorder (HCC)     Chronic kidney disease     Depression     Epilepsy ([facility] )     Fibroid 2012    Hematuria     History of cardiac cath 2006    Hydronephrosis 2007    Hypertension     Migraine without aura     PTSD (post-traumatic stress disorder)     Rheumatoid arteritis ([facility] )        Past Surgical History:   Procedure Laterality Date    APPENDECTOMY      CARDIAC CATHETERIZATION  2006    CARDIAC CATHETERIZATION       SECTION  2008    CHOLECYSTECTOMY  2018    IR BIOPSY KIDNEY COLUMBIA KIT NO LATERALITY  2021    KIDNEY SURGERY  2007    TUBAL LIGATION  2008       Family History   Problem Relation Age of Onset    Diabetes Maternal Grandmother     Hypertension Maternal Grandmother     No Known Problems Mother     No Known Problems Sister     No Known Problems Daughter     No Known Problems Paternal Grandmother     No Known Problems Daughter     Breast cancer Neg Hx     Cancer Neg Hx      I have reviewed and agree with the history as documented  E-Cigarette/Vaping    E-Cigarette Use Never User      E-Cigarette/Vaping Substances     Social History     Tobacco Use    Smoking status: Former Smoker     Types: Cigarettes     Quit date:      Years since quittin 6    Smokeless tobacco: Former User    Tobacco comment: 13-14 years ago   Vaping Use    Vaping Use: Never used   Substance Use Topics    Alcohol use: Not Currently    Drug use: Never       Review of Systems   Constitutional: Positive for chills and fever  HENT: Negative for congestion, rhinorrhea and sore throat  Eyes: Negative for pain and visual disturbance  Respiratory: Negative for cough, shortness of breath and wheezing  Cardiovascular: Negative for chest pain and palpitations  Gastrointestinal: Positive for abdominal pain, nausea and vomiting  Negative for constipation, diarrhea and melena  Genitourinary: Positive for dysuria, flank pain, frequency, hematuria and urgency  Negative for pelvic pain, vaginal bleeding and vaginal discharge  Musculoskeletal: Negative for back pain, neck pain and neck stiffness  Skin: Negative for rash and wound  Neurological: Negative for dizziness, weakness, light-headedness and numbness  Physical Exam  Physical Exam  Constitutional:       General: She is not in acute distress  Appearance: She is well-developed  She is not toxic-appearing or diaphoretic  HENT:      Head: Normocephalic and atraumatic  Right Ear: External ear normal       Left Ear: External ear normal    Eyes:      Conjunctiva/sclera: Conjunctivae normal       Pupils: Pupils are equal, round, and reactive to light  Cardiovascular:      Rate and Rhythm: Normal rate and regular rhythm  Heart sounds: Normal heart sounds  No murmur heard  No friction rub  No gallop  Pulmonary:      Effort: Pulmonary effort is normal  No respiratory distress  Breath sounds: Normal breath sounds  No wheezing  Abdominal:      General: There is no distension  Palpations: Abdomen is soft  Tenderness: There is abdominal tenderness  There is left CVA tenderness  There is no right CVA tenderness, guarding or rebound  Negative signs include Mahmood's sign, Rovsing's sign and McBurney's sign  Comments: L CVAT  TTP to LUQ of abdomen without rigidity, rebound, or guarding  No McBurney point TTP  Negative Mahmood's  Musculoskeletal:      Cervical back: Normal range of motion and neck supple  Lymphadenopathy:      Cervical: No cervical adenopathy  Skin:     General: Skin is warm and dry  Capillary Refill: Capillary refill takes less than 2 seconds  Findings: No erythema or rash  Neurological:      Mental Status: She is alert and oriented to person, place, and time  Motor: No abnormal muscle tone  Coordination: Coordination normal    Psychiatric:         Behavior: Behavior normal          Thought Content:  Thought content normal          Judgment: Judgment normal          Vital Signs  ED Triage Vitals Temperature Pulse Respirations Blood Pressure SpO2   08/08/21 0718 08/08/21 0718 08/08/21 0718 08/08/21 0718 08/08/21 0718   98 7 °F (37 1 °C) 75 18 144/78 100 %      Temp Source Heart Rate Source Patient Position - Orthostatic VS BP Location FiO2 (%)   08/08/21 0718 08/08/21 0920 08/08/21 0920 08/08/21 0920 --   Oral Monitor Lying Right arm       Pain Score       08/08/21 0718       8           Vitals:    08/08/21 0718 08/08/21 0920 08/08/21 1127   BP: 144/78 100/64 90/54   Pulse: 75 56 64   Patient Position - Orthostatic VS:  Lying Lying         Visual Acuity      ED Medications  Medications   fentanyl citrate (PF) 100 MCG/2ML 25 mcg (25 mcg Intravenous Given 8/8/21 0804)   ondansetron (ZOFRAN) injection 4 mg (4 mg Intravenous Given 8/8/21 0804)   sodium chloride 0 9 % bolus 500 mL (0 mL Intravenous Stopped 8/8/21 0907)   fentanyl citrate (PF) 100 MCG/2ML 25 mcg (25 mcg Intravenous Given 8/8/21 1008)   ceftriaxone (ROCEPHIN) 1 g/50 mL in dextrose IVPB (0 mg Intravenous Stopped 8/8/21 1051)       Diagnostic Studies  Results Reviewed     Procedure Component Value Units Date/Time    Urine culture [[id number]] Collected: 08/08/21 0727    Lab Status: Final result Specimen: Urine, Clean Catch Updated: 08/10/21 0758     Urine Culture 10,000-19,000 cfu/ml     Urine Microscopic [[id number]]  (Abnormal) Collected: 08/08/21 0727    Lab Status: Final result Specimen: Urine, Clean Catch Updated: 08/08/21 0832     RBC, UA Innumerable /hpf      WBC, UA 4-10 /hpf      Epithelial Cells Occasional /hpf      Bacteria, UA Occasional /hpf     APTT [[id number]]  (Abnormal) Collected: 08/08/21 0807    Lab Status: Final result Specimen: Blood from Arm, Right Updated: 08/08/21 0830     PTT 40 seconds     Protime-INR [[id number]]  (Normal) Collected: 08/08/21 0807    Lab Status: Final result Specimen: Blood from Arm, Right Updated: 08/08/21 0830     Protime 14 1 seconds      INR 1 11    Comprehensive metabolic panel [[id number]]  (Abnormal) Collected: 08/08/21 0807    Lab Status: Final result Specimen: Blood from Arm, Right Updated: 08/08/21 0828     Sodium 141 mmol/L      Potassium 4 1 mmol/L      Chloride 107 mmol/L      CO2 25 mmol/L      ANION GAP 9 mmol/L      BUN 14 mg/dL      Creatinine 0 71 mg/dL      Glucose 93 mg/dL      Calcium 8 3 mg/dL      Corrected Calcium 8 9 mg/dL      AST 14 U/L      ALT 23 U/L      Alkaline Phosphatase 55 U/L      Total Protein 7 1 g/dL      Albumin 3 3 g/dL      Total Bilirubin 0 26 mg/dL      eGFR 106 ml/min/1 73sq m     Narrative:      [place] guidelines for Chronic Kidney Disease (CKD):     Stage 1 with normal or high GFR (GFR > 90 mL/min/1 73 square meters)    Stage 2 Mild CKD (GFR = 60-89 mL/min/1 73 square meters)    Stage 3A Moderate CKD (GFR = 45-59 mL/min/1 73 square meters)    Stage 3B Moderate CKD (GFR = 30-44 mL/min/1 73 square meters)    Stage 4 Severe CKD (GFR = 15-29 mL/min/1 73 square meters)    Stage 5 End Stage CKD (GFR <15 mL/min/1 73 square meters)  Note: GFR calculation is accurate only with a steady state creatinine    CBC and differential [[id number]]  (Abnormal) Collected: 08/08/21 0807    Lab Status: Final result Specimen: Blood from Arm, Right Updated: 08/08/21 0816     WBC 5 27 Thousand/uL      RBC 3 45 Million/uL      Hemoglobin 8 5 g/dL      Hematocrit 28 8 %      MCV 84 fL      MCH 24 6 pg      MCHC 29 5 g/dL      RDW 20 8 %      MPV 10 1 fL      Platelets 321 Thousands/uL      nRBC 0 /100 WBCs      Neutrophils Relative 63 %      Immat GRANS % 0 %      Lymphocytes Relative 26 %      Monocytes Relative 8 %      Eosinophils Relative 3 %      Basophils Relative 0 %      Neutrophils Absolute 3 29 Thousands/µL      Immature Grans Absolute 0 02 Thousand/uL      Lymphocytes Absolute 1 36 Thousands/µL      Monocytes Absolute 0 42 Thousand/µL      Eosinophils Absolute 0 16 Thousand/µL      Basophils Absolute 0 02 Thousands/µL     POCT pregnancy, urine [[id number]] (Normal) Resulted: 08/08/21 0730    Lab Status: Final result Updated: 08/08/21 0730     EXT PREG TEST UR (Ref: Negative) negative     Control VALID    Urine Macroscopic, POC [[id number]]  (Abnormal) Collected: 08/08/21 0727    Lab Status: Final result Specimen: Urine Updated: 08/08/21 0729     Color, UA Red     Clarity, UA Cloudy     pH, UA 5 5     Leukocytes, UA Trace     Nitrite, UA Negative     Protein,  (2+) mg/dl      Glucose, UA Negative mg/dl      Ketones, UA Negative mg/dl      Urobilinogen, UA 0 2 E U /dl      Bilirubin, UA Interference- unable to analyze     Blood, UA Large     Specific Gravity, UA >=1 030    Narrative:      CLINITEK RESULT                 CT abdomen pelvis wo contrast   Final Result by Betzy Lawson MD (08/08 3987)      1  No renal calculi or other acute abnormality within the abdomen or pelvis  2  Diverticulosis without acute diverticulitis  Workstation performed: [id number]                    Procedures  Procedures         ED Course  ED Course as of Aug 11 0725   [facility] Aug 08, 2021   9257 Patient reports anaphylaxis to IV contrast medium, reports also allergic to benadryl  Will obtain noncontrast CT       1013 Case discussed with Thomas Morris, on call urology  Reviewed history/imaging findings, recommends coverage for possible UTI given recent procedure, otherwise no further need for admission or urologic intervention at this time  SBIRT 22yo+      Most Recent Value   SBIRT (22 yo +)   In order to provide better care to our patients, we are screening all of our patients for alcohol and drug use  Would it be okay to ask you these screening questions? No Filed at: 08/08/2021 0909                    MDM  Number of Diagnoses or Management Options  Left flank pain  Nausea  Pyuria  Diagnosis management comments: L flank pain, urinary symptoms, and N/V over past day following recent renal biopsy on 7/29   Does note she initially had been asymptomatic following procedure until yesterday  Urine macro noted to have large blood, trace leuks  Will obtain urine micro/culture  Check labs including CBC for WBC count, metabolic panel for renal function  Check CT abd/pelvis to exclude post-procedural complication including hematoma, ureteral stone vs other acute intra-abdominal pathology  Zofran for nausea, fentanyl for pain, IV fluids  Amount and/or Complexity of Data Reviewed  Clinical lab tests: ordered  Tests in the radiology section of CPT®: ordered and reviewed    Patient Progress  Patient progress: improved      Disposition  Final diagnoses:   Left flank pain   Pyuria   Nausea     Time reflects when diagnosis was documented in both MDM as applicable and the Disposition within this note     Time User Action Codes Description Comment    8/8/2021 11:18 AM Jacquenette Luci Add [R10 9] Left flank pain     8/8/2021 11:18 AM Jacquenette Luci Add [R82 81] Pyuria     8/8/2021 11:18 AM Jacquenette Luci Add [R11 0] Nausea       ED Disposition     ED Disposition Condition Date/Time Comment    Discharge Stable Sun Aug 8, 2021 11:18 AM Kimberley Hodgkins discharge to home/self care              Follow-up Information     Follow up With Specialties Details Why [address] Emergency Department Emergency Medicine  If symptoms worsen [facility] [id number]  [address] [place] [address] Emergency Department, [facility] , [place], [place], [address] [facility]rology [facility] Schedule an appointment as soon as possible for a visit   [address] [place] [address]  [address] [place] [address] [facility], [address] [place] [address], [address] [facility]    Dante Wolf PA-C Family Medicine, Physician Assistant Call   [address]  [address] [facility] [address]  [phone]             Discharge Medication List as of 8/8/2021 11:32 AM      START taking these medications    Details   acetaminophen (TYLENOL) 650 mg CR tablet Take 1 tablet (650 mg total) by mouth every 8 (eight) hours as needed for mild pain or moderate pain, Starting Sun 8/8/2021, Normal      !! ondansetron (ZOFRAN-ODT) 4 mg disintegrating tablet Take 1 tablet (4 mg total) by mouth every 6 (six) hours as needed for nausea or vomiting, Starting Sun 8/8/2021, Normal      sulfamethoxazole-trimethoprim (BACTRIM DS) 800-160 mg per tablet Take 1 tablet by mouth 2 (two) times a day for 10 days smx-tmp DS (BACTRIM) 800-160 mg tabs (1tab q12 D10), Starting Sun 8/8/2021, Until Wed 8/18/2021, Normal       !! - Potential duplicate medications found  Please discuss with provider  CONTINUE these medications which have NOT CHANGED    Details   Abatacept 125 MG/ML SOAJ Inject under the skin, Historical Med      Adalimumab 40 MG/0 4ML PNKT Inject 40 mg under the skin every 14 (fourteen) days , Starting Wed 1/20/2021, Historical Med      albuterol (PROVENTIL HFA,VENTOLIN HFA) 90 mcg/act inhaler Inhale 2 puffs every 4 (four) hours as needed for wheezing, Starting Sun 12/29/2019, Print      Calcium Carb-Cholecalciferol (CALCIUM 600+D3) 600-200 MG-UNIT TABS take 1 tablet twice a day, Historical Med      celecoxib (CeleBREX) 200 mg capsule Take 200 mg by mouth 2 (two) times a day, Historical Med      Cholecalciferol (VITAMIN D3) 2000 units capsule take 1 tablet po daily  , Historical Med      citalopram (CeleXA) 20 mg tablet Take 1 tablet (20 mg total) by mouth daily, Starting Wed 3/3/2021, Normal      doxepin (SINEquan) 25 mg capsule Take 1 capsule (25 mg total) by mouth daily at bedtime, Starting Wed 3/3/2021, Normal      ergocalciferol (Drisdol) 1 25 MG (35982 UT) capsule Take 50,000 Units by mouth daily in the early morning , Starting Wed 4/21/2021, Until Sun 8/8/2021, Historical Med      famotidine (PEPCID) 20 mg tablet Take 1 tablet (20 mg total) by mouth 2 (two) times a day, Starting Mon 6/8/2020, Normal      fluticasone (FLONASE) 50 mcg/act nasal spray 1 spray into each nostril daily, Starting Wed 3/3/2021, Normal      fluticasone (FLOVENT HFA) 110 MCG/ACT inhaler Inhale 2 puffs 2 (two) times a day Rinse mouth after use , Starting Wed 3/3/3796, Normal      folic acid (FOLVITE) 1 mg tablet every 24 hours, Starting Wed 1/27/2016, Historical Med      furosemide (LASIX) 40 mg tablet Take 1 tablet (40 mg total) by mouth daily, Starting Wed 3/3/2021, Normal      gabapentin (NEURONTIN) 100 mg capsule Take 1 capsule (100 mg total) by mouth 3 (three) times a day for 14 days, Starting Tue 7/6/2021, Until Sun 8/8/2021, Normal      leflunomide (ARAVA) 20 MG tablet Take 20 mg by mouth daily, Historical Med      levETIRAcetam (KEPPRA) 1000 MG tablet Take 1 tablet (1,000 mg total) by mouth 2 (two) times a day, Starting Wed 11/18/2020, Normal      methylPREDNISolone (MEDROL) 32 MG tablet Take one (1) tablet 12 hours and one (1) tablet  2 hours PRIOR to CT scan , Normal      metoprolol tartrate (LOPRESSOR) 25 mg tablet Take 1 tablet (25 mg total) by mouth every 12 (twelve) hours, Starting Wed 11/18/2020, Normal      Multiple Vitamins-Minerals (MULTIVITAMIN WITH MINERALS) tablet Take 1 tablet by mouth daily, Starting Fri 8/2/2019, Normal      naproxen (NAPROSYN) 500 mg tablet Take 1 tablet (500 mg total) by mouth 2 (two) times a day with meals, Starting Tue 7/6/2021, Normal      nitroglycerin (NITROSTAT) 0 4 mg SL tablet Place 1 tablet (0 4 mg total) under the tongue every 5 (five) minutes as needed for chest pain Call 911 if using 3 doses, Starting u 10/15/2020, Normal      !! ondansetron (ZOFRAN-ODT) 4 mg disintegrating tablet Take 1 tablet (4 mg total) by mouth every 8 (eight) hours as needed for nausea for up to 10 doses, Starting Fri 4/9/2021, Normal      polyethylene glycol (GLYCOLAX) 17 GM/SCOOP powder Take 17 g by mouth daily, Starting Thu 10/15/2020, Normal      predniSONE 5 mg tablet Take by mouth daily, Historical Med      traMADol (ULTRAM) 50 mg tablet Take 1 tablet (50 mg total) by mouth every 6 (six) hours as needed for moderate pain, Starting Fri 4/9/2021, Normal      amLODIPine (NORVASC) 5 mg tablet Take 1 tablet (5 mg total) by mouth daily, Starting Wed 3/3/2021, Until Sun 8/8/2021, Normal      Elastic Bandages & Supports (CARPAL TUNNEL WRIST DELUXE) MISC by Does not apply route daily at bedtime, Starting Fri 8/2/2019, Print      Incontinence Supply Disposable (Poise Pad) PADS by Does not apply route 4 (four) times a day as needed (incontinence), Starting Thu 10/15/2020, Normal      Methenamine-Sodium Salicylate 836-645 9 MG TABS Take 1 tablet by mouth 3 (three) times a day, Starting Tue 11/24/2020, Normal      rizatriptan (MAXALT) 5 MG tablet Take 1 tablet (5 mg total) by mouth once as needed for migraine for up to 1 dose May repeat in 2 hours if needed, Starting Tue 6/1/2021, Normal       !! - Potential duplicate medications found  Please discuss with provider  No discharge procedures on file      PDMP Review       Value Time User    PDMP Reviewed  Yes 4/9/2021 11:58 AM Hemant Godwin PA-C          ED Provider  Electronically Signed by           Nestor Krishnamurthy PA-C  08/11/21 0758

## 2021-08-11 NOTE — TELEPHONE ENCOUNTER
Reschedule Appointment     Who is calling in Patient    Doctor Appointment Scheduled with Dr Matthews Record date and time 08/16 at 10:40am   New date and time 08/20 at 11:00am   Location [place]   Patient verbalized understanding

## 2021-08-11 NOTE — TELEPHONE ENCOUNTER
I spoke To Jens Fontana this morning about her recent test results including prolonged PTT time, lupus testing  We will need further testing to determine whether she has a factor deficiency or factor inhibitor  I am ordering further testing now and there is another test that was ordered a week ago that is a miscellaneous lab order that needs to be collected  She will also get her office appointment rescheduled with me from August 16 to August 20  She promises to call in today to do so  All questions and concerns answered  She will need a CBC next Thursday week as her Hgb may get  <8 at that point and I am ordering this as well      Christiano Ochoa MD

## 2021-08-12 LAB
FACT IX ACT/NOR PPP: 86 % (ref 60–177)
FACT XI ACT/NOR PPP: 75 % (ref 60–150)
FACT XII ACT/NOR PPP: 86 % (ref 50–150)
FACT XIIIA PPP-ACNC: 92 % (ref 56–140)
VWF:RCO ACT/NOR PPP PL AGG: 61 % (ref 50–200)

## 2021-08-18 LAB — MISCELLANEOUS LAB TEST RESULT: NORMAL

## 2021-08-18 NOTE — PROGRESS NOTES
Hematology Outpatient Office Note    Date of Service: 8/18/2021    [facility] HEMATOLOGY SPECIALISTS AMINATA Silva  [facility]  [phone]    Reason for Consultation:   No chief complaint on file  Referral Physician: No ref  provider found    Primary Care Physician:  Lazara Haynes PA-C     Nickname: Janki      ASSESSMENT & PLAN      Diagnosis ICD-10-CM Associated Orders   1  Prolonged PTT  R79 1    2  Gross hematuria  R31 0    3  Iron deficiency anemia due to chronic blood loss  D50 0          This is a 38 y o  being seen in consultation for   No chief complaint on file  Easy bruising    · Discussion of decision making  Today we focused on easy bruising: this is an undiagnosed new problem with uncertain prognosis     Iron Deficiency anemia: likely 2/2 persistent gross hematuria from 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    Easy bruising and bleeding: PTT 49 (elevated) on 4/9/2021, decreased to 40 on repeat testing  PT/INR WNL today  Factor deficiencies as well as Factor 8 inhibitor has been ruled out with testing (see below)  Stanley Bunk Disease has not yet been ruled out  Anti-phospholipid syndrome testing WNL (Beta-2 glycoprotein IgG/IgM, Lupus Anti-coagulant, Cardiolipin Ab negative)  A Von Willebrand Comprehensive profile test including a separate VWF Collagen Collagen Binding send out tests are in process since 8/2/21 as is a 8/11/21 Ristocetin VWF profile test      Cardiolipin Antibody, Lupus anticoagulant, Beta-2 glycoprotein Antibody testing: normal  Factors 8, 9, 11, 12 activity all normal  Factor 8 Act clotting level normal (69% ), Factor VIII inhibitor, EIA testing: negative ([facility] send out)     INR: 1 11 (normal)    Further testing results seen below:  Order: [id number]  Status:  Final result   Visible to patient:  Yes (53 [address] Morales) Next appt:  08/20/2021 at 11:00 AM in Hematology and Oncology (Christiano Ochoa MD) Dx: Iron deficiency anemia due to chronic      0 Result Notes   Ref Range & Units 8/2/21  3:37 PM   PTT Lupus Anticoagulant 0 0 - 51 9 sec 57  4High     Dilute Viper Venom Time 0 0 - 47 0 sec 45 0    DILUTE PROTHROMBIN TIME(DPT) 0 0 - 55 0 sec 44 1    THROMBIN TIME (DRVW) 0 0 - 23 0 sec 17 0    DPT CONFIRM RATIO 0 00 - 1 40 Ratio 1 11    LUPUS REFLEX INTERPRETATION  Comment:    Comment: No lupus anticoagulant was detected  Results suggest the presence of an   inhibitor  The presence of heparin, which is a non-specific inhibitor, may   cause this pattern of results  Since the PTT-LA was extended and the dRVVT   was within normal limits, a specific inhibitor to factor VIII, IX, XI, or   XII cannot be excluded  Contains abnormal data PTT-LA Mixing Study  Order: [id number] - Reflex for Order [id number]  Status:  Final result   Visible to patient:  Yes (53 [address] Morales) Next appt:  08/20/2021 at 11:00 AM in Hematology and Oncology (Christiano Ochoa MD) Dx: Iron deficiency anemia due to chronic      0 Result Notes   Ref Range & Units 8/2/21  3:37 PM   PTT-LA MIX 0 0 - 48 9 sec 50  [address] and recommendations  F/u Devyn Mi testing is in process: VWD panel+ Collagen binding activity  --VWF:ag  --VWF:Rco  --VWF collagen binding activity   -If above testing is negative, then the above abnormalities in Lupus testing are likely of no clinical consequence and pt should focus on addressing her underlying secondary FSGS (renal guidance appreciated)  - Chromogenic Factor 8 assay in process  -PTT mixing study send out to [facility] added to above specimens and is in process      I personally reviewed the following lab results, the image studies, pathology, other specialty/physicians consult notes and recommendations, and outside medical  I had a lengthy discussion with the patient and shared the work-up findings  We discussed the diagnosis and management plan as below  I spent 53 minutes reviewing the records (labs, clinician notes, outside records, medical history, ordering medicine/tests/procedures, interpreting the imaging/labs previously done) and coordination of care as well as direct time with the patient today, of which greater than 50% of the time was spent in counseling and coordination of care with the patient/family  Follow Up: 3 weeks (9/10/21)    All questions were answered to the patient's satisfaction during this encounter  The patient knows the contact information for our office and knows to reach out for any relevant concerns related to this encounter  For all other listed problems and medical diagnosis in their chart - they are managed by PCP and/or other specialists, which the patient acknowledges  Thank you very much for your consultation and making us a part of this patient's care  We are continuing to follow closely with you  Please do not hesitate to reach out to me with any additional questions or concerns  Berkley Hall MD  Hematology & Medical Oncology Staff Physician             Disclaimer: This document was prepared using [address] [employer] Direct technology  If a word or phrase is confusing, or does not make sense, this is likely due to recognition error which was not discovered during this clinician's review  If you believe an error has occurred, please contact me through [address] [place] line for devin? 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   [employer]  Pain: 7/10 at renal biopsy site      Epistaxis   [x] 0 No, or less than 5 minutes  [] 1 >5, or more than 10 minutes  [] 2 Consultation only  [] 3 Packing, cautery, or antifibrinolytics  [] 4 Blood transfusion, replacement therapy, or desmopressin     Oral Cavity   [] 0 No  [x] 1 At least one  [] 2 Consultation only  [] 3 Surgical hemostasis or antifibrinolytics  [] 4 Blood transfusion, replacement therapy, or desmopressin     Surgical   [] -1 No bleeding in at least 2 surgeries  [x]  0 Not done, or no bleeding in 1 surgery  []  1 Reported in <25% of surgeries  []  2 Reported in >25% of surgeries, no intervention  []  3 Surgical hemostasis or antifibrinolytics  []  4 Blood transfusion, replacement therapy, or desmopressin     Muscle Hematoma   [] 0 Never  [] 1 Post trauma, no therapy  [x] 2 Spontaneous, no therapy  [] 3 Spontaneous or traumatic, desmopressin or replacement therapy  [] 4 Spontaneous or traumatic, requiring surgery or blood transufsion     Cutaneous   [] 0 No, or trivial <1cm  [x] 1 >1cm and no trauma  [] 2 Consultation     GI bleeding   [x] 0 No  [] 1 Assoc with ulcer, portal HTN, hemorrhoid, AVM  [] 2 Spontaneous  [] 3 surgical hemostasis, blood, replacement therapy, antifibrinolytics, or desmopressin     Menorrhagia   [] 0 No  [x] 1 Consultation only  [] 2 Antifibrinolytics or pill use  [] 3 Curettage or iron therapy  [] 4 Blood transfusion, factor replacement, desmopressin, or hysterectomy   ________________________________________________________  However, last menstrual period was last October  She had hematuria 7 years ago that was determined to be "blood clots from her kidney"  Clottin years ago she developed in one of her lower extremities  Not sure if it was provoked or not  She went on Lovenox and then changed to ASA 81mg due to "hives" on Lovenox  SUBJECTIVE     Denies F/C, N/V, SOB, CP, abd pain   She has been having gross hematuria every time she urinates and this is being worked up  I have reviewed the relevant past medical, surgical, social and family history  I have also reviewed allergies and medications for this patient  She continues to have bleeding per urination that is stable  Review of Systems       A 10-point review of system was performed, pertinent positive and negative were detailed as above  Otherwise, the 10-point review of system was negative        Past Medical History:   Diagnosis Date    [facility])     Anxiety     Asthma     Bipolar 1 disorder ([place] [address] )     Chronic kidney disease     Depression     Epilepsy ([place] [address] )     Fibroid 2012    Hematuria     History of cardiac cath 2006    Hydronephrosis 2007    Hypertension     Migraine without aura     PTSD (post-traumatic stress disorder)     Rheumatoid arteritis ([place] [address] )        Past Surgical History:   Procedure Laterality Date    APPENDECTOMY      CARDIAC CATHETERIZATION      CARDIAC CATHETERIZATION       SECTION  2008    CHOLECYSTECTOMY  2018    IR BIOPSY KIDNEY COLUMBIA KIT NO LATERALITY  2021    KIDNEY SURGERY  2007    TUBAL LIGATION  2008       Family History   Problem Relation Age of Onset    Diabetes Maternal Grandmother     Hypertension Maternal Grandmother     No Known Problems Mother     No Known Problems Sister     No Known Problems Daughter     No Known Problems Paternal Grandmother     No Known Problems Daughter     Breast cancer Neg Hx     Cancer Neg Hx        Social History     Socioeconomic History    Marital status: Single     Spouse name: Not on file    Number of children: Not on file    Years of education: Not on file    Highest education level: Not on file   Occupational History    Not on file   Tobacco Use    Smoking status: Former Smoker     Types: Cigarettes     Quit date:      Years since quittin 6    Smokeless tobacco: Former User    Tobacco comment: 13-14 years ago Vaping Use    Vaping Use: Never used   Substance and Sexual Activity    Alcohol use: Not Currently    Drug use: Never    Sexual activity: Not Currently     Partners: Male     Birth control/protection: Female Sterilization   Other Topics Concern    Not on file   Social History Narrative    Not on file     Social Determinants of Health     Financial Resource Strain: High Risk    Difficulty of Paying Living Expenses: Hard   Food Insecurity: No Food Insecurity    Worried About Running Out of Food in the Last Year: Never true    Maria of Food in the Last Year: Never true   Transportation Needs: Unmet Transportation Needs    Lack of Transportation (Medical): Yes    Lack of Transportation (Non-Medical): Yes   Physical Activity:     Days of Exercise per Week:     Minutes of Exercise per Session:    Stress:     Feeling of Stress :    Social Connections:     Frequency of Communication with Friends and Family:     Frequency of Social Gatherings with Friends and Family:     Attends Anabaptism Services:     Active Member of Clubs or Organizations:     Attends Club or Organization Meetings:     Marital Status:    Intimate Partner Violence:     Fear of Current or Ex-Partner:     Emotionally Abused:     Physically Abused:     Sexually Abused:         Allergies   Allergen Reactions    Codeine     Contrast [Iodinated Diagnostic Agents]     Iodine - Food Allergy     Latex     Lovenox [Enoxaparin]     Shellfish-Derived Products - Food Allergy Hives    Vaccinium Angustifolium Hives    Vicodin [Hydrocodone-Acetaminophen]     Benadryl [Diphenhydramine] Rash    Oxycontin [Oxycodone] Palpitations    Provera [Medroxyprogesterone] Palpitations       Current Outpatient Medications   Medication Sig Dispense Refill    Abatacept 125 MG/ML SOAJ Inject under the skin      acetaminophen (TYLENOL) 650 mg CR tablet Take 1 tablet (650 mg total) by mouth every 8 (eight) hours as needed for mild pain or moderate pain 30 tablet 0    Adalimumab 40 MG/0 4ML PNKT Inject 40 mg under the skin every 14 (fourteen) days       albuterol (PROVENTIL HFA,VENTOLIN HFA) 90 mcg/act inhaler Inhale 2 puffs every 4 (four) hours as needed for wheezing 1 Inhaler 0    Calcium Carb-Cholecalciferol (CALCIUM 600+D3) 600-200 MG-UNIT TABS take 1 tablet twice a day      celecoxib (CeleBREX) 200 mg capsule Take 200 mg by mouth 2 (two) times a day      Cholecalciferol (VITAMIN D3) 2000 units capsule take 1 tablet po daily   citalopram (CeleXA) 20 mg tablet Take 1 tablet (20 mg total) by mouth daily 90 tablet 1    doxepin (SINEquan) 25 mg capsule Take 1 capsule (25 mg total) by mouth daily at bedtime 90 capsule 1    Elastic Bandages & Supports (CARPAL TUNNEL WRIST DELUXE) MISC by Does not apply route daily at bedtime 2 each 0    ergocalciferol (Drisdol) 1 25 MG (60052 UT) capsule Take 50,000 Units by mouth daily in the early morning       famotidine (PEPCID) 20 mg tablet Take 1 tablet (20 mg total) by mouth 2 (two) times a day 30 tablet 0    fluticasone (FLONASE) 50 mcg/act nasal spray 1 spray into each nostril daily 16 g 5    fluticasone (FLOVENT HFA) 110 MCG/ACT inhaler Inhale 2 puffs 2 (two) times a day Rinse mouth after use   3 Inhaler 1    folic acid (FOLVITE) 1 mg tablet every 24 hours      furosemide (LASIX) 40 mg tablet Take 1 tablet (40 mg total) by mouth daily 90 tablet 1    gabapentin (NEURONTIN) 100 mg capsule Take 1 capsule (100 mg total) by mouth 3 (three) times a day for 14 days 42 capsule 0    Incontinence Supply Disposable (Poise Pad) PADS by Does not apply route 4 (four) times a day as needed (incontinence) 90 each 0    leflunomide (ARAVA) 20 MG tablet Take 20 mg by mouth daily      levETIRAcetam (KEPPRA) 1000 MG tablet Take 1 tablet (1,000 mg total) by mouth 2 (two) times a day 60 tablet 5    losartan (COZAAR) 25 mg tablet Take 1 tablet (25 mg total) by mouth daily at bedtime 90 tablet 4    Methenamine-Sodium Salicylate 086-516 4 MG TABS Take 1 tablet by mouth 3 (three) times a day (Patient not taking: Reported on 8/8/2021) 90 each 6    methylPREDNISolone (MEDROL) 32 MG tablet Take one (1) tablet 12 hours and one (1) tablet  2 hours PRIOR to CT scan  2 tablet 0    metoprolol tartrate (LOPRESSOR) 25 mg tablet Take 1 tablet (25 mg total) by mouth every 12 (twelve) hours 60 tablet 1    Multiple Vitamins-Minerals (MULTIVITAMIN WITH MINERALS) tablet Take 1 tablet by mouth daily 30 tablet 11    naproxen (NAPROSYN) 500 mg tablet Take 1 tablet (500 mg total) by mouth 2 (two) times a day with meals 30 tablet 0    nitroglycerin (NITROSTAT) 0 4 mg SL tablet Place 1 tablet (0 4 mg total) under the tongue every 5 (five) minutes as needed for chest pain Call 911 if using 3 doses 25 tablet 0    ondansetron (ZOFRAN-ODT) 4 mg disintegrating tablet Take 1 tablet (4 mg total) by mouth every 8 (eight) hours as needed for nausea for up to 10 doses 20 tablet 0    ondansetron (ZOFRAN-ODT) 4 mg disintegrating tablet Take 1 tablet (4 mg total) by mouth every 6 (six) hours as needed for nausea or vomiting 20 tablet 0    polyethylene glycol (GLYCOLAX) 17 GM/SCOOP powder Take 17 g by mouth daily 578 g 0    predniSONE 5 mg tablet Take by mouth daily      rizatriptan (MAXALT) 5 MG tablet Take 1 tablet (5 mg total) by mouth once as needed for migraine for up to 1 dose May repeat in 2 hours if needed 9 tablet 0    sulfamethoxazole-trimethoprim (BACTRIM DS) 800-160 mg per tablet Take 1 tablet by mouth 2 (two) times a day for 10 days smx-tmp DS (BACTRIM) 800-160 mg tabs (1tab q12 D10) 20 tablet 0    traMADol (ULTRAM) 50 mg tablet Take 1 tablet (50 mg total) by mouth every 6 (six) hours as needed for moderate pain 12 tablet 0     No current facility-administered medications for this visit  (Not in a hospital admission)        Objective:     24 Hour Vitals Assessment:     There were no vitals filed for this visit      PHYSICIAN EXAM:    General: Appearance: alert, cooperative, no distress  HEENT: Normocephalic, atraumatic  No scleral icterus  conjunctivae clear  EOMI  Chest: No tenderness  Lungs: Clear to auscultation bilaterally, Respirations unlabored  Cardiac: Regular rate and rhythm, S1and S2 are normal, no murmur, click, rubs or gallops  Abdomen: Soft, non-tender, non-distended  Bowel sounds are normal  No masses  Pelvic: deferred  Extremities:  No cyanosis, clubbing  Non-pitting edema up to the knees b/l  Skin: Skin color, turgor are normal    Neurologic: Alert and oriented, no gross focal deficits noted b/l  Normal strength  DATA REVIEW:    Pathology Result:    Final Diagnosis   Date Value Ref Range Status   07/29/2021   Final    A  Kidney, left, biopsy:  -  Renal cortical parenchyma (gross evaluation only)  - The entire specimen, including tissue inappropriate fixatives for electron microscopy, light microscopy and immunofluorescence is sent to [address] [place] [address] for expert nephropathologist evaluation   - A full report from that institution will follow under separate cover  04/27/2021   Final    A  Urine, Clean Catch:  Negative for high grade urothelial carcinoma ([address]) - see comment  Benign urothelial cells, benign squamous cells, red blood cells and neutrophils  Satisfactory for evaluation  Comment:  The above diagnostic category is from the recently published book, [employer] [place] [employer] Reporting Urinary Cytology, and is in keeping with the ongoing effort for utilization of standardized diagnostic terminology in urine cytology  *    *[employer] [place] [employer] Reporting Urinary Cytology  Ann-Marie Dacosta; 2016 01/20/2021   Final    A  Urine, Clean Catch (ThinPrep):  - Negative for high grade urothelial carcinoma ([address])  See Note  - Predominantly benign squamous cells, few benign urothelial cells, neutrophils, and red blood cells present  09/14/2018   Final    A  Urine, Voided, :  Negative for high grade urothelial carcinoma ([address]) - see comment  Benign urothelial cells  Benign squamous cells  Neutrophils and lymphocytes    Satisfactory for Evaluation  Comment:  The above diagnostic category is from the recently published book, [employer] [place] [employer] Reporting Urinary Cytology, and is in keeping with the ongoing effort for utilization of standardized diagnostic terminology in urine cytology  *    *[employer] [place] for Reporting Urinary Cytology  Ann-Marie Rodríguez; 2016  Interpretation performed at [facility], [address] [facility] [address] [facility] [address] [facility] [address] , [place], [address] [facility]              Image Results:   Image result are reviewed and documented in Hematology/Oncology history    CT abdomen pelvis wo contrast  Narrative: CT ABDOMEN AND PELVIS WITHOUT IV CONTRAST    INDICATION:   Pyelonephritis, complicated  L flank pain, recent renal biopsy  COMPARISON:  CT 1/25/2021    TECHNIQUE:  CT examination of the abdomen and pelvis was performed without intravenous contrast   Axial, sagittal, and coronal 2D reformatted images were created from the source data and submitted for interpretation  Radiation dose length product (DLP) for this visit:  1034 mGy-cm   This examination, like all CT scans performed in the [facility], was performed utilizing techniques to minimize radiation dose exposure, including the use of iterative   reconstruction and automated exposure control  Enteric contrast was administered  FINDINGS:    ABDOMEN    LOWER CHEST:  No clinically significant abnormality identified in the visualized lower chest     LIVER/BILIARY TREE:  Unremarkable  GALLBLADDER:  Gallbladder is surgically absent  SPLEEN:  Unremarkable  PANCREAS:  Unremarkable  ADRENAL GLANDS:  Unremarkable  KIDNEYS/URETERS:  Unremarkable  No hydronephrosis      STOMACH AND BOWEL:  There is colonic diverticulosis without evidence of acute diverticulitis  APPENDIX:  No findings to suggest appendicitis  ABDOMINOPELVIC CAVITY:  No ascites  No pneumoperitoneum  No lymphadenopathy  VESSELS:  Unremarkable for patient's age  PELVIS    REPRODUCTIVE ORGANS:  Unremarkable for patient's age  URINARY BLADDER:  Unremarkable  ABDOMINAL WALL/INGUINAL REGIONS:  Unremarkable  OSSEOUS STRUCTURES:  No acute fracture or destructive osseous lesion  Impression: 1  No renal calculi or other acute abnormality within the abdomen or pelvis  2  Diverticulosis without acute diverticulitis  Workstation performed: [id number]      LABS:  [facility]  Results for Sunil Biswas (MRN [id number]) as of 8/2/2021 14:50   Ref   Range 7/9/2021 12:50   WBC Latest Ref Range: 4 31 - 10 16 Thousand/uL 5 48   Red Blood Cell Count Latest Ref Range: 3 81 - 5 12 Million/uL 4 07   Hemoglobin Latest Ref Range: 11 5 - 15 4 g/dL 9 9 (L)   HCT Latest Ref Range: 34 8 - 46 1 % 32 5 (L)   MCV Latest Ref Range: 82 - 98 fL 80 (L)   MCH Latest Ref Range: 26 8 - 34 3 pg 24 3 (L)   MCHC Latest Ref Range: 31 4 - 37 4 g/dL 30 5 (L)   RDW Latest Ref Range: 11 6 - 15 1 % 13 0   Platelet Count Latest Ref Range: 149 - 390 Thousands/uL 352   MPV Latest Ref Range: 8 9 - 12 7 fL 9 6   nRBC Latest Units: /100 WBCs 0   Neutrophils % Latest Ref Range: 43 - 75 % 66   Immat GRANS % Latest Ref Range: 0 - 2 % 0   Lymphocytes Relative Latest Ref Range: 14 - 44 % 24   Monocytes Relative Latest Ref Range: 4 - 12 % 6   Eosinophils Latest Ref Range: 0 - 6 % 4   Basophils Relative Latest Ref Range: 0 - 1 % 0   Immature Grans Absolute Latest Ref Range: 0 00 - 0 20 Thousand/uL 0 01   Absolute Neutrophils Latest Ref Range: 1 85 - 7 62 Thousands/µL 3 61   Lymphocytes Absolute Latest Ref Range: 0 60 - 4 47 Thousands/µL 1 32   Absolute Monocytes Latest Ref Range: 0 17 - 1 22 Thousand/µL 0 30   Absolute Eosinophils Latest Ref Range: 0 00 - 0 61 Thousand/µL 0 22   Basophils Absolute Latest Ref Range: 0 00 - 0 10 Thousands/µL 0 02       No results found for this or any previous visit (from the past 48 hour(s))  No results for input(s): WBC, CREAT in the last 72 hours      Invalid input(s):  PLT     By:  Lucille Elam MD, 8/18/2021, 6:27 PM

## 2021-08-19 LAB — FACT VIII AG ACT/NOR PPP IA: 114 %

## 2021-08-20 ENCOUNTER — OFFICE VISIT (OUTPATIENT)
Dept: HEMATOLOGY ONCOLOGY | Facility: CLINIC | Age: 41
End: 2021-08-20
Payer: COMMERCIAL

## 2021-08-20 VITALS
RESPIRATION RATE: 18 BRPM | SYSTOLIC BLOOD PRESSURE: 122 MMHG | HEIGHT: 62 IN | HEART RATE: 88 BPM | BODY MASS INDEX: 48.07 KG/M2 | DIASTOLIC BLOOD PRESSURE: 78 MMHG | TEMPERATURE: 98.1 F | WEIGHT: 261.2 LBS | OXYGEN SATURATION: 99 %

## 2021-08-20 DIAGNOSIS — D50.0 IRON DEFICIENCY ANEMIA DUE TO CHRONIC BLOOD LOSS: ICD-10-CM

## 2021-08-20 DIAGNOSIS — R79.1 PROLONGED PTT: Primary | ICD-10-CM

## 2021-08-20 DIAGNOSIS — R31.0 GROSS HEMATURIA: ICD-10-CM

## 2021-08-20 PROCEDURE — 99215 OFFICE O/P EST HI 40 MIN: CPT | Performed by: INTERNAL MEDICINE

## 2021-08-22 ENCOUNTER — APPOINTMENT (EMERGENCY)
Dept: CT IMAGING | Facility: HOSPITAL | Age: 41
End: 2021-08-22
Payer: COMMERCIAL

## 2021-08-22 ENCOUNTER — HOSPITAL ENCOUNTER (EMERGENCY)
Facility: HOSPITAL | Age: 41
Discharge: HOME | End: 2021-08-22
Attending: EMERGENCY MEDICINE
Payer: COMMERCIAL

## 2021-08-22 VITALS
OXYGEN SATURATION: 98 % | TEMPERATURE: 98.3 F | HEART RATE: 58 BPM | RESPIRATION RATE: 16 BRPM | SYSTOLIC BLOOD PRESSURE: 131 MMHG | DIASTOLIC BLOOD PRESSURE: 75 MMHG

## 2021-08-22 DIAGNOSIS — R10.9 LEFT FLANK PAIN: Primary | ICD-10-CM

## 2021-08-22 DIAGNOSIS — R10.9 LEFT SIDED ABDOMINAL PAIN: ICD-10-CM

## 2021-08-22 LAB
ALBUMIN SERPL BCP-MCNC: 3.5 G/DL (ref 3.5–5)
ALP SERPL-CCNC: 54 U/L (ref 46–116)
ALT SERPL W P-5'-P-CCNC: 23 U/L (ref 12–78)
ANION GAP SERPL CALCULATED.3IONS-SCNC: 7 MMOL/L (ref 4–13)
APTT PPP: 43 SECONDS (ref 23–37)
AST SERPL W P-5'-P-CCNC: 16 U/L (ref 5–45)
BACTERIA UR QL AUTO: ABNORMAL /HPF
BASOPHILS # BLD AUTO: 0.02 THOUSANDS/ΜL (ref 0–0.1)
BASOPHILS NFR BLD AUTO: 0 % (ref 0–1)
BILIRUB SERPL-MCNC: 0.28 MG/DL (ref 0.2–1)
BILIRUB UR QL STRIP: NEGATIVE
BUN SERPL-MCNC: 14 MG/DL (ref 5–25)
CALCIUM SERPL-MCNC: 8.3 MG/DL (ref 8.3–10.1)
CHLORIDE SERPL-SCNC: 105 MMOL/L (ref 100–108)
CLARITY UR: CLEAR
CO2 SERPL-SCNC: 26 MMOL/L (ref 21–32)
COLOR UR: YELLOW
CREAT SERPL-MCNC: 0.64 MG/DL (ref 0.6–1.3)
EOSINOPHIL # BLD AUTO: 0.23 THOUSAND/ΜL (ref 0–0.61)
EOSINOPHIL NFR BLD AUTO: 5 % (ref 0–6)
ERYTHROCYTE [DISTWIDTH] IN BLOOD BY AUTOMATED COUNT: 22.5 % (ref 11.6–15.1)
EXT PREG TEST URINE: NORMAL
EXT. CONTROL ED NAV: NORMAL
GFR SERPL CREATININE-BSD FRML MDRD: 111 ML/MIN/1.73SQ M
GLUCOSE SERPL-MCNC: 99 MG/DL (ref 65–140)
GLUCOSE UR STRIP-MCNC: NEGATIVE MG/DL
HCT VFR BLD AUTO: 31.2 % (ref 34.8–46.1)
HGB BLD-MCNC: 9.3 G/DL (ref 11.5–15.4)
HGB UR QL STRIP.AUTO: ABNORMAL
IMM GRANULOCYTES # BLD AUTO: 0 THOUSAND/UL (ref 0–0.2)
IMM GRANULOCYTES NFR BLD AUTO: 0 % (ref 0–2)
INR PPP: 1.17 (ref 0.84–1.19)
KETONES UR STRIP-MCNC: NEGATIVE MG/DL
LEUKOCYTE ESTERASE UR QL STRIP: ABNORMAL
LIPASE SERPL-CCNC: 69 U/L (ref 73–393)
LYMPHOCYTES # BLD AUTO: 1.57 THOUSANDS/ΜL (ref 0.6–4.47)
LYMPHOCYTES NFR BLD AUTO: 34 % (ref 14–44)
MCH RBC QN AUTO: 25.8 PG (ref 26.8–34.3)
MCHC RBC AUTO-ENTMCNC: 29.8 G/DL (ref 31.4–37.4)
MCV RBC AUTO: 86 FL (ref 82–98)
MONOCYTES # BLD AUTO: 0.47 THOUSAND/ΜL (ref 0.17–1.22)
MONOCYTES NFR BLD AUTO: 10 % (ref 4–12)
NEUTROPHILS # BLD AUTO: 2.4 THOUSANDS/ΜL (ref 1.85–7.62)
NEUTS SEG NFR BLD AUTO: 51 % (ref 43–75)
NITRITE UR QL STRIP: NEGATIVE
NON-SQ EPI CELLS URNS QL MICRO: ABNORMAL /HPF
NRBC BLD AUTO-RTO: 0 /100 WBCS
PH UR STRIP.AUTO: 7.5 [PH] (ref 4.5–8)
PLATELET # BLD AUTO: 269 THOUSANDS/UL (ref 149–390)
PMV BLD AUTO: 9.3 FL (ref 8.9–12.7)
POTASSIUM SERPL-SCNC: 4.1 MMOL/L (ref 3.5–5.3)
PROT SERPL-MCNC: 7.1 G/DL (ref 6.4–8.2)
PROT UR STRIP-MCNC: ABNORMAL MG/DL
PROTHROMBIN TIME: 14.6 SECONDS (ref 11.6–14.5)
RBC # BLD AUTO: 3.61 MILLION/UL (ref 3.81–5.12)
RBC #/AREA URNS AUTO: ABNORMAL /HPF
SODIUM SERPL-SCNC: 138 MMOL/L (ref 136–145)
SP GR UR STRIP.AUTO: 1.02 (ref 1–1.03)
UROBILINOGEN UR QL STRIP.AUTO: 0.2 E.U./DL
WBC # BLD AUTO: 4.69 THOUSAND/UL (ref 4.31–10.16)
WBC #/AREA URNS AUTO: ABNORMAL /HPF

## 2021-08-22 PROCEDURE — 81025 URINE PREGNANCY TEST: CPT | Performed by: PHYSICIAN ASSISTANT

## 2021-08-22 PROCEDURE — 96374 THER/PROPH/DIAG INJ IV PUSH: CPT

## 2021-08-22 PROCEDURE — 85025 COMPLETE CBC W/AUTO DIFF WBC: CPT | Performed by: PHYSICIAN ASSISTANT

## 2021-08-22 PROCEDURE — 96361 HYDRATE IV INFUSION ADD-ON: CPT

## 2021-08-22 PROCEDURE — 85610 PROTHROMBIN TIME: CPT | Performed by: PHYSICIAN ASSISTANT

## 2021-08-22 PROCEDURE — 99285 EMERGENCY DEPT VISIT HI MDM: CPT | Performed by: PHYSICIAN ASSISTANT

## 2021-08-22 PROCEDURE — 85730 THROMBOPLASTIN TIME PARTIAL: CPT | Performed by: PHYSICIAN ASSISTANT

## 2021-08-22 PROCEDURE — 36415 COLL VENOUS BLD VENIPUNCTURE: CPT | Performed by: PHYSICIAN ASSISTANT

## 2021-08-22 PROCEDURE — 80053 COMPREHEN METABOLIC PANEL: CPT | Performed by: PHYSICIAN ASSISTANT

## 2021-08-22 PROCEDURE — 83690 ASSAY OF LIPASE: CPT | Performed by: PHYSICIAN ASSISTANT

## 2021-08-22 PROCEDURE — 96375 TX/PRO/DX INJ NEW DRUG ADDON: CPT

## 2021-08-22 PROCEDURE — 74176 CT ABD & PELVIS W/O CONTRAST: CPT

## 2021-08-22 PROCEDURE — 99284 EMERGENCY DEPT VISIT MOD MDM: CPT

## 2021-08-22 PROCEDURE — 81001 URINALYSIS AUTO W/SCOPE: CPT

## 2021-08-22 PROCEDURE — G1004 CDSM NDSC: HCPCS

## 2021-08-22 RX ORDER — FENTANYL CITRATE 50 UG/ML
50 INJECTION, SOLUTION INTRAMUSCULAR; INTRAVENOUS ONCE
Status: COMPLETED | OUTPATIENT
Start: 2021-08-22 | End: 2021-08-22

## 2021-08-22 RX ORDER — TRAMADOL HYDROCHLORIDE 50 MG/1
50 TABLET ORAL EVERY 8 HOURS PRN
Qty: 10 TABLET | Refills: 0 | Status: SHIPPED | OUTPATIENT
Start: 2021-08-22

## 2021-08-22 RX ORDER — ONDANSETRON 2 MG/ML
4 INJECTION INTRAMUSCULAR; INTRAVENOUS ONCE
Status: COMPLETED | OUTPATIENT
Start: 2021-08-22 | End: 2021-08-22

## 2021-08-22 RX ADMIN — ONDANSETRON 4 MG: 2 INJECTION INTRAMUSCULAR; INTRAVENOUS at 13:01

## 2021-08-22 RX ADMIN — FENTANYL CITRATE 50 MCG: 50 INJECTION INTRAMUSCULAR; INTRAVENOUS at 13:02

## 2021-08-22 RX ADMIN — SODIUM CHLORIDE 1000 ML: 0.9 INJECTION, SOLUTION INTRAVENOUS at 13:00

## 2021-08-22 NOTE — Clinical Note
Makayla Gabriel was seen and treated in our emergency department on 8/22/2021  Diagnosis:     Nisha Rodriguezkalpesh    She may return on this date: 08/24/2021         If you have any questions or concerns, please don't hesitate to call        Dimitry Rai PA-C    ______________________________           _______________          _______________  Hospital Representative                              Date                                Time

## 2021-08-25 NOTE — ED PROVIDER NOTES
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  She had experienced pain in L flank initially and in the following 1-2 weeks for which she was previously evaluated in the ED on 8/8  She underwent CT abd/pelvis at that time, and also had urine culture which did not reveal UTI  She reports her pain had resolved before returning 2-3 days ago  She notes pain primarily in L flank but occasionally to L upper abdomen  She reports this pain is similar to what she experienced at previous ED evaluation  She also notes ongoing hematuria  She follows with heme/onc for anemia, previously attributed to chronic hematuria  Denies dysuria or urinary urgency/frequency  Denies fevers/chills  Admits to nausea, denies vomiting or diarrhea  No fevers/chills  History provided by:  Patient   used: No    Abdominal Pain  Pain location:  L flank  Duration:  3 days  Timing:  Constant  Progression:  Waxing and waning  Chronicity:  Recurrent  Relieved by:  Nothing  Worsened by:  Palpation  Ineffective treatments:  Acetaminophen  Associated symptoms: nausea    Associated symptoms: no chest pain, no chills, no constipation, no cough, no diarrhea, no dysuria, no fever, no hematuria, no melena, no shortness of breath, no sore throat, no vaginal bleeding, no vaginal discharge and no vomiting        Prior to Admission Medications   Prescriptions Last Dose Informant Patient Reported? Taking?    Abatacept 125 MG/ML SOAJ  Self Yes No   Sig: Inject under the skin Adalimumab 40 MG/0 4ML PNKT  Self Yes No   Sig: Inject 40 mg under the skin every 14 (fourteen) days    Calcium Carb-Cholecalciferol (CALCIUM 600+D3) 600-200 MG-UNIT TABS  Self Yes No   Sig: take 1 tablet twice a day   Cholecalciferol (VITAMIN D3) 2000 units capsule  Self Yes No   Sig: take 1 tablet po daily  Elastic Bandages & Supports (CARPAL TUNNEL WRIST DELUXE) MISC  Self No No   Sig: by Does not apply route daily at bedtime   Incontinence Supply Disposable (Poise Pad) PADS  Self No No   Sig: by Does not apply route 4 (four) times a day as needed (incontinence)   Methenamine-Sodium Salicylate 257-036 3 MG TABS  Self No No   Sig: Take 1 tablet by mouth 3 (three) times a day   Patient not taking: Reported on 2021   Multiple Vitamins-Minerals (MULTIVITAMIN WITH MINERALS) tablet  Self No No   Sig: Take 1 tablet by mouth daily   acetaminophen (TYLENOL) 650 mg CR tablet   No No   Sig: Take 1 tablet (650 mg total) by mouth every 8 (eight) hours as needed for mild pain or moderate pain   albuterol (PROVENTIL HFA,VENTOLIN HFA) 90 mcg/act inhaler  Self No No   Sig: Inhale 2 puffs every 4 (four) hours as needed for wheezing   celecoxib (CeleBREX) 200 mg capsule  Self Yes No   Sig: Take 200 mg by mouth 2 (two) times a day   citalopram (CeleXA) 20 mg tablet  Self No No   Sig: Take 1 tablet (20 mg total) by mouth daily   doxepin (SINEquan) 25 mg capsule  Self No No   Sig: Take 1 capsule (25 mg total) by mouth daily at bedtime   ergocalciferol (Drisdol) 1 25 MG (44431 UT) capsule  Self Yes No   Sig: Take 50,000 Units by mouth daily in the early morning    famotidine (PEPCID) 20 mg tablet  Self No No   Sig: Take 1 tablet (20 mg total) by mouth 2 (two) times a day   fluticasone (FLONASE) 50 mcg/act nasal spray  Self No No   Si spray into each nostril daily   fluticasone (FLOVENT HFA) 110 MCG/ACT inhaler  Self No No   Sig: Inhale 2 puffs 2 (two) times a day Rinse mouth after use     folic acid (FOLVITE) 1 mg tablet Self Yes No   Sig: every 24 hours   furosemide (LASIX) 40 mg tablet  Self No No   Sig: Take 1 tablet (40 mg total) by mouth daily   gabapentin (NEURONTIN) 100 mg capsule   No No   Sig: Take 1 capsule (100 mg total) by mouth 3 (three) times a day for 14 days   leflunomide (ARAVA) 20 MG tablet  Self Yes No   Sig: Take 20 mg by mouth daily   levETIRAcetam (KEPPRA) 1000 MG tablet  Self No No   Sig: Take 1 tablet (1,000 mg total) by mouth 2 (two) times a day   losartan (COZAAR) 25 mg tablet   No No   Sig: Take 1 tablet (25 mg total) by mouth daily at bedtime   methylPREDNISolone (MEDROL) 32 MG tablet   No No   Sig: Take one (1) tablet 12 hours and one (1) tablet  2 hours PRIOR to CT scan    metoprolol tartrate (LOPRESSOR) 25 mg tablet  Self No No   Sig: Take 1 tablet (25 mg total) by mouth every 12 (twelve) hours   naproxen (NAPROSYN) 500 mg tablet   No No   Sig: Take 1 tablet (500 mg total) by mouth 2 (two) times a day with meals   nitroglycerin (NITROSTAT) 0 4 mg SL tablet  Self No No   Sig: Place 1 tablet (0 4 mg total) under the tongue every 5 (five) minutes as needed for chest pain Call 911 if using 3 doses   ondansetron (ZOFRAN-ODT) 4 mg disintegrating tablet  Self No No   Sig: Take 1 tablet (4 mg total) by mouth every 8 (eight) hours as needed for nausea for up to 10 doses   ondansetron (ZOFRAN-ODT) 4 mg disintegrating tablet   No No   Sig: Take 1 tablet (4 mg total) by mouth every 6 (six) hours as needed for nausea or vomiting   polyethylene glycol (GLYCOLAX) 17 GM/SCOOP powder  Self No No   Sig: Take 17 g by mouth daily   predniSONE 5 mg tablet  Self Yes No   Sig: Take by mouth daily   rizatriptan (MAXALT) 5 MG tablet   No No   Sig: Take 1 tablet (5 mg total) by mouth once as needed for migraine for up to 1 dose May repeat in 2 hours if needed   traMADol (ULTRAM) 50 mg tablet  Self No No   Sig: Take 1 tablet (50 mg total) by mouth every 6 (six) hours as needed for moderate pain      Facility-Administered Medications: None       Past Medical History:   Diagnosis Date    Angina at rest [facility])     Anxiety     Asthma     Bipolar 1 disorder ([place] [id number] )     Chronic kidney disease     Depression     Epilepsy ([place] [address] )     Fibroid 2012    Hematuria     History of cardiac cath 2006    Hydronephrosis 2007    Hypertension     Migraine without aura     PTSD (post-traumatic stress disorder)     Rheumatoid arteritis ([place] [address] )        Past Surgical History:   Procedure Laterality Date    APPENDECTOMY      CARDIAC CATHETERIZATION      CARDIAC CATHETERIZATION       SECTION  2008    CHOLECYSTECTOMY  2018    IR BIOPSY KIDNEY COLUMBIA KIT NO LATERALITY  2021    KIDNEY SURGERY  2007    TUBAL LIGATION  2008       Family History   Problem Relation Age of Onset    Diabetes Maternal Grandmother     Hypertension Maternal Grandmother     No Known Problems Mother     No Known Problems Sister     No Known Problems Daughter     No Known Problems Paternal Grandmother     No Known Problems Daughter     Breast cancer Neg Hx     Cancer Neg Hx      I have reviewed and agree with the history as documented  E-Cigarette/Vaping    E-Cigarette Use Never User      E-Cigarette/Vaping Substances     Social History     Tobacco Use    Smoking status: Former Smoker     Types: Cigarettes     Quit date:      Years since quittin 6    Smokeless tobacco: Former User    Tobacco comment: 13-14 years ago   Vaping Use    Vaping Use: Never used   Substance Use Topics    Alcohol use: Not Currently    Drug use: Never       Review of Systems   Constitutional: Negative for chills and fever  HENT: Negative for congestion, rhinorrhea and sore throat  Eyes: Negative for pain and visual disturbance  Respiratory: Negative for cough, shortness of breath and wheezing  Cardiovascular: Negative for chest pain and palpitations  Gastrointestinal: Positive for abdominal pain and nausea   Negative for constipation, diarrhea, melena and vomiting  Genitourinary: Positive for flank pain  Negative for dysuria, frequency, hematuria, pelvic pain, urgency, vaginal bleeding and vaginal discharge  Musculoskeletal: Negative for back pain, neck pain and neck stiffness  Skin: Negative for rash and wound  Neurological: Negative for dizziness, weakness, light-headedness and numbness  Physical Exam  Physical Exam  Constitutional:       General: She is not in acute distress  Appearance: She is well-developed  She is not toxic-appearing or diaphoretic  HENT:      Head: Normocephalic and atraumatic  Right Ear: External ear normal       Left Ear: External ear normal    Eyes:      Conjunctiva/sclera: Conjunctivae normal       Pupils: Pupils are equal, round, and reactive to light  Cardiovascular:      Rate and Rhythm: Normal rate and regular rhythm  Heart sounds: Normal heart sounds  No murmur heard  No friction rub  No gallop  Pulmonary:      Effort: Pulmonary effort is normal  No respiratory distress  Breath sounds: Normal breath sounds  No wheezing  Abdominal:      General: There is no distension  Palpations: Abdomen is soft  Tenderness: There is abdominal tenderness in the left upper quadrant and left lower quadrant  There is left CVA tenderness  There is no right CVA tenderness or guarding  Negative signs include Mahmood's sign, Rovsing's sign, McBurney's sign, psoas sign and obturator sign  Musculoskeletal:      Cervical back: Normal range of motion and neck supple  Lymphadenopathy:      Cervical: No cervical adenopathy  Skin:     General: Skin is warm and dry  Capillary Refill: Capillary refill takes less than 2 seconds  Findings: No erythema or rash  Neurological:      Mental Status: She is alert and oriented to person, place, and time  Motor: No abnormal muscle tone        Coordination: Coordination normal    Psychiatric:         Behavior: Behavior normal  Thought Content:  Thought content normal          Judgment: Judgment normal          Vital Signs  ED Triage Vitals   Temperature Pulse Respirations Blood Pressure SpO2   08/22/21 1144 08/22/21 1144 08/22/21 1144 08/22/21 1144 08/22/21 1144   98 3 °F (36 8 °C) 72 18 147/76 96 %      Temp Source Heart Rate Source Patient Position - Orthostatic VS BP Location FiO2 (%)   08/22/21 1144 08/22/21 1144 08/22/21 1144 08/22/21 1144 --   Oral Monitor Sitting Right arm       Pain Score       08/22/21 1302       9           Vitals:    08/22/21 1144 08/22/21 1422 08/22/21 1652   BP: 147/76 124/72 131/75   Pulse: 72 63 58   Patient Position - Orthostatic VS: Sitting Sitting Sitting         Visual Acuity      ED Medications  Medications   sodium chloride 0 9 % bolus 1,000 mL (0 mL Intravenous Stopped 8/22/21 1645)   fentanyl citrate (PF) 100 MCG/2ML 50 mcg (50 mcg Intravenous Given 8/22/21 1302)   ondansetron (ZOFRAN) injection 4 mg (4 mg Intravenous Given 8/22/21 1301)       Diagnostic Studies  Results Reviewed     Procedure Component Value Units Date/Time    Urine Microscopic [[id number]]  (Abnormal) Collected: 08/22/21 1428    Lab Status: Final result Specimen: Urine, Clean Catch Updated: 08/22/21 1542     RBC, UA Innumerable /hpf      WBC, UA 2-4 /hpf      Epithelial Cells Occasional /hpf      Bacteria, UA Occasional /hpf     POCT pregnancy, urine [[id number]]  (Normal) Resulted: 08/22/21 1434    Lab Status: Final result Updated: 08/22/21 1434     EXT PREG TEST UR (Ref: Negative) Negative (-)     Control Valid    Urine Macroscopic, POC [[id number]]  (Abnormal) Collected: 08/22/21 1428    Lab Status: Final result Specimen: Urine Updated: 08/22/21 1429     Color, UA Yellow     Clarity, UA Clear     pH, UA 7 5     Leukocytes, UA Trace     Nitrite, UA Negative     Protein,  (2+) mg/dl      Glucose, UA Negative mg/dl      Ketones, UA Negative mg/dl      Urobilinogen, UA 0 2 E U /dl      Bilirubin, UA Negative     Blood, UA Large Specific Effie, UA 1 020    Narrative:      CLINITEK RESULT    Comprehensive metabolic panel [[id number]] Collected: 08/22/21 1258    Lab Status: Final result Specimen: Blood from Arm, Right Updated: 08/22/21 1329     Sodium 138 mmol/L      Potassium 4 1 mmol/L      Chloride 105 mmol/L      CO2 26 mmol/L      ANION GAP 7 mmol/L      BUN 14 mg/dL      Creatinine 0 64 mg/dL      Glucose 99 mg/dL      Calcium 8 3 mg/dL      AST 16 U/L      ALT 23 U/L      Alkaline Phosphatase 54 U/L      Total Protein 7 1 g/dL      Albumin 3 5 g/dL      Total Bilirubin 0 28 mg/dL      eGFR 111 ml/min/1 73sq m     Narrative:      [place] guidelines for Chronic Kidney Disease (CKD):     Stage 1 with normal or high GFR (GFR > 90 mL/min/1 73 square meters)    Stage 2 Mild CKD (GFR = 60-89 mL/min/1 73 square meters)    Stage 3A Moderate CKD (GFR = 45-59 mL/min/1 73 square meters)    Stage 3B Moderate CKD (GFR = 30-44 mL/min/1 73 square meters)    Stage 4 Severe CKD (GFR = 15-29 mL/min/1 73 square meters)    Stage 5 End Stage CKD (GFR <15 mL/min/1 73 square meters)  Note: GFR calculation is accurate only with a steady state creatinine    Lipase [[id number]]  (Abnormal) Collected: 08/22/21 1258    Lab Status: Final result Specimen: Blood from Arm, Right Updated: 08/22/21 1329     Lipase 69 u/L     APTT [[id number]]  (Abnormal) Collected: 08/22/21 1258    Lab Status: Final result Specimen: Blood from Arm, Right Updated: 08/22/21 1328     PTT 43 seconds     Protime-INR [[id number]]  (Abnormal) Collected: 08/22/21 1258    Lab Status: Final result Specimen: Blood from Arm, Right Updated: 08/22/21 1328     Protime 14 6 seconds      INR 1 17    CBC and differential [[id number]]  (Abnormal) Collected: 08/22/21 1259    Lab Status: Final result Specimen: Blood from Arm, Right Updated: 08/22/21 1310     WBC 4 69 Thousand/uL      RBC 3 61 Million/uL      Hemoglobin 9 3 g/dL      Hematocrit 31 2 %      MCV 86 fL      MCH 25 8 pg      MCHC 29 8 g/dL      RDW 22 5 %      MPV 9 3 fL      Platelets 254 Thousands/uL      nRBC 0 /100 WBCs      Neutrophils Relative 51 %      Immat GRANS % 0 %      Lymphocytes Relative 34 %      Monocytes Relative 10 %      Eosinophils Relative 5 %      Basophils Relative 0 %      Neutrophils Absolute 2 40 Thousands/µL      Immature Grans Absolute 0 00 Thousand/uL      Lymphocytes Absolute 1 57 Thousands/µL      Monocytes Absolute 0 47 Thousand/µL      Eosinophils Absolute 0 23 Thousand/µL      Basophils Absolute 0 02 Thousands/µL                  CT abdomen pelvis wo contrast   Final Result by Carlos Llanos MD (08/22 1523)      There is no interval change since 8/8/2021  No acute intra-abdominal or intrapelvic abnormality            Workstation performed: [id number]                    Procedures  Procedures         ED Course                             SBIRT 20yo+      Most Recent Value   SBIRT (24 yo +)   In order to provide better care to our patients, we are screening all of our patients for alcohol and drug use  Would it be okay to ask you these screening questions? No Filed at: 08/22/2021 1531                    MDM  Number of Diagnoses or Management Options  Left flank pain  Left sided abdominal pain  Diagnosis management comments: Ongoing L flank, L sided abdominal pain which has occurred intermittently following renal biopsy on 7/29 showing FSGS  Did have ED workup on 8/8 for similar pain  On exam with L CVAT, L sided abdominal TTP without peritoneal signs  She is otherwise afebrile, nontoxic, and in no acute distress  Urine dip shows ongoing hematuria  Will check labs including CBC for anemia, CMP for electrolytes and renal function, lipase given abdominal pain, coags given ongoing hematuria, check CT abd/pelvis  Fentanyl here for pain, IV fluids, Zofran for nausea         Amount and/or Complexity of Data Reviewed  Clinical lab tests: reviewed and ordered  Tests in the radiology section of CPT®: ordered and reviewed    Patient Progress  Patient progress: improved      Disposition  Final diagnoses:   Left flank pain   Left sided abdominal pain     Time reflects when diagnosis was documented in both MDM as applicable and the Disposition within this note     Time User Action Codes Description Comment    8/22/2021  4:46 PM [place] Nick Add [R10 9] Left flank pain     8/22/2021  4:46 PM [place] Nick Add [R10 9] Left sided abdominal pain       ED Disposition     ED Disposition Condition Date/Time Comment    Discharge Stable Sun Aug 22, 2021  4:41  Mela Guillaume discharge to home/self care  Follow-up Information     Follow up With Specialties Details Why Contact Info Additional Information    Hemant Godwin PA-C Family Medicine, Physician Assistant Schedule an appointment as soon as possible for a visit   [address]  [address] Emergency Department Emergency Medicine  If symptoms worsen [facility] [id number]  [address] [facility] Emergency Department, [address] [place] [address], [phone]          Discharge Medication List as of 8/22/2021  4:49 PM      START taking these medications    Details   !! traMADol (ULTRAM) 50 mg tablet Take 1 tablet (50 mg total) by mouth every 8 (eight) hours as needed for severe pain for up to 10 doses, Starting Sun 8/22/2021, Normal       !! - Potential duplicate medications found  Please discuss with provider        CONTINUE these medications which have NOT CHANGED    Details   Abatacept 125 MG/ML SOAJ Inject under the skin, Historical Med      acetaminophen (TYLENOL) 650 mg CR tablet Take 1 tablet (650 mg total) by mouth every 8 (eight) hours as needed for mild pain or moderate pain, Starting Sun 8/8/2021, Normal      Adalimumab 40 MG/0 4ML PNKT Inject 40 mg under the skin every 14 (fourteen) days , Starting Wed 1/20/2021, Historical Med      albuterol (PROVENTIL HFA,VENTOLIN HFA) 90 mcg/act inhaler Inhale 2 puffs every 4 (four) hours as needed for wheezing, Starting Sun 12/29/2019, Print      Calcium Carb-Cholecalciferol (CALCIUM 600+D3) 600-200 MG-UNIT TABS take 1 tablet twice a day, Historical Med      celecoxib (CeleBREX) 200 mg capsule Take 200 mg by mouth 2 (two) times a day, Historical Med      Cholecalciferol (VITAMIN D3) 2000 units capsule take 1 tablet po daily  , Historical Med      citalopram (CeleXA) 20 mg tablet Take 1 tablet (20 mg total) by mouth daily, Starting Wed 3/3/2021, Normal      doxepin (SINEquan) 25 mg capsule Take 1 capsule (25 mg total) by mouth daily at bedtime, Starting Wed 3/3/2021, Normal      Elastic Bandages & Supports (CARPAL TUNNEL WRIST DELUXE) MISC by Does not apply route daily at bedtime, Starting Fri 8/2/2019, Print      famotidine (PEPCID) 20 mg tablet Take 1 tablet (20 mg total) by mouth 2 (two) times a day, Starting Mon 6/8/2020, Normal      fluticasone (FLONASE) 50 mcg/act nasal spray 1 spray into each nostril daily, Starting Wed 3/3/2021, Normal      fluticasone (FLOVENT HFA) 110 MCG/ACT inhaler Inhale 2 puffs 2 (two) times a day Rinse mouth after use , Starting Wed 6/6/5534, Normal      folic acid (FOLVITE) 1 mg tablet every 24 hours, Starting Wed 1/27/2016, Historical Med      furosemide (LASIX) 40 mg tablet Take 1 tablet (40 mg total) by mouth daily, Starting Wed 3/3/2021, Normal      Incontinence Supply Disposable (Poise Pad) PADS by Does not apply route 4 (four) times a day as needed (incontinence), Starting Thu 10/15/2020, Normal      leflunomide (ARAVA) 20 MG tablet Take 20 mg by mouth daily, Historical Med      levETIRAcetam (KEPPRA) 1000 MG tablet Take 1 tablet (1,000 mg total) by mouth 2 (two) times a day, Starting Wed 11/18/2020, Normal      losartan (COZAAR) 25 mg tablet Take 1 tablet (25 mg total) by mouth daily at bedtime, Starting Tue 8/10/2021, Normal      methylPREDNISolone (MEDROL) 32 MG tablet Take one (1) tablet 12 hours and one (1) tablet  2 hours PRIOR to CT scan , Normal      metoprolol tartrate (LOPRESSOR) 25 mg tablet Take 1 tablet (25 mg total) by mouth every 12 (twelve) hours, Starting Wed 11/18/2020, Normal      Multiple Vitamins-Minerals (MULTIVITAMIN WITH MINERALS) tablet Take 1 tablet by mouth daily, Starting Fri 8/2/2019, Normal      naproxen (NAPROSYN) 500 mg tablet Take 1 tablet (500 mg total) by mouth 2 (two) times a day with meals, Starting Tue 7/6/2021, Normal      nitroglycerin (NITROSTAT) 0 4 mg SL tablet Place 1 tablet (0 4 mg total) under the tongue every 5 (five) minutes as needed for chest pain Call 911 if using 3 doses, Starting Thu 10/15/2020, Normal      !! ondansetron (ZOFRAN-ODT) 4 mg disintegrating tablet Take 1 tablet (4 mg total) by mouth every 8 (eight) hours as needed for nausea for up to 10 doses, Starting Fri 4/9/2021, Normal      !! ondansetron (ZOFRAN-ODT) 4 mg disintegrating tablet Take 1 tablet (4 mg total) by mouth every 6 (six) hours as needed for nausea or vomiting, Starting Sun 8/8/2021, Normal      polyethylene glycol (GLYCOLAX) 17 GM/SCOOP powder Take 17 g by mouth daily, Starting Thu 10/15/2020, Normal      predniSONE 5 mg tablet Take by mouth daily, Historical Med      rizatriptan (MAXALT) 5 MG tablet Take 1 tablet (5 mg total) by mouth once as needed for migraine for up to 1 dose May repeat in 2 hours if needed, Starting Tue 6/1/2021, Normal      !! traMADol (ULTRAM) 50 mg tablet Take 1 tablet (50 mg total) by mouth every 6 (six) hours as needed for moderate pain, Starting Fri 4/9/2021, Normal      ergocalciferol (Drisdol) 1 25 MG (46680 UT) capsule Take 50,000 Units by mouth daily in the early morning , Starting Wed 4/21/2021, Until Sun 8/8/2021, Historical Med      gabapentin (NEURONTIN) 100 mg capsule Take 1 capsule (100 mg total) by mouth 3 (three) times a day for 14 days, Starting Tue 7/6/2021, Until Sun 8/8/2021, Normal Methenamine-Sodium Salicylate 506-715 2 MG TABS Take 1 tablet by mouth 3 (three) times a day, Starting Tue 11/24/2020, Normal       !! - Potential duplicate medications found  Please discuss with provider  No discharge procedures on file      PDMP Review       Value Time User    PDMP Reviewed  Yes 4/9/2021 11:58 AM Hemant Godwin PA-C          ED Provider  Electronically Signed by           Michelle Hobbs PA-C  08/25/21 [id number]

## 2021-09-02 ENCOUNTER — HOSPITAL ENCOUNTER (EMERGENCY)
Facility: HOSPITAL | Age: 41
Discharge: HOME | End: 2021-09-02
Admitting: EMERGENCY MEDICINE
Payer: COMMERCIAL

## 2021-09-02 VITALS
RESPIRATION RATE: 18 BRPM | WEIGHT: 254.85 LBS | HEART RATE: 65 BPM | DIASTOLIC BLOOD PRESSURE: 70 MMHG | OXYGEN SATURATION: 100 % | BODY MASS INDEX: 46.61 KG/M2 | TEMPERATURE: 98.1 F | SYSTOLIC BLOOD PRESSURE: 152 MMHG

## 2021-09-02 DIAGNOSIS — R10.9 FLANK PAIN: Primary | ICD-10-CM

## 2021-09-02 LAB
ALBUMIN SERPL BCP-MCNC: 3.8 G/DL (ref 3.5–5)
ALP SERPL-CCNC: 64 U/L (ref 46–116)
ALT SERPL W P-5'-P-CCNC: 18 U/L (ref 12–78)
ANION GAP SERPL CALCULATED.3IONS-SCNC: 10 MMOL/L (ref 4–13)
AST SERPL W P-5'-P-CCNC: 17 U/L (ref 5–45)
BASOPHILS # BLD AUTO: 0.03 THOUSANDS/ΜL (ref 0–0.1)
BASOPHILS NFR BLD AUTO: 1 % (ref 0–1)
BILIRUB SERPL-MCNC: 0.16 MG/DL (ref 0.2–1)
BUN SERPL-MCNC: 15 MG/DL (ref 5–25)
CALCIUM SERPL-MCNC: 8.7 MG/DL (ref 8.3–10.1)
CHLORIDE SERPL-SCNC: 103 MMOL/L (ref 100–108)
CO2 SERPL-SCNC: 23 MMOL/L (ref 21–32)
CREAT SERPL-MCNC: 0.86 MG/DL (ref 0.6–1.3)
EOSINOPHIL # BLD AUTO: 0.16 THOUSAND/ΜL (ref 0–0.61)
EOSINOPHIL NFR BLD AUTO: 3 % (ref 0–6)
ERYTHROCYTE [DISTWIDTH] IN BLOOD BY AUTOMATED COUNT: 20.8 % (ref 11.6–15.1)
GFR SERPL CREATININE-BSD FRML MDRD: 84 ML/MIN/1.73SQ M
GLUCOSE SERPL-MCNC: 92 MG/DL (ref 65–140)
HCT VFR BLD AUTO: 36.2 % (ref 34.8–46.1)
HGB BLD-MCNC: 11.1 G/DL (ref 11.5–15.4)
IMM GRANULOCYTES # BLD AUTO: 0.02 THOUSAND/UL (ref 0–0.2)
IMM GRANULOCYTES NFR BLD AUTO: 0 % (ref 0–2)
LYMPHOCYTES # BLD AUTO: 1.87 THOUSANDS/ΜL (ref 0.6–4.47)
LYMPHOCYTES NFR BLD AUTO: 31 % (ref 14–44)
MCH RBC QN AUTO: 26.1 PG (ref 26.8–34.3)
MCHC RBC AUTO-ENTMCNC: 30.7 G/DL (ref 31.4–37.4)
MCV RBC AUTO: 85 FL (ref 82–98)
MONOCYTES # BLD AUTO: 0.45 THOUSAND/ΜL (ref 0.17–1.22)
MONOCYTES NFR BLD AUTO: 7 % (ref 4–12)
NEUTROPHILS # BLD AUTO: 3.52 THOUSANDS/ΜL (ref 1.85–7.62)
NEUTS SEG NFR BLD AUTO: 58 % (ref 43–75)
NRBC BLD AUTO-RTO: 0 /100 WBCS
PLATELET # BLD AUTO: 228 THOUSANDS/UL (ref 149–390)
PMV BLD AUTO: 10.4 FL (ref 8.9–12.7)
POTASSIUM SERPL-SCNC: 3.9 MMOL/L (ref 3.5–5.3)
PROT SERPL-MCNC: 8.2 G/DL (ref 6.4–8.2)
RBC # BLD AUTO: 4.25 MILLION/UL (ref 3.81–5.12)
SODIUM SERPL-SCNC: 136 MMOL/L (ref 136–145)
WBC # BLD AUTO: 6.05 THOUSAND/UL (ref 4.31–10.16)

## 2021-09-02 PROCEDURE — 85025 COMPLETE CBC W/AUTO DIFF WBC: CPT

## 2021-09-02 PROCEDURE — 36415 COLL VENOUS BLD VENIPUNCTURE: CPT

## 2021-09-02 PROCEDURE — 80053 COMPREHEN METABOLIC PANEL: CPT

## 2021-09-02 PROCEDURE — 99284 EMERGENCY DEPT VISIT MOD MDM: CPT

## 2021-09-02 PROCEDURE — 99284 EMERGENCY DEPT VISIT MOD MDM: CPT | Performed by: EMERGENCY MEDICINE

## 2021-09-02 PROCEDURE — 96360 HYDRATION IV INFUSION INIT: CPT

## 2021-09-02 PROCEDURE — 96372 THER/PROPH/DIAG INJ SC/IM: CPT

## 2021-09-02 RX ORDER — KETOROLAC TROMETHAMINE 30 MG/ML
15 INJECTION, SOLUTION INTRAMUSCULAR; INTRAVENOUS ONCE
Status: COMPLETED | OUTPATIENT
Start: 2021-09-02 | End: 2021-09-02

## 2021-09-02 RX ADMIN — KETOROLAC TROMETHAMINE 15 MG: 30 INJECTION, SOLUTION INTRAMUSCULAR; INTRAVENOUS at 16:13

## 2021-09-02 RX ADMIN — SODIUM CHLORIDE 1000 ML: 0.9 INJECTION, SOLUTION INTRAVENOUS at 16:25

## 2021-09-02 NOTE — ED PROVIDER NOTES
History  Chief Complaint   Patient presents with    Flank Pain     Worsening left flank pain and hematuria  Recent renal biopsy on 7/29  Hx CKD  HPI  42 y o f with pmho hydronephrosis, chronic hematuria, and FSGS  Pt returned the ED for the same problem of flank pain that prompted her to come to ED on 8/22  Pt said she developed this left side flank pain that radiates from left CVA to below her left chest  So far pt only took Tylenol for pain control with minimal relief  Pt described her pain is intermittent and sharp in intensity at peak 10/10  This morning pt woke up with pain 2/10 but progressively got worse to 10/10 when she came to work  Pt also endorsed dizziness and feeling nauseous, urge incontinence  Pt denied blood in stool, diarrhea, recent alcohol use, chest pain, fever, shortness of breath, night sweat, drastic weight change  Pt continued to have hematuria for the past couple of month  Nephrology apt coming in October, will see her PCP tomorrow, and will see heme/onco day after that  Pt mentioned that she had seen urologist Dr Luella Dandy for hematuria who reported a normal cystoscopy for pt in April  Pt has stopped taking Naproxen and other nephrotoxic medications per advise from her nephrologist in the past couple of weeks  Pt had not seen her urologist after renal biopsy and pain onset  Prior to Admission Medications   Prescriptions Last Dose Informant Patient Reported? Taking? Abatacept 125 MG/ML SOAJ  Self Yes No   Sig: Inject under the skin   Adalimumab 40 MG/0 4ML PNKT  Self Yes No   Sig: Inject 40 mg under the skin every 14 (fourteen) days    Calcium Carb-Cholecalciferol (CALCIUM 600+D3) 600-200 MG-UNIT TABS  Self Yes No   Sig: take 1 tablet twice a day   Cholecalciferol (VITAMIN D3) 2000 units capsule  Self Yes No   Sig: take 1 tablet po daily     Elastic Bandages & Supports (CARPAL TUNNEL WRIST DELUXE) MISC  Self No No   Sig: by Does not apply route daily at bedtime Incontinence Supply Disposable (Poise Pad) PADS  Self No No   Sig: by Does not apply route 4 (four) times a day as needed (incontinence)   Methenamine-Sodium Salicylate 024-736 2 MG TABS  Self No No   Sig: Take 1 tablet by mouth 3 (three) times a day   Patient not taking: Reported on 2021   Multiple Vitamins-Minerals (MULTIVITAMIN WITH MINERALS) tablet  Self No No   Sig: Take 1 tablet by mouth daily   acetaminophen (TYLENOL) 650 mg CR tablet   No No   Sig: Take 1 tablet (650 mg total) by mouth every 8 (eight) hours as needed for mild pain or moderate pain   albuterol (PROVENTIL HFA,VENTOLIN HFA) 90 mcg/act inhaler  Self No No   Sig: Inhale 2 puffs every 4 (four) hours as needed for wheezing   celecoxib (CeleBREX) 200 mg capsule  Self Yes No   Sig: Take 200 mg by mouth 2 (two) times a day   citalopram (CeleXA) 20 mg tablet  Self No No   Sig: Take 1 tablet (20 mg total) by mouth daily   doxepin (SINEquan) 25 mg capsule  Self No No   Sig: Take 1 capsule (25 mg total) by mouth daily at bedtime   ergocalciferol (Drisdol) 1 25 MG (82364 UT) capsule  Self Yes No   Sig: Take 50,000 Units by mouth daily in the early morning    famotidine (PEPCID) 20 mg tablet  Self No No   Sig: Take 1 tablet (20 mg total) by mouth 2 (two) times a day   fluticasone (FLONASE) 50 mcg/act nasal spray  Self No No   Si spray into each nostril daily   fluticasone (FLOVENT HFA) 110 MCG/ACT inhaler  Self No No   Sig: Inhale 2 puffs 2 (two) times a day Rinse mouth after use     folic acid (FOLVITE) 1 mg tablet  Self Yes No   Sig: every 24 hours   furosemide (LASIX) 40 mg tablet  Self No No   Sig: Take 1 tablet (40 mg total) by mouth daily   gabapentin (NEURONTIN) 100 mg capsule   No No   Sig: Take 1 capsule (100 mg total) by mouth 3 (three) times a day for 14 days   leflunomide (ARAVA) 20 MG tablet  Self Yes No   Sig: Take 20 mg by mouth daily   levETIRAcetam (KEPPRA) 1000 MG tablet  Self No No   Sig: Take 1 tablet (1,000 mg total) by mouth 2 (two) times a day   losartan (COZAAR) 25 mg tablet   No No   Sig: Take 1 tablet (25 mg total) by mouth daily at bedtime   methylPREDNISolone (MEDROL) 32 MG tablet   No No   Sig: Take one (1) tablet 12 hours and one (1) tablet  2 hours PRIOR to CT scan    metoprolol tartrate (LOPRESSOR) 25 mg tablet  Self No No   Sig: Take 1 tablet (25 mg total) by mouth every 12 (twelve) hours   naproxen (NAPROSYN) 500 mg tablet   No No   Sig: Take 1 tablet (500 mg total) by mouth 2 (two) times a day with meals   nitroglycerin (NITROSTAT) 0 4 mg SL tablet  Self No No   Sig: Place 1 tablet (0 4 mg total) under the tongue every 5 (five) minutes as needed for chest pain Call 911 if using 3 doses   ondansetron (ZOFRAN-ODT) 4 mg disintegrating tablet  Self No No   Sig: Take 1 tablet (4 mg total) by mouth every 8 (eight) hours as needed for nausea for up to 10 doses   ondansetron (ZOFRAN-ODT) 4 mg disintegrating tablet   No No   Sig: Take 1 tablet (4 mg total) by mouth every 6 (six) hours as needed for nausea or vomiting   polyethylene glycol (GLYCOLAX) 17 GM/SCOOP powder  Self No No   Sig: Take 17 g by mouth daily   predniSONE 5 mg tablet  Self Yes No   Sig: Take by mouth daily   rizatriptan (MAXALT) 5 MG tablet   No No   Sig: Take 1 tablet (5 mg total) by mouth once as needed for migraine for up to 1 dose May repeat in 2 hours if needed   traMADol (ULTRAM) 50 mg tablet  Self No No   Sig: Take 1 tablet (50 mg total) by mouth every 6 (six) hours as needed for moderate pain   traMADol (ULTRAM) 50 mg tablet   No No   Sig: Take 1 tablet (50 mg total) by mouth every 8 (eight) hours as needed for severe pain for up to 10 doses      Facility-Administered Medications: None       Past Medical History:   Diagnosis Date    [facility] at rest [facility])     Anxiety     Asthma     [facility]     Chronic kidney disease     Depression     Epilepsy ([facility] )     Fibroid 2012    Hematuria     History of cardiac cath 2006    Hydronephrosis 2007    Hypertension     Migraine without aura     PTSD (post-traumatic stress disorder)     [facility]        Past Surgical History:   Procedure Laterality Date    APPENDECTOMY      CARDIAC CATHETERIZATION      CARDIAC CATHETERIZATION       SECTION  2008    CHOLECYSTECTOMY  2018    IR BIOPSY KIDNEY [place] KIT NO LATERALITY  2021    KIDNEY SURGERY  2007    TUBAL LIGATION  2008       Family History   Problem Relation Age of Onset    Diabetes Maternal Grandmother     Hypertension Maternal Grandmother     No Known Problems Mother     No Known Problems Sister     No Known Problems Daughter     No Known Problems Paternal Grandmother     No Known Problems Daughter     Breast cancer Neg Hx     Cancer Neg Hx      I have reviewed and agree with the history as documented  E-Cigarette/Vaping    E-Cigarette Use Never User      E-Cigarette/Vaping Substances     Social History     Tobacco Use    Smoking status: Former Smoker     Types: Cigarettes     Quit date:      Years since quittin 6    Smokeless tobacco: Former User    Tobacco comment: 13-14 years ago   Vaping Use    Vaping Use: Never used   Substance Use Topics    Alcohol use: Not Currently    Drug use: Never        Review of Systems   Constitutional: Positive for activity change and fatigue  Negative for appetite change, chills, diaphoresis, fever and unexpected weight change  HENT: Negative for congestion  Respiratory: Negative for apnea, cough, chest tightness, shortness of breath, wheezing and stridor  Cardiovascular: Negative for chest pain, palpitations and leg swelling  Gastrointestinal: Positive for nausea  Negative for abdominal pain, anal bleeding, blood in stool, constipation, diarrhea and vomiting  Genitourinary: Positive for difficulty urinating, dysuria, flank pain, frequency and urgency   Negative for decreased urine volume, dyspareunia, enuresis, pelvic pain, vaginal bleeding, vaginal discharge and vaginal pain  Skin: Negative for color change, pallor and rash  Neurological: Negative for dizziness, tremors, seizures, facial asymmetry, speech difficulty, light-headedness, numbness and headaches  Hematological: Does not bruise/bleed easily  Psychiatric/Behavioral: Negative for decreased concentration, dysphoric mood, hallucinations and sleep disturbance  Physical Exam  ED Triage Vitals [09/02/21 1433]   Temperature Pulse Respirations Blood Pressure SpO2   98 1 °F (36 7 °C) 76 18 151/66 100 %      Temp Source Heart Rate Source Patient Position - Orthostatic VS BP Location FiO2 (%)   Oral Monitor Lying Right arm --      Pain Score       Worst Possible Pain             Orthostatic Vital Signs  Vitals:    09/02/21 1433   BP: 151/66   Pulse: 76   Patient Position - Orthostatic VS: Lying       Physical Exam  Constitutional:       General: She is in acute distress  Appearance: She is obese  She is ill-appearing  HENT:      Head: Normocephalic and atraumatic  Right Ear: External ear normal       Left Ear: External ear normal       Nose: Nose normal  No congestion or rhinorrhea  Mouth/Throat:      Mouth: Mucous membranes are moist    Eyes:      Extraocular Movements: Extraocular movements intact  Pupils: Pupils are equal, round, and reactive to light  Cardiovascular:      Rate and Rhythm: Normal rate and regular rhythm  Pulses: Normal pulses  Heart sounds: Normal heart sounds  No murmur heard  No friction rub  No gallop  Pulmonary:      Effort: Pulmonary effort is normal       Breath sounds: No wheezing or rales  Abdominal:      General: Abdomen is flat  Tenderness: There is no abdominal tenderness  There is no guarding  Musculoskeletal:      Cervical back: No rigidity  Lymphadenopathy:      Cervical: No cervical adenopathy  Skin:     General: Skin is warm and dry        Capillary Refill: Capillary refill takes 2 to 3 seconds  Coloration: Skin is pale  Findings: Rash present  No bruising, erythema or lesion  Neurological:      General: No focal deficit present  Mental Status: She is oriented to person, place, and time  Cranial Nerves: No cranial nerve deficit  Psychiatric:         Mood and Affect: Mood normal          ED Medications  Medications   ketorolac (TORADOL) injection 15 mg (has no administration in time range)   sodium chloride 0 9 % bolus 1,000 mL (has no administration in time range)       Diagnostic Studies  Results Reviewed     Procedure Component Value Units Date/Time    CBC and differential [[id number]]     Lab Status: No result Specimen: Blood     Comprehensive metabolic panel [[id number]]     Lab Status: No result Specimen: Blood                  No orders to display         Procedures  Procedures      ED Course  ED Course as of Sep 02 1626   Thu Sep 02, 2021   1551 Comprehensive metabolic panel   2928 Hgb Increased to 11 1 compared to 8/22 hgb 9 2                                          MDM  Number of Diagnoses or Management Options  Diagnosis management comments: Flank Pain  - started 7/29 s/p renal biopsy for chronic hematuria   - Pt said pain  Is intermittent and sharp at her L CVA which is ttp, radiates to the front below left chest   - Pt last visit to ED 8/22 for the same problem, given Fentanyl and IV bolus at that encounter  - Renal biopsy showed FSGS, pt continue to have hematuria which has been nonstop in the past 2 months  - Normal Cystoscopy in April   - 8/22 Hgb 9 2  - Pt endorsed dizziness, feeling nauseous   - Plan: Order CBC, CMP, Toradol 15 mg injection, 1 L NS bolus, follow with nephrology            Disposition  Final diagnoses:   None     ED Disposition     None      Follow-up Information    None         Patient's Medications   Discharge Prescriptions    No medications on file     No discharge procedures on file      PDMP Review       Value Time User    PDMP Reviewed Yes 4/9/2021 11:58 AM Hemant Godwin PA-C           ED Provider  Attending physically available and evaluated [address]  I managed the patient along with the ED Attending      Electronically Signed by         Sienna Solitario DO  09/02/21 1626       Sienna Solitario DO  09/02/21 1626       Sienna Solitario DO  09/02/21 3119

## 2021-09-02 NOTE — ED ATTENDING ATTESTATION
9/2/2021  IShazia MD, saw and evaluated the patient  I have discussed the patient with the resident/non-physician practitioner and agree with the resident's/non-physician practitioner's findings, Plan of Care, and MDM as documented in the resident's/non-physician practitioner's note, except where noted  All available labs and Radiology studies were reviewed  I was present for key portions of any procedure(s) performed by the resident/non-physician practitioner and I was immediately available to provide assistance  At this point I agree with the current assessment done in the Emergency Department  I have conducted an independent evaluation of this patient a history and physical is as follows:    37 YO female presents with pain after a renal biopsy  Pt states she had this for evaluation of hydronephrosis  Pt was evaluated for this previously  States the pain began again this morning  This has been Left sided, it is intermittent, sharp  Pt states this is typical of her usual pain  She additionally notes gross hematuria, this has been an ongoing issue and was the original reason for the biopsy  Gen: Pt is in NAD  HEENT: Head is atraumatic, EOM's intact, neck has FROM  Chest: CTAB, non-tender  Heart: RRR  Abdomen: Soft  Musculoskeletal: FROM in all extremities  Skin: No rash, no ecchymosis  Neuro: Awake, alert, oriented x4; Cranial nerves II-XII intact  Psych: Normal affect    MDM -  Pt with ongoing, intermittent flank pain after biopsy  Will check CBC as she has a Hx of significant anemia, check electrolytes for kidney injury, give analgesia  Will recommend follow back up with nephrology and urology for further evaluation      ED Course         Critical Care Time  Procedures

## 2021-09-02 NOTE — Clinical Note
Karen Mukherjee was seen and treated in our emergency department on 9/2/2021  long standing, heavy lifting    Diagnosis: Flank pain, possible renal causes    Janki    She may return on this date: 09/04/2021         If you have any questions or concerns, please don't hesitate to call        Yulissa Frank, DO    ______________________________           _______________          _______________  Hospital Representative                              Date                                Time

## 2021-09-02 NOTE — DISCHARGE INSTRUCTIONS
Please continue to take tylenol for your pain  You can go to 3000 mg of tylenol per day for pain, but please do not exceed this dose   I would recommend you to follow up with Dr Erinn Carcamo for your pain     Your Hemoglobin actually improved in comparison to what it was in 8/22   Please go to the ED if your pain is out of control despite Tylenol, or if you develop shortness of breath, chest pain, or high fever

## 2021-09-23 ENCOUNTER — OFFICE VISIT (OUTPATIENT)
Dept: FAMILY MEDICINE CLINIC | Facility: CLINIC | Age: 41
End: 2021-09-23

## 2021-09-23 DIAGNOSIS — B34.9 VIRAL INFECTION, UNSPECIFIED: ICD-10-CM

## 2021-09-23 DIAGNOSIS — R11.11 NON-INTRACTABLE VOMITING WITHOUT NAUSEA, UNSPECIFIED VOMITING TYPE: Primary | ICD-10-CM

## 2021-09-23 PROCEDURE — 99213 OFFICE O/P EST LOW 20 MIN: CPT | Performed by: PHYSICIAN ASSISTANT

## 2021-09-23 PROCEDURE — U0005 INFEC AGEN DETEC AMPLI PROBE: HCPCS | Performed by: PHYSICIAN ASSISTANT

## 2021-09-23 PROCEDURE — 3078F DIAST BP <80 MM HG: CPT | Performed by: PHYSICIAN ASSISTANT

## 2021-09-23 PROCEDURE — U0003 INFECTIOUS AGENT DETECTION BY NUCLEIC ACID (DNA OR RNA); SEVERE ACUTE RESPIRATORY SYNDROME CORONAVIRUS 2 (SARS-COV-2) (CORONAVIRUS DISEASE [COVID-19]), AMPLIFIED PROBE TECHNIQUE, MAKING USE OF HIGH THROUGHPUT TECHNOLOGIES AS DESCRIBED BY CMS-2020-01-R: HCPCS | Performed by: PHYSICIAN ASSISTANT

## 2021-09-23 PROCEDURE — 3077F SYST BP >= 140 MM HG: CPT | Performed by: PHYSICIAN ASSISTANT

## 2021-09-23 RX ORDER — ONDANSETRON 8 MG/1
8 TABLET, ORALLY DISINTEGRATING ORAL EVERY 8 HOURS PRN
Qty: 20 TABLET | Refills: 0 | Status: SHIPPED | OUTPATIENT
Start: 2021-09-23

## 2021-09-23 NOTE — LETTER
September 23, 2021     Patient: Edi De Luna   YOB: 1980   Date of Visit: 9/23/2021       To Whom it May Concern:    Edi De Luna is under my professional care  She was seen in my office on 9/23/2021  She may return to work on completion of testing  she is excused 9/21 until results return   If you have any questions or concerns, please don't hesitate to call           Sincerely,          [email or web address]        CC: No Recipients

## 2021-09-23 NOTE — PROGRESS NOTES
COVID-19 Outpatient Progress Note    Assessment/Plan:    Problem List Items Addressed This Visit        Digestive    Non-intractable vomiting - Primary    Relevant Medications    ondansetron (Zofran ODT) 8 mg disintegrating tablet      Other Visit Diagnoses     Viral infection, unspecified        Relevant Orders    Novel Coronavirus (Covid-19),PCR [facility] - Collected at Mobile [facility] or [facility]         Disposition:     I referred patient to one of our centralized sites for a COVID-19 swab  I have spent 9 minutes directly with the patient  Greater than 50% of this time was spent in counseling/coordination of care regarding: instructions for management and impressions  Continue zofran       Verification of patient location:    Patient is located in the following state in which I hold an active license [place]    Encounter provider [address] [place] [address]    Provider located at [address] [address] [phone]    Recent Visits  No visits were found meeting these conditions  Showing recent visits within past 7 days and meeting all other requirements  Future Appointments  No visits were found meeting these conditions  Showing future appointments within next 150 days and meeting all other requirements     This virtual check-in was done via [employer] and patient was informed that this is a secure, HIPAA-compliant platform  She agrees to proceed  Patient agrees to participate in a virtual check in via telephone or video visit instead of presenting to the office to address urgent/immediate medical needs  Patient is aware this is a billable service  After connecting through [facility], the patient was identified by name and date of birth  Christel Corona was informed that this was a telemedicine visit and that the exam was being conducted confidentially over secure lines  My office door was closed   No one else was in the room  Isaac Tee acknowledged consent and understanding of privacy and security of the telemedicine visit  I informed the patient that I have reviewed her record in Epic and presented the opportunity for her to ask any questions regarding the visit today  The patient agreed to participate  Subjective:   Isaac Tee is a 42 y o  female who is concerned about COVID-19  Patient's symptoms include cough (usually after vomiting), abdominal pain (epigastric), nausea, vomiting and diarrhea  Patient denies fever, chills, congestion, sore throat, myalgias and headaches         Date of symptom onset: 9/21/2021  COVID-19 vaccination status: Fully vaccinated    Exposure:   Contact with a person who is under investigation (PUI) for or who is positive for COVID-19 within the last 14 days?: No    Hospitalized recently for fever and/or lower respiratory symptoms?: No      Currently a healthcare worker that is involved in direct patient care?: No      Works in a special setting where the risk of COVID-19 transmission may be high? (this may include long-term care, correctional and alf facilities; homeless shelters; assisted-living facilities and group homes ): No      Resident in a special setting where the risk of COVID-19 transmission may be high? (this may include long-term care, correctional and alf facilities; homeless shelters; assisted-living facilities and group homes ): No      Lab Results   Component Value Date    SARSCOV2 Negative 06/15/2021     Past Medical History:   Diagnosis Date    [facility]ina at rest [facility])     Anxiety     Asthma     Bipolar 1 disorder ([facility] )     Chronic kidney disease     Depression     Epilepsy ([facility] )     Fibroid 2012    Hematuria     History of cardiac cath 2006    Hydronephrosis 2007    Hypertension     Migraine without aura     PTSD (post-traumatic stress disorder)     Rheumatoid arteritis ([facility] )      Past Surgical History:   Procedure Laterality Date    [address]  2006    CARDIAC CATHETERIZATION       SECTION  2008    CHOLECYSTECTOMY  2018    IR BIOPSY KIDNEY [place] KIT NO LATERALITY  2021    KIDNEY SURGERY  2007    TUBAL LIGATION       Current Outpatient Medications   Medication Sig Dispense Refill    Abatacept 125 MG/ML SOAJ Inject under the skin      acetaminophen (TYLENOL) 650 mg CR tablet Take 1 tablet (650 mg total) by mouth every 8 (eight) hours as needed for mild pain or moderate pain 30 tablet 0    Adalimumab 40 MG/0 4ML PNKT Inject 40 mg under the skin every 14 (fourteen) days       albuterol (PROVENTIL HFA,VENTOLIN HFA) 90 mcg/act inhaler Inhale 2 puffs every 4 (four) hours as needed for wheezing 1 Inhaler 0    Calcium Carb-Cholecalciferol (CALCIUM 600+D3) 600-200 MG-UNIT TABS take 1 tablet twice a day      celecoxib (CeleBREX) 200 mg capsule Take 200 mg by mouth 2 (two) times a day      Cholecalciferol (VITAMIN D3) 2000 units capsule take 1 tablet po daily   citalopram (CeleXA) 20 mg tablet Take 1 tablet (20 mg total) by mouth daily 90 tablet 1    doxepin (SINEquan) 25 mg capsule Take 1 capsule (25 mg total) by mouth daily at bedtime 90 capsule 1    Elastic Bandages & Supports (CARPAL TUNNEL WRIST DELUXE) MISC by Does not apply route daily at bedtime 2 each 0    ergocalciferol (Drisdol) 1 25 MG (05998 UT) capsule Take 50,000 Units by mouth daily in the early morning       famotidine (PEPCID) 20 mg tablet Take 1 tablet (20 mg total) by mouth 2 (two) times a day 30 tablet 0    fluticasone (FLONASE) 50 mcg/act nasal spray 1 spray into each nostril daily 16 g 5    fluticasone (FLOVENT HFA) 110 MCG/ACT inhaler Inhale 2 puffs 2 (two) times a day Rinse mouth after use   3 Inhaler 1    folic acid (FOLVITE) 1 mg tablet every 24 hours      furosemide (LASIX) 40 mg tablet Take 1 tablet (40 mg total) by mouth daily 90 tablet 1    gabapentin (NEURONTIN) 100 mg capsule Take 1 capsule (100 mg total) by mouth 3 (three) times a day for 14 days 42 capsule 0    Incontinence Supply Disposable (Poise Pad) PADS by Does not apply route 4 (four) times a day as needed (incontinence) 90 each 0    leflunomide (ARAVA) 20 MG tablet Take 20 mg by mouth daily      levETIRAcetam (KEPPRA) 1000 MG tablet Take 1 tablet (1,000 mg total) by mouth 2 (two) times a day 60 tablet 5    losartan (COZAAR) 25 mg tablet Take 1 tablet (25 mg total) by mouth daily at bedtime 90 tablet 4    Methenamine-Sodium Salicylate 264-429 9 MG TABS Take 1 tablet by mouth 3 (three) times a day (Patient not taking: Reported on 8/8/2021) 90 each 6    methylPREDNISolone (MEDROL) 32 MG tablet Take one (1) tablet 12 hours and one (1) tablet  2 hours PRIOR to CT scan   2 tablet 0    metoprolol tartrate (LOPRESSOR) 25 mg tablet Take 1 tablet (25 mg total) by mouth every 12 (twelve) hours 60 tablet 1    Multiple Vitamins-Minerals (MULTIVITAMIN WITH MINERALS) tablet Take 1 tablet by mouth daily 30 tablet 11    naproxen (NAPROSYN) 500 mg tablet Take 1 tablet (500 mg total) by mouth 2 (two) times a day with meals 30 tablet 0    nitroglycerin (NITROSTAT) 0 4 mg SL tablet Place 1 tablet (0 4 mg total) under the tongue every 5 (five) minutes as needed for chest pain Call 911 if using 3 doses 25 tablet 0    ondansetron (Zofran ODT) 8 mg disintegrating tablet Take 1 tablet (8 mg total) by mouth every 8 (eight) hours as needed for nausea or vomiting 20 tablet 0    polyethylene glycol (GLYCOLAX) 17 GM/SCOOP powder Take 17 g by mouth daily 578 g 0    predniSONE 5 mg tablet Take by mouth daily      rizatriptan (MAXALT) 5 MG tablet Take 1 tablet (5 mg total) by mouth once as needed for migraine for up to 1 dose May repeat in 2 hours if needed 9 tablet 0    traMADol (ULTRAM) 50 mg tablet Take 1 tablet (50 mg total) by mouth every 6 (six) hours as needed for moderate pain 12 tablet 0    traMADol (ULTRAM) 50 mg tablet Take 1 tablet (50 mg total) by mouth every 8 (eight) hours as needed for severe pain for up to 10 doses 10 tablet 0     No current facility-administered medications for this visit  Allergies   Allergen Reactions    Codeine     Contrast [Iodinated Diagnostic Agents]     Iodine - Food Allergy     Latex     Lovenox [Enoxaparin]     Shellfish-Derived Products - Food Allergy Hives    Vaccinium Angustifolium Hives    Vicodin [Hydrocodone-Acetaminophen]     Benadryl [Diphenhydramine] Rash    Oxycontin [Oxycodone] Palpitations    Provera [Medroxyprogesterone] Palpitations       Review of Systems   Constitutional: Negative for chills and fever  HENT: Negative for congestion and sore throat  Respiratory: Positive for cough (usually after vomiting)  Gastrointestinal: Positive for abdominal pain (epigastric), diarrhea, nausea and vomiting  Musculoskeletal: Negative for myalgias  Neurological: Negative for headaches  Objective: There were no vitals filed for this visit  Physical Exam  Constitutional:       Appearance: Normal appearance  HENT:      Head: Normocephalic and atraumatic  Right Ear: Ear canal normal       Left Ear: Ear canal normal       Nose: Nose normal    Eyes:      Conjunctiva/sclera: Conjunctivae normal    Neck:      Comments: No visible masses    Pulmonary:      Effort: Pulmonary effort is normal  No respiratory distress  Musculoskeletal:      Cervical back: Normal range of motion  Neurological:      Mental Status: She is alert  Psychiatric:         Mood and Affect: Mood normal          Behavior: Behavior normal          VIRTUAL VISIT [facility] verbally agrees to participate in [employer]   Pt is aware that [employer] could be limited without vital signs or the ability to perform a full hands-on physical exam  Janki Ireland understands she or the provider may request at any time to terminate the video visit and request the patient to seek care or treatment in person

## 2021-10-01 ENCOUNTER — TELEPHONE (OUTPATIENT)
Dept: NEPHROLOGY | Facility: CLINIC | Age: 41
End: 2021-10-01

## 2021-10-11 DIAGNOSIS — R10.9 LEFT FLANK PAIN: ICD-10-CM

## 2021-10-12 RX ORDER — SENNOSIDES 8.6 MG
650 CAPSULE ORAL EVERY 8 HOURS PRN
Qty: 30 TABLET | Refills: 2 | Status: SHIPPED | OUTPATIENT
Start: 2021-10-12 | End: 2021-11-16 | Stop reason: SDUPTHER

## 2021-10-13 ENCOUNTER — HOSPITAL ENCOUNTER (EMERGENCY)
Facility: HOSPITAL | Age: 41
Discharge: HOME | End: 2021-10-13
Attending: EMERGENCY MEDICINE
Payer: COMMERCIAL

## 2021-10-13 ENCOUNTER — APPOINTMENT (EMERGENCY)
Dept: RADIOLOGY | Facility: HOSPITAL | Age: 41
End: 2021-10-13
Payer: COMMERCIAL

## 2021-10-13 VITALS
HEART RATE: 54 BPM | TEMPERATURE: 98.3 F | HEIGHT: 62 IN | RESPIRATION RATE: 18 BRPM | DIASTOLIC BLOOD PRESSURE: 70 MMHG | OXYGEN SATURATION: 100 % | BODY MASS INDEX: 48.6 KG/M2 | SYSTOLIC BLOOD PRESSURE: 130 MMHG | WEIGHT: 264.11 LBS

## 2021-10-13 DIAGNOSIS — R55 PRE-SYNCOPE: Primary | ICD-10-CM

## 2021-10-13 LAB
ALBUMIN SERPL BCP-MCNC: 3.4 G/DL (ref 3.5–5)
ALP SERPL-CCNC: 71 U/L (ref 46–116)
ALT SERPL W P-5'-P-CCNC: 18 U/L (ref 12–78)
ANION GAP SERPL CALCULATED.3IONS-SCNC: 9 MMOL/L (ref 4–13)
AST SERPL W P-5'-P-CCNC: 10 U/L (ref 5–45)
BACTERIA UR QL AUTO: ABNORMAL /HPF
BASOPHILS # BLD AUTO: 0.02 THOUSANDS/ΜL (ref 0–0.1)
BASOPHILS NFR BLD AUTO: 0 % (ref 0–1)
BILIRUB SERPL-MCNC: 0.18 MG/DL (ref 0.2–1)
BILIRUB UR QL STRIP: NEGATIVE
BUN SERPL-MCNC: 12 MG/DL (ref 5–25)
CALCIUM ALBUM COR SERPL-MCNC: 9 MG/DL (ref 8.3–10.1)
CALCIUM SERPL-MCNC: 8.5 MG/DL (ref 8.3–10.1)
CHLORIDE SERPL-SCNC: 103 MMOL/L (ref 100–108)
CLARITY UR: CLEAR
CO2 SERPL-SCNC: 26 MMOL/L (ref 21–32)
COLOR UR: YELLOW
CREAT SERPL-MCNC: 0.71 MG/DL (ref 0.6–1.3)
EOSINOPHIL # BLD AUTO: 0.22 THOUSAND/ΜL (ref 0–0.61)
EOSINOPHIL NFR BLD AUTO: 4 % (ref 0–6)
ERYTHROCYTE [DISTWIDTH] IN BLOOD BY AUTOMATED COUNT: 17.2 % (ref 11.6–15.1)
GFR SERPL CREATININE-BSD FRML MDRD: 105 ML/MIN/1.73SQ M
GLUCOSE SERPL-MCNC: 106 MG/DL (ref 65–140)
GLUCOSE SERPL-MCNC: 87 MG/DL (ref 65–140)
GLUCOSE UR STRIP-MCNC: NEGATIVE MG/DL
HCT VFR BLD AUTO: 34.6 % (ref 34.8–46.1)
HGB BLD-MCNC: 10.8 G/DL (ref 11.5–15.4)
HGB UR QL STRIP.AUTO: ABNORMAL
IMM GRANULOCYTES # BLD AUTO: 0.01 THOUSAND/UL (ref 0–0.2)
IMM GRANULOCYTES NFR BLD AUTO: 0 % (ref 0–2)
KETONES UR STRIP-MCNC: NEGATIVE MG/DL
LEUKOCYTE ESTERASE UR QL STRIP: ABNORMAL
LYMPHOCYTES # BLD AUTO: 1.78 THOUSANDS/ΜL (ref 0.6–4.47)
LYMPHOCYTES NFR BLD AUTO: 30 % (ref 14–44)
MCH RBC QN AUTO: 26.2 PG (ref 26.8–34.3)
MCHC RBC AUTO-ENTMCNC: 31.2 G/DL (ref 31.4–37.4)
MCV RBC AUTO: 84 FL (ref 82–98)
MONOCYTES # BLD AUTO: 0.45 THOUSAND/ΜL (ref 0.17–1.22)
MONOCYTES NFR BLD AUTO: 8 % (ref 4–12)
NEUTROPHILS # BLD AUTO: 3.45 THOUSANDS/ΜL (ref 1.85–7.62)
NEUTS SEG NFR BLD AUTO: 58 % (ref 43–75)
NITRITE UR QL STRIP: NEGATIVE
NON-SQ EPI CELLS URNS QL MICRO: ABNORMAL /HPF
NRBC BLD AUTO-RTO: 0 /100 WBCS
PH UR STRIP.AUTO: 5.5 [PH]
PLATELET # BLD AUTO: 318 THOUSANDS/UL (ref 149–390)
PMV BLD AUTO: 9.3 FL (ref 8.9–12.7)
POTASSIUM SERPL-SCNC: 3.7 MMOL/L (ref 3.5–5.3)
PROT SERPL-MCNC: 7.4 G/DL (ref 6.4–8.2)
PROT UR STRIP-MCNC: NEGATIVE MG/DL
RBC # BLD AUTO: 4.12 MILLION/UL (ref 3.81–5.12)
RBC #/AREA URNS AUTO: ABNORMAL /HPF
SODIUM SERPL-SCNC: 138 MMOL/L (ref 136–145)
SP GR UR STRIP.AUTO: 1.01 (ref 1–1.03)
TROPONIN I SERPL-MCNC: <0.02 NG/ML
TROPONIN I SERPL-MCNC: <0.02 NG/ML
UROBILINOGEN UR QL STRIP.AUTO: 0.2 E.U./DL
WBC # BLD AUTO: 5.93 THOUSAND/UL (ref 4.31–10.16)
WBC #/AREA URNS AUTO: ABNORMAL /HPF

## 2021-10-13 PROCEDURE — 99285 EMERGENCY DEPT VISIT HI MDM: CPT | Performed by: PHYSICIAN ASSISTANT

## 2021-10-13 PROCEDURE — 85025 COMPLETE CBC W/AUTO DIFF WBC: CPT | Performed by: PHYSICIAN ASSISTANT

## 2021-10-13 PROCEDURE — 82948 REAGENT STRIP/BLOOD GLUCOSE: CPT

## 2021-10-13 PROCEDURE — 81001 URINALYSIS AUTO W/SCOPE: CPT | Performed by: PHYSICIAN ASSISTANT

## 2021-10-13 PROCEDURE — 93005 ELECTROCARDIOGRAM TRACING: CPT

## 2021-10-13 PROCEDURE — 36415 COLL VENOUS BLD VENIPUNCTURE: CPT | Performed by: PHYSICIAN ASSISTANT

## 2021-10-13 PROCEDURE — 96360 HYDRATION IV INFUSION INIT: CPT

## 2021-10-13 PROCEDURE — 96361 HYDRATE IV INFUSION ADD-ON: CPT

## 2021-10-13 PROCEDURE — 80053 COMPREHEN METABOLIC PANEL: CPT | Performed by: PHYSICIAN ASSISTANT

## 2021-10-13 PROCEDURE — 71045 X-RAY EXAM CHEST 1 VIEW: CPT

## 2021-10-13 PROCEDURE — 84484 ASSAY OF TROPONIN QUANT: CPT | Performed by: PHYSICIAN ASSISTANT

## 2021-10-13 PROCEDURE — 99285 EMERGENCY DEPT VISIT HI MDM: CPT

## 2021-10-13 RX ADMIN — SODIUM CHLORIDE 500 ML: 0.9 INJECTION, SOLUTION INTRAVENOUS at 22:31

## 2021-10-13 RX ADMIN — SODIUM CHLORIDE 500 ML: 0.9 INJECTION, SOLUTION INTRAVENOUS at 19:27

## 2021-10-14 LAB
ATRIAL RATE: 54 BPM
ATRIAL RATE: 69 BPM
P AXIS: 44 DEGREES
P AXIS: 54 DEGREES
PR INTERVAL: 168 MS
PR INTERVAL: 176 MS
QRS AXIS: 50 DEGREES
QRS AXIS: 51 DEGREES
QRSD INTERVAL: 88 MS
QRSD INTERVAL: 88 MS
QT INTERVAL: 396 MS
QT INTERVAL: 434 MS
QTC INTERVAL: 411 MS
QTC INTERVAL: 424 MS
T WAVE AXIS: 24 DEGREES
T WAVE AXIS: 32 DEGREES
VENTRICULAR RATE: 54 BPM
VENTRICULAR RATE: 69 BPM

## 2021-10-14 PROCEDURE — 93010 ELECTROCARDIOGRAM REPORT: CPT | Performed by: INTERNAL MEDICINE

## 2021-10-28 ENCOUNTER — APPOINTMENT (EMERGENCY)
Dept: CT IMAGING | Facility: HOSPITAL | Age: 41
End: 2021-10-28
Payer: COMMERCIAL

## 2021-10-28 ENCOUNTER — HOSPITAL ENCOUNTER (EMERGENCY)
Facility: HOSPITAL | Age: 41
Discharge: HOME | End: 2021-10-28
Attending: EMERGENCY MEDICINE
Payer: COMMERCIAL

## 2021-10-28 VITALS
SYSTOLIC BLOOD PRESSURE: 130 MMHG | HEART RATE: 60 BPM | OXYGEN SATURATION: 98 % | TEMPERATURE: 98.2 F | RESPIRATION RATE: 16 BRPM | DIASTOLIC BLOOD PRESSURE: 64 MMHG

## 2021-10-28 DIAGNOSIS — R11.0 NAUSEA: ICD-10-CM

## 2021-10-28 DIAGNOSIS — R11.10 VOMITING: ICD-10-CM

## 2021-10-28 DIAGNOSIS — R10.13 EPIGASTRIC PAIN: Primary | ICD-10-CM

## 2021-10-28 LAB
ALBUMIN SERPL BCP-MCNC: 3.4 G/DL (ref 3.5–5)
ALP SERPL-CCNC: 64 U/L (ref 46–116)
ALT SERPL W P-5'-P-CCNC: 17 U/L (ref 12–78)
ANION GAP SERPL CALCULATED.3IONS-SCNC: 8 MMOL/L (ref 4–13)
AST SERPL W P-5'-P-CCNC: 12 U/L (ref 5–45)
BASOPHILS # BLD AUTO: 0.01 THOUSANDS/ΜL (ref 0–0.1)
BASOPHILS NFR BLD AUTO: 0 % (ref 0–1)
BILIRUB SERPL-MCNC: 0.15 MG/DL (ref 0.2–1)
BUN SERPL-MCNC: 13 MG/DL (ref 5–25)
CALCIUM ALBUM COR SERPL-MCNC: 9.1 MG/DL (ref 8.3–10.1)
CALCIUM SERPL-MCNC: 8.6 MG/DL (ref 8.3–10.1)
CHLORIDE SERPL-SCNC: 103 MMOL/L (ref 100–108)
CO2 SERPL-SCNC: 26 MMOL/L (ref 21–32)
CREAT SERPL-MCNC: 0.78 MG/DL (ref 0.6–1.3)
EOSINOPHIL # BLD AUTO: 0.26 THOUSAND/ΜL (ref 0–0.61)
EOSINOPHIL NFR BLD AUTO: 4 % (ref 0–6)
ERYTHROCYTE [DISTWIDTH] IN BLOOD BY AUTOMATED COUNT: 16.1 % (ref 11.6–15.1)
EXT PREG TEST URINE: NEGATIVE
EXT. CONTROL ED NAV: NORMAL
GFR SERPL CREATININE-BSD FRML MDRD: 94 ML/MIN/1.73SQ M
GLUCOSE SERPL-MCNC: 108 MG/DL (ref 65–140)
HCT VFR BLD AUTO: 37.1 % (ref 34.8–46.1)
HGB BLD-MCNC: 11.6 G/DL (ref 11.5–15.4)
IMM GRANULOCYTES # BLD AUTO: 0.02 THOUSAND/UL (ref 0–0.2)
IMM GRANULOCYTES NFR BLD AUTO: 0 % (ref 0–2)
LIPASE SERPL-CCNC: 83 U/L (ref 73–393)
LYMPHOCYTES # BLD AUTO: 2.13 THOUSANDS/ΜL (ref 0.6–4.47)
LYMPHOCYTES NFR BLD AUTO: 31 % (ref 14–44)
MCH RBC QN AUTO: 26.2 PG (ref 26.8–34.3)
MCHC RBC AUTO-ENTMCNC: 31.3 G/DL (ref 31.4–37.4)
MCV RBC AUTO: 84 FL (ref 82–98)
MONOCYTES # BLD AUTO: 0.54 THOUSAND/ΜL (ref 0.17–1.22)
MONOCYTES NFR BLD AUTO: 8 % (ref 4–12)
NEUTROPHILS # BLD AUTO: 3.91 THOUSANDS/ΜL (ref 1.85–7.62)
NEUTS SEG NFR BLD AUTO: 57 % (ref 43–75)
NRBC BLD AUTO-RTO: 0 /100 WBCS
PLATELET # BLD AUTO: 284 THOUSANDS/UL (ref 149–390)
PMV BLD AUTO: 9.5 FL (ref 8.9–12.7)
POTASSIUM SERPL-SCNC: 3.8 MMOL/L (ref 3.5–5.3)
PROT SERPL-MCNC: 7.7 G/DL (ref 6.4–8.2)
RBC # BLD AUTO: 4.42 MILLION/UL (ref 3.81–5.12)
SODIUM SERPL-SCNC: 137 MMOL/L (ref 136–145)
TROPONIN I SERPL-MCNC: <0.02 NG/ML
WBC # BLD AUTO: 6.87 THOUSAND/UL (ref 4.31–10.16)

## 2021-10-28 PROCEDURE — 99285 EMERGENCY DEPT VISIT HI MDM: CPT | Performed by: EMERGENCY MEDICINE

## 2021-10-28 PROCEDURE — 96374 THER/PROPH/DIAG INJ IV PUSH: CPT

## 2021-10-28 PROCEDURE — 93005 ELECTROCARDIOGRAM TRACING: CPT

## 2021-10-28 PROCEDURE — 83690 ASSAY OF LIPASE: CPT | Performed by: STUDENT IN AN ORGANIZED HEALTH CARE EDUCATION/TRAINING PROGRAM

## 2021-10-28 PROCEDURE — G1004 CDSM NDSC: HCPCS

## 2021-10-28 PROCEDURE — 84484 ASSAY OF TROPONIN QUANT: CPT | Performed by: EMERGENCY MEDICINE

## 2021-10-28 PROCEDURE — 36415 COLL VENOUS BLD VENIPUNCTURE: CPT | Performed by: STUDENT IN AN ORGANIZED HEALTH CARE EDUCATION/TRAINING PROGRAM

## 2021-10-28 PROCEDURE — 81025 URINE PREGNANCY TEST: CPT | Performed by: STUDENT IN AN ORGANIZED HEALTH CARE EDUCATION/TRAINING PROGRAM

## 2021-10-28 PROCEDURE — 85025 COMPLETE CBC W/AUTO DIFF WBC: CPT | Performed by: STUDENT IN AN ORGANIZED HEALTH CARE EDUCATION/TRAINING PROGRAM

## 2021-10-28 PROCEDURE — 80053 COMPREHEN METABOLIC PANEL: CPT | Performed by: STUDENT IN AN ORGANIZED HEALTH CARE EDUCATION/TRAINING PROGRAM

## 2021-10-28 PROCEDURE — 74176 CT ABD & PELVIS W/O CONTRAST: CPT

## 2021-10-28 PROCEDURE — 99284 EMERGENCY DEPT VISIT MOD MDM: CPT

## 2021-10-28 RX ORDER — DICYCLOMINE HCL 20 MG
20 TABLET ORAL ONCE
Status: COMPLETED | OUTPATIENT
Start: 2021-10-28 | End: 2021-10-28

## 2021-10-28 RX ORDER — ONDANSETRON 2 MG/ML
4 INJECTION INTRAMUSCULAR; INTRAVENOUS ONCE
Status: COMPLETED | OUTPATIENT
Start: 2021-10-28 | End: 2021-10-28

## 2021-10-28 RX ORDER — MAGNESIUM HYDROXIDE/ALUMINUM HYDROXICE/SIMETHICONE 120; 1200; 1200 MG/30ML; MG/30ML; MG/30ML
30 SUSPENSION ORAL ONCE
Status: COMPLETED | OUTPATIENT
Start: 2021-10-28 | End: 2021-10-28

## 2021-10-28 RX ADMIN — ONDANSETRON 4 MG: 2 INJECTION INTRAMUSCULAR; INTRAVENOUS at 19:34

## 2021-10-28 RX ADMIN — DICYCLOMINE HYDROCHLORIDE 20 MG: 20 TABLET ORAL at 20:53

## 2021-10-28 RX ADMIN — ALUMINUM HYDROXIDE, MAGNESIUM HYDROXIDE, AND SIMETHICONE 30 ML: 200; 200; 20 SUSPENSION ORAL at 23:10

## 2021-10-29 LAB
ATRIAL RATE: 59 BPM
P AXIS: 42 DEGREES
PR INTERVAL: 174 MS
QRS AXIS: 24 DEGREES
QRSD INTERVAL: 84 MS
QT INTERVAL: 408 MS
QTC INTERVAL: 403 MS
T WAVE AXIS: 29 DEGREES
VENTRICULAR RATE: 59 BPM

## 2021-10-29 PROCEDURE — 93010 ELECTROCARDIOGRAM REPORT: CPT | Performed by: INTERNAL MEDICINE

## 2021-11-30 ENCOUNTER — INTAKE (OUTPATIENT)
Dept: OTHER | Facility: HOSPITAL | Age: 41
End: 2021-11-30

## 2021-11-30 ENCOUNTER — HOSPITAL ENCOUNTER (EMERGENCY)
Facility: HOSPITAL | Age: 41
Discharge: HOME | End: 2021-11-30
Attending: EMERGENCY MEDICINE
Payer: COMMERCIAL

## 2021-11-30 ENCOUNTER — TELEPHONE (OUTPATIENT)
Dept: NEPHROLOGY | Facility: CLINIC | Age: 41
End: 2021-11-30

## 2021-11-30 ENCOUNTER — APPOINTMENT (EMERGENCY)
Dept: CT IMAGING | Facility: HOSPITAL | Age: 41
End: 2021-11-30
Payer: COMMERCIAL

## 2021-11-30 VITALS
DIASTOLIC BLOOD PRESSURE: 76 MMHG | BODY MASS INDEX: 48.6 KG/M2 | SYSTOLIC BLOOD PRESSURE: 155 MMHG | RESPIRATION RATE: 18 BRPM | OXYGEN SATURATION: 100 % | HEIGHT: 62 IN | TEMPERATURE: 98 F | WEIGHT: 264.11 LBS | HEART RATE: 73 BPM

## 2021-11-30 DIAGNOSIS — R10.9 LEFT FLANK PAIN: Primary | ICD-10-CM

## 2021-11-30 DIAGNOSIS — N18.1 STAGE 1 CHRONIC KIDNEY DISEASE: Primary | ICD-10-CM

## 2021-11-30 DIAGNOSIS — N39.0 UTI (URINARY TRACT INFECTION): ICD-10-CM

## 2021-11-30 DIAGNOSIS — N13.30 HYDRONEPHROSIS, UNSPECIFIED HYDRONEPHROSIS TYPE: ICD-10-CM

## 2021-11-30 LAB
ALBUMIN SERPL BCP-MCNC: 3.5 G/DL (ref 3.5–5)
ALP SERPL-CCNC: 65 U/L (ref 46–116)
ALT SERPL W P-5'-P-CCNC: 21 U/L (ref 12–78)
ANION GAP SERPL CALCULATED.3IONS-SCNC: 9 MMOL/L (ref 4–13)
AST SERPL W P-5'-P-CCNC: 14 U/L (ref 5–45)
BACTERIA UR QL AUTO: ABNORMAL /HPF
BASOPHILS # BLD AUTO: 0.02 THOUSANDS/ΜL (ref 0–0.1)
BASOPHILS NFR BLD AUTO: 0 % (ref 0–1)
BILIRUB SERPL-MCNC: 0.52 MG/DL (ref 0.2–1)
BILIRUB UR QL STRIP: NEGATIVE
BUN SERPL-MCNC: 11 MG/DL (ref 5–25)
CALCIUM SERPL-MCNC: 8.8 MG/DL (ref 8.3–10.1)
CHLORIDE SERPL-SCNC: 105 MMOL/L (ref 100–108)
CLARITY UR: ABNORMAL
CO2 SERPL-SCNC: 26 MMOL/L (ref 21–32)
COLOR UR: YELLOW
CREAT SERPL-MCNC: 0.74 MG/DL (ref 0.6–1.3)
EOSINOPHIL # BLD AUTO: 0.32 THOUSAND/ΜL (ref 0–0.61)
EOSINOPHIL NFR BLD AUTO: 5 % (ref 0–6)
ERYTHROCYTE [DISTWIDTH] IN BLOOD BY AUTOMATED COUNT: 14.2 % (ref 11.6–15.1)
EXT PREG TEST URINE: NEGATIVE
EXT. CONTROL ED NAV: NORMAL
GFR SERPL CREATININE-BSD FRML MDRD: 100 ML/MIN/1.73SQ M
GLUCOSE SERPL-MCNC: 103 MG/DL (ref 65–140)
GLUCOSE UR STRIP-MCNC: NEGATIVE MG/DL
HCT VFR BLD AUTO: 37.8 % (ref 34.8–46.1)
HGB BLD-MCNC: 12 G/DL (ref 11.5–15.4)
HGB UR QL STRIP.AUTO: ABNORMAL
IMM GRANULOCYTES # BLD AUTO: 0.01 THOUSAND/UL (ref 0–0.2)
IMM GRANULOCYTES NFR BLD AUTO: 0 % (ref 0–2)
KETONES UR STRIP-MCNC: NEGATIVE MG/DL
LEUKOCYTE ESTERASE UR QL STRIP: ABNORMAL
LYMPHOCYTES # BLD AUTO: 1.78 THOUSANDS/ΜL (ref 0.6–4.47)
LYMPHOCYTES NFR BLD AUTO: 29 % (ref 14–44)
MCH RBC QN AUTO: 27 PG (ref 26.8–34.3)
MCHC RBC AUTO-ENTMCNC: 31.7 G/DL (ref 31.4–37.4)
MCV RBC AUTO: 85 FL (ref 82–98)
MONOCYTES # BLD AUTO: 0.48 THOUSAND/ΜL (ref 0.17–1.22)
MONOCYTES NFR BLD AUTO: 8 % (ref 4–12)
MUCOUS THREADS UR QL AUTO: ABNORMAL
NEUTROPHILS # BLD AUTO: 3.62 THOUSANDS/ΜL (ref 1.85–7.62)
NEUTS SEG NFR BLD AUTO: 58 % (ref 43–75)
NITRITE UR QL STRIP: NEGATIVE
NON-SQ EPI CELLS URNS QL MICRO: ABNORMAL /HPF
NRBC BLD AUTO-RTO: 0 /100 WBCS
PH UR STRIP.AUTO: 5.5 [PH] (ref 4.5–8)
PLATELET # BLD AUTO: 313 THOUSANDS/UL (ref 149–390)
PMV BLD AUTO: 9.7 FL (ref 8.9–12.7)
POTASSIUM SERPL-SCNC: 3.8 MMOL/L (ref 3.5–5.3)
PROT SERPL-MCNC: 7.8 G/DL (ref 6.4–8.2)
PROT UR STRIP-MCNC: NEGATIVE MG/DL
RBC # BLD AUTO: 4.45 MILLION/UL (ref 3.81–5.12)
RBC #/AREA URNS AUTO: ABNORMAL /HPF
SODIUM SERPL-SCNC: 140 MMOL/L (ref 136–145)
SP GR UR STRIP.AUTO: >=1.03 (ref 1–1.03)
UROBILINOGEN UR QL STRIP.AUTO: 0.2 E.U./DL
WBC # BLD AUTO: 6.23 THOUSAND/UL (ref 4.31–10.16)
WBC #/AREA URNS AUTO: ABNORMAL /HPF

## 2021-11-30 PROCEDURE — 81001 URINALYSIS AUTO W/SCOPE: CPT

## 2021-11-30 PROCEDURE — 81025 URINE PREGNANCY TEST: CPT | Performed by: PHYSICIAN ASSISTANT

## 2021-11-30 PROCEDURE — 87147 CULTURE TYPE IMMUNOLOGIC: CPT

## 2021-11-30 PROCEDURE — 85025 COMPLETE CBC W/AUTO DIFF WBC: CPT | Performed by: PHYSICIAN ASSISTANT

## 2021-11-30 PROCEDURE — 99284 EMERGENCY DEPT VISIT MOD MDM: CPT

## 2021-11-30 PROCEDURE — 80053 COMPREHEN METABOLIC PANEL: CPT | Performed by: PHYSICIAN ASSISTANT

## 2021-11-30 PROCEDURE — 74176 CT ABD & PELVIS W/O CONTRAST: CPT

## 2021-11-30 PROCEDURE — G1004 CDSM NDSC: HCPCS

## 2021-11-30 PROCEDURE — 87086 URINE CULTURE/COLONY COUNT: CPT

## 2021-11-30 PROCEDURE — 36415 COLL VENOUS BLD VENIPUNCTURE: CPT | Performed by: PHYSICIAN ASSISTANT

## 2021-11-30 PROCEDURE — 99284 EMERGENCY DEPT VISIT MOD MDM: CPT | Performed by: PHYSICIAN ASSISTANT

## 2021-11-30 RX ORDER — ACETAMINOPHEN 325 MG/1
650 TABLET ORAL ONCE
Status: COMPLETED | OUTPATIENT
Start: 2021-11-30 | End: 2021-11-30

## 2021-11-30 RX ORDER — LIDOCAINE 50 MG/G
1 PATCH TOPICAL ONCE
Status: DISCONTINUED | OUTPATIENT
Start: 2021-11-30 | End: 2021-11-30 | Stop reason: HOSPADM

## 2021-11-30 RX ORDER — ONDANSETRON 2 MG/ML
4 INJECTION INTRAMUSCULAR; INTRAVENOUS ONCE
Status: COMPLETED | OUTPATIENT
Start: 2021-11-30 | End: 2021-11-30

## 2021-11-30 RX ORDER — NITROFURANTOIN 25; 75 MG/1; MG/1
100 CAPSULE ORAL 2 TIMES DAILY
Qty: 10 CAPSULE | Refills: 0 | Status: SHIPPED | OUTPATIENT
Start: 2021-11-30

## 2021-11-30 RX ADMIN — SODIUM CHLORIDE 1000 ML: 0.9 INJECTION, SOLUTION INTRAVENOUS at 17:45

## 2021-11-30 RX ADMIN — ONDANSETRON 4 MG: 2 INJECTION INTRAMUSCULAR; INTRAVENOUS at 17:47

## 2021-11-30 RX ADMIN — LIDOCAINE 1 PATCH: 50 PATCH CUTANEOUS at 17:44

## 2021-11-30 RX ADMIN — ACETAMINOPHEN 650 MG: 325 TABLET, FILM COATED ORAL at 17:47

## 2021-12-02 ENCOUNTER — OFFICE VISIT (OUTPATIENT)
Dept: NEPHROLOGY | Facility: CLINIC | Age: 41
End: 2021-12-02
Payer: COMMERCIAL

## 2021-12-02 VITALS
BODY MASS INDEX: 48.4 KG/M2 | HEIGHT: 62 IN | WEIGHT: 263 LBS | SYSTOLIC BLOOD PRESSURE: 132 MMHG | HEART RATE: 77 BPM | DIASTOLIC BLOOD PRESSURE: 58 MMHG

## 2021-12-02 DIAGNOSIS — N18.1 STAGE 1 CHRONIC KIDNEY DISEASE: ICD-10-CM

## 2021-12-02 DIAGNOSIS — R80.1 PERSISTENT PROTEINURIA: ICD-10-CM

## 2021-12-02 DIAGNOSIS — D50.0 IRON DEFICIENCY ANEMIA DUE TO CHRONIC BLOOD LOSS: ICD-10-CM

## 2021-12-02 DIAGNOSIS — E66.01 MORBID OBESITY (HCC): ICD-10-CM

## 2021-12-02 DIAGNOSIS — N05.1 FSGS (FOCAL SEGMENTAL GLOMERULOSCLEROSIS): ICD-10-CM

## 2021-12-02 DIAGNOSIS — I10 ESSENTIAL HYPERTENSION: Primary | ICD-10-CM

## 2021-12-02 DIAGNOSIS — E55.9 VITAMIN D DEFICIENCY: ICD-10-CM

## 2021-12-02 LAB
BACTERIA UR CULT: ABNORMAL
BACTERIA UR CULT: ABNORMAL

## 2021-12-02 PROCEDURE — 99214 OFFICE O/P EST MOD 30 MIN: CPT | Performed by: NURSE PRACTITIONER

## 2022-01-12 ENCOUNTER — TELEPHONE (OUTPATIENT)
Dept: NEPHROLOGY | Facility: CLINIC | Age: 42
End: 2022-01-12

## 2022-03-07 ENCOUNTER — HOSPITAL ENCOUNTER (EMERGENCY)
Facility: HOSPITAL | Age: 42
Discharge: HOME | End: 2022-03-07
Attending: EMERGENCY MEDICINE
Payer: COMMERCIAL

## 2022-03-07 ENCOUNTER — APPOINTMENT (EMERGENCY)
Dept: CT IMAGING | Facility: HOSPITAL | Age: 42
End: 2022-03-07
Payer: COMMERCIAL

## 2022-03-07 VITALS
DIASTOLIC BLOOD PRESSURE: 70 MMHG | TEMPERATURE: 98.1 F | BODY MASS INDEX: 45.81 KG/M2 | SYSTOLIC BLOOD PRESSURE: 142 MMHG | OXYGEN SATURATION: 98 % | WEIGHT: 250.44 LBS | RESPIRATION RATE: 16 BRPM | HEART RATE: 70 BPM

## 2022-03-07 DIAGNOSIS — G43.909 MIGRAINE WITHOUT STATUS MIGRAINOSUS, NOT INTRACTABLE, UNSPECIFIED MIGRAINE TYPE: Primary | ICD-10-CM

## 2022-03-07 PROCEDURE — G1004 CDSM NDSC: HCPCS

## 2022-03-07 PROCEDURE — 70450 CT HEAD/BRAIN W/O DYE: CPT

## 2022-03-07 PROCEDURE — 99284 EMERGENCY DEPT VISIT MOD MDM: CPT | Performed by: EMERGENCY MEDICINE

## 2022-03-07 PROCEDURE — 96375 TX/PRO/DX INJ NEW DRUG ADDON: CPT

## 2022-03-07 PROCEDURE — 96374 THER/PROPH/DIAG INJ IV PUSH: CPT

## 2022-03-07 PROCEDURE — 99284 EMERGENCY DEPT VISIT MOD MDM: CPT

## 2022-03-07 RX ORDER — VALPROATE SODIUM 100 MG/ML
1000 INJECTION, SOLUTION INTRAVENOUS ONCE
Status: DISCONTINUED | OUTPATIENT
Start: 2022-03-07 | End: 2022-03-07 | Stop reason: SDUPTHER

## 2022-03-07 RX ORDER — KETOROLAC TROMETHAMINE 30 MG/ML
15 INJECTION, SOLUTION INTRAMUSCULAR; INTRAVENOUS ONCE
Status: COMPLETED | OUTPATIENT
Start: 2022-03-07 | End: 2022-03-07

## 2022-03-07 RX ORDER — BUTALBITAL, ACETAMINOPHEN AND CAFFEINE 50; 325; 40 MG/1; MG/1; MG/1
1 TABLET ORAL EVERY 4 HOURS PRN
Qty: 15 TABLET | Refills: 0 | Status: SHIPPED | OUTPATIENT
Start: 2022-03-07 | End: 2022-03-17

## 2022-03-07 RX ORDER — METOCLOPRAMIDE HYDROCHLORIDE 5 MG/ML
10 INJECTION INTRAMUSCULAR; INTRAVENOUS ONCE
Status: COMPLETED | OUTPATIENT
Start: 2022-03-07 | End: 2022-03-07

## 2022-03-07 RX ADMIN — VALPROATE SODIUM 1000 MG: 100 INJECTION, SOLUTION INTRAVENOUS at 17:06

## 2022-03-07 RX ADMIN — KETOROLAC TROMETHAMINE 15 MG: 30 INJECTION, SOLUTION INTRAMUSCULAR at 17:06

## 2022-03-07 RX ADMIN — METOCLOPRAMIDE 10 MG: 5 INJECTION, SOLUTION INTRAMUSCULAR; INTRAVENOUS at 17:06

## 2022-03-07 NOTE — Clinical Note
Sage Olvera was seen and treated in our emergency department on 3/7/2022  No restrictions            Diagnosis:     Pasquale Hinson  may return to work on return date  She may return on this date: 03/09/2022         If you have any questions or concerns, please don't hesitate to call        Que Higginbotham DO    ______________________________           _______________          _______________  Hospital Representative                              Date                                Time

## 2022-03-07 NOTE — ED PROVIDER NOTES
History  Chief Complaint   Patient presents with    Headache - Recurrent or Known Dx Migraines     Patient has a h/o migraines, patient reports a migraine for a week now  Patient reports she has been taking her migraine medicine without relief  Patient reports last Wednesday she fell at work due to pain in head, unsure if she hit head or lost consciousness  Patient also reports blurry vision and watery diarrhea  14-year-old female with history of migraine headaches, hypertension, bipolar depression presents to the emergency department with ongoing headache for the last 5 days  Patient states she was at work working in a hot environment  She states she had a syncopal event 5 days ago and she is unsure if she hit her head  Since then she has been having a migraine worse than usual   She states the headache is all over her head  She has had intermittent blurred vision bilaterally  No nausea or vomiting  No speech deficits or focal motor deficits  She took her usual migraine medications without relief        History provided by:  Patient   used: No    Headache - Recurrent or Known Dx Migraines  Pain location:  Generalized  Radiates to:  Does not radiate  Severity currently:  10/10  Severity at highest:  10/10  Onset quality:  Gradual  Duration:  5 days  Timing:  Constant  Progression:  Unchanged  Chronicity:  Recurrent  Similar to prior headaches: no    Context: bright light    Relieved by:  Nothing  Worsened by:  Nothing  Ineffective treatments:  Prescription medications  Associated symptoms: blurred vision and diarrhea    Associated symptoms: no abdominal pain, no back pain, no congestion, no cough, no dizziness, no drainage, no ear pain, no eye pain, no facial pain, no fatigue, no fever, no focal weakness, no hearing loss, no loss of balance, no myalgias, no nausea, no near-syncope, no neck pain, no neck stiffness, no numbness, no paresthesias, no photophobia, no seizures, no sinus pressure, no sore throat, no swollen glands, no syncope, no tingling, no URI, no visual change, no vomiting and no weakness        Prior to Admission Medications   Prescriptions Last Dose Informant Patient Reported? Taking? Abatacept 125 MG/ML SOAJ  Self Yes No   Sig: Inject under the skin   Patient not taking: Reported on 12/2/2021    Adalimumab 40 MG/0 4ML PNKT  Self Yes No   Sig: Inject 40 mg under the skin every 14 (fourteen) days    Patient not taking: Reported on 12/2/2021    Calcium Carb-Cholecalciferol (CALCIUM 600+D3) 600-200 MG-UNIT TABS  Self Yes No   Sig: take 1 tablet twice a day   Cholecalciferol (VITAMIN D3) 2000 units capsule  Self Yes No   Sig: take 1 tablet po daily     Elastic Bandages & Supports (CARPAL TUNNEL WRIST DELUXE) MISC  Self No No   Sig: by Does not apply route daily at bedtime   Incontinence Supply Disposable (Poise Pad) PADS  Self No No   Sig: by Does not apply route 4 (four) times a day as needed (incontinence)   Methenamine-Sodium Salicylate 533-424 1 MG TABS  Self No No   Sig: Take 1 tablet by mouth 3 (three) times a day   Patient not taking: Reported on 8/8/2021   Multiple Vitamins-Minerals (MULTIVITAMIN WITH MINERALS) tablet  Self No No   Sig: Take 1 tablet by mouth daily   acetaminophen (TYLENOL) 650 mg CR tablet   No No   Sig: Take 1 tablet (650 mg total) by mouth every 8 (eight) hours as needed for mild pain or moderate pain   albuterol (PROVENTIL HFA,VENTOLIN HFA) 90 mcg/act inhaler  Self No No   Sig: Inhale 2 puffs every 4 (four) hours as needed for wheezing   celecoxib (CeleBREX) 200 mg capsule  Self Yes No   Sig: Take 200 mg by mouth 2 (two) times a day   citalopram (CeleXA) 20 mg tablet  Self No No   Sig: Take 1 tablet (20 mg total) by mouth daily   doxepin (SINEquan) 25 mg capsule  Self No No   Sig: Take 1 capsule (25 mg total) by mouth daily at bedtime   ergocalciferol (Drisdol) 1 25 MG (72875 UT) capsule  Self Yes No   Sig: Take 50,000 Units by mouth daily in the early morning    famotidine (PEPCID) 20 mg tablet  Self No No   Sig: Take 1 tablet (20 mg total) by mouth 2 (two) times a day   fluticasone (FLONASE) 50 mcg/act nasal spray  Self No No   Si spray into each nostril daily   fluticasone (FLOVENT HFA) 110 MCG/ACT inhaler  Self No No   Sig: Inhale 2 puffs 2 (two) times a day Rinse mouth after use  folic acid (FOLVITE) 1 mg tablet  Self Yes No   Sig: every 24 hours   furosemide (LASIX) 40 mg tablet  Self No No   Sig: Take 1 tablet (40 mg total) by mouth daily   gabapentin (NEURONTIN) 100 mg capsule   No No   Sig: Take 1 capsule (100 mg total) by mouth 3 (three) times a day for 14 days   leflunomide (ARAVA) 20 MG tablet  Self Yes No   Sig: Take 20 mg by mouth daily   levETIRAcetam (KEPPRA) 1000 MG tablet  Self No No   Sig: Take 1 tablet (1,000 mg total) by mouth 2 (two) times a day   losartan (COZAAR) 25 mg tablet   No No   Sig: Take 1 tablet (25 mg total) by mouth daily at bedtime   methylPREDNISolone (MEDROL) 32 MG tablet   No No   Sig: Take one (1) tablet 12 hours and one (1) tablet  2 hours PRIOR to CT scan     Patient not taking: Reported on 2021    metoprolol tartrate (LOPRESSOR) 25 mg tablet  Self No No   Sig: Take 1 tablet (25 mg total) by mouth every 12 (twelve) hours   naproxen (NAPROSYN) 500 mg tablet   No No   Sig: Take 1 tablet (500 mg total) by mouth 2 (two) times a day with meals   Patient not taking: Reported on 2021    nitrofurantoin (MACROBID) 100 mg capsule   No No   Sig: Take 1 capsule (100 mg total) by mouth 2 (two) times a day   nitroglycerin (NITROSTAT) 0 4 mg SL tablet  Self No No   Sig: Place 1 tablet (0 4 mg total) under the tongue every 5 (five) minutes as needed for chest pain Call 911 if using 3 doses   ondansetron (Zofran ODT) 8 mg disintegrating tablet   No No   Sig: Take 1 tablet (8 mg total) by mouth every 8 (eight) hours as needed for nausea or vomiting   polyethylene glycol (GLYCOLAX) 17 GM/SCOOP powder  Self No No   Sig: Take 17 g by mouth daily   predniSONE 5 mg tablet  Self Yes No   Sig: Take by mouth daily   rizatriptan (MAXALT) 5 MG tablet   No No   Sig: Take 1 tablet (5 mg total) by mouth once as needed for migraine for up to 1 dose May repeat in 2 hours if needed   traMADol (ULTRAM) 50 mg tablet  Self No No   Sig: Take 1 tablet (50 mg total) by mouth every 6 (six) hours as needed for moderate pain   traMADol (ULTRAM) 50 mg tablet   No No   Sig: Take 1 tablet (50 mg total) by mouth every 8 (eight) hours as needed for severe pain for up to 10 doses   Patient not taking: Reported on 2021       Facility-Administered Medications: None       Past Medical History:   Diagnosis Date    Angina at rest [facility])     Anxiety     Asthma     Bipolar 1 disorder [place] [address] )     Chronic kidney disease     Depression     Epilepsy [place] [address] )     Fibroid 2012    Hematuria     History of cardiac cath 2006    Hydronephrosis 2007    Hypertension     Migraine without aura     PTSD (post-traumatic stress disorder)     Rheumatoid arteritis [place] [address] )        Past Surgical History:   Procedure Laterality Date    APPENDECTOMY      CARDIAC CATHETERIZATION  2006    CARDIAC CATHETERIZATION       SECTION  2008    CHOLECYSTECTOMY  2018    IR BIOPSY KIDNEY COLUMBIA KIT NO LATERALITY  2021    KIDNEY SURGERY  2007    TUBAL LIGATION  2008       Family History   Problem Relation Age of Onset    Diabetes Maternal Grandmother     Hypertension Maternal Grandmother     No Known Problems Mother     No Known Problems Sister     No Known Problems Daughter     No Known Problems Paternal Grandmother     No Known Problems Daughter     Breast cancer Neg Hx     Cancer Neg Hx      I have reviewed and agree with the history as documented      E-Cigarette/Vaping    E-Cigarette Use Never User      E-Cigarette/Vaping Substances     Social History     Tobacco Use    Smoking status: Former Smoker     Types: Cigarettes     Quit date: 2007     Years since quitting: 15 1    Smokeless tobacco: Former User    Tobacco comment: 13-14 years ago   Vaping Use    Vaping Use: Never used   Substance Use Topics    Alcohol use: Not Currently    Drug use: Never       Review of Systems   Constitutional: Negative  Negative for chills, diaphoresis, fatigue and fever  HENT: Negative  Negative for congestion, ear pain, hearing loss, postnasal drip, rhinorrhea, sinus pressure and sore throat  Eyes: Positive for blurred vision  Negative for photophobia, pain, discharge, redness and itching  Respiratory: Negative  Negative for apnea, cough, chest tightness, shortness of breath and wheezing  Cardiovascular: Negative for chest pain, palpitations, leg swelling, syncope and near-syncope  Gastrointestinal: Positive for diarrhea  Negative for abdominal pain, nausea and vomiting  Endocrine: Negative  Genitourinary: Negative  Negative for flank pain, frequency and urgency  Musculoskeletal: Negative  Negative for back pain, myalgias, neck pain and neck stiffness  Skin: Negative  Allergic/Immunologic: Negative  Neurological: Positive for headaches  Negative for dizziness, tremors, focal weakness, seizures, syncope, facial asymmetry, speech difficulty, weakness, light-headedness, numbness, paresthesias and loss of balance  Hematological: Negative  All other systems reviewed and are negative  Physical Exam  Physical Exam  Vitals and nursing note reviewed  Constitutional:       General: She is awake  She is not in acute distress  Appearance: She is well-developed  She is obese  She is not ill-appearing, toxic-appearing or diaphoretic  HENT:      Head: Normocephalic and atraumatic  Right Ear: Tympanic membrane and external ear normal       Left Ear: Tympanic membrane and external ear normal       Nose: Nose normal       Mouth/Throat:      Mouth: Mucous membranes are moist       Pharynx: No oropharyngeal exudate     Eyes:      General: Lids are normal  No scleral icterus  Right eye: No discharge  Left eye: No discharge  Extraocular Movements:      Right eye: No nystagmus  Left eye: No nystagmus  Conjunctiva/sclera: Conjunctivae normal       Pupils: Pupils are equal, round, and reactive to light  Neck:      Thyroid: No thyromegaly  Vascular: No JVD  Trachea: No tracheal deviation  Cardiovascular:      Rate and Rhythm: Normal rate and regular rhythm  Heart sounds: Normal heart sounds  No murmur heard  No friction rub  No gallop  Pulmonary:      Effort: Pulmonary effort is normal  No respiratory distress  Breath sounds: Normal breath sounds  No stridor  No wheezing, rhonchi or rales  Chest:      Chest wall: No tenderness  Abdominal:      General: Bowel sounds are normal  There is no distension  Palpations: Abdomen is soft  There is no mass  Tenderness: There is no abdominal tenderness  Hernia: No hernia is present  Musculoskeletal:         General: No tenderness or deformity  Normal range of motion  Cervical back: Normal range of motion and neck supple  No rigidity or tenderness  Lymphadenopathy:      Cervical: No cervical adenopathy  Skin:     General: Skin is warm and dry  Coloration: Skin is not jaundiced or pale  Findings: No bruising, erythema, lesion or rash  Neurological:      General: No focal deficit present  Mental Status: She is alert and oriented to person, place, and time  Cranial Nerves: No cranial nerve deficit  Motor: No weakness or abnormal muscle tone  Coordination: Coordination normal       Gait: Gait (Observed patient walking normally from waiting room to room 28) normal       Deep Tendon Reflexes: Reflexes are normal and symmetric  Reflexes normal    Psychiatric:         Mood and Affect: Mood normal          Behavior: Behavior is cooperative           Vital Signs  ED Triage Vitals [03/07/22 1520]   Temperature Pulse Respirations Blood Pressure SpO2   98 1 °F (36 7 °C) 75 18 155/72 95 %      Temp Source Heart Rate Source Patient Position - Orthostatic VS BP Location FiO2 (%)   Oral Monitor Sitting Right arm --      Pain Score       8           Vitals:    03/07/22 1520 03/07/22 1745   BP: 155/72 142/70   Pulse: 75 70   Patient Position - Orthostatic VS: Sitting Lying         Visual Acuity      ED Medications  Medications   ketorolac (TORADOL) injection 15 mg (15 mg Intravenous Given 3/7/22 1706)   metoclopramide (REGLAN) injection 10 mg (10 mg Intravenous Given 3/7/22 1706)   valproate (DEPACON) 1,000 mg in sodium chloride 0 9 % 50 mL for headache (0 g Intravenous Stopped 3/7/22 1720)       Diagnostic Studies  Results Reviewed     None                 CT head without contrast   Final Result by Bryanna Quintanilla MD (03/07 1654)      No acute intracranial abnormality  Workstation performed: [id number]                    Procedures  Procedures         ED Course  ED Course as of 03/07/22 2115   Mon Mar 07, 2022   1726 Patient updated regarding results of CT exam   Headache is improving                               SBIRT 22yo+      Most Recent Value   SBIRT (25 yo +)    In order to provide better care to our patients, we are screening all of our patients for alcohol and drug use  Would it be okay to ask you these screening questions? No Filed at: 03/07/2022 1557                    MDM  Number of Diagnoses or Management Options  Diagnosis management comments: 40-year-old female presents to the ED with migraine headache  She states this is worse than usual over the last 5 days  Prior to the headache starting she did have a syncopal event at work and is unsure she had her head  She has had no nausea and vomiting but has had some blurred vision intermittently  On exam she is awake and alert no distress  Her neuro exam is normal and she ambulates normally    Secondary to the headache being worse than usual and possible trauma will CT head to rule out intracranial trauma or bleed although this is unlikely given her normal exam   Will also give migraine cocktail and Depacon to try to relieve the headache       Amount and/or Complexity of Data Reviewed  Tests in the radiology section of CPT®: ordered and reviewed        Disposition  Final diagnoses:   Migraine without status migrainosus, not intractable, unspecified migraine type     Time reflects when diagnosis was documented in both MDM as applicable and the Disposition within this note     Time User Action Codes Description Comment    3/7/2022  5:33 PM Aysha Rexángel HIMA Add [G43 909] Migraine without status migrainosus, not intractable, unspecified migraine type       ED Disposition     ED Disposition Condition Date/Time Comment    Discharge Good Mon Mar 7, 2022  5:33 PM Kimmie Oar discharge to home/self care              Follow-up Information     Follow up With Specialties Details Why Contact Info    RUPERT Ortega Nurse Practitioner Schedule an appointment as soon as possible for a visit in 2 days If symptoms worsen [address] [facility]  [address] [facility]  [phone]            Discharge Medication List as of 3/7/2022  5:35 PM      START taking these medications    Details   butalbital-acetaminophen-caffeine (FIORICET,ESGIC) -40 mg per tablet Take 1 tablet by mouth every 4 (four) hours as needed for headaches or migraine for up to 10 days, Starting Mon 3/7/2022, Until Thu 3/17/2022 at 2359, Normal         CONTINUE these medications which have NOT CHANGED    Details   Abatacept 125 MG/ML SOAJ Inject under the skin, Historical Med      acetaminophen (TYLENOL) 650 mg CR tablet Take 1 tablet (650 mg total) by mouth every 8 (eight) hours as needed for mild pain or moderate pain, Starting Tue 11/16/2021, Normal      Adalimumab 40 MG/0 4ML PNKT Inject 40 mg under the skin every 14 (fourteen) days , Starting Wed 1/20/2021, Historical Med      albuterol (PROVENTIL HFA,VENTOLIN HFA) 90 mcg/act inhaler Inhale 2 puffs every 4 (four) hours as needed for wheezing, Starting Sun 12/29/2019, Print      Calcium Carb-Cholecalciferol (CALCIUM 600+D3) 600-200 MG-UNIT TABS take 1 tablet twice a day, Historical Med      celecoxib (CeleBREX) 200 mg capsule Take 200 mg by mouth 2 (two) times a day, Historical Med      Cholecalciferol (VITAMIN D3) 2000 units capsule take 1 tablet po daily  , Historical Med      citalopram (CeleXA) 20 mg tablet Take 1 tablet (20 mg total) by mouth daily, Starting Wed 3/3/2021, Normal      doxepin (SINEquan) 25 mg capsule Take 1 capsule (25 mg total) by mouth daily at bedtime, Starting Wed 3/3/2021, Normal      Elastic Bandages & Supports (CARPAL TUNNEL WRIST DELUXE) MISC by Does not apply route daily at bedtime, Starting Fri 8/2/2019, Print      ergocalciferol (Drisdol) 1 25 MG (83382 UT) capsule Take 50,000 Units by mouth daily in the early morning , Starting Wed 4/21/2021, Until Thu 12/2/2021, Historical Med      famotidine (PEPCID) 20 mg tablet Take 1 tablet (20 mg total) by mouth 2 (two) times a day, Starting Mon 6/8/2020, Normal      fluticasone (FLONASE) 50 mcg/act nasal spray 1 spray into each nostril daily, Starting Wed 3/3/2021, Normal      fluticasone (FLOVENT HFA) 110 MCG/ACT inhaler Inhale 2 puffs 2 (two) times a day Rinse mouth after use , Starting Wed 0/1/4772, Normal      folic acid (FOLVITE) 1 mg tablet every 24 hours, Starting Wed 1/27/2016, Historical Med      furosemide (LASIX) 40 mg tablet Take 1 tablet (40 mg total) by mouth daily, Starting Wed 3/3/2021, Normal      gabapentin (NEURONTIN) 100 mg capsule Take 1 capsule (100 mg total) by mouth 3 (three) times a day for 14 days, Starting Tue 7/6/2021, Until Thu 12/2/2021, Normal      Incontinence Supply Disposable (Poise Pad) PADS by Does not apply route 4 (four) times a day as needed (incontinence), Starting Thu 10/15/2020, Normal      leflunomide (ARAVA) 20 MG tablet Take 20 mg by mouth daily, Historical Med      levETIRAcetam (KEPPRA) 1000 MG tablet Take 1 tablet (1,000 mg total) by mouth 2 (two) times a day, Starting Wed 11/18/2020, Normal      losartan (COZAAR) 25 mg tablet Take 1 tablet (25 mg total) by mouth daily at bedtime, Starting Tue 8/10/2021, Normal      Methenamine-Sodium Salicylate 243-324 6 MG TABS Take 1 tablet by mouth 3 (three) times a day, Starting Tue 11/24/2020, Normal      methylPREDNISolone (MEDROL) 32 MG tablet Take one (1) tablet 12 hours and one (1) tablet  2 hours PRIOR to CT scan , Normal      metoprolol tartrate (LOPRESSOR) 25 mg tablet Take 1 tablet (25 mg total) by mouth every 12 (twelve) hours, Starting Wed 11/18/2020, Normal      Multiple Vitamins-Minerals (MULTIVITAMIN WITH MINERALS) tablet Take 1 tablet by mouth daily, Starting Fri 8/2/2019, Normal      naproxen (NAPROSYN) 500 mg tablet Take 1 tablet (500 mg total) by mouth 2 (two) times a day with meals, Starting Tue 7/6/2021, Normal      nitrofurantoin (MACROBID) 100 mg capsule Take 1 capsule (100 mg total) by mouth 2 (two) times a day, Starting Tue 11/30/2021, Normal      nitroglycerin (NITROSTAT) 0 4 mg SL tablet Place 1 tablet (0 4 mg total) under the tongue every 5 (five) minutes as needed for chest pain Call 911 if using 3 doses, Starting Thu 10/15/2020, Normal      ondansetron (Zofran ODT) 8 mg disintegrating tablet Take 1 tablet (8 mg total) by mouth every 8 (eight) hours as needed for nausea or vomiting, Starting Thu 9/23/2021, Normal      polyethylene glycol (GLYCOLAX) 17 GM/SCOOP powder Take 17 g by mouth daily, Starting Thu 10/15/2020, Normal      predniSONE 5 mg tablet Take by mouth daily, Historical Med      rizatriptan (MAXALT) 5 MG tablet Take 1 tablet (5 mg total) by mouth once as needed for migraine for up to 1 dose May repeat in 2 hours if needed, Starting Tue 6/1/2021, Normal      !! traMADol (ULTRAM) 50 mg tablet Take 1 tablet (50 mg total) by mouth every 6 (six) hours as needed for moderate pain, Starting Fri 4/9/2021, Normal      !! traMADol (ULTRAM) 50 mg tablet Take 1 tablet (50 mg total) by mouth every 8 (eight) hours as needed for severe pain for up to 10 doses, Starting Sun 8/22/2021, Normal       !! - Potential duplicate medications found  Please discuss with provider  No discharge procedures on file      PDMP Review       Value Time User    PDMP Reviewed  Yes 4/9/2021 11:58 AM Hemant Godwin PA-C          ED Provider  Electronically Signed by           Thelma Flores DO  03/07/22 8733

## 2022-10-04 ENCOUNTER — VBI (OUTPATIENT)
Dept: ADMINISTRATIVE | Facility: OTHER | Age: 42
End: 2022-10-04

## 2022-12-29 ENCOUNTER — VBI (OUTPATIENT)
Dept: ADMINISTRATIVE | Facility: OTHER | Age: 42
End: 2022-12-29